# Patient Record
Sex: FEMALE | Race: WHITE | NOT HISPANIC OR LATINO | Employment: UNEMPLOYED | URBAN - NONMETROPOLITAN AREA
[De-identification: names, ages, dates, MRNs, and addresses within clinical notes are randomized per-mention and may not be internally consistent; named-entity substitution may affect disease eponyms.]

---

## 2017-05-15 ENCOUNTER — APPOINTMENT (OUTPATIENT)
Dept: LAB | Facility: HOSPITAL | Age: 22
End: 2017-05-15
Payer: COMMERCIAL

## 2017-05-15 ENCOUNTER — TRANSCRIBE ORDERS (OUTPATIENT)
Dept: ADMINISTRATIVE | Facility: HOSPITAL | Age: 22
End: 2017-05-15

## 2017-05-15 DIAGNOSIS — O20.9 ANTEPARTUM BLEEDING, FIRST TRIMESTER: Primary | ICD-10-CM

## 2017-05-15 DIAGNOSIS — O20.9 ANTEPARTUM BLEEDING, FIRST TRIMESTER: ICD-10-CM

## 2017-05-15 DIAGNOSIS — O26.859 SPOTTING COMPLICATING PREGNANCY, UNSPECIFIED TRIMESTER: ICD-10-CM

## 2017-05-15 LAB
ABO GROUP BLD: NORMAL
B-HCG SERPL-ACNC: 102 MIU/ML
BLD GP AB SCN SERPL QL: NEGATIVE
RH BLD: POSITIVE
SPECIMEN EXPIRATION DATE: NORMAL

## 2017-05-15 PROCEDURE — 36415 COLL VENOUS BLD VENIPUNCTURE: CPT

## 2017-05-15 PROCEDURE — 84702 CHORIONIC GONADOTROPIN TEST: CPT

## 2017-05-15 PROCEDURE — 86900 BLOOD TYPING SEROLOGIC ABO: CPT

## 2017-05-15 PROCEDURE — 86901 BLOOD TYPING SEROLOGIC RH(D): CPT

## 2017-05-15 PROCEDURE — 86850 RBC ANTIBODY SCREEN: CPT

## 2017-05-17 ENCOUNTER — APPOINTMENT (OUTPATIENT)
Dept: LAB | Facility: CLINIC | Age: 22
End: 2017-05-17
Payer: COMMERCIAL

## 2017-05-17 DIAGNOSIS — O20.9 ANTEPARTUM BLEEDING, FIRST TRIMESTER: ICD-10-CM

## 2017-05-17 DIAGNOSIS — O26.859 SPOTTING COMPLICATING PREGNANCY, UNSPECIFIED TRIMESTER: ICD-10-CM

## 2017-05-17 LAB — B-HCG SERPL-ACNC: 41 MIU/ML

## 2017-05-17 PROCEDURE — 84702 CHORIONIC GONADOTROPIN TEST: CPT

## 2017-05-17 PROCEDURE — 36415 COLL VENOUS BLD VENIPUNCTURE: CPT

## 2017-08-11 ENCOUNTER — HOSPITAL ENCOUNTER (EMERGENCY)
Facility: HOSPITAL | Age: 22
End: 2017-08-12
Attending: EMERGENCY MEDICINE | Admitting: EMERGENCY MEDICINE
Payer: COMMERCIAL

## 2017-08-11 DIAGNOSIS — T50.902A SUICIDE ATTEMPT BY DRUG INGESTION (HCC): Primary | ICD-10-CM

## 2017-08-11 LAB
ALBUMIN SERPL BCP-MCNC: 3.5 G/DL (ref 3.5–5)
ALP SERPL-CCNC: 40 U/L (ref 46–116)
ALT SERPL W P-5'-P-CCNC: 20 U/L (ref 12–78)
AMPHETAMINES SERPL QL SCN: NEGATIVE
ANION GAP SERPL CALCULATED.3IONS-SCNC: 10 MMOL/L (ref 4–13)
APAP SERPL-MCNC: <2 UG/ML (ref 10–30)
AST SERPL W P-5'-P-CCNC: 13 U/L (ref 5–45)
BARBITURATES UR QL: NEGATIVE
BASOPHILS # BLD AUTO: 0.03 THOUSANDS/ΜL (ref 0–0.1)
BASOPHILS NFR BLD AUTO: 0 % (ref 0–1)
BENZODIAZ UR QL: NEGATIVE
BILIRUB DIRECT SERPL-MCNC: 0.13 MG/DL (ref 0–0.2)
BILIRUB SERPL-MCNC: 0.3 MG/DL (ref 0.2–1)
BILIRUB UR QL STRIP: NEGATIVE
BUN SERPL-MCNC: 10 MG/DL (ref 5–25)
CALCIUM SERPL-MCNC: 8.6 MG/DL (ref 8.3–10.1)
CHLORIDE SERPL-SCNC: 105 MMOL/L (ref 100–108)
CLARITY UR: CLEAR
CO2 SERPL-SCNC: 24 MMOL/L (ref 21–32)
COCAINE UR QL: NEGATIVE
COLOR UR: YELLOW
CREAT SERPL-MCNC: 0.87 MG/DL (ref 0.6–1.3)
EOSINOPHIL # BLD AUTO: 0.14 THOUSAND/ΜL (ref 0–0.61)
EOSINOPHIL NFR BLD AUTO: 1 % (ref 0–6)
ERYTHROCYTE [DISTWIDTH] IN BLOOD BY AUTOMATED COUNT: 12.4 % (ref 11.6–15.1)
ETHANOL SERPL-MCNC: <3 MG/DL (ref 0–3)
GFR SERPL CREATININE-BSD FRML MDRD: 94 ML/MIN/1.73SQ M
GLUCOSE SERPL-MCNC: 94 MG/DL (ref 65–140)
GLUCOSE UR STRIP-MCNC: NEGATIVE MG/DL
HCG UR QL: NEGATIVE
HCT VFR BLD AUTO: 39 % (ref 34.8–46.1)
HGB BLD-MCNC: 13.6 G/DL (ref 11.5–15.4)
HGB UR QL STRIP.AUTO: NEGATIVE
INR PPP: 0.95 (ref 0.86–1.16)
KETONES UR STRIP-MCNC: NEGATIVE MG/DL
LEUKOCYTE ESTERASE UR QL STRIP: NEGATIVE
LIPASE SERPL-CCNC: 51 U/L (ref 73–393)
LYMPHOCYTES # BLD AUTO: 1.77 THOUSANDS/ΜL (ref 0.6–4.47)
LYMPHOCYTES NFR BLD AUTO: 18 % (ref 14–44)
MCH RBC QN AUTO: 32.3 PG (ref 26.8–34.3)
MCHC RBC AUTO-ENTMCNC: 34.9 G/DL (ref 31.4–37.4)
MCV RBC AUTO: 93 FL (ref 82–98)
METHADONE UR QL: NEGATIVE
MONOCYTES # BLD AUTO: 0.48 THOUSAND/ΜL (ref 0.17–1.22)
MONOCYTES NFR BLD AUTO: 5 % (ref 4–12)
NEUTROPHILS # BLD AUTO: 7.31 THOUSANDS/ΜL (ref 1.85–7.62)
NEUTS SEG NFR BLD AUTO: 76 % (ref 43–75)
NITRITE UR QL STRIP: NEGATIVE
OPIATES UR QL SCN: NEGATIVE
PCP UR QL: NEGATIVE
PH UR STRIP.AUTO: 7.5 [PH] (ref 4.5–8)
PLATELET # BLD AUTO: 254 THOUSANDS/UL (ref 149–390)
PMV BLD AUTO: 10.1 FL (ref 8.9–12.7)
POTASSIUM SERPL-SCNC: 3.9 MMOL/L (ref 3.5–5.3)
PROT SERPL-MCNC: 6.9 G/DL (ref 6.4–8.2)
PROT UR STRIP-MCNC: NEGATIVE MG/DL
PROTHROMBIN TIME: 12.6 SECONDS (ref 12.1–14.4)
RBC # BLD AUTO: 4.21 MILLION/UL (ref 3.81–5.12)
SALICYLATES SERPL-MCNC: <3 MG/DL (ref 3–20)
SODIUM SERPL-SCNC: 139 MMOL/L (ref 136–145)
SP GR UR STRIP.AUTO: 1.01 (ref 1–1.03)
THC UR QL: POSITIVE
UROBILINOGEN UR QL STRIP.AUTO: 0.2 E.U./DL
WBC # BLD AUTO: 9.73 THOUSAND/UL (ref 4.31–10.16)

## 2017-08-11 PROCEDURE — 96360 HYDRATION IV INFUSION INIT: CPT

## 2017-08-11 PROCEDURE — 80076 HEPATIC FUNCTION PANEL: CPT | Performed by: EMERGENCY MEDICINE

## 2017-08-11 PROCEDURE — 83690 ASSAY OF LIPASE: CPT | Performed by: EMERGENCY MEDICINE

## 2017-08-11 PROCEDURE — 93005 ELECTROCARDIOGRAM TRACING: CPT

## 2017-08-11 PROCEDURE — 81025 URINE PREGNANCY TEST: CPT | Performed by: EMERGENCY MEDICINE

## 2017-08-11 PROCEDURE — G0480 DRUG TEST DEF 1-7 CLASSES: HCPCS | Performed by: EMERGENCY MEDICINE

## 2017-08-11 PROCEDURE — 81003 URINALYSIS AUTO W/O SCOPE: CPT | Performed by: EMERGENCY MEDICINE

## 2017-08-11 PROCEDURE — 80320 DRUG SCREEN QUANTALCOHOLS: CPT | Performed by: EMERGENCY MEDICINE

## 2017-08-11 PROCEDURE — 80307 DRUG TEST PRSMV CHEM ANLYZR: CPT | Performed by: EMERGENCY MEDICINE

## 2017-08-11 PROCEDURE — 80048 BASIC METABOLIC PNL TOTAL CA: CPT | Performed by: EMERGENCY MEDICINE

## 2017-08-11 PROCEDURE — 85025 COMPLETE CBC W/AUTO DIFF WBC: CPT | Performed by: EMERGENCY MEDICINE

## 2017-08-11 PROCEDURE — 80329 ANALGESICS NON-OPIOID 1 OR 2: CPT | Performed by: EMERGENCY MEDICINE

## 2017-08-11 PROCEDURE — 85610 PROTHROMBIN TIME: CPT | Performed by: EMERGENCY MEDICINE

## 2017-08-11 PROCEDURE — 36415 COLL VENOUS BLD VENIPUNCTURE: CPT | Performed by: EMERGENCY MEDICINE

## 2017-08-11 RX ORDER — LORAZEPAM 1 MG/1
TABLET ORAL
Status: COMPLETED
Start: 2017-08-11 | End: 2017-08-11

## 2017-08-11 RX ORDER — LORAZEPAM 0.5 MG/1
0.5 TABLET ORAL ONCE
Status: COMPLETED | OUTPATIENT
Start: 2017-08-11 | End: 2017-08-11

## 2017-08-11 RX ORDER — NICOTINE 21 MG/24HR
21 PATCH, TRANSDERMAL 24 HOURS TRANSDERMAL ONCE
Status: DISCONTINUED | OUTPATIENT
Start: 2017-08-11 | End: 2017-08-12 | Stop reason: HOSPADM

## 2017-08-11 RX ORDER — LORAZEPAM 0.5 MG/1
0.5 TABLET ORAL ONCE
Status: DISCONTINUED | OUTPATIENT
Start: 2017-08-11 | End: 2017-08-11

## 2017-08-11 RX ORDER — LORAZEPAM 1 MG/1
2 TABLET ORAL ONCE
Status: COMPLETED | OUTPATIENT
Start: 2017-08-11 | End: 2017-08-11

## 2017-08-11 RX ADMIN — LORAZEPAM 0.5 MG: 1 TABLET ORAL at 20:25

## 2017-08-11 RX ADMIN — SODIUM CHLORIDE 1000 ML: 0.9 INJECTION, SOLUTION INTRAVENOUS at 16:30

## 2017-08-11 RX ADMIN — LORAZEPAM 2 MG: 1 TABLET ORAL at 23:02

## 2017-08-11 RX ADMIN — NICOTINE 21 MG: 21 PATCH, EXTENDED RELEASE TRANSDERMAL at 20:24

## 2017-08-11 RX ADMIN — LORAZEPAM 0.5 MG: 0.5 TABLET ORAL at 20:25

## 2017-08-12 VITALS
OXYGEN SATURATION: 99 % | DIASTOLIC BLOOD PRESSURE: 81 MMHG | HEART RATE: 77 BPM | RESPIRATION RATE: 15 BRPM | TEMPERATURE: 95.8 F | SYSTOLIC BLOOD PRESSURE: 128 MMHG

## 2017-08-12 PROCEDURE — 99285 EMERGENCY DEPT VISIT HI MDM: CPT

## 2017-08-14 LAB
ATRIAL RATE: 89 BPM
P AXIS: 60 DEGREES
PR INTERVAL: 130 MS
QRS AXIS: 61 DEGREES
QRSD INTERVAL: 82 MS
QT INTERVAL: 352 MS
QTC INTERVAL: 428 MS
T WAVE AXIS: 47 DEGREES
VENTRICULAR RATE: 89 BPM

## 2017-08-23 ENCOUNTER — GENERIC CONVERSION - ENCOUNTER (OUTPATIENT)
Dept: OTHER | Facility: OTHER | Age: 22
End: 2017-08-23

## 2017-08-24 ENCOUNTER — ALLSCRIPTS OFFICE VISIT (OUTPATIENT)
Dept: OTHER | Facility: OTHER | Age: 22
End: 2017-08-24

## 2017-09-21 ENCOUNTER — ALLSCRIPTS OFFICE VISIT (OUTPATIENT)
Dept: OTHER | Facility: OTHER | Age: 22
End: 2017-09-21

## 2018-01-11 NOTE — RESULT NOTES
Verified Results  (1) CBC/PLT/DIFF 07Apr2016 12:20PM Gabby Barnett Order Number: [id number]    TW Order Number: [id number]     Test Name Result Flag Reference   WBC COUNT 8 44 Thousand/uL  4 31-10 16   RBC COUNT 4 52 Million/uL  3 81-5 12   HEMOGLOBIN 13 8 g/dL  11 5-15 4   HEMATOCRIT 40 9 %  34 8-46  1   MCV 91 fL  82-98   MCH 30 5 pg  26 8-34 3   MCHC 33 7 g/dL  31 4-37 4   RDW 12 3 %  11 6-15 1   MPV 10 7 fL  8 9-12 7   PLATELET COUNT 199 Thousands/uL  149-390   NEUTROPHILS RELATIVE PERCENT 63 %  43-75   LYMPHOCYTES RELATIVE PERCENT 24 %  14-44   MONOCYTES RELATIVE PERCENT 8 %  4-12   EOSINOPHILS RELATIVE PERCENT 4 %  0-6   BASOPHILS RELATIVE PERCENT 1 %  0-1   NEUTROPHILS ABSOLUTE COUNT 5 40 Thousands/µL  1 85-7 62   LYMPHOCYTES ABSOLUTE COUNT 2 03 Thousands/µL  0 60-4 47   MONOCYTES ABSOLUTE COUNT 0 66 Thousand/µL  0 17-1 22   EOSINOPHILS ABSOLUTE COUNT 0 31 Thousand/µL  0 00-0 61   BASOPHILS ABSOLUTE COUNT 0 04 Thousands/µL  0 00-0 10     (1) COMPREHENSIVE METABOLIC PANEL [id number] 98:06RB Perez Arora    Order Number: [id number]      National Kidney Disease Education Program recommendations are as follows:  GFR calculation is accurate only with a steady state creatinine  Chronic Kidney disease less than 60 ml/min/1 73 sq  meters  Kidney failure less than 15 ml/min/1 73 sq  meters  Test Name Result Flag Reference   GLUCOSE,RANDM 98 mg/dL     If the patient is fasting, the ADA then defines impaired fasting glucose as > 100 mg/dL and diabetes as > or equal to 123 mg/dL     SODIUM 141 mmol/L  136-145   POTASSIUM 4 8 mmol/L  3 5-5 3   CHLORIDE 106 mmol/L  100-108   CARBON DIOXIDE 24 mmol/L  21-32   ANION GAP (CALC) 11 mmol/L  4-13   BLOOD UREA NITROGEN 13 mg/dL  5-25   CREATININE 0 73 mg/dL  0 60-1 30   Standardized to IDMS reference method   CALCIUM 9 0 mg/dL  8 3-10 1   BILI, TOTAL 0 11 mg/dL L 0 20-1 00   ALK PHOSPHATAS 57 U/L     ALT (SGPT) 26 U/L  12-78   AST(SGOT) 12 U/L 5-45   ALBUMIN 3 7 g/dL  3 5-5 0   TOTAL PROTEIN 7 0 g/dL  6 4-8 2   eGFR Non-African American      >60 0 ml/min/1 73sq m     (1) LIPID PANEL, FASTING 07Apr2016 12:20PM [employer] Order Number: [id number]      Triglyceride:         Normal              <150 mg/dl       Borderline High    150-199 mg/dl       High               200-499 mg/dl       Very High          >499 mg/dl  Cholesterol:         Desirable        <200 mg/dl      Borderline High  200-239 mg/dl      High             >239 mg/dl  HDL Cholesterol:        High    >59 mg/dL      Low     <41 mg/dL     Test Name Result Flag Reference   CHOLESTEROL 170 mg/dL     HDL,DIRECT 34 mg/dL L 40-60   Specimen collection should occur prior to Metamizole administration due to the potential for falsely depressed results  LDL CHOLESTEROL CALCULATED 123 mg/dL H 0-100   TRIGLYCERIDES 67 mg/dL  <=150   Specimen collection should occur prior to N-Acetylcysteine or Metamizole administration due to the potential for falsely depressed results  (1) PREGNANCY TEST (HCG QUALITATIVE) 07Apr2016 12:20PM [employer] Order Number: [id number]     Test Name Result Flag Reference   PREGNANCY TEST Negative  Negative     (1) TSH 07Apr2016 12:20PM [employer] Order Number: [id number]    Patients undergoing fluorescein dye angiography may retain small amounts of fluorescein in the body for 48-72 hours post procedure  Samples containing fluorescein can produce falsely depressed TSH values  If the patient had this procedure,a specimen should be resubmitted post fluorescein clearance          The recommended reference ranges for TSH during pregnancy are as follows:  First trimester 0 1 to 2 5 uIU/mL  Second trimester  0 2 to 3 0 uIU/mL  Third trimester 0 3 to 3 0 uIU/m     Test Name Result Flag Reference   TSH 1 301 uIU/mL  0 358-3 740     (1) URINALYSIS w URINE C/S REFLEX (will reflex a microscopy if leukocytes, occult blood, or nitrites are not within normal limits) 07Apr2016 12:20PM Cj Davila Order Number: [id number]     Test Name Result Flag Reference   COLOR Yellow     CLARITY Slightly Cloudy     PH UA 6 0  4 5-8 0   LEUKOCYTE ESTERASE UA Negative  Negative   NITRITE UA Negative  Negative   PROTEIN UA Negative mg/dl  Negative   GLUCOSE UA Negative mg/dl  Negative   KETONES UA Trace mg/dl A Negative   UROBILINOGEN UA 0 2 E U /dl  0 2, 1 0 E U /dl   BILIRUBIN UA Negative  Negative   BLOOD UA Negative  Negative, Trace-Intact   SPECIFIC GRAVITY UA 1 025  1 003-1 030

## 2018-01-13 VITALS
DIASTOLIC BLOOD PRESSURE: 78 MMHG | HEIGHT: 72 IN | BODY MASS INDEX: 35.08 KG/M2 | SYSTOLIC BLOOD PRESSURE: 130 MMHG | WEIGHT: 259 LBS

## 2018-01-22 VITALS
SYSTOLIC BLOOD PRESSURE: 120 MMHG | DIASTOLIC BLOOD PRESSURE: 70 MMHG | BODY MASS INDEX: 34.95 KG/M2 | HEIGHT: 72 IN | WEIGHT: 258 LBS

## 2018-01-23 NOTE — MISCELLANEOUS
Assessment   1  Current smoker (305 1) (F17 200)1   2  Depression (311) (F32 9)1      1 Amended By: Shruthi Murphy; Aug 24 2017 12:22 PM EST    Plan  Depression    · Start: Sertraline HCl - 50 MG Oral Tablet; TAKE 1 TABLET BY MOUTH ONCE DAILY1   Rx By: Shruthi Murphy; Dispense: 30 Days ; #:30 Tablet; Refill: 5;For: Depression; MIKAELA =   N;1 1,2  Verified Transmission to [address] 1,2  Last Updated By: System, [employer]; 8/24/2017 12:24:51 PM2    · Follow-up visit in 3 weeks Evaluation and Treatment  Follow-up  Status: Hold For -  Scheduling  Requested for: [id number]   Ordered; For: Depression;  Ordered By: Shruthi Murphy  Performed:   Due: [id number]      1 Amended By: Shruthi Murphy; Aug 24 2017 12:22 PM EST   2 Amended By: Shruthi Murphy; Aug 24 2017 12:25 PM EST    Discussion/Summary  Discussion Summary: To help her shut her mind down when she wants to rest,we will add sertraline 50 mg at bedtime  Follow-up with therapist as scheduled  Follow-up here in 2-3 weeks or when necessary1    Medication SE Review and Pt Understands Tx: Possible side effects of new medications were reviewed with the patient/guardian today1  The treatment plan was reviewed with the patient/guardian  The patient/guardian understands and agrees with the treatment plan1        1 Amended By: Shruthi Murphy; Aug 24 2017 12:25 PM EST    Chief Complaint  Chief Complaint Free Text Note Form: TOC1    Chief Complaint Chronic Condition [facility]: Patient is here today for follow up of chronic conditions described in HPI 1        1 Amended By: Shruthi Murphy; Aug 24 2017 12:26 PM EST    History of Present Illness  TCM Communication [facility]: The patient is being contacted for follow-up after hospitalization and EMMY  8-24-17 1215 1  PM  PT CALLED TO RESCHEDULE 1  APPT  Hospital  records were reviewed2  2   She was hospitalized at [facility]  The date of admission: 8-12-17, date of discharge: 8-19-17   Diagnosis: DEPRESSION WITH SUICIDAL IDEATION  She was discharged to home  She did not schedule a follow up appointment  Follow-up appointments with other specialists: PSYCHIATRY AND THERAPEUTIC CONSULT APPT 8-30-17 1200 PM AT [address]  The patient is currently asymptomatic  Communication performed and completed by Kiara Sifuentes   HPI: PT WAS ADMITTED TO [facility] WITH A SUICIDE ATTEMPT 2 WEEKS PRIOR TO ADMISSION  PT HAD A POSITIVE SCREEN OF MARIJUANA IN SYSTEM AND HAD CBC AND CMP WHAT WERE NORMAL  PT ALSO HAD AN EKG WHICH WAS NORMAL AT TIME OF ADMISSION  PT WAS COMPLAINING OF INSOMNIA AND WAS PLACED ON TRAZADONE 50 AT BEDTIME  WELLBUTRIN 100  WAS TAKEN ON A REGULAR BASIS ALONG WITH PROPANOLOL 10 FOR ANXIETY REASONS  PT RECEIVED THERAPY WHILE ADMITTED WITH PSYCH AND WAS DISCHARGED WITH AN APPT TO [address] [place] [address] WITH ACCESS SERVICES ON 8-30-17  patient here today for EMMY  Patient was admitted to the psychiatric floor due to depression and suicidal ideation  Patient now on bupropion and propranolol  She feels it is helping with her depression  Patient states trazodone does not help her sleep  Has trouble shutting her mind off  No SI or HI currently  Going to see the therapist on 8/30/20173    Depression (Initial): The patient presents with complaints of moderate depressed mood  Symptoms are improving  3  The patient is being seen for  follow-up of a hospitalization for3  depression3   The patient presents with complaints of moderate depressed mood  Symptoms are improving  3  insomnia3    The patient presents with complaints of suicidal ideation  Symptoms are resolved  3   Associated symptoms: 3  racing thoughts3   She describes this as3  moderate in severity3   Current treatment includes  bupropion, propranolol3  3    By report, there is  good compliance with treatment3  ,  good tolerance of treatment3  and  fair symptom control3  3         1 Amended By: Corine Haddad; Aug 22 2017 11:04 AM EST   2 Amended By: Gwendolyn Mobley; Aug 24 2017 12:22 PM EST   3 Amended By: Gwendolyn Mobley; Aug 24 2017 12:28 PM EST    Review of Systems  Complete-Female:   Constitutional:1  No fever, no chills, feels well, no tiredness, no recent weight gain or weight loss1   Cardiovascular:1  No complaints of slow heart rate, no fast heart rate, no chest pain, no palpitations, no leg claudication, no lower extremity edema1   Respiratory:1  No complaints of shortness of breath, no wheezing, no cough, no SOB on exertion, no orthopnea, no PND1   Gastrointestinal:1  No complaints of abdominal pain, no constipation, no nausea or vomiting, no diarrhea, no bloody stools1   Genitourinary:1  No complaints of dysuria, no incontinence, no pelvic pain, no dysmenorrhea, no vaginal discharge or bleeding1   Psychiatric:1  as noted in Hilda Iniguez   1 Amended By: Gwendolyn Mobley; Aug 24 2017 12:26 PM EST    Active Problems   1  Encounter for screening for cardiovascular disorders (V81 2) (Z13 6)  2  Postural dizziness with presyncope (780 4,780 2) (R42,R55)  3  Redness or discharge of eye (379 93) (H57 8)    Surgical History  Surgical History Reviewed: The surgical history was reviewed and updated today  1        1 Amended By: Gwendolyn Weinberg Aug 24 2017 12:25 PM EST    Family History  Family History Reviewed: The family history was reviewed and updated today  1        1 Amended By: Gwendolyn Weinberg Aug 24 2017 12:25 PM EST    Social History    · Current smoker (305 1) (F17 200)    Social History Reviewed: The social history was reviewed and updated today  1  The social history was reviewed and is unchanged  1        1 Amended By: Gwendolyn Mobley; Aug 24 2017 12:25 PM EST    Current Meds  1  No Reported Medications Recorded  Medication List Reviewed: The medication list was reviewed and updated today  1        1 Amended By: Gwendolyn Weinberg Aug 24 2017 12:25 PM EST    Allergies   1   No Known Drug Allergies    Vitals  Signs   Recorded: 38UMT1094 46:57YX    Systolic: 587 1    Diastolic: 78 1    Height: 6 ft  1    Weight: 259 lb  1    BMI Calculated: 35 13 1    BSA Calculated: 2 38 1      1 Amended By: Josr Gaviria; Aug 24 2017 12:25 PM EST    Physical Exam    Constitutional1    General appearance: No acute distress, well appearing and well nourished  1    Pulmonary1    Respiratory effort: No increased work of breathing or signs of respiratory distress  1    Auscultation of lungs: Clear to auscultation  1    Cardiovascular1    Auscultation of heart: Normal rate and rhythm, normal S1 and S2, without murmurs  1    Examination of extremities for edema and/or varicosities: Normal 1    Psychiatric1    Orientation to person, place, and time: Normal 1    Mood and affect: Normal 1          1 Amended By: Josr Gaviria; Aug 24 2017 12:25 PM EST    Future Appointments    Date/Time Provider Specialty Site   08/22/2017 01:15 PM Josr Gaviria DO Family [facility] [address] [facility]     Signatures   Electronically signed by : Shalini Hlil, ; Aug 21 2017  1:03PM EST                       (Author)    Electronically signed by : Cynthia Roberts DO; Aug 22 2017  6:50AM EST                       (Author)    Electronically signed by : Shalini Hill, ; Aug 24 2017 10:57AM EST                       (Author)    Electronically signed by : Jazmine Dewey DO; Aug 24 2017 12:28PM EST                       (Author)

## 2019-03-15 ENCOUNTER — OFFICE VISIT (OUTPATIENT)
Dept: FAMILY MEDICINE CLINIC | Facility: CLINIC | Age: 24
End: 2019-03-15
Payer: COMMERCIAL

## 2019-03-15 VITALS
BODY MASS INDEX: 38.33 KG/M2 | DIASTOLIC BLOOD PRESSURE: 72 MMHG | WEIGHT: 283 LBS | TEMPERATURE: 97.8 F | HEIGHT: 72 IN | SYSTOLIC BLOOD PRESSURE: 130 MMHG

## 2019-03-15 DIAGNOSIS — K59.1 FUNCTIONAL DIARRHEA: Primary | ICD-10-CM

## 2019-03-15 DIAGNOSIS — R10.84 GENERALIZED ABDOMINAL PAIN: ICD-10-CM

## 2019-03-15 DIAGNOSIS — J06.9 UPPER RESPIRATORY TRACT INFECTION, UNSPECIFIED TYPE: ICD-10-CM

## 2019-03-15 PROCEDURE — 99214 OFFICE O/P EST MOD 30 MIN: CPT | Performed by: FAMILY MEDICINE

## 2019-03-15 PROCEDURE — 3008F BODY MASS INDEX DOCD: CPT | Performed by: FAMILY MEDICINE

## 2019-03-15 RX ORDER — FLUTICASONE PROPIONATE 50 MCG
2 SPRAY, SUSPENSION (ML) NASAL DAILY
Qty: 16 G | Refills: 0 | Status: SHIPPED | OUTPATIENT
Start: 2019-03-15 | End: 2019-06-28 | Stop reason: ALTCHOICE

## 2019-03-15 RX ORDER — HYOSCYAMINE SULFATE 0.125 MG
0.12 TABLET ORAL EVERY 4 HOURS PRN
Qty: 40 TABLET | Refills: 1 | Status: SHIPPED | OUTPATIENT
Start: 2019-03-15 | End: 2019-06-28 | Stop reason: ALTCHOICE

## 2019-03-15 RX ORDER — AZITHROMYCIN 250 MG/1
TABLET, FILM COATED ORAL
Qty: 6 TABLET | Refills: 0 | Status: SHIPPED | OUTPATIENT
Start: 2019-03-15 | End: 2019-03-20

## 2019-03-15 NOTE — LETTER
March 15, 2019     Patient: Maru Ballard   YOB: 1995   Date of Visit: 3/15/2019       To Whom it May Concern:    Maru Ballard is under my professional care  She was seen in my office on 3/15/2019  She may return to work on 3/16/2019  Please excuse from work 3/15/2019 due to URI  If you have any questions or concerns, please don't hesitate to call           Sincerely,          Cathryn Johnson, DO        CC: No Recipients

## 2019-03-15 NOTE — PROGRESS NOTES
Assessment/Plan: For URI, Rx for Flonase and Zithromax  Push fluids  Call symptoms continue or increase  For her abdominal pain and functional diarrhea, Rx for Levsin and referral to GI  She will call us if symptoms continue or increase  No problem-specific Assessment & Plan notes found for this encounter  Diagnoses and all orders for this visit:    Functional diarrhea  -     Ambulatory referral to Gastroenterology; Future  -     hyoscyamine (ANASPAZ,LEVSIN) 0 125 MG tablet; Take 1 tablet (0 125 mg total) by mouth every 4 (four) hours as needed for cramping or diarrhea    Generalized abdominal pain  -     Ambulatory referral to Gastroenterology; Future  -     hyoscyamine (ANASPAZ,LEVSIN) 0 125 MG tablet; Take 1 tablet (0 125 mg total) by mouth every 4 (four) hours as needed for cramping or diarrhea    Upper respiratory tract infection, unspecified type  -     fluticasone (FLONASE) 50 mcg/act nasal spray; 2 sprays into each nostril daily  -     azithromycin (ZITHROMAX) 250 mg tablet; Take 2 tablets today then 1 tablet daily x 4 days          Subjective:      Patient ID: Rosi Guaman is a 20 y o  female  Patient here today with a couple concerns  The 1st is she has had a cold since yesterday  Head congestion postnasal drip coughing up phlegm  Feels hot and cold  No nausea or vomiting  Secondly for the last couple months, patient has had episodes of abdominal pain in the need to run to the bathroom to have a bowel movement  When she does, it is loose  The pain does finally subsided after a while after the bowel movement  Patient has tried to change her diet, increase her fiber  She states this weeks been pretty good  It seems to go every other week  It has he been woken her up out of sleep where she has to go to the bathroom  She has used Imodium, which she states does help  She has not seen any blood the stool  URI    This is a new problem  The current episode started yesterday   The problem has been unchanged  There has been no fever  Associated symptoms include abdominal pain, chest pain, congestion, coughing and diarrhea  Pertinent negatives include no nausea or vomiting  Treatments tried: Over-the-counter cold medicine  The treatment provided mild relief  Diarrhea    This is a new problem  The current episode started more than 1 month ago  The problem has been unchanged  The stool consistency is described as watery  Associated symptoms include abdominal pain, coughing and a URI  Pertinent negatives include no vomiting  Nothing aggravates the symptoms  There are no known risk factors  She has tried anti-motility drug for the symptoms  The treatment provided moderate relief  The following portions of the patient's history were reviewed and updated as appropriate:   She  has a past medical history of Depression  She   Patient Active Problem List    Diagnosis Date Noted    Functional diarrhea 03/15/2019    Generalized abdominal pain 03/15/2019     She  has no past surgical history on file  Her family history includes Thyroid disease in her mother  She  reports that she has been smoking  She uses smokeless tobacco  She reports that she drinks alcohol  She reports that she has current or past drug history  Drug: Marijuana  Current Outpatient Medications   Medication Sig Dispense Refill    azithromycin (ZITHROMAX) 250 mg tablet Take 2 tablets today then 1 tablet daily x 4 days 6 tablet 0    fluticasone (FLONASE) 50 mcg/act nasal spray 2 sprays into each nostril daily 16 g 0    hyoscyamine (ANASPAZ,LEVSIN) 0 125 MG tablet Take 1 tablet (0 125 mg total) by mouth every 4 (four) hours as needed for cramping or diarrhea 40 tablet 1     No current facility-administered medications for this visit  No current outpatient medications on file prior to visit  No current facility-administered medications on file prior to visit  She has No Known Allergies       Review of Systems HENT: Positive for congestion  Respiratory: Positive for cough  Cardiovascular: Positive for chest pain  Gastrointestinal: Positive for abdominal pain and diarrhea  Negative for nausea and vomiting  Objective:      /72   Temp 97 8 °F (36 6 °C)   Ht 6' (1 829 m)   Wt 128 kg (283 lb)   BMI 38 38 kg/m²          Physical Exam   Constitutional: She is oriented to person, place, and time  She appears well-developed and well-nourished  No distress  HENT:   Head: Normocephalic and atraumatic  Right Ear: Tympanic membrane normal    Left Ear: Tympanic membrane normal    Nose: Mucosal edema and rhinorrhea present  Mouth/Throat: Posterior oropharyngeal erythema present  No oropharyngeal exudate  Clear postnasal drip   Eyes: Conjunctivae are normal    Cardiovascular: Normal rate, regular rhythm and normal heart sounds  Exam reveals no gallop and no friction rub  No murmur heard  Pulmonary/Chest: Effort normal and breath sounds normal  No respiratory distress  She has no wheezes  She has no rales  Musculoskeletal: She exhibits no edema  Neurological: She is alert and oriented to person, place, and time  Skin: She is not diaphoretic  Psychiatric: She has a normal mood and affect  Her behavior is normal  Judgment and thought content normal    Vitals reviewed

## 2019-06-28 ENCOUNTER — OFFICE VISIT (OUTPATIENT)
Dept: FAMILY MEDICINE CLINIC | Facility: CLINIC | Age: 24
End: 2019-06-28
Payer: COMMERCIAL

## 2019-06-28 VITALS
DIASTOLIC BLOOD PRESSURE: 72 MMHG | BODY MASS INDEX: 37.84 KG/M2 | WEIGHT: 279.4 LBS | HEIGHT: 72 IN | SYSTOLIC BLOOD PRESSURE: 112 MMHG

## 2019-06-28 DIAGNOSIS — Z12.4 SCREENING FOR CERVICAL CANCER: ICD-10-CM

## 2019-06-28 DIAGNOSIS — N92.1 METRORRHAGIA: ICD-10-CM

## 2019-06-28 DIAGNOSIS — Z00.00 ANNUAL PHYSICAL EXAM: Primary | ICD-10-CM

## 2019-06-28 DIAGNOSIS — N94.10 DYSPAREUNIA IN FEMALE: ICD-10-CM

## 2019-06-28 PROCEDURE — 99395 PREV VISIT EST AGE 18-39: CPT | Performed by: PHYSICIAN ASSISTANT

## 2019-07-05 ENCOUNTER — OFFICE VISIT (OUTPATIENT)
Dept: URGENT CARE | Facility: CLINIC | Age: 24
End: 2019-07-05
Payer: COMMERCIAL

## 2019-07-05 VITALS
RESPIRATION RATE: 20 BRPM | DIASTOLIC BLOOD PRESSURE: 70 MMHG | OXYGEN SATURATION: 99 % | TEMPERATURE: 98.6 F | HEIGHT: 72 IN | HEART RATE: 60 BPM | WEIGHT: 279 LBS | BODY MASS INDEX: 37.79 KG/M2 | SYSTOLIC BLOOD PRESSURE: 140 MMHG

## 2019-07-05 DIAGNOSIS — K08.89 PAIN, DENTAL: Primary | ICD-10-CM

## 2019-07-05 PROCEDURE — 99204 OFFICE O/P NEW MOD 45 MIN: CPT | Performed by: PHYSICIAN ASSISTANT

## 2019-07-05 RX ORDER — AMOXICILLIN 500 MG/1
500 TABLET, FILM COATED ORAL 3 TIMES DAILY
Qty: 21 TABLET | Refills: 0 | Status: SHIPPED | OUTPATIENT
Start: 2019-07-05 | End: 2019-07-12

## 2019-07-05 NOTE — PROGRESS NOTES
[address] [facility]  ([facility]) [phone]  (fax) [phone]        NAME: Tyler Valdez is a 20 y o  female  : 1995    MRN: [id number]  DATE: 2019  TIME: 12:53 PM    Assessment and Plan   Pain, dental [K08 89]  1  Pain, dental  amoxicillin (AMOXIL) 500 MG tablet       Patient Instructions   May alternate Tylenol and Ibuprofen as needed  Encourage fluids and rest    Warm water rinses  Maintain good oral hygiene  Complete course of antibiotics as prescribed  Follow up with Dentist for additional eval as scheduled next week  F/U with PCP if symptoms persist/worsen or go to nearest emergency department if any signs of distress  To present to the ER if symptoms worsen  Chief Complaint     Chief Complaint   Patient presents with    Dental Pain     Has broken tooth R lower with  R sided facial pain    Earache     R ear sounds muffled x 1 day         History of Present Illness   Tyler Valdez presents to the clinic c/o    Has apt with dentist next week  Dental Pain    This is a new problem  The current episode started in the past 7 days  The problem occurs constantly  The problem has been unchanged  The pain is moderate  Associated symptoms include facial pain  Pertinent negatives include no difficulty swallowing, fever, oral bleeding, sinus pressure or thermal sensitivity  She has tried NSAIDs for the symptoms  The treatment provided mild relief  Review of Systems   Review of Systems   Constitutional: Negative for chills, fatigue and fever  HENT: Positive for dental problem  Negative for congestion, ear discharge, ear pain, postnasal drip, rhinorrhea, sinus pressure and sinus pain  Right ear blocked at times   Eyes: Negative for photophobia, pain, discharge, redness and itching  Respiratory: Negative for apnea, cough, choking, chest tightness, shortness of breath and stridor      Cardiovascular: Negative for chest pain, palpitations and leg swelling  Musculoskeletal: Negative for arthralgias  Neurological: Negative for dizziness, weakness and headaches  Hematological: Negative for adenopathy  Current Medications     No long-term medications on file  Current Allergies     Allergies as of 07/05/2019    (No Known Allergies)            The following portions of the patient's history were reviewed and updated as appropriate: allergies, current medications, past family history, past medical history, past social history, past surgical history and problem list   Past Medical History:   Diagnosis Date    Depression      History reviewed  No pertinent surgical history    Social History     Socioeconomic History    Marital status: Single     Spouse name: Not on file    Number of children: Not on file    Years of education: Not on file    Highest education level: Not on file   Occupational History    Not on file   Social Needs    Financial resource strain: Not on file    Food insecurity:     Worry: Not on file     Inability: Not on file    Transportation needs:     Medical: Not on file     Non-medical: Not on file   Tobacco Use    Smoking status: Current Every Day Smoker    Smokeless tobacco: Never Used   Substance and Sexual Activity    Alcohol use: Not Currently     Frequency: Monthly or less     Drinks per session: 1 or 2     Binge frequency: Never     Comment: rare    Drug use: Yes     Types: Marijuana    Sexual activity: Not on file   Lifestyle    Physical activity:     Days per week: Not on file     Minutes per session: Not on file    Stress: Not on file   Relationships    Social connections:     Talks on phone: Not on file     Gets together: Not on file     Attends Latter day service: Not on file     Active member of club or organization: Not on file     Attends meetings of clubs or organizations: Not on file     Relationship status: Not on file    Intimate partner violence:     Fear of current or ex partner: Not on file     Emotionally abused: Not on file     Physically abused: Not on file     Forced sexual activity: Not on file   Other Topics Concern    Not on file   Social History Narrative    Not on file       Objective   /70 (BP Location: Right arm, Patient Position: Sitting, Cuff Size: Large)   Pulse 60   Temp 98 6 °F (37 °C) (Tympanic)   Resp 20   Ht 6' (1 829 m)   Wt 127 kg (279 lb)   LMP 06/12/2019   SpO2 99%   BMI 37 84 kg/m²      Physical Exam     Physical Exam   Constitutional: She appears well-developed and well-nourished  No distress  HENT:   Head: Normocephalic and atraumatic  Right Ear: Tympanic membrane and external ear normal  No drainage, swelling or tenderness  No middle ear effusion  Left Ear: Tympanic membrane and external ear normal  No drainage, swelling or tenderness  No middle ear effusion  Nose: Nose normal    Mouth/Throat: Oropharynx is clear and moist  Dental caries present  No dental abscesses  No oropharyngeal exudate  Eyes: Conjunctivae are normal  Right eye exhibits no discharge  Left eye exhibits no discharge  Cardiovascular: Normal rate and regular rhythm  Exam reveals no gallop and no friction rub  No murmur heard  Pulmonary/Chest: Effort normal and breath sounds normal  No stridor  No respiratory distress  She has no decreased breath sounds  She has no wheezes  She has no rhonchi  She has no rales  She exhibits no tenderness  Lymphadenopathy:     She has no cervical adenopathy  Neurological: She is alert  Skin: She is not diaphoretic  Nursing note and vitals reviewed        Yesika Love PA-C

## 2019-08-08 ENCOUNTER — OFFICE VISIT (OUTPATIENT)
Dept: URGENT CARE | Facility: CLINIC | Age: 24
End: 2019-08-08
Payer: COMMERCIAL

## 2019-08-08 VITALS
TEMPERATURE: 98.9 F | OXYGEN SATURATION: 98 % | HEART RATE: 92 BPM | HEIGHT: 72 IN | DIASTOLIC BLOOD PRESSURE: 73 MMHG | BODY MASS INDEX: 37.25 KG/M2 | SYSTOLIC BLOOD PRESSURE: 123 MMHG | WEIGHT: 275 LBS | RESPIRATION RATE: 18 BRPM

## 2019-08-08 DIAGNOSIS — K04.7 DENTAL INFECTION: Primary | ICD-10-CM

## 2019-08-08 PROCEDURE — 99214 OFFICE O/P EST MOD 30 MIN: CPT | Performed by: PHYSICIAN ASSISTANT

## 2019-08-08 RX ORDER — AMOXICILLIN 500 MG/1
500 TABLET, FILM COATED ORAL 3 TIMES DAILY
Qty: 21 TABLET | Refills: 0 | Status: SHIPPED | COMMUNITY
Start: 2019-08-08 | End: 2019-08-15

## 2019-08-08 NOTE — PROGRESS NOTES
[address] [facility]  ([facility] [phone]  (fax) [phone]        NAME: Payal Muñoz is a 20 y o  female  : 1995    MRN: [id number]  DATE: 2019  TIME: 9:09 AM    Assessment and Plan   Dental infection [K04 7]  1  Dental infection  amoxicillin (AMOXIL) 500 MG tablet       Patient Instructions   May alternate Tylenol and Ibuprofen as needed  Encourage fluids and rest    Warm water rinses  Maintain good oral hygiene  Complete course of antibiotics as prescribed  Follow up with Dentist for additional eval    F/U with PCP if symptoms persist/worsen or go to nearest emergency department if any signs of distress  To present to the ER if symptoms worsen  Chief Complaint     Chief Complaint   Patient presents with    Dental Pain     left lower jaw pain, has appt with dentist next week         History of Present Illness   Payal Muñoz presents to the clinic c/o    Has apt with dentist next week  Dental Pain    This is a new problem  The current episode started in the past 7 days  The problem occurs constantly  The problem has been unchanged  The pain is at a severity of 8/10  The pain is moderate  Associated symptoms include thermal sensitivity  Pertinent negatives include no difficulty swallowing, facial pain, fever, oral bleeding or sinus pressure  She has tried acetaminophen and NSAIDs for the symptoms  The treatment provided mild relief  Review of Systems   Review of Systems   Constitutional: Negative for activity change, appetite change, chills, diaphoresis, fatigue and fever  HENT: Positive for dental problem  Negative for congestion, ear discharge, ear pain, facial swelling, rhinorrhea, sinus pressure, sinus pain, sneezing and sore throat  Eyes: Negative for photophobia, pain, discharge, redness, itching and visual disturbance  Respiratory: Negative for apnea, cough, chest tightness, shortness of breath and wheezing  Cardiovascular: Negative for chest pain  Gastrointestinal: Negative for abdominal distention, abdominal pain, constipation, diarrhea, nausea and vomiting  Genitourinary: Negative for dysuria, flank pain, frequency, hematuria and urgency  Musculoskeletal: Negative for arthralgias, back pain, gait problem, joint swelling, myalgias, neck pain and neck stiffness  Skin: Negative for color change, rash and wound  Allergic/Immunologic: Negative for immunocompromised state  Neurological: Negative for dizziness and headaches  Hematological: Negative for adenopathy  Psychiatric/Behavioral: Negative for confusion  Current Medications     No long-term medications on file  Current Allergies     Allergies as of 08/08/2019    (No Known Allergies)            The following portions of the patient's history were reviewed and updated as appropriate: allergies, current medications, past family history, past medical history, past social history, past surgical history and problem list   Past Medical History:   Diagnosis Date    Depression      History reviewed  No pertinent surgical history    Social History     Socioeconomic History    Marital status: Single     Spouse name: Not on file    Number of children: Not on file    Years of education: Not on file    Highest education level: Not on file   Occupational History    Not on file   Social Needs    Financial resource strain: Not on file    Food insecurity:     Worry: Not on file     Inability: Not on file    Transportation needs:     Medical: Not on file     Non-medical: Not on file   Tobacco Use    Smoking status: Current Every Day Smoker    Smokeless tobacco: Never Used   Substance and Sexual Activity    Alcohol use: Not Currently     Frequency: Monthly or less     Drinks per session: 1 or 2     Binge frequency: Never     Comment: rare    Drug use: Not Currently     Types: Marijuana    Sexual activity: Not on file   Lifestyle    Physical activity:     Days per week: Not on file     Minutes per session: Not on file    Stress: Not on file   Relationships    Social connections:     Talks on phone: Not on file     Gets together: Not on file     Attends Jainism service: Not on file     Active member of club or organization: Not on file     Attends meetings of clubs or organizations: Not on file     Relationship status: Not on file    Intimate partner violence:     Fear of current or ex partner: Not on file     Emotionally abused: Not on file     Physically abused: Not on file     Forced sexual activity: Not on file   Other Topics Concern    Not on file   Social History Narrative    Not on file       Objective   /73   Pulse 92   Temp 98 9 °F (37 2 °C)   Resp 18   Ht 6' (1 829 m)   Wt 125 kg (275 lb)   SpO2 98%   BMI 37 30 kg/m²      Physical Exam     Physical Exam   Constitutional: She is oriented to person, place, and time  She appears well-developed and well-nourished  No distress  HENT:   Head: Normocephalic and atraumatic  Right Ear: Tympanic membrane and external ear normal    Left Ear: Tympanic membrane and external ear normal    Nose: Nose normal    Mouth/Throat: Oropharynx is clear and moist  Abnormal dentition  Dental caries present  No dental abscesses  No oropharyngeal exudate or posterior oropharyngeal erythema  Eyes: Conjunctivae are normal  Right eye exhibits no discharge  Left eye exhibits no discharge  No scleral icterus  Neck: Normal range of motion  Neck supple  No JVD present  No tracheal deviation present  No thyromegaly present  Cardiovascular: Normal rate, regular rhythm and normal heart sounds  Exam reveals no gallop and no friction rub  No murmur heard  Pulmonary/Chest: Effort normal and breath sounds normal  No stridor  No respiratory distress  She has no wheezes  She has no rales  She exhibits no tenderness  Musculoskeletal: Normal range of motion   She exhibits no tenderness or deformity  Lymphadenopathy:     She has no cervical adenopathy  Neurological: She is alert and oriented to person, place, and time  Coordination normal    Skin: Skin is warm and dry  No rash noted  She is not diaphoretic  No erythema  No pallor  Psychiatric: She has a normal mood and affect  Her behavior is normal  Judgment and thought content normal    Nursing note and vitals reviewed        Helena Humphrey PA-C

## 2019-08-28 ENCOUNTER — OFFICE VISIT (OUTPATIENT)
Dept: OBGYN CLINIC | Facility: MEDICAL CENTER | Age: 24
End: 2019-08-28
Payer: COMMERCIAL

## 2019-08-28 VITALS
HEIGHT: 71 IN | WEIGHT: 272 LBS | SYSTOLIC BLOOD PRESSURE: 110 MMHG | DIASTOLIC BLOOD PRESSURE: 78 MMHG | BODY MASS INDEX: 38.08 KG/M2

## 2019-08-28 DIAGNOSIS — N89.8 VAGINAL DISCHARGE: Primary | ICD-10-CM

## 2019-08-28 DIAGNOSIS — R39.9 URINARY TRACT INFECTION SYMPTOMS: ICD-10-CM

## 2019-08-28 DIAGNOSIS — Z11.3 SCREENING EXAMINATION FOR STD (SEXUALLY TRANSMITTED DISEASE): ICD-10-CM

## 2019-08-28 PROCEDURE — 87510 GARDNER VAG DNA DIR PROBE: CPT | Performed by: NURSE PRACTITIONER

## 2019-08-28 PROCEDURE — 87591 N.GONORRHOEAE DNA AMP PROB: CPT | Performed by: NURSE PRACTITIONER

## 2019-08-28 PROCEDURE — 87660 TRICHOMONAS VAGIN DIR PROBE: CPT | Performed by: NURSE PRACTITIONER

## 2019-08-28 PROCEDURE — 87491 CHLMYD TRACH DNA AMP PROBE: CPT | Performed by: NURSE PRACTITIONER

## 2019-08-28 PROCEDURE — 87480 CANDIDA DNA DIR PROBE: CPT | Performed by: NURSE PRACTITIONER

## 2019-08-28 PROCEDURE — 99202 OFFICE O/P NEW SF 15 MIN: CPT | Performed by: NURSE PRACTITIONER

## 2019-08-28 RX ORDER — FLUCONAZOLE 200 MG/1
TABLET ORAL
Qty: 2 TABLET | Refills: 0 | Status: SHIPPED | OUTPATIENT
Start: 2019-08-28 | End: 2019-09-01

## 2019-08-28 NOTE — PROGRESS NOTES
Assessment    Very low risk of STD exposure        Plan;  Labs for [facility] and Chlamydia sent  Affirm culture sent of vaginal discharge             Patient treated with fluconazole for her presumptive yeast infection  Urine cc dip in office was negative  Urine clean catch culture in the office was negative for infection today                25 minutes was spent with pt of which >50% was counseling and coordination of care  Discussed safe sexual practice in detail  Discussed HSV issues in detail  Appropriate educational material was distributed       Subjective    Garrett Roth is a 20 y o  female who presents for sexually transmitted disease check  Sexual history reviewed with the patient  STI Exposure: denies knowledge of risky exposure  Previous history of STI none  Patient recently finished a course of amoxicillin for dental work  She complains of pain with sex which she states is mainly with penetration     She is in a monogamous relationship  With 1 partner and uses condoms all the time  Current symptoms vaginal discharge: white and thick,   Denies odor, bu positive itching and burning  Wants to be checked for UTI also  Because sometimes feels burning with voiding  Contraception;   Condoms all the time  OB History        1    Para        Term                AB   1    Living   0       SAB   1    TAB        Ectopic        Multiple        Live Births   0                Menarche age:  13  Patient's last menstrual period was 2019  Period Duration (Days): 5 days  Period Pattern: Regular  Menstrual Flow: Moderate    The following portions of the patient's history were reviewed and updated as appropriate: allergies, past family history, past social history and past surgical history  Review of Systems  Pertinent items are noted in HPI          Objective      /78   Ht 5' 11" (1 803 m)   Wt 123 kg (272 lb)   LMP 2019   BMI 37 94 kg/m² General:   alert and oriented, in no acute distress           Abdomen: soft, non-tender, without masses or organomegaly   Vulva: normal   Vagina: + discharge thick white   Cervix: no cervical motion tenderness   Uterus: normal size   Adnexa: no mass, fullness, tenderness

## 2019-08-29 LAB
C TRACH DNA SPEC QL NAA+PROBE: NEGATIVE
N GONORRHOEA DNA SPEC QL NAA+PROBE: NEGATIVE

## 2019-08-30 LAB
CANDIDA RRNA VAG QL PROBE: NEGATIVE
G VAGINALIS RRNA GENITAL QL PROBE: NEGATIVE
T VAGINALIS RRNA GENITAL QL PROBE: NEGATIVE

## 2020-07-06 ENCOUNTER — TELEPHONE (OUTPATIENT)
Dept: OBGYN CLINIC | Facility: MEDICAL CENTER | Age: 25
End: 2020-07-06

## 2020-07-07 ENCOUNTER — LAB (OUTPATIENT)
Dept: LAB | Facility: MEDICAL CENTER | Age: 25
End: 2020-07-07
Payer: COMMERCIAL

## 2020-07-07 DIAGNOSIS — N91.2 AMENORRHEA: ICD-10-CM

## 2020-07-07 DIAGNOSIS — N91.2 AMENORRHEA: Primary | ICD-10-CM

## 2020-07-07 LAB
B-HCG SERPL-ACNC: ABNORMAL MIU/ML
PROGEST SERPL-MCNC: 16.6 NG/ML

## 2020-07-07 PROCEDURE — 84702 CHORIONIC GONADOTROPIN TEST: CPT

## 2020-07-07 PROCEDURE — 36415 COLL VENOUS BLD VENIPUNCTURE: CPT

## 2020-07-07 PROCEDURE — 84144 ASSAY OF PROGESTERONE: CPT

## 2020-07-08 DIAGNOSIS — Z34.91 ENCOUNTER FOR PREGNANCY RELATED EXAMINATION IN FIRST TRIMESTER: Primary | ICD-10-CM

## 2020-07-08 NOTE — RESULT ENCOUNTER NOTE
Please inform patient of results , given LMP around 11 weeks    If interested in sequential screen recommend MFM referral asap as this is time sensitive thanks

## 2020-07-13 NOTE — PATIENT INSTRUCTIONS
Pregnancy at 11 to 14 Weeks   AMBULATORY CARE:   What changes are happening to your body: You are now at the end of your first trimester and entering your second trimester  Morning sickness usually goes away by this time  You may have other symptoms such as fatigue, frequent urination, and headaches  You may have gained between 2 to 4 pounds by now  Seek care immediately if:   · You have pain or cramping in your abdomen or low back  · You have heavy vaginal bleeding or clotting  · You pass material that looks like tissue or large clots  Collect the material and bring it with you  Contact your healthcare provider if:   · You cannot keep food or drinks down, and you are losing weight  · You have light bleeding  · You have chills or a fever  · You have vaginal itching, burning, or pain  · You have yellow, green, white, or foul-smelling vaginal discharge  · You have pain or burning when you urinate, less urine than usual, or pink or bloody urine  · You have questions or concerns about your condition or care  How to care for yourself at this stage of your pregnancy:   · Get plenty of rest   You may feel more tired than normal  You may need to take naps or go to bed earlier  · Manage nausea and vomiting  Avoid fatty and spicy foods  Eat small meals throughout the day instead of large meals  Marion may help to decrease nausea  Ask your healthcare provider about other ways of decreasing nausea and vomiting  · Eat a variety of healthy foods  Healthy foods include fruits, vegetables, whole-grain breads, low-fat dairy foods, beans, lean meats, and fish  Drink liquids as directed  Ask how much liquid to drink each day and which liquids are best for you  Limit caffeine to less than 200 milligrams each day  Limit your intake of fish to 2 servings each week  Choose fish low in mercury such as canned light tuna, shrimp, salmon, cod, or tilapia   Do not  eat fish high in mercury such as swordfish, tilefish, basilio mackerel, and shark  · Take prenatal vitamins as directed  Your need for certain vitamins and minerals, such as folic acid, increases during pregnancy  Prenatal vitamins provide some of the extra vitamins and minerals you need  Prenatal vitamins may also help to decrease the risk of certain birth defects  · Do not smoke  If you smoke, it is never too late to quit  Smoking increases your risk of a miscarriage and other health problems during your pregnancy  Smoking can cause your baby to be born too early or weigh less at birth  Ask your healthcare provider for information if you need help quitting  · Do not drink alcohol  Alcohol passes from your body to your baby through the placenta  It can affect your baby's brain development and cause fetal alcohol syndrome (FAS)  FAS is a group of conditions that causes mental, behavior, and growth problems  · Talk to your healthcare provider before you take any medicines  Many medicines may harm your baby if you take them when you are pregnant  Do not take any medicines, vitamins, herbs, or supplements without first talking to your healthcare provider  Never use illegal or street drugs (such as marijuana or cocaine) while you are pregnant  Safety tips during pregnancy:   · Avoid hot tubs and saunas  Do not use a hot tub or sauna while you are pregnant, especially during your first trimester  Hot tubs and saunas may raise your baby's temperature and increase the risk of birth defects  · Avoid toxoplasmosis  This is an infection caused by eating raw meat or being around infected cat feces  It can cause birth defects, miscarriages, and other problems  Wash your hands after you touch raw meat  Make sure any meat is well-cooked before you eat it  Avoid raw eggs and unpasteurized milk  Use gloves or ask someone else to clean your cat's litter box while you are pregnant  Changes that are happening with your baby:   Your baby has fully formed fingernails and toenails  Your baby's heartbeat can now be heard  Ask your healthcare provider if you can listen to your baby's heartbeat  By week 14, your baby is over 4 inches long from the top of the head to the rump (baby's bottom)  Your baby weighs over 3 ounces  What you need to know about prenatal care:  During the first 28 weeks of your pregnancy, you will see your healthcare provider once a month  Prenatal care can help prevent problems during pregnancy and childbirth  Your healthcare provider will check your blood pressure and weight  You may also need any of the following:  · A urine test  may also be done to check for sugar and protein  These can be signs of gestational diabetes or infection  · Genetic disorders screening tests  may be offered to you  This screening test checks your baby's risk of genetic disorders such as Down syndrome  The screening test includes a blood test and ultrasound  · Your baby's heart rate  will be checked  © 2017 [address] [employer] Information is for End User's use only and may not be sold, redistributed or otherwise used for commercial purposes  All illustrations and images included in CareNotes® are the copyrighted property of [employer]  or [employer]  The above information is an  only  It is not intended as medical advice for individual conditions or treatments  Talk to your doctor, nurse or pharmacist before following any medical regimen to see if it is safe and effective for you

## 2020-07-15 ENCOUNTER — TELEPHONE (OUTPATIENT)
Dept: PERINATAL CARE | Facility: OTHER | Age: 25
End: 2020-07-15

## 2020-07-15 ENCOUNTER — INITIAL PRENATAL (OUTPATIENT)
Dept: OBGYN CLINIC | Facility: MEDICAL CENTER | Age: 25
End: 2020-07-15
Payer: COMMERCIAL

## 2020-07-15 DIAGNOSIS — Z34.01 ENCOUNTER FOR PRENATAL CARE IN FIRST TRIMESTER OF FIRST PREGNANCY: Primary | ICD-10-CM

## 2020-07-15 PROCEDURE — T1001 NURSING ASSESSMENT/EVALUATN: HCPCS | Performed by: OBSTETRICS & GYNECOLOGY

## 2020-07-15 NOTE — TELEPHONE ENCOUNTER
Spoke with patient and confirmed appointment with M  1 support person (must be over age of 15) may accompany patient  Will you and your support person be able to wear a mask, without a valve, during the entire appointment? yes  [facility] does not allow cell phone use, recording device or streaming during the ultrasound  Check in and rooming questions will be done via phone, when you arrive in the parking lot please call the following inside line # prior to entering office:    [facility] line:  [phone]    Have you or your support person traveled outside the state in the last 2 weeks? No   If yes, what state did you travel to? No     Do you or your support person have:  Fever or flu-like symptoms? No  Symptoms of upper respiratory infection like runny nose, sore throat or cough? No  Do you have new headache that you have not had in the past? No  Have you experienced any new shortness of breath recently? No  Do you have any new loss of taste or smell? No  Do you have any new diarrhea, nausea or vomiting? No  Have you recently been in contact with anyone who has been sick or diagnosed with COVID-19 infection? No  Have you been recommended to quarantine because of an exposure to a confirmed positive COVID19 person? No    You and your support person will have temperature screening upon arrival     Patient verbalized understanding of all instructions

## 2020-07-15 NOTE — PROGRESS NOTES
OB INTAKE INTERVIEW      Pt presents for OB intake    OB History    Para Term  AB Living   2       1 0   SAB TAB Ectopic Multiple Live Births   1       0      # Outcome Date GA Lbr Satish/2nd Weight Sex Delivery Anes PTL Lv   2 Current            1 SAB 2016 6w0d    SAB            Hx of  delivery prior to 36 weeks 6 days:  NO   Last Menstrual Period:   Patient's last menstrual period was 2020 (exact date)  ? History of Diabetes: NO  History of Hypertension: NO      Infection Screening: Does the pt have a hx of MRSA? NO    H&P visit scheduled  ?  Interview education  Information on [facility] Pregnancy Essentials reviewed  Handouts given: Baby and Me support center  [facility] Vishal Li in Pregnancy information sheet   COVID-19: precautions and travel restrictions reviewed    Interview education    [facility]  Discussed genetic testing-    - pt has dating US with NT scheduled tomorrow  Referral given for genetic counselor as pts  Mother has cerebellar atrophy  Information on CF and SMA carrier screening reviewed-patient will let office know at next appointment if screening is desired  Discussed Tdap and Influenza vaccines         Depression Screening Follow-up Plan: Patient's depression screening was Negative with an [place] score of 4    States she had episode of bleeding approx  6 weeks ago  States she has not had any since and UPT remains positive  Lab slip given for PN panel-advised to complete ASAP                   The patient was oriented to our practice and all questions were answered    Interviewed by: Shayna Slade RN 07/15/20

## 2020-07-16 ENCOUNTER — APPOINTMENT (OUTPATIENT)
Dept: LAB | Facility: MEDICAL CENTER | Age: 25
End: 2020-07-16
Payer: COMMERCIAL

## 2020-07-16 ENCOUNTER — TELEPHONE (OUTPATIENT)
Dept: PERINATAL CARE | Facility: OTHER | Age: 25
End: 2020-07-16

## 2020-07-16 ENCOUNTER — ROUTINE PRENATAL (OUTPATIENT)
Dept: PERINATAL CARE | Facility: OTHER | Age: 25
End: 2020-07-16
Payer: COMMERCIAL

## 2020-07-16 VITALS
WEIGHT: 290 LBS | DIASTOLIC BLOOD PRESSURE: 80 MMHG | SYSTOLIC BLOOD PRESSURE: 132 MMHG | TEMPERATURE: 99.1 F | HEART RATE: 110 BPM | HEIGHT: 72 IN | BODY MASS INDEX: 39.28 KG/M2

## 2020-07-16 DIAGNOSIS — Z34.91 ENCOUNTER FOR PREGNANCY RELATED EXAMINATION IN FIRST TRIMESTER: ICD-10-CM

## 2020-07-16 DIAGNOSIS — Z86.59 H/O MAJOR DEPRESSION: ICD-10-CM

## 2020-07-16 DIAGNOSIS — Z3A.13 13 WEEKS GESTATION OF PREGNANCY: Primary | ICD-10-CM

## 2020-07-16 DIAGNOSIS — Z13.79 GENETIC SCREENING: ICD-10-CM

## 2020-07-16 DIAGNOSIS — Z34.01 ENCOUNTER FOR PRENATAL CARE IN FIRST TRIMESTER OF FIRST PREGNANCY: ICD-10-CM

## 2020-07-16 DIAGNOSIS — Z72.0 TOBACCO USER: ICD-10-CM

## 2020-07-16 DIAGNOSIS — E66.9 OBESITY (BMI 35.0-39.9 WITHOUT COMORBIDITY): ICD-10-CM

## 2020-07-16 DIAGNOSIS — Z36.82 ENCOUNTER FOR (NT) NUCHAL TRANSLUCENCY SCAN: ICD-10-CM

## 2020-07-16 LAB
ABO GROUP BLD: NORMAL
BACTERIA UR QL AUTO: ABNORMAL /HPF
BASOPHILS # BLD AUTO: 0.03 THOUSANDS/ΜL (ref 0–0.1)
BASOPHILS NFR BLD AUTO: 0 % (ref 0–1)
BILIRUB UR QL STRIP: NEGATIVE
BLD GP AB SCN SERPL QL: NEGATIVE
CLARITY UR: ABNORMAL
COLOR UR: ABNORMAL
EOSINOPHIL # BLD AUTO: 0.19 THOUSAND/ΜL (ref 0–0.61)
EOSINOPHIL NFR BLD AUTO: 2 % (ref 0–6)
ERYTHROCYTE [DISTWIDTH] IN BLOOD BY AUTOMATED COUNT: 12.2 % (ref 11.6–15.1)
GLUCOSE UR STRIP-MCNC: NEGATIVE MG/DL
HBV SURFACE AG SER QL: NORMAL
HCT VFR BLD AUTO: 35.6 % (ref 34.8–46.1)
HGB BLD-MCNC: 12.1 G/DL (ref 11.5–15.4)
HGB UR QL STRIP.AUTO: NEGATIVE
IMM GRANULOCYTES # BLD AUTO: 0.02 THOUSAND/UL (ref 0–0.2)
IMM GRANULOCYTES NFR BLD AUTO: 0 % (ref 0–2)
KETONES UR STRIP-MCNC: NEGATIVE MG/DL
LEUKOCYTE ESTERASE UR QL STRIP: NEGATIVE
LYMPHOCYTES # BLD AUTO: 2.89 THOUSANDS/ΜL (ref 0.6–4.47)
LYMPHOCYTES NFR BLD AUTO: 28 % (ref 14–44)
MCH RBC QN AUTO: 31.3 PG (ref 26.8–34.3)
MCHC RBC AUTO-ENTMCNC: 34 G/DL (ref 31.4–37.4)
MCV RBC AUTO: 92 FL (ref 82–98)
MONOCYTES # BLD AUTO: 0.65 THOUSAND/ΜL (ref 0.17–1.22)
MONOCYTES NFR BLD AUTO: 6 % (ref 4–12)
MUCOUS THREADS UR QL AUTO: ABNORMAL
NEUTROPHILS # BLD AUTO: 6.47 THOUSANDS/ΜL (ref 1.85–7.62)
NEUTS SEG NFR BLD AUTO: 64 % (ref 43–75)
NITRITE UR QL STRIP: NEGATIVE
NON-SQ EPI CELLS URNS QL MICRO: ABNORMAL /HPF
NRBC BLD AUTO-RTO: 0 /100 WBCS
PH UR STRIP.AUTO: 6 [PH]
PLATELET # BLD AUTO: 277 THOUSANDS/UL (ref 149–390)
PMV BLD AUTO: 10.4 FL (ref 8.9–12.7)
PROT UR STRIP-MCNC: ABNORMAL MG/DL
RBC # BLD AUTO: 3.87 MILLION/UL (ref 3.81–5.12)
RBC #/AREA URNS AUTO: ABNORMAL /HPF
RH BLD: POSITIVE
RUBV IGG SERPL IA-ACNC: 38 IU/ML
SP GR UR STRIP.AUTO: 1.02 (ref 1–1.03)
SPECIMEN EXPIRATION DATE: NORMAL
UROBILINOGEN UR QL STRIP.AUTO: 0.2 E.U./DL
WBC # BLD AUTO: 10.25 THOUSAND/UL (ref 4.31–10.16)
WBC #/AREA URNS AUTO: ABNORMAL /HPF

## 2020-07-16 PROCEDURE — 4004F PT TOBACCO SCREEN RCVD TLK: CPT | Performed by: OBSTETRICS & GYNECOLOGY

## 2020-07-16 PROCEDURE — 87086 URINE CULTURE/COLONY COUNT: CPT

## 2020-07-16 PROCEDURE — 76813 OB US NUCHAL MEAS 1 GEST: CPT | Performed by: OBSTETRICS & GYNECOLOGY

## 2020-07-16 PROCEDURE — 81001 URINALYSIS AUTO W/SCOPE: CPT

## 2020-07-16 PROCEDURE — 36415 COLL VENOUS BLD VENIPUNCTURE: CPT

## 2020-07-16 PROCEDURE — 99202 OFFICE O/P NEW SF 15 MIN: CPT | Performed by: OBSTETRICS & GYNECOLOGY

## 2020-07-16 PROCEDURE — 80081 OBSTETRIC PANEL INC HIV TSTG: CPT

## 2020-07-16 NOTE — PROGRESS NOTES
Ob ultrasound and brief MFM consultation    Ms Verena Garcia  is a 31 yo   at 15 0/7 weeks gestation patient    An ultrasound for viability, dating and nuchal translucency was completed today  See Ob Procedures in EPIC  1  Live, mayo fetus with size = dates; CALLIE 2021  Normal nuchal translucency  Today's ultrasound findings and suggested follow-up were discussed  with the patient  The Sequential Screen was discussed in detail, including the sensitivity for detection of Down syndrome  Definitive prenatal diagnosis is possible only through genetic amniocentesis or CVS   The patient had a fingerstick blood collection for hCG and GUILLERMO-A to complete the initial component of the Sequential Screen  Results should be available within one week  Ob Hx:     2017: miscarriage at 6 weeks unexplained  No D/C     2020: current pregnancy    Medical hx: Obesity  Three years ago depression; was admitted to [facility]; no suicidal ideation then or now  Surgical hx:  None    Medications: None    Allergies: none    Family Hx: non contributory    Social Hx: Smokes 1/2 ppd of cigarettes from 1 ppd; no alcohol or illicit drug use  I reviewed the results of this ultrasound with Ms Verena Garcia and her partner  Errol Barnhart  and answered their questions  We discussed that tobacco use during pregnancy is associated with an increased risk for adverse pregnancy outcomes, including  cleft lip and palate, abnormal development of the fetal brain,  delivery, fetal growth restriction, abruptio placentae, and stillbirth and in the  period, SIDS, otitis and obesity  Reviewed how use of nicotine replacement therapy has not been shown to be safe in pregnancy and it is not recommended  Encouraged her to continue  her effort to D/C smoking  Counseling to reduce the risk of postpartum depression   There are significant risks associated with untreated and under treated depression and other mental illnesses in pregnancy  The [address] [place] [address] is an additional resource for screening and treatment of depression  Resources for a mental health crisis include the emergency department and the [place] [address] at 0-210-387-RDHF  These interventions can reduce the risk of postpartum depression  It is recommended this be arranged through your office  Obesity: BMI above 30 confers increased pregnancy risks  Maternal risks include preeclampsia, gestational diabetes, cardiac dysfunction, and sleep apnea  Fetal risks include miscarriage, fetal anomalies, and stillbirth as well as macrosomia and impaired growth  Intrapartum risks include  delivery, wound complications, and venous thrombosis  The  detection of congenital anomalies is reduced with increasing maternal BMI  Strategies to limit weight gain to the [place] of Medicine guidelines, which for BMI>30 is 11-20 pounds, include dietary control, exercise, and behavior modification  The [place] [address] and Human Services recommends 150 minutes per week of aerobic exercise in pregnancy, which could be divided into 30 minutes daily, five times per week  Recommendations:    1  Follow-up multiple marker serum screening at 16 to 20 weeks gestation is recommended to complete the Sequential Screen  2  Fetal Level II ultrasound imaging is scheduled at about 20 weeks gestation  3  I recommend a consultation with  nutrition  4   I also advise early glucola screening  In addition to review of the ultrasound results I completed a consultation in 20 minutes with > 50% in direct face to face contact and coordination of a plan of care  Thank you for referring your patient to our offices  The limitations of ultrasound to detect all anomalies was reviewed and how it is not  a test to rule out aneuploidy  If you have any further questions do not hesitate to contact us as [phone]      Walker Cervantes MD

## 2020-07-16 NOTE — TELEPHONE ENCOUNTER
Spoke with patient and confirmed appointment with [facility]  1 support person ( must be over age of 15) may accompany patient  Will you and your support person be able to wear a mask ,without a valve , during entire appointment? ***  [facility] does not allow cell phone use, recording device or streaming during the ultrasound  Check in and rooming questions will be done via phone, when you arrive in the parking lot please call the following inside line # prior to entering office:    [place] [phone]  [place] line: [phone] line:  [address] line: [phone]  Sakina Marcum line:  [phone]  [place] line: [phone]    Have you or your support person traveled outside the state in the last 2 weeks? ***   If yes, what state did you travel to? ***     Do you or your support person have:  Fever or flu- like symptoms? ***  Symptoms of upper respiratory infection like runny nose, sore throat or cough? ***  Do you have new headache that you have not had in the past?***  Have you experienced any new shortness of breath recently? ***  Do you have any new loss of taste or smell? ***  Do you have any new diarrhea, nausea or vomiting?***  Have you recently been in contact with anyone who has been sick or diagnosed with COVID-19 infection? ***  Have you been recommended to quarantine because of an exposure to a confirmed positive COVID19 person? ***  You and your support person will have temperature screening upon arrival  Patient verbalized understanding of all instructions   -------------------------------------------------------------    Attempted to reach patient by phone and left voicemail to confirm appointment for MFM ultrasound  1 support person ( must be over the age of 15) may accompany you for your appointment  If you or your support person have traveled outside the state in the past 2 weeks, please call and notify our office today #[phone]  You and your support person must wear a mask ,covering nose and mouth,during your entire visit  You and your support person will have temperature screened upon arrival     Please call our office prior to entering the building  Check in and rooming questions will be done via phone  We will give you directions when to enter for your appointment  Inside office # provided:  [facility] line: [phone]  [facility] line:  [phone]  [facility] line:  [phone] line:  [phone]  [facility] line:  [phone]  [place] line:  [phone]    [facility] does not allow cell phone use, recording device or streaming during ultrasound  IF you are not feeling well- cough, fever, shortness of breath or any flu like symptoms, contact your primary care physician or [phone]-[facility]    Any questions with these instructions please call Maternal Fetal Medicine nurse line today @ # [phone]

## 2020-07-16 NOTE — LETTER
2020     Joe Suh MD  [address]    Patient: Yuly Sue   YOB: 1995   Date of Visit: 2020       Dear Dr Ashlee Duron: Thank you for referring Yuly Sue to me for evaluation  Below are my notes for this consultation  If you have questions, please do not hesitate to call me  I look forward to following your patient along with you  Sincerely,        Heather Mahajan MD        CC: No Recipients  Heather Mahajan MD  2020 12:26 PM  Sign at close encounter  Ob ultrasound and brief MFM consultation    Ms Arleth Quevedo  is a 29 yo   at 15 0/7 weeks gestation patient    An ultrasound for viability, dating and nuchal translucency was completed today  See Ob Procedures in EPIC  1  Live, mayo fetus with size = dates; CALLIE 2021  Normal nuchal translucency  Today's ultrasound findings and suggested follow-up were discussed  with the patient  The Sequential Screen was discussed in detail, including the sensitivity for detection of Down syndrome  Definitive prenatal diagnosis is possible only through genetic amniocentesis or CVS   The patient had a fingerstick blood collection for hCG and GUILLERMO-A to complete the initial component of the Sequential Screen  Results should be available within one week  Ob Hx:     2017: miscarriage at 6 weeks unexplained  No D/C     2020: current pregnancy    Medical hx: Obesity  Three years ago depression; was admitted to [facility]; no suicidal ideation then or now  Surgical hx:  None    Medications: None    Allergies: none    Family Hx: non contributory    Social Hx: Smokes 1/2 ppd of cigarettes from 1 ppd; no alcohol or illicit drug use  I reviewed the results of this ultrasound with Ms Arleth Quevedo and her partner  Matthew Guerrero  and answered their questions      We discussed that tobacco use during pregnancy is associated with an increased risk for adverse pregnancy outcomes, including  cleft lip and palate, abnormal development of the fetal brain,  delivery, fetal growth restriction, abruptio placentae, and stillbirth and in the  period, SIDS, otitis and obesity  Reviewed how use of nicotine replacement therapy has not been shown to be safe in pregnancy and it is not recommended  Encouraged her to continue  her effort to D/C smoking  Counseling to reduce the risk of postpartum depression  There are significant risks associated with untreated and under treated depression and other mental illnesses in pregnancy  The [address] [place] [address] Nw is an additional resource for screening and treatment of depression  Resources for a mental health crisis include the emergency department and the [place] [address] at 6-233-051-CJNW  These interventions can reduce the risk of postpartum depression  It is recommended this be arranged through your office  Obesity: BMI above 30 confers increased pregnancy risks  Maternal risks include preeclampsia, gestational diabetes, cardiac dysfunction, and sleep apnea  Fetal risks include miscarriage, fetal anomalies, and stillbirth as well as macrosomia and impaired growth  Intrapartum risks include  delivery, wound complications, and venous thrombosis  The  detection of congenital anomalies is reduced with increasing maternal BMI  Strategies to limit weight gain to the [place] [facility] [place] guidelines, which for BMI>30 is 11-20 pounds, include dietary control, exercise, and behavior modification  The [facility] and Human Services recommends 150 minutes per week of aerobic exercise in pregnancy, which could be divided into 30 minutes daily, five times per week  Recommendations:    1  Follow-up multiple marker serum screening at 16 to 20 weeks gestation is recommended to complete the Sequential Screen        2  Fetal Level II ultrasound imaging is scheduled at about 20 weeks gestation  3  I recommend a consultation with  nutrition  4   I also advise early glucola screening  In addition to review of the ultrasound results I completed a consultation in 20 minutes with > 50% in direct face to face contact and coordination of a plan of care  Thank you for referring your patient to our offices  The limitations of ultrasound to detect all anomalies was reviewed and how it is not  a test to rule out aneuploidy  If you have any further questions do not hesitate to contact us as [phone]      Roque Brewer MD

## 2020-07-17 LAB
BACTERIA UR CULT: NORMAL
HIV 1+2 AB+HIV1 P24 AG SERPL QL IA: NORMAL
RPR SER QL: NORMAL

## 2020-07-20 ENCOUNTER — INITIAL PRENATAL (OUTPATIENT)
Dept: OBGYN CLINIC | Facility: MEDICAL CENTER | Age: 25
End: 2020-07-20
Payer: COMMERCIAL

## 2020-07-20 VITALS
TEMPERATURE: 98.4 F | SYSTOLIC BLOOD PRESSURE: 134 MMHG | BODY MASS INDEX: 39.63 KG/M2 | WEIGHT: 292.2 LBS | DIASTOLIC BLOOD PRESSURE: 70 MMHG

## 2020-07-20 DIAGNOSIS — Z3A.13 13 WEEKS GESTATION OF PREGNANCY: Primary | ICD-10-CM

## 2020-07-20 PROCEDURE — 4004F PT TOBACCO SCREEN RCVD TLK: CPT | Performed by: OBSTETRICS & GYNECOLOGY

## 2020-07-20 PROCEDURE — 87591 N.GONORRHOEAE DNA AMP PROB: CPT | Performed by: OBSTETRICS & GYNECOLOGY

## 2020-07-20 PROCEDURE — 87491 CHLMYD TRACH DNA AMP PROBE: CPT | Performed by: OBSTETRICS & GYNECOLOGY

## 2020-07-20 PROCEDURE — 99214 OFFICE O/P EST MOD 30 MIN: CPT | Performed by: OBSTETRICS & GYNECOLOGY

## 2020-07-20 PROCEDURE — G0145 SCR C/V CYTO,THINLAYER,RESCR: HCPCS | Performed by: OBSTETRICS & GYNECOLOGY

## 2020-07-21 NOTE — PROGRESS NOTES
A/P    1   IUP    @ 13w5d    2  Tobacco    Advised to decrease the number of cigarettes   Pt has decreased from 1 pack to about 10 -12 a day     3  Obesity    Current BMI is 39   Advised to watch total weight gain of 12 lbs   Currently lost 3 kg     4  Major Depression    Watch for pp depression and advised to reach out if feels  the need arises     5  Pap and cultures today       31 y/o   IUP no complaints doing great  + fetal heart tones no vaginal bleeding or leakage of fluid    Review of Systems - History obtained from chart review and the patient  General ROS: negative  Psychological ROS: negative  Ophthalmic ROS: negative  ENT ROS: negative  Allergy and Immunology ROS: negative  Hematological and Lymphatic ROS: negative  Endocrine ROS: negative  Breast ROS: negative for breast lumps  Respiratory ROS: no cough, shortness of breath, or wheezing  Cardiovascular ROS: no chest pain or dyspnea on exertion  Gastrointestinal ROS: no abdominal pain, change in bowel habits, or black or bloody stools  Genito-Urinary ROS: no dysuria, trouble voiding, or hematuria  Musculoskeletal ROS: negative  Neurological ROS: no TIA or stroke symptoms  Dermatological ROS: negative    Physical Exam   Constitutional: She is oriented to person, place, and time  She appears well-developed  Eyes: EOM are normal    Neck: Normal range of motion  No thyromegaly present  Cardiovascular: Normal rate and normal heart sounds  Pulmonary/Chest: Effort normal  No accessory muscle usage  No respiratory distress  She exhibits no tenderness  Right breast exhibits no inverted nipple, no mass and no tenderness  Left breast exhibits no inverted nipple, no mass and no tenderness  No breast tenderness or discharge  Breasts are symmetrical    Abdominal: Soft  She exhibits no distension  There is no tenderness  There is no rebound, no guarding and no CVA tenderness     Genitourinary: Vagina normal and uterus normal  Rectal exam shows no external hemorrhoid  No breast tenderness or discharge  Pelvic exam was performed with patient supine  No labial fusion  There is no rash, tenderness, lesion or injury on the right labia  There is no rash, tenderness, lesion or injury on the left labia  Uterus is not enlarged and not fixed  Cervix exhibits no motion tenderness and no discharge  Right adnexum displays no mass, no tenderness and no fullness  Left adnexum displays no mass, no tenderness and no fullness  No bleeding in the vagina  No foreign body in the vagina  No vaginal discharge found  Lymphadenopathy:     She has no cervical adenopathy  Right: No inguinal and no supraclavicular adenopathy present  Left: No inguinal and no supraclavicular adenopathy present  Neurological: She is alert and oriented to person, place, and time  Skin: Skin is intact  Psychiatric: She has a normal mood and affect  Her speech is normal and behavior is normal  Judgment and thought content normal    Vitals reviewed

## 2020-07-24 ENCOUNTER — TELEPHONE (OUTPATIENT)
Dept: PERINATAL CARE | Facility: OTHER | Age: 25
End: 2020-07-24

## 2020-07-24 NOTE — LETTER
07/24/20  Lillihilton Vaughnet  1995    Thank you for completing Part 1 of your Sequential Screen  To obtain a complete test result, please complete blood work for Part 2 Sequential Screen between the weeks of 8/3/20 to 8/17/20  Based on your insurance coverage, please use one of the following locations  Call our office for any questions at [phone]      [address] [place] [address] Avenue  [address] [place] [address], [place], [address]   300 [facility], [facility], [address]  Phone:  [phone]     Phone:  [phone]    [facility]  [facility] 6 [facility], 960 [facility], [address] [place] [address] Road  Phone: [phone]      Phone: [phone] [address] for lab)    53 [facility]  59 [facility], [facility], [facility]   700 [facility], [address]  Phone: [phone]      Phone:  [phone]    [address] [place] [address]  1401 [facility], [facility] 6   [facility], [address] [place] [address]  Phone: [phone]      Phone:  793 [facility]  207 [facility], [facility], 600 [address] [facility]   [facility], [address]  Phone: [phone]      Phone: [phone]    [facility] [place]     1896 [facility]  1430 [facility], [facility], [address], [place], [address]  Phone:  [phone]     Phone: [phone]    [address] [place] [address] - [place], [address]  Phone: [phone]

## 2020-07-24 NOTE — TELEPHONE ENCOUNTER
----- Message from Ilan Oliveros MD sent at 7/20/2020  6:24 PM EDT -----  I have reviewed the patient's lab results which are normal   Please contact the patient to inform her of the normal results        Ilan Oliveros MD

## 2020-07-24 NOTE — TELEPHONE ENCOUNTER
Phone call to patient, reviewed [facility] Part 1 Sequential Screen result  Patient given instructions for Part 2 collection and she verbalized understanding  TRF for Part 2 and collection instruction letter mailed  Patient given phone number to call [facility] nurse line any questions [phone]

## 2020-07-25 LAB
C TRACH DNA SPEC QL NAA+PROBE: NEGATIVE
N GONORRHOEA DNA SPEC QL NAA+PROBE: NEGATIVE

## 2020-07-29 LAB
LAB AP GYN PRIMARY INTERPRETATION: NORMAL
Lab: NORMAL
PATH INTERP SPEC-IMP: NORMAL

## 2020-08-17 ENCOUNTER — TRANSCRIBE ORDERS (OUTPATIENT)
Dept: ADMINISTRATIVE | Facility: HOSPITAL | Age: 25
End: 2020-08-17

## 2020-08-17 ENCOUNTER — APPOINTMENT (OUTPATIENT)
Dept: LAB | Facility: MEDICAL CENTER | Age: 25
End: 2020-08-17
Payer: COMMERCIAL

## 2020-08-17 ENCOUNTER — ROUTINE PRENATAL (OUTPATIENT)
Dept: OBGYN CLINIC | Facility: MEDICAL CENTER | Age: 25
End: 2020-08-17
Payer: COMMERCIAL

## 2020-08-17 VITALS — DIASTOLIC BLOOD PRESSURE: 84 MMHG | BODY MASS INDEX: 40.61 KG/M2 | SYSTOLIC BLOOD PRESSURE: 126 MMHG | WEIGHT: 293 LBS

## 2020-08-17 DIAGNOSIS — Z3A.17 17 WEEKS GESTATION OF PREGNANCY: Primary | ICD-10-CM

## 2020-08-17 DIAGNOSIS — E66.9 OBESITY (BMI 35.0-39.9 WITHOUT COMORBIDITY): ICD-10-CM

## 2020-08-17 DIAGNOSIS — Z72.0 TOBACCO USER: ICD-10-CM

## 2020-08-17 DIAGNOSIS — Z33.1 PREGNANT STATE, INCIDENTAL: ICD-10-CM

## 2020-08-17 DIAGNOSIS — Z33.1 PREGNANT STATE, INCIDENTAL: Primary | ICD-10-CM

## 2020-08-17 PROCEDURE — 99213 OFFICE O/P EST LOW 20 MIN: CPT | Performed by: ADVANCED PRACTICE MIDWIFE

## 2020-08-17 PROCEDURE — 36415 COLL VENOUS BLD VENIPUNCTURE: CPT

## 2020-08-17 PROCEDURE — 4004F PT TOBACCO SCREEN RCVD TLK: CPT | Performed by: ADVANCED PRACTICE MIDWIFE

## 2020-08-17 NOTE — PROGRESS NOTES
Ephraim Chun is a 30y o  year old  at 17w4d for routine prenatal visit    + FM, no vaginal bleeding, contractions, or LOF  Complaints: Yes Nausea and vomiting, occasionally in the a m  Most recent ultrasound and labs reviewed  Doing well  Not feeling movement  For second part of Sequential today

## 2020-08-18 LAB — SCAN RESULT: NORMAL

## 2020-08-27 ENCOUNTER — TELEPHONE (OUTPATIENT)
Dept: PERINATAL CARE | Facility: OTHER | Age: 25
End: 2020-08-27

## 2020-08-27 NOTE — TELEPHONE ENCOUNTER
I called the patient and left a voicemail, per her communication consent, with the results of her part 2 sequential screen   I left the nurse line phone number ([phone]) for her to call with any questions

## 2020-08-27 NOTE — TELEPHONE ENCOUNTER
----- Message from Yuan Campos MD sent at 8/26/2020  5:04 PM EDT -----  I have reviewed the patient's lab results which are normal   Please contact the patient to inform her of the normal results        Yuan Campos MD

## 2020-09-02 ENCOUNTER — TELEPHONE (OUTPATIENT)
Dept: PERINATAL CARE | Facility: CLINIC | Age: 25
End: 2020-09-02

## 2020-09-02 NOTE — TELEPHONE ENCOUNTER
Attempted to reach patient by phone and left voicemail to confirm appointment for MFM ultrasound  1 support person ( must be over the age of 15) may accompany you for your appointment  If you or your support person have traveled outside the state in the past 2 weeks, please call and notify our office today #[phone]  You and your support person must wear a mask ,covering nose and mouth,during your entire visit  You and your support person will have temperature screened upon arrival     To minimize your exposure in our waiting room, please call our office prior to entering the building  Check in and rooming questions will be done via phone  We will give you directions when to enter for your appointment  Inside office # provided:  [facility] line: [phone]  [facility] line:  [phone]  [facility] line:  [address] line:  [phone]  [facility] line:  [phone]  [place] line:  [phone]    IF you are not feeling well- cough, fever, shortness of breath or any flu like symptoms, contact your primary care physician or [facility] [place]    Any questions with these instructions please call Maternal Fetal Medicine nurse line today @ # [phone]

## 2020-09-03 ENCOUNTER — ROUTINE PRENATAL (OUTPATIENT)
Dept: PERINATAL CARE | Facility: OTHER | Age: 25
End: 2020-09-03
Payer: COMMERCIAL

## 2020-09-03 VITALS
HEART RATE: 101 BPM | DIASTOLIC BLOOD PRESSURE: 62 MMHG | SYSTOLIC BLOOD PRESSURE: 136 MMHG | TEMPERATURE: 98.1 F | WEIGHT: 293 LBS | BODY MASS INDEX: 39.68 KG/M2 | HEIGHT: 72 IN

## 2020-09-03 DIAGNOSIS — E66.01 MATERNAL MORBID OBESITY, ANTEPARTUM (HCC): ICD-10-CM

## 2020-09-03 DIAGNOSIS — O99.210 MATERNAL MORBID OBESITY, ANTEPARTUM (HCC): ICD-10-CM

## 2020-09-03 DIAGNOSIS — Z3A.20 20 WEEKS GESTATION OF PREGNANCY: ICD-10-CM

## 2020-09-03 DIAGNOSIS — Z36.89 ENCOUNTER FOR FETAL ANATOMIC SURVEY: Primary | ICD-10-CM

## 2020-09-03 DIAGNOSIS — Z36.86 ENCOUNTER FOR ANTENATAL SCREENING FOR CERVICAL LENGTH: ICD-10-CM

## 2020-09-03 PROBLEM — N83.202 LEFT OVARIAN CYST: Status: ACTIVE | Noted: 2020-09-03

## 2020-09-03 PROCEDURE — 76811 OB US DETAILED SNGL FETUS: CPT | Performed by: OBSTETRICS & GYNECOLOGY

## 2020-09-03 PROCEDURE — 76817 TRANSVAGINAL US OBSTETRIC: CPT | Performed by: OBSTETRICS & GYNECOLOGY

## 2020-09-03 PROCEDURE — 99212 OFFICE O/P EST SF 10 MIN: CPT | Performed by: OBSTETRICS & GYNECOLOGY

## 2020-09-03 NOTE — PROGRESS NOTES
[address]: Ms Yong Lozano was seen today at 20w0d for anatomic survey and cervical length screening ultrasound  See ultrasound report under "OB Procedures" tab    Please don't hesitate to contact our office with any concerns or questions   -Berna Johnson MD

## 2020-09-03 NOTE — PROGRESS NOTES
A transvaginal ultrasound was performed  Sonographer note on use of High Level Disinfection Process (Trophon) for transvaginal probe#1 used, serial [id number]    Kayla Wahl RDMS

## 2020-09-08 ENCOUNTER — OFFICE VISIT (OUTPATIENT)
Dept: FAMILY MEDICINE CLINIC | Facility: CLINIC | Age: 25
End: 2020-09-08
Payer: COMMERCIAL

## 2020-09-08 ENCOUNTER — TELEPHONE (OUTPATIENT)
Dept: FAMILY MEDICINE CLINIC | Facility: CLINIC | Age: 25
End: 2020-09-08

## 2020-09-08 VITALS
WEIGHT: 293 LBS | DIASTOLIC BLOOD PRESSURE: 74 MMHG | TEMPERATURE: 98.2 F | SYSTOLIC BLOOD PRESSURE: 116 MMHG | BODY MASS INDEX: 39.68 KG/M2 | HEIGHT: 72 IN

## 2020-09-08 DIAGNOSIS — L05.91 INFECTED PILONIDAL CYST: Primary | ICD-10-CM

## 2020-09-08 PROCEDURE — 3725F SCREEN DEPRESSION PERFORMED: CPT | Performed by: FAMILY MEDICINE

## 2020-09-08 PROCEDURE — 99213 OFFICE O/P EST LOW 20 MIN: CPT | Performed by: FAMILY MEDICINE

## 2020-09-08 RX ORDER — AMOXICILLIN AND CLAVULANATE POTASSIUM 875; 125 MG/1; MG/1
1 TABLET, FILM COATED ORAL EVERY 12 HOURS SCHEDULED
Qty: 14 TABLET | Refills: 0 | Status: SHIPPED | OUTPATIENT
Start: 2020-09-08 | End: 2020-09-15

## 2020-09-08 NOTE — PROGRESS NOTES
Assessment/Plan:  Rx put in for Augmentin  Patient will use warm compresses to the area  If she should start getting fever chills, she knows she has to go to the ER  She will call us if symptoms continue or increase  Problem List Items Addressed This Visit     None      Visit Diagnoses     Infected pilonidal cyst    -  Primary    Relevant Medications    amoxicillin-clavulanate (AUGMENTIN) 875-125 mg per tablet           Diagnoses and all orders for this visit:    Infected pilonidal cyst  -     amoxicillin-clavulanate (AUGMENTIN) 875-125 mg per tablet; Take 1 tablet by mouth every 12 (twelve) hours for 7 days        No problem-specific Assessment & Plan notes found for this encounter  Subjective:      Patient ID: Natalie Denton is a 30 y o  female  Patient here today stating that over the weekend started with an area at the top of her buttock that seems to have gotten bigger and more painful  Patient has had a cyst in this location at least twice before  1 time and even ruptured on her  She denies any fever chills  No nausea vomiting or diarrhea  Patient is 20 weeks pregnant  The following portions of the patient's history were reviewed and updated as appropriate:   She has a past medical history of Chlamydia, Depression, Urinary tract infection, and Varicella  ,  does not have any pertinent problems on file  ,   has a past surgical history that includes [place] tooth extraction  ,  family history includes Breast cancer in her maternal grandmother; Diabetes in her father and paternal grandmother; Kidney disease in her father; Other in her mother; Thyroid disease in her mother  ,   reports that she has been smoking  She has a 5 00 pack-year smoking history  She has never used smokeless tobacco  She reports previous alcohol use  She reports previous drug use  Drug: Marijuana  ,  has No Known Allergies     Current Outpatient Medications   Medication Sig Dispense Refill    Prenatal MV & Min w/FA-DHA (PRENATAL ADULT GUMMY/DHA/FA) 0 4-25 MG CHEW Chew 2 tablets daily      amoxicillin-clavulanate (AUGMENTIN) 875-125 mg per tablet Take 1 tablet by mouth every 12 (twelve) hours for 7 days 14 tablet 0     No current facility-administered medications for this visit  Review of Systems   Constitutional: Negative  Respiratory: Negative  Cardiovascular: Negative  Gastrointestinal: Negative  Genitourinary: Negative  Skin:        As per HPI         Objective:  Vitals:    09/08/20 1111   BP: 116/74   Temp: 98 2 °F (36 8 °C)   Weight: (!) 140 kg (309 lb 6 4 oz)   Height: 6' (1 829 m)     Body mass index is 41 96 kg/m²  Physical Exam  Vitals signs reviewed  Constitutional:       General: She is not in acute distress  Appearance: She is well-developed  She is not diaphoretic  HENT:      Head: Normocephalic and atraumatic  Eyes:      Conjunctiva/sclera: Conjunctivae normal    Musculoskeletal:         General: Tenderness (At the top of her gluteal cleft, right greater than left  No erythema) present  Neurological:      Mental Status: She is alert and oriented to person, place, and time  Psychiatric:         Behavior: Behavior normal          Thought Content:  Thought content normal          Judgment: Judgment normal

## 2020-09-08 NOTE — TELEPHONE ENCOUNTER
Dr Jovita Van  I am writing you to let you know that I saw this patient today with what looks like a early infected pilonidal cyst   Patient has had infections before, 1 spontaneously drained on its own  On exam, area not erythematous, no real swelling seen  I placed her on 7 days of Augmentin  Patient has no fever chills, or any other problems  Thank you,  Kumar LIND

## 2020-09-12 ENCOUNTER — HOSPITAL ENCOUNTER (EMERGENCY)
Facility: HOSPITAL | Age: 25
Discharge: HOME | End: 2020-09-12
Attending: FAMILY MEDICINE | Admitting: FAMILY MEDICINE
Payer: COMMERCIAL

## 2020-09-12 VITALS
RESPIRATION RATE: 18 BRPM | HEART RATE: 87 BPM | TEMPERATURE: 97.9 F | OXYGEN SATURATION: 98 % | HEIGHT: 72 IN | DIASTOLIC BLOOD PRESSURE: 58 MMHG | WEIGHT: 293 LBS | BODY MASS INDEX: 39.68 KG/M2 | SYSTOLIC BLOOD PRESSURE: 124 MMHG

## 2020-09-12 DIAGNOSIS — L05.01 PILONIDAL ABSCESS: Primary | ICD-10-CM

## 2020-09-12 PROCEDURE — 99284 EMERGENCY DEPT VISIT MOD MDM: CPT | Performed by: PHYSICIAN ASSISTANT

## 2020-09-12 PROCEDURE — 10080 I&D PILONIDAL CYST SIMPLE: CPT | Performed by: PHYSICIAN ASSISTANT

## 2020-09-12 PROCEDURE — 99282 EMERGENCY DEPT VISIT SF MDM: CPT

## 2020-09-12 RX ORDER — LIDOCAINE HYDROCHLORIDE AND EPINEPHRINE 10; 10 MG/ML; UG/ML
20 INJECTION, SOLUTION INFILTRATION; PERINEURAL ONCE
Status: COMPLETED | OUTPATIENT
Start: 2020-09-12 | End: 2020-09-12

## 2020-09-12 RX ADMIN — LIDOCAINE HYDROCHLORIDE,EPINEPHRINE BITARTRATE 20 ML: 10; .01 INJECTION, SOLUTION INFILTRATION; PERINEURAL at 11:07

## 2020-09-12 NOTE — ED PROVIDER NOTES
History  Chief Complaint   Patient presents with    Cyst     small absess between gluteal folds, started few days ago, hx of pilonodal cysts     61-year-old female presents emergency department concern for pilonidal cyst   She reports having a history of these  She states they have drained previously spontaneously on room  She also has got antibiotics from her PCP for them  She reports tenderness when sitting and lying flat on her back  She is also pregnant  She denies having pilonidal abscess drained previously  At rest she is well-appearing in no acute distress  She is lying on her side for comfort  Allergies reviewed          Prior to Admission Medications   Prescriptions Last Dose Informant Patient Reported? Taking? Prenatal MV & Min w/FA-DHA (PRENATAL ADULT GUMMY/DHA/FA) 0 4-25 MG CHEW 9/12/2020 at Unknown time Self Yes Yes   Sig: Chew 2 tablets daily   amoxicillin-clavulanate (AUGMENTIN) 875-125 mg per tablet Not Taking at Unknown time  No No   Sig: Take 1 tablet by mouth every 12 (twelve) hours for 7 days   Patient not taking: Reported on 9/12/2020      Facility-Administered Medications: None       Past Medical History:   Diagnosis Date    Chlamydia     Depression     no medications for 1 year    Urinary tract infection     Varicella        Past Surgical History:   Procedure Laterality Date    WISDOM TOOTH EXTRACTION         Family History   Problem Relation Age of Onset    Thyroid disease Mother     Other Mother         cerebellar atrophy dx  at 22 years    Diabetes Father     Kidney disease Father     Breast cancer Maternal Grandmother     Diabetes Paternal Grandmother     Colon cancer Neg Hx     Ovarian cancer Neg Hx      I have reviewed and agree with the history as documented      E-Cigarette/Vaping    E-Cigarette Use Never User      E-Cigarette/Vaping Substances    Nicotine No     THC No     CBD No     Flavoring No     Other No     Unknown No      Social History Tobacco Use    Smoking status: Current Every Day Smoker     Packs/day: 0 50     Years: 10 00     Pack years: 5 00    Smokeless tobacco: Never Used    Tobacco comment: encouraged to contact 1-800-quit-now   Substance Use Topics    Alcohol use: Not Currently     Frequency: Monthly or less     Drinks per session: 1 or 2     Binge frequency: Never     Comment: rare    Drug use: Not Currently     Types: Marijuana     Comment: quit with knowledge of pregnancy       Review of Systems   Constitutional: Negative for chills, fatigue and fever  HENT: Negative for congestion, ear pain, rhinorrhea, sinus pressure, sneezing, sore throat and trouble swallowing  Eyes: Negative for discharge and itching  Respiratory: Negative for cough, chest tightness, shortness of breath, wheezing and stridor  Cardiovascular: Negative for chest pain and palpitations  Gastrointestinal: Negative for abdominal pain, diarrhea, nausea and vomiting  Neurological: Negative for dizziness, syncope, numbness and headaches  All other systems reviewed and are negative  Physical Exam  Physical Exam  Vitals signs and nursing note reviewed  Constitutional:       General: She is not in acute distress  Appearance: She is well-developed  She is not diaphoretic  HENT:      Head: Normocephalic and atraumatic  Right Ear: External ear normal       Left Ear: External ear normal       Nose: Nose normal    Eyes:      Conjunctiva/sclera: Conjunctivae normal       Pupils: Pupils are equal, round, and reactive to light  Neck:      Musculoskeletal: Normal range of motion  Cardiovascular:      Rate and Rhythm: Normal rate and regular rhythm  Heart sounds: Normal heart sounds  No murmur  No friction rub  No gallop  Pulmonary:      Effort: Pulmonary effort is normal  No respiratory distress  Breath sounds: Normal breath sounds  No stridor  No wheezing or rales     Abdominal:      General: Bowel sounds are normal  There is no distension  Palpations: Abdomen is soft  Tenderness: There is no abdominal tenderness  There is no guarding  Musculoskeletal: Normal range of motion  General: No tenderness  Skin:     General: Skin is warm  Capillary Refill: Capillary refill takes less than 2 seconds  Comments: Abscess at the gluteal fold concerning for pilonidal abscess  Large and fluctuant  Neurological:      Mental Status: She is alert and oriented to person, place, and time  Vital Signs  ED Triage Vitals [09/12/20 1005]   Temperature Pulse Respirations Blood Pressure SpO2   97 9 °F (36 6 °C) (!) 112 18 107/68 100 %      Temp Source Heart Rate Source Patient Position - Orthostatic VS BP Location FiO2 (%)   Temporal Monitor Lying Left arm --      Pain Score       Worst Possible Pain           Vitals:    09/12/20 1005 09/12/20 1133   BP: 107/68 124/58   Pulse: (!) 112 87   Patient Position - Orthostatic VS: Lying Sitting         Visual Acuity      ED Medications  Medications   lidocaine-epinephrine (XYLOCAINE/EPINEPHRINE) 1 %-1:100,000 injection 20 mL (20 mL Infiltration Given 9/12/20 1107)       Diagnostic Studies  Results Reviewed     None                 No orders to display              Procedures  Procedures         ED Course  ED Course as of Sep 12 1955   Sat Sep 12, 2020   1129 I and D pilonidal abscess  Lidocaine with epinephrine used for field block  Approximately 15 cc used  Procedure well tolerated with some discomfort  Large volume of purulent material expressed  Wound packed  Advised f/u with gen surgery                                          MDM  Number of Diagnoses or Management Options  Pilonidal abscess: new and requires workup  Diagnosis management comments: Pilonidal abscess  I and D performed  Lidocaine with epinephrine was used for field block  Procedure was well tolerated  Very large volume of purulent and necrotic appearing material was expressed    Patient reports dramatic relief of discomfort  Patient was educated regarding wound care and packing removal   Advised close follow-up with surgery as she may need surgical intervention to prevent future recurrence  Patient educated regarding their diagnosis and given return and follow-up instructions  Patient was advised to returned to the ED with worsening symptoms or concerns  Patient is understanding and in agreement with the treatment plan  There are no questions at the time of discharge  Amount and/or Complexity of Data Reviewed  Clinical lab tests: ordered and reviewed  Tests in the radiology section of CPT®: ordered and reviewed    Risk of Complications, Morbidity, and/or Mortality  Presenting problems: moderate  Diagnostic procedures: low  Management options: low    Patient Progress  Patient progress: stable      Disposition  Final diagnoses:   Pilonidal abscess     Time reflects when diagnosis was documented in both MDM as applicable and the Disposition within this note     Time User Action Codes Description Comment    9/12/2020 11:28 AM Kapil Valerio [L05 01] Pilonidal abscess       ED Disposition     ED Disposition Condition Date/Time Comment    Discharge Stable Sat Sep 12, 2020 11:28 AM Susy Gaona discharge to home/self care  Follow-up Information     Follow up With Specialties Details Why Contact Info    Luis Fernando Mckeon MD General Surgery   [address] [place] [id number]  [phone]            Discharge Medication List as of 9/12/2020 11:29 AM      CONTINUE these medications which have NOT CHANGED    Details   Prenatal MV & Min w/FA-DHA (PRENATAL ADULT GUMMY/DHA/FA) 0 4-25 MG CHEW Chew 2 tablets daily, Historical Med      amoxicillin-clavulanate (AUGMENTIN) 875-125 mg per tablet Take 1 tablet by mouth every 12 (twelve) hours for 7 days, Starting Tue 9/8/2020, Until Tue 9/15/2020, Normal           No discharge procedures on file      PDMP Review     None          ED Provider  Electronically Signed by           Sophy Villarreal PA-C  09/12/20 1955

## 2020-09-21 ENCOUNTER — ROUTINE PRENATAL (OUTPATIENT)
Dept: OBGYN CLINIC | Facility: CLINIC | Age: 25
End: 2020-09-21
Payer: COMMERCIAL

## 2020-09-21 VITALS — SYSTOLIC BLOOD PRESSURE: 126 MMHG | BODY MASS INDEX: 41.77 KG/M2 | WEIGHT: 293 LBS | DIASTOLIC BLOOD PRESSURE: 72 MMHG

## 2020-09-21 DIAGNOSIS — Z3A.20 20 WEEKS GESTATION OF PREGNANCY: ICD-10-CM

## 2020-09-21 DIAGNOSIS — E66.01 MATERNAL MORBID OBESITY, ANTEPARTUM (HCC): ICD-10-CM

## 2020-09-21 DIAGNOSIS — Z86.59 H/O MAJOR DEPRESSION: ICD-10-CM

## 2020-09-21 DIAGNOSIS — Z72.0 TOBACCO USER: ICD-10-CM

## 2020-09-21 DIAGNOSIS — O99.210 MATERNAL MORBID OBESITY, ANTEPARTUM (HCC): ICD-10-CM

## 2020-09-21 PROCEDURE — 99213 OFFICE O/P EST LOW 20 MIN: CPT | Performed by: ADVANCED PRACTICE MIDWIFE

## 2020-09-21 PROCEDURE — 4004F PT TOBACCO SCREEN RCVD TLK: CPT | Performed by: ADVANCED PRACTICE MIDWIFE

## 2020-09-21 NOTE — PROGRESS NOTES
Saad Astudillo is a 30y o  year old  at 18w2d for routine prenatal visit    + FM, no vaginal bleeding, contractions, or LOF  Complaints: Yes mild nausea, using over the counter preggie pops and noting relief  Most recent ultrasound and labs reviewed  For 1 hr gtt, cbc,TDAP next visit  Pilonidal cyst healed  Seeing dentist for dental work in the next few weeks

## 2020-10-19 ENCOUNTER — ROUTINE PRENATAL (OUTPATIENT)
Dept: OBGYN CLINIC | Facility: MEDICAL CENTER | Age: 25
End: 2020-10-19
Payer: COMMERCIAL

## 2020-10-19 VITALS — BODY MASS INDEX: 42.72 KG/M2 | WEIGHT: 293 LBS | DIASTOLIC BLOOD PRESSURE: 80 MMHG | SYSTOLIC BLOOD PRESSURE: 120 MMHG

## 2020-10-19 DIAGNOSIS — Z3A.26 26 WEEKS GESTATION OF PREGNANCY: Primary | ICD-10-CM

## 2020-10-19 DIAGNOSIS — Z34.03 ENCOUNTER FOR SUPERVISION OF NORMAL FIRST PREGNANCY IN THIRD TRIMESTER: ICD-10-CM

## 2020-10-19 LAB
BASOPHILS # BLD AUTO: 0.03 THOUSANDS/ΜL (ref 0–0.1)
BASOPHILS NFR BLD AUTO: 0 % (ref 0–1)
EOSINOPHIL # BLD AUTO: 0.23 THOUSAND/ΜL (ref 0–0.61)
EOSINOPHIL NFR BLD AUTO: 2 % (ref 0–6)
ERYTHROCYTE [DISTWIDTH] IN BLOOD BY AUTOMATED COUNT: 12.6 % (ref 11.6–15.1)
GLUCOSE 1H P 50 G GLC PO SERPL-MCNC: 190 MG/DL
HCT VFR BLD AUTO: 35.3 % (ref 34.8–46.1)
HGB BLD-MCNC: 11.9 G/DL (ref 11.5–15.4)
IMM GRANULOCYTES # BLD AUTO: 0.08 THOUSAND/UL (ref 0–0.2)
IMM GRANULOCYTES NFR BLD AUTO: 1 % (ref 0–2)
LYMPHOCYTES # BLD AUTO: 2.1 THOUSANDS/ΜL (ref 0.6–4.47)
LYMPHOCYTES NFR BLD AUTO: 16 % (ref 14–44)
MCH RBC QN AUTO: 32.1 PG (ref 26.8–34.3)
MCHC RBC AUTO-ENTMCNC: 33.7 G/DL (ref 31.4–37.4)
MCV RBC AUTO: 95 FL (ref 82–98)
MONOCYTES # BLD AUTO: 0.69 THOUSAND/ΜL (ref 0.17–1.22)
MONOCYTES NFR BLD AUTO: 5 % (ref 4–12)
NEUTROPHILS # BLD AUTO: 10.11 THOUSANDS/ΜL (ref 1.85–7.62)
NEUTS SEG NFR BLD AUTO: 76 % (ref 43–75)
NRBC BLD AUTO-RTO: 0 /100 WBCS
PLATELET # BLD AUTO: 306 THOUSANDS/UL (ref 149–390)
PMV BLD AUTO: 10.6 FL (ref 8.9–12.7)
RBC # BLD AUTO: 3.71 MILLION/UL (ref 3.81–5.12)
WBC # BLD AUTO: 13.24 THOUSAND/UL (ref 4.31–10.16)

## 2020-10-19 PROCEDURE — 85025 COMPLETE CBC W/AUTO DIFF WBC: CPT | Performed by: OBSTETRICS & GYNECOLOGY

## 2020-10-19 PROCEDURE — 99213 OFFICE O/P EST LOW 20 MIN: CPT | Performed by: OBSTETRICS & GYNECOLOGY

## 2020-10-19 PROCEDURE — 36415 COLL VENOUS BLD VENIPUNCTURE: CPT | Performed by: OBSTETRICS & GYNECOLOGY

## 2020-10-19 PROCEDURE — 82950 GLUCOSE TEST: CPT | Performed by: OBSTETRICS & GYNECOLOGY

## 2020-10-22 DIAGNOSIS — O24.410 DIET CONTROLLED GESTATIONAL DIABETES MELLITUS (GDM) IN THIRD TRIMESTER: Primary | ICD-10-CM

## 2020-10-23 DIAGNOSIS — Z3A.27 27 WEEKS GESTATION OF PREGNANCY: ICD-10-CM

## 2020-10-23 DIAGNOSIS — O24.419 GESTATIONAL DIABETES MELLITUS (GDM) IN SECOND TRIMESTER, GESTATIONAL DIABETES METHOD OF CONTROL UNSPECIFIED: ICD-10-CM

## 2020-10-23 DIAGNOSIS — O99.212 OBESITY AFFECTING PREGNANCY IN SECOND TRIMESTER: ICD-10-CM

## 2020-10-23 DIAGNOSIS — O99.810 ABNORMAL GLUCOSE TOLERANCE AFFECTING PREGNANCY, ANTEPARTUM: Primary | ICD-10-CM

## 2020-10-23 RX ORDER — BLOOD-GLUCOSE METER
EACH MISCELLANEOUS
Qty: 1 KIT | Refills: 0 | Status: SHIPPED | OUTPATIENT
Start: 2020-10-23 | End: 2021-04-08 | Stop reason: ALTCHOICE

## 2020-10-23 RX ORDER — BLOOD SUGAR DIAGNOSTIC
STRIP MISCELLANEOUS
Qty: 100 EACH | Refills: 4 | Status: SHIPPED | OUTPATIENT
Start: 2020-10-23 | End: 2021-01-07 | Stop reason: HOSPADM

## 2020-10-23 RX ORDER — LANCETS 33 GAUGE
EACH MISCELLANEOUS
Qty: 100 EACH | Refills: 4 | Status: SHIPPED | OUTPATIENT
Start: 2020-10-23 | End: 2021-04-08 | Stop reason: ALTCHOICE

## 2020-10-26 ENCOUNTER — TELEMEDICINE (OUTPATIENT)
Dept: PERINATAL CARE | Facility: CLINIC | Age: 25
End: 2020-10-26
Payer: COMMERCIAL

## 2020-10-26 ENCOUNTER — TELEPHONE (OUTPATIENT)
Dept: PERINATAL CARE | Facility: CLINIC | Age: 25
End: 2020-10-26

## 2020-10-26 DIAGNOSIS — O24.419 GESTATIONAL DIABETES MELLITUS (GDM) IN THIRD TRIMESTER, GESTATIONAL DIABETES METHOD OF CONTROL UNSPECIFIED: Primary | ICD-10-CM

## 2020-10-26 DIAGNOSIS — Z3A.27 27 WEEKS GESTATION OF PREGNANCY: ICD-10-CM

## 2020-10-26 PROCEDURE — G0109 DIAB MANAGE TRN IND/GROUP: HCPCS | Performed by: DIETITIAN, REGISTERED

## 2020-10-28 ENCOUNTER — TELEPHONE (OUTPATIENT)
Dept: PERINATAL CARE | Facility: CLINIC | Age: 25
End: 2020-10-28

## 2020-10-29 ENCOUNTER — ULTRASOUND (OUTPATIENT)
Dept: PERINATAL CARE | Facility: OTHER | Age: 25
End: 2020-10-29
Payer: COMMERCIAL

## 2020-10-29 VITALS
SYSTOLIC BLOOD PRESSURE: 118 MMHG | HEART RATE: 97 BPM | DIASTOLIC BLOOD PRESSURE: 82 MMHG | BODY MASS INDEX: 39.68 KG/M2 | WEIGHT: 293 LBS | TEMPERATURE: 97.9 F | HEIGHT: 72 IN

## 2020-10-29 DIAGNOSIS — O24.414 INSULIN CONTROLLED GESTATIONAL DIABETES MELLITUS (GDM) IN THIRD TRIMESTER: Primary | ICD-10-CM

## 2020-10-29 DIAGNOSIS — Z3A.28 28 WEEKS GESTATION OF PREGNANCY: ICD-10-CM

## 2020-10-29 DIAGNOSIS — O99.210 OBESITY AFFECTING PREGNANCY, ANTEPARTUM: ICD-10-CM

## 2020-10-29 PROCEDURE — 76816 OB US FOLLOW-UP PER FETUS: CPT | Performed by: OBSTETRICS & GYNECOLOGY

## 2020-10-29 PROCEDURE — 99213 OFFICE O/P EST LOW 20 MIN: CPT | Performed by: OBSTETRICS & GYNECOLOGY

## 2020-10-29 RX ORDER — CLINDAMYCIN HYDROCHLORIDE 300 MG/1
300 CAPSULE ORAL 3 TIMES DAILY
COMMUNITY
Start: 2020-10-29 | End: 2020-11-05

## 2020-10-30 ENCOUNTER — TELEPHONE (OUTPATIENT)
Dept: PERINATAL CARE | Facility: CLINIC | Age: 25
End: 2020-10-30

## 2020-11-02 ENCOUNTER — ROUTINE PRENATAL (OUTPATIENT)
Dept: OBGYN CLINIC | Facility: MEDICAL CENTER | Age: 25
End: 2020-11-02
Payer: COMMERCIAL

## 2020-11-02 ENCOUNTER — TELEMEDICINE (OUTPATIENT)
Dept: PERINATAL CARE | Facility: CLINIC | Age: 25
End: 2020-11-02
Payer: COMMERCIAL

## 2020-11-02 VITALS — SYSTOLIC BLOOD PRESSURE: 112 MMHG | BODY MASS INDEX: 42.45 KG/M2 | WEIGHT: 293 LBS | DIASTOLIC BLOOD PRESSURE: 68 MMHG

## 2020-11-02 VITALS — BODY MASS INDEX: 39.68 KG/M2 | WEIGHT: 293 LBS | HEIGHT: 72 IN

## 2020-11-02 DIAGNOSIS — E66.01 MATERNAL MORBID OBESITY, ANTEPARTUM (HCC): ICD-10-CM

## 2020-11-02 DIAGNOSIS — Z72.0 TOBACCO USER: ICD-10-CM

## 2020-11-02 DIAGNOSIS — O99.213 OBESITY AFFECTING PREGNANCY IN THIRD TRIMESTER: ICD-10-CM

## 2020-11-02 DIAGNOSIS — Z3A.28 28 WEEKS GESTATION OF PREGNANCY: ICD-10-CM

## 2020-11-02 DIAGNOSIS — O24.414 INSULIN CONTROLLED GESTATIONAL DIABETES MELLITUS (GDM) IN THIRD TRIMESTER: ICD-10-CM

## 2020-11-02 DIAGNOSIS — Z3A.28 28 WEEKS GESTATION OF PREGNANCY: Primary | ICD-10-CM

## 2020-11-02 DIAGNOSIS — Z34.93 THIRD TRIMESTER PREGNANCY: ICD-10-CM

## 2020-11-02 DIAGNOSIS — O99.810 HYPERGLYCEMIA IN PREGNANCY: Primary | ICD-10-CM

## 2020-11-02 DIAGNOSIS — O99.210 MATERNAL MORBID OBESITY, ANTEPARTUM (HCC): ICD-10-CM

## 2020-11-02 PROCEDURE — 99214 OFFICE O/P EST MOD 30 MIN: CPT | Performed by: NURSE PRACTITIONER

## 2020-11-02 PROCEDURE — 3008F BODY MASS INDEX DOCD: CPT | Performed by: OBSTETRICS & GYNECOLOGY

## 2020-11-02 PROCEDURE — 4004F PT TOBACCO SCREEN RCVD TLK: CPT | Performed by: NURSE PRACTITIONER

## 2020-11-02 PROCEDURE — 99213 OFFICE O/P EST LOW 20 MIN: CPT | Performed by: OBSTETRICS & GYNECOLOGY

## 2020-11-02 RX ORDER — INSULIN DETEMIR 100 [IU]/ML
34 INJECTION, SOLUTION SUBCUTANEOUS
Qty: 5 PEN | Refills: 0 | Status: SHIPPED | OUTPATIENT
Start: 2020-11-02 | End: 2020-12-02 | Stop reason: SDUPTHER

## 2020-11-03 ENCOUNTER — LAB (OUTPATIENT)
Dept: LAB | Facility: MEDICAL CENTER | Age: 25
End: 2020-11-03
Payer: COMMERCIAL

## 2020-11-03 DIAGNOSIS — O24.414 INSULIN CONTROLLED GESTATIONAL DIABETES MELLITUS (GDM) IN THIRD TRIMESTER: ICD-10-CM

## 2020-11-03 LAB
ALBUMIN SERPL BCP-MCNC: 2.7 G/DL (ref 3.5–5)
ALP SERPL-CCNC: 63 U/L (ref 46–116)
ALT SERPL W P-5'-P-CCNC: 17 U/L (ref 12–78)
ANION GAP SERPL CALCULATED.3IONS-SCNC: 6 MMOL/L (ref 4–13)
AST SERPL W P-5'-P-CCNC: 6 U/L (ref 5–45)
BILIRUB SERPL-MCNC: 0.15 MG/DL (ref 0.2–1)
BUN SERPL-MCNC: 5 MG/DL (ref 5–25)
CALCIUM ALBUM COR SERPL-MCNC: 9.9 MG/DL (ref 8.3–10.1)
CALCIUM SERPL-MCNC: 8.9 MG/DL (ref 8.3–10.1)
CHLORIDE SERPL-SCNC: 110 MMOL/L (ref 100–108)
CO2 SERPL-SCNC: 22 MMOL/L (ref 21–32)
CREAT SERPL-MCNC: 0.62 MG/DL (ref 0.6–1.3)
EST. AVERAGE GLUCOSE BLD GHB EST-MCNC: 120 MG/DL
GFR SERPL CREATININE-BSD FRML MDRD: 124 ML/MIN/1.73SQ M
GLUCOSE P FAST SERPL-MCNC: 121 MG/DL (ref 65–99)
HBA1C MFR BLD: 5.8 %
POTASSIUM SERPL-SCNC: 4 MMOL/L (ref 3.5–5.3)
PROT SERPL-MCNC: 6.8 G/DL (ref 6.4–8.2)
SODIUM SERPL-SCNC: 138 MMOL/L (ref 136–145)

## 2020-11-03 PROCEDURE — 83036 HEMOGLOBIN GLYCOSYLATED A1C: CPT

## 2020-11-03 PROCEDURE — 36415 COLL VENOUS BLD VENIPUNCTURE: CPT | Performed by: OBSTETRICS & GYNECOLOGY

## 2020-11-03 PROCEDURE — 80053 COMPREHEN METABOLIC PANEL: CPT | Performed by: OBSTETRICS & GYNECOLOGY

## 2020-11-05 ENCOUNTER — TELEMEDICINE (OUTPATIENT)
Dept: PERINATAL CARE | Facility: CLINIC | Age: 25
End: 2020-11-05
Payer: COMMERCIAL

## 2020-11-05 ENCOUNTER — DOCUMENTATION (OUTPATIENT)
Dept: PERINATAL CARE | Facility: CLINIC | Age: 25
End: 2020-11-05

## 2020-11-05 DIAGNOSIS — O99.213 OBESITY AFFECTING PREGNANCY IN THIRD TRIMESTER: ICD-10-CM

## 2020-11-05 DIAGNOSIS — O24.414 INSULIN CONTROLLED GESTATIONAL DIABETES MELLITUS (GDM) IN THIRD TRIMESTER: Primary | ICD-10-CM

## 2020-11-05 DIAGNOSIS — Z3A.29 29 WEEKS GESTATION OF PREGNANCY: ICD-10-CM

## 2020-11-05 PROCEDURE — G0108 DIAB MANAGE TRN  PER INDIV: HCPCS | Performed by: DIETITIAN, REGISTERED

## 2020-11-16 ENCOUNTER — DOCUMENTATION (OUTPATIENT)
Dept: PERINATAL CARE | Facility: CLINIC | Age: 25
End: 2020-11-16

## 2020-11-16 ENCOUNTER — ROUTINE PRENATAL (OUTPATIENT)
Dept: OBGYN CLINIC | Facility: MEDICAL CENTER | Age: 25
End: 2020-11-16
Payer: COMMERCIAL

## 2020-11-16 VITALS — SYSTOLIC BLOOD PRESSURE: 118 MMHG | BODY MASS INDEX: 43.1 KG/M2 | WEIGHT: 293 LBS | DIASTOLIC BLOOD PRESSURE: 70 MMHG

## 2020-11-16 DIAGNOSIS — Z3A.30 30 WEEKS GESTATION OF PREGNANCY: Primary | ICD-10-CM

## 2020-11-16 DIAGNOSIS — O24.414 INSULIN CONTROLLED GESTATIONAL DIABETES MELLITUS (GDM) IN THIRD TRIMESTER: ICD-10-CM

## 2020-11-16 PROBLEM — Z3A.28 28 WEEKS GESTATION OF PREGNANCY: Status: RESOLVED | Noted: 2020-07-16 | Resolved: 2020-11-16

## 2020-11-16 PROCEDURE — 90471 IMMUNIZATION ADMIN: CPT | Performed by: STUDENT IN AN ORGANIZED HEALTH CARE EDUCATION/TRAINING PROGRAM

## 2020-11-16 PROCEDURE — 90715 TDAP VACCINE 7 YRS/> IM: CPT | Performed by: STUDENT IN AN ORGANIZED HEALTH CARE EDUCATION/TRAINING PROGRAM

## 2020-11-16 PROCEDURE — 99214 OFFICE O/P EST MOD 30 MIN: CPT | Performed by: STUDENT IN AN ORGANIZED HEALTH CARE EDUCATION/TRAINING PROGRAM

## 2020-11-23 ENCOUNTER — DOCUMENTATION (OUTPATIENT)
Dept: PERINATAL CARE | Facility: CLINIC | Age: 25
End: 2020-11-23

## 2020-11-23 DIAGNOSIS — Z3A.31 31 WEEKS GESTATION OF PREGNANCY: ICD-10-CM

## 2020-11-23 DIAGNOSIS — O99.213 OBESITY AFFECTING PREGNANCY IN THIRD TRIMESTER: ICD-10-CM

## 2020-11-23 DIAGNOSIS — O24.414 INSULIN CONTROLLED GESTATIONAL DIABETES MELLITUS (GDM) IN THIRD TRIMESTER: Primary | ICD-10-CM

## 2020-11-24 PROBLEM — Z3A.31 31 WEEKS GESTATION OF PREGNANCY: Status: ACTIVE | Noted: 2020-11-16

## 2020-11-30 ENCOUNTER — TELEPHONE (OUTPATIENT)
Dept: PERINATAL CARE | Facility: CLINIC | Age: 25
End: 2020-11-30

## 2020-11-30 ENCOUNTER — DOCUMENTATION (OUTPATIENT)
Dept: PERINATAL CARE | Facility: CLINIC | Age: 25
End: 2020-11-30

## 2020-12-01 ENCOUNTER — ULTRASOUND (OUTPATIENT)
Dept: PERINATAL CARE | Facility: OTHER | Age: 25
End: 2020-12-01
Payer: COMMERCIAL

## 2020-12-01 ENCOUNTER — ROUTINE PRENATAL (OUTPATIENT)
Dept: PERINATAL CARE | Facility: OTHER | Age: 25
End: 2020-12-01
Payer: COMMERCIAL

## 2020-12-01 VITALS
WEIGHT: 293 LBS | SYSTOLIC BLOOD PRESSURE: 133 MMHG | HEIGHT: 72 IN | DIASTOLIC BLOOD PRESSURE: 68 MMHG | HEART RATE: 93 BPM | BODY MASS INDEX: 39.68 KG/M2

## 2020-12-01 DIAGNOSIS — Z3A.32 32 WEEKS GESTATION OF PREGNANCY: ICD-10-CM

## 2020-12-01 DIAGNOSIS — O24.414 INSULIN CONTROLLED GESTATIONAL DIABETES MELLITUS (GDM) IN THIRD TRIMESTER: Primary | ICD-10-CM

## 2020-12-01 DIAGNOSIS — O99.210 OBESITY COMPLICATING PREGNANCY, UNSPECIFIED TRIMESTER: Primary | ICD-10-CM

## 2020-12-01 PROCEDURE — NC001 PR NO CHARGE: Performed by: OBSTETRICS & GYNECOLOGY

## 2020-12-01 PROCEDURE — 76816 OB US FOLLOW-UP PER FETUS: CPT | Performed by: OBSTETRICS & GYNECOLOGY

## 2020-12-01 PROCEDURE — 59025 FETAL NON-STRESS TEST: CPT | Performed by: OBSTETRICS & GYNECOLOGY

## 2020-12-01 PROCEDURE — 99212 OFFICE O/P EST SF 10 MIN: CPT | Performed by: OBSTETRICS & GYNECOLOGY

## 2020-12-01 RX ORDER — AMOXICILLIN AND CLAVULANATE POTASSIUM 250; 125 MG/1; MG/1
1 TABLET, FILM COATED ORAL EVERY 8 HOURS SCHEDULED
COMMUNITY
End: 2020-12-18 | Stop reason: ALTCHOICE

## 2020-12-01 NOTE — LETTER
Michael Barber  MRN: [id number] MRN: [id number] MRN: [id number]  : 1995 : 1995 : 1995  CALLIE/GA: CALLIE/GA: CALLIE/GA:  Date:  Date:  Date:     Michael Barber  MRN: [id number] MRN: [id number] MRN: [id number]  : [birth year] : 1995  CALLIE/GA: CALLIE/GA: CALLIE/GA:  Date:  Date:  Date:     Michael Barber  MRN: [id number] MRN: [id number] MRN: [id number]  : [birth year] : 1995  CALLIE/GA: CALLIE/GA: CALLIE/GA:  Date:  Date:  Date:     Michael Barber  MRN: [id number] MRN: [id number] MRN: [id number]  : [birth year] : 1995  CALLIE/GA: CALLIE/GA: CALLIE/GA:  Date:  Date:  Date:     Michael Barber  MRN: [id number] MRN: [id number] MRN: [id number]  : [birth year] : 1995  CALLIE/GA: CALLIE/GA: CALLIE/GA:  Date:  Date:  Date:     Michael Barber  MRN: [id number] MRN: [id number] MRN: [id number]  : [birth year] : 1995  CALLIE/GA: CALLIE/GA: CALLIE/GA:  Date:  Date:  Date:     Michael Barber  MRN: [id number] MRN: [id number] MRN: [id number]  : [birth year] : 1995  CALLIE/GA: CALLIE/GA: CALLIE/GA:  Date:  Date:  Date:

## 2020-12-01 NOTE — LETTER
NST sleeve cover sheet    Patient name: Marin Ca  : 1995  MRN: [id number]    CALLIE: Estimated Date of Delivery: 21    Obstetrician: ______________________[facility]_________    Reason(s) for testing:  _____________________________A2 GDM_____________      Testing frequency:    x___ 2x/wk  ___ 1x/wk  ___ Dopplers  ___ BPP?       Last growth scan: __________________________________________

## 2020-12-02 DIAGNOSIS — O24.414 INSULIN CONTROLLED GESTATIONAL DIABETES MELLITUS (GDM) IN THIRD TRIMESTER: Primary | ICD-10-CM

## 2020-12-02 DIAGNOSIS — O99.810 HYPERGLYCEMIA IN PREGNANCY: ICD-10-CM

## 2020-12-02 DIAGNOSIS — Z3A.28 28 WEEKS GESTATION OF PREGNANCY: ICD-10-CM

## 2020-12-02 DIAGNOSIS — O99.213 OBESITY AFFECTING PREGNANCY IN THIRD TRIMESTER: ICD-10-CM

## 2020-12-02 RX ORDER — INSULIN DETEMIR 100 [IU]/ML
72 INJECTION, SOLUTION SUBCUTANEOUS
Qty: 10 PEN | Refills: 0 | Status: SHIPPED | OUTPATIENT
Start: 2020-12-02 | End: 2021-01-04 | Stop reason: SDUPTHER

## 2020-12-03 ENCOUNTER — ROUTINE PRENATAL (OUTPATIENT)
Dept: OBGYN CLINIC | Facility: MEDICAL CENTER | Age: 25
End: 2020-12-03
Payer: COMMERCIAL

## 2020-12-03 VITALS — DIASTOLIC BLOOD PRESSURE: 70 MMHG | BODY MASS INDEX: 43.32 KG/M2 | SYSTOLIC BLOOD PRESSURE: 118 MMHG | WEIGHT: 293 LBS

## 2020-12-03 DIAGNOSIS — Z3A.33 33 WEEKS GESTATION OF PREGNANCY: Primary | ICD-10-CM

## 2020-12-03 DIAGNOSIS — O24.414 INSULIN CONTROLLED GESTATIONAL DIABETES MELLITUS (GDM) IN THIRD TRIMESTER: ICD-10-CM

## 2020-12-03 DIAGNOSIS — B37.3 VULVOVAGINAL CANDIDIASIS: ICD-10-CM

## 2020-12-03 PROBLEM — B37.31 VULVOVAGINAL CANDIDIASIS: Status: ACTIVE | Noted: 2020-12-03

## 2020-12-03 PROCEDURE — 4004F PT TOBACCO SCREEN RCVD TLK: CPT | Performed by: STUDENT IN AN ORGANIZED HEALTH CARE EDUCATION/TRAINING PROGRAM

## 2020-12-03 PROCEDURE — 59025 FETAL NON-STRESS TEST: CPT | Performed by: STUDENT IN AN ORGANIZED HEALTH CARE EDUCATION/TRAINING PROGRAM

## 2020-12-03 PROCEDURE — 99214 OFFICE O/P EST MOD 30 MIN: CPT | Performed by: STUDENT IN AN ORGANIZED HEALTH CARE EDUCATION/TRAINING PROGRAM

## 2020-12-03 RX ORDER — FLUCONAZOLE 150 MG/1
150 TABLET ORAL ONCE
Qty: 1 TABLET | Refills: 0 | Status: SHIPPED | OUTPATIENT
Start: 2020-12-03 | End: 2020-12-03

## 2020-12-07 ENCOUNTER — TELEPHONE (OUTPATIENT)
Dept: PERINATAL CARE | Facility: CLINIC | Age: 25
End: 2020-12-07

## 2020-12-07 ENCOUNTER — DOCUMENTATION (OUTPATIENT)
Dept: PERINATAL CARE | Facility: CLINIC | Age: 25
End: 2020-12-07

## 2020-12-08 ENCOUNTER — ULTRASOUND (OUTPATIENT)
Dept: PERINATAL CARE | Facility: OTHER | Age: 25
End: 2020-12-08
Payer: COMMERCIAL

## 2020-12-08 VITALS
WEIGHT: 293 LBS | BODY MASS INDEX: 39.68 KG/M2 | HEIGHT: 72 IN | DIASTOLIC BLOOD PRESSURE: 70 MMHG | HEART RATE: 96 BPM | SYSTOLIC BLOOD PRESSURE: 110 MMHG

## 2020-12-08 DIAGNOSIS — O24.414 INSULIN CONTROLLED GESTATIONAL DIABETES MELLITUS (GDM) IN THIRD TRIMESTER: Primary | ICD-10-CM

## 2020-12-08 DIAGNOSIS — O99.213 OBESITY AFFECTING PREGNANCY IN THIRD TRIMESTER: ICD-10-CM

## 2020-12-08 DIAGNOSIS — Z3A.33 33 WEEKS GESTATION OF PREGNANCY: ICD-10-CM

## 2020-12-08 PROCEDURE — 59025 FETAL NON-STRESS TEST: CPT | Performed by: OBSTETRICS & GYNECOLOGY

## 2020-12-08 PROCEDURE — 76815 OB US LIMITED FETUS(S): CPT | Performed by: OBSTETRICS & GYNECOLOGY

## 2020-12-08 RX ORDER — INSULIN LISPRO 100 [IU]/ML
INJECTION, SOLUTION INTRAVENOUS; SUBCUTANEOUS
Qty: 15 ML | Refills: 0 | Status: SHIPPED | OUTPATIENT
Start: 2020-12-08 | End: 2021-01-07 | Stop reason: HOSPADM

## 2020-12-08 RX ORDER — FLUCONAZOLE 150 MG/1
TABLET ORAL
COMMUNITY
Start: 2020-12-03 | End: 2020-12-18 | Stop reason: ALTCHOICE

## 2020-12-11 ENCOUNTER — ROUTINE PRENATAL (OUTPATIENT)
Dept: OBGYN CLINIC | Facility: MEDICAL CENTER | Age: 25
End: 2020-12-11
Payer: COMMERCIAL

## 2020-12-11 ENCOUNTER — TELEPHONE (OUTPATIENT)
Dept: PERINATAL CARE | Facility: CLINIC | Age: 25
End: 2020-12-11

## 2020-12-11 VITALS — SYSTOLIC BLOOD PRESSURE: 125 MMHG | BODY MASS INDEX: 44.24 KG/M2 | DIASTOLIC BLOOD PRESSURE: 75 MMHG | WEIGHT: 293 LBS

## 2020-12-11 DIAGNOSIS — Z3A.34 34 WEEKS GESTATION OF PREGNANCY: Primary | ICD-10-CM

## 2020-12-11 DIAGNOSIS — Z86.59 H/O MAJOR DEPRESSION: ICD-10-CM

## 2020-12-11 DIAGNOSIS — Z72.0 TOBACCO USER: ICD-10-CM

## 2020-12-11 DIAGNOSIS — O24.414 INSULIN CONTROLLED GESTATIONAL DIABETES MELLITUS (GDM) IN THIRD TRIMESTER: ICD-10-CM

## 2020-12-11 PROCEDURE — 99214 OFFICE O/P EST MOD 30 MIN: CPT | Performed by: STUDENT IN AN ORGANIZED HEALTH CARE EDUCATION/TRAINING PROGRAM

## 2020-12-14 ENCOUNTER — DOCUMENTATION (OUTPATIENT)
Dept: PERINATAL CARE | Facility: CLINIC | Age: 25
End: 2020-12-14

## 2020-12-14 ENCOUNTER — ULTRASOUND (OUTPATIENT)
Dept: PERINATAL CARE | Facility: OTHER | Age: 25
End: 2020-12-14
Payer: COMMERCIAL

## 2020-12-14 VITALS
DIASTOLIC BLOOD PRESSURE: 84 MMHG | WEIGHT: 293 LBS | BODY MASS INDEX: 39.68 KG/M2 | SYSTOLIC BLOOD PRESSURE: 128 MMHG | HEART RATE: 100 BPM | HEIGHT: 72 IN

## 2020-12-14 DIAGNOSIS — E66.01 MATERNAL MORBID OBESITY, ANTEPARTUM (HCC): ICD-10-CM

## 2020-12-14 DIAGNOSIS — O24.414 INSULIN CONTROLLED GESTATIONAL DIABETES MELLITUS (GDM) IN THIRD TRIMESTER: Primary | ICD-10-CM

## 2020-12-14 DIAGNOSIS — Z3A.34 34 WEEKS GESTATION OF PREGNANCY: ICD-10-CM

## 2020-12-14 DIAGNOSIS — O99.210 MATERNAL MORBID OBESITY, ANTEPARTUM (HCC): ICD-10-CM

## 2020-12-14 PROCEDURE — 76815 OB US LIMITED FETUS(S): CPT | Performed by: OBSTETRICS & GYNECOLOGY

## 2020-12-14 PROCEDURE — 59025 FETAL NON-STRESS TEST: CPT | Performed by: OBSTETRICS & GYNECOLOGY

## 2020-12-14 PROCEDURE — 3008F BODY MASS INDEX DOCD: CPT | Performed by: STUDENT IN AN ORGANIZED HEALTH CARE EDUCATION/TRAINING PROGRAM

## 2020-12-15 ENCOUNTER — PATIENT MESSAGE (OUTPATIENT)
Dept: PERINATAL CARE | Facility: CLINIC | Age: 25
End: 2020-12-15

## 2020-12-18 ENCOUNTER — ROUTINE PRENATAL (OUTPATIENT)
Dept: OBGYN CLINIC | Facility: MEDICAL CENTER | Age: 25
End: 2020-12-18
Payer: COMMERCIAL

## 2020-12-18 ENCOUNTER — ROUTINE PRENATAL (OUTPATIENT)
Dept: OBGYN CLINIC | Facility: MEDICAL CENTER | Age: 25
End: 2020-12-18

## 2020-12-18 ENCOUNTER — TELEPHONE (OUTPATIENT)
Dept: PERINATAL CARE | Facility: OTHER | Age: 25
End: 2020-12-18

## 2020-12-18 VITALS — BODY MASS INDEX: 43.94 KG/M2 | SYSTOLIC BLOOD PRESSURE: 124 MMHG | WEIGHT: 293 LBS | DIASTOLIC BLOOD PRESSURE: 80 MMHG

## 2020-12-18 DIAGNOSIS — Z3A.35 35 WEEKS GESTATION OF PREGNANCY: Primary | ICD-10-CM

## 2020-12-18 DIAGNOSIS — O24.414 INSULIN CONTROLLED GESTATIONAL DIABETES MELLITUS (GDM) IN THIRD TRIMESTER: ICD-10-CM

## 2020-12-18 DIAGNOSIS — Z3A.36 36 WEEKS GESTATION OF PREGNANCY: Primary | ICD-10-CM

## 2020-12-18 LAB — EXTERNAL GROUP B STREP ANTIGEN: POSITIVE

## 2020-12-18 PROCEDURE — 87081 CULTURE SCREEN ONLY: CPT | Performed by: STUDENT IN AN ORGANIZED HEALTH CARE EDUCATION/TRAINING PROGRAM

## 2020-12-18 PROCEDURE — 99214 OFFICE O/P EST MOD 30 MIN: CPT | Performed by: STUDENT IN AN ORGANIZED HEALTH CARE EDUCATION/TRAINING PROGRAM

## 2020-12-18 PROCEDURE — NC001 PR NO CHARGE: Performed by: STUDENT IN AN ORGANIZED HEALTH CARE EDUCATION/TRAINING PROGRAM

## 2020-12-18 PROCEDURE — 87184 SC STD DISK METHOD PER PLATE: CPT | Performed by: STUDENT IN AN ORGANIZED HEALTH CARE EDUCATION/TRAINING PROGRAM

## 2020-12-18 PROCEDURE — 59025 FETAL NON-STRESS TEST: CPT | Performed by: STUDENT IN AN ORGANIZED HEALTH CARE EDUCATION/TRAINING PROGRAM

## 2020-12-21 ENCOUNTER — ULTRASOUND (OUTPATIENT)
Dept: PERINATAL CARE | Facility: OTHER | Age: 25
End: 2020-12-21
Payer: COMMERCIAL

## 2020-12-21 VITALS
DIASTOLIC BLOOD PRESSURE: 84 MMHG | BODY MASS INDEX: 39.68 KG/M2 | WEIGHT: 293 LBS | SYSTOLIC BLOOD PRESSURE: 138 MMHG | HEART RATE: 93 BPM | HEIGHT: 72 IN

## 2020-12-21 DIAGNOSIS — E66.01 MATERNAL MORBID OBESITY, ANTEPARTUM (HCC): ICD-10-CM

## 2020-12-21 DIAGNOSIS — O24.414 INSULIN CONTROLLED GESTATIONAL DIABETES MELLITUS (GDM) IN THIRD TRIMESTER: Primary | ICD-10-CM

## 2020-12-21 DIAGNOSIS — O99.210 MATERNAL MORBID OBESITY, ANTEPARTUM (HCC): ICD-10-CM

## 2020-12-21 PROCEDURE — 76815 OB US LIMITED FETUS(S): CPT | Performed by: OBSTETRICS & GYNECOLOGY

## 2020-12-21 PROCEDURE — 59025 FETAL NON-STRESS TEST: CPT | Performed by: OBSTETRICS & GYNECOLOGY

## 2020-12-22 LAB
GP B STREP GENITAL QL CULT: ABNORMAL
GP B STREP GENITAL QL CULT: ABNORMAL

## 2020-12-28 ENCOUNTER — TELEPHONE (OUTPATIENT)
Dept: PERINATAL CARE | Facility: OTHER | Age: 25
End: 2020-12-28

## 2020-12-29 ENCOUNTER — ROUTINE PRENATAL (OUTPATIENT)
Dept: PERINATAL CARE | Facility: OTHER | Age: 25
End: 2020-12-29
Payer: COMMERCIAL

## 2020-12-29 ENCOUNTER — ULTRASOUND (OUTPATIENT)
Dept: PERINATAL CARE | Facility: OTHER | Age: 25
End: 2020-12-29
Payer: COMMERCIAL

## 2020-12-29 VITALS
BODY MASS INDEX: 39.68 KG/M2 | HEIGHT: 72 IN | SYSTOLIC BLOOD PRESSURE: 128 MMHG | HEART RATE: 96 BPM | WEIGHT: 293 LBS | DIASTOLIC BLOOD PRESSURE: 85 MMHG

## 2020-12-29 DIAGNOSIS — O99.213 MATERNAL MORBID OBESITY IN THIRD TRIMESTER, ANTEPARTUM (HCC): ICD-10-CM

## 2020-12-29 DIAGNOSIS — O24.414 INSULIN CONTROLLED GESTATIONAL DIABETES MELLITUS (GDM) IN THIRD TRIMESTER: Primary | ICD-10-CM

## 2020-12-29 DIAGNOSIS — E66.01 MATERNAL MORBID OBESITY IN THIRD TRIMESTER, ANTEPARTUM (HCC): ICD-10-CM

## 2020-12-29 DIAGNOSIS — Z3A.36 36 WEEKS GESTATION OF PREGNANCY: ICD-10-CM

## 2020-12-29 PROCEDURE — 76816 OB US FOLLOW-UP PER FETUS: CPT | Performed by: OBSTETRICS & GYNECOLOGY

## 2020-12-29 PROCEDURE — 99212 OFFICE O/P EST SF 10 MIN: CPT | Performed by: OBSTETRICS & GYNECOLOGY

## 2020-12-29 PROCEDURE — 59025 FETAL NON-STRESS TEST: CPT | Performed by: OBSTETRICS & GYNECOLOGY

## 2020-12-29 PROCEDURE — NC001 PR NO CHARGE

## 2020-12-30 ENCOUNTER — ROUTINE PRENATAL (OUTPATIENT)
Dept: OBGYN CLINIC | Facility: MEDICAL CENTER | Age: 25
End: 2020-12-30
Payer: COMMERCIAL

## 2020-12-30 VITALS
WEIGHT: 293 LBS | HEIGHT: 72 IN | SYSTOLIC BLOOD PRESSURE: 132 MMHG | HEART RATE: 107 BPM | DIASTOLIC BLOOD PRESSURE: 90 MMHG | BODY MASS INDEX: 39.68 KG/M2

## 2020-12-30 DIAGNOSIS — Z72.0 TOBACCO USER: ICD-10-CM

## 2020-12-30 DIAGNOSIS — Z3A.36 36 WEEKS GESTATION OF PREGNANCY: Primary | ICD-10-CM

## 2020-12-30 DIAGNOSIS — O24.414 INSULIN CONTROLLED GESTATIONAL DIABETES MELLITUS (GDM) IN THIRD TRIMESTER: ICD-10-CM

## 2020-12-30 PROCEDURE — 99214 OFFICE O/P EST MOD 30 MIN: CPT | Performed by: STUDENT IN AN ORGANIZED HEALTH CARE EDUCATION/TRAINING PROGRAM

## 2020-12-30 PROCEDURE — 3008F BODY MASS INDEX DOCD: CPT | Performed by: STUDENT IN AN ORGANIZED HEALTH CARE EDUCATION/TRAINING PROGRAM

## 2020-12-31 ENCOUNTER — DOCUMENTATION (OUTPATIENT)
Dept: PERINATAL CARE | Facility: CLINIC | Age: 25
End: 2020-12-31

## 2020-12-31 ENCOUNTER — TELEPHONE (OUTPATIENT)
Dept: PERINATAL CARE | Facility: CLINIC | Age: 25
End: 2020-12-31

## 2020-12-31 DIAGNOSIS — O24.414 INSULIN CONTROLLED GESTATIONAL DIABETES MELLITUS (GDM) IN THIRD TRIMESTER: Primary | ICD-10-CM

## 2021-01-04 ENCOUNTER — TELEPHONE (OUTPATIENT)
Dept: PERINATAL CARE | Facility: OTHER | Age: 26
End: 2021-01-04

## 2021-01-04 ENCOUNTER — HOSPITAL ENCOUNTER (INPATIENT)
Facility: HOSPITAL | Age: 26
LOS: 3 days | Discharge: HOME | DRG: 560 | End: 2021-01-07
Attending: OBSTETRICS & GYNECOLOGY | Admitting: OBSTETRICS & GYNECOLOGY
Payer: COMMERCIAL

## 2021-01-04 ENCOUNTER — ROUTINE PRENATAL (OUTPATIENT)
Dept: OBGYN CLINIC | Facility: MEDICAL CENTER | Age: 26
End: 2021-01-04
Payer: COMMERCIAL

## 2021-01-04 VITALS — DIASTOLIC BLOOD PRESSURE: 90 MMHG | SYSTOLIC BLOOD PRESSURE: 136 MMHG | BODY MASS INDEX: 44.89 KG/M2 | WEIGHT: 293 LBS

## 2021-01-04 DIAGNOSIS — O24.414 INSULIN CONTROLLED GESTATIONAL DIABETES MELLITUS (GDM) IN THIRD TRIMESTER: ICD-10-CM

## 2021-01-04 DIAGNOSIS — O99.213 OBESITY AFFECTING PREGNANCY IN THIRD TRIMESTER: ICD-10-CM

## 2021-01-04 DIAGNOSIS — Z34.93 THIRD TRIMESTER PREGNANCY: ICD-10-CM

## 2021-01-04 DIAGNOSIS — Z3A.37 37 WEEKS GESTATION OF PREGNANCY: ICD-10-CM

## 2021-01-04 DIAGNOSIS — O99.810 HYPERGLYCEMIA IN PREGNANCY: ICD-10-CM

## 2021-01-04 DIAGNOSIS — Z3A.37 37 WEEKS GESTATION OF PREGNANCY: Primary | ICD-10-CM

## 2021-01-04 DIAGNOSIS — O13.3 GESTATIONAL HYPERTENSION, THIRD TRIMESTER: ICD-10-CM

## 2021-01-04 DIAGNOSIS — O99.210 MATERNAL MORBID OBESITY, ANTEPARTUM (HCC): ICD-10-CM

## 2021-01-04 DIAGNOSIS — Z3A.28 28 WEEKS GESTATION OF PREGNANCY: ICD-10-CM

## 2021-01-04 DIAGNOSIS — E66.01 MATERNAL MORBID OBESITY, ANTEPARTUM (HCC): ICD-10-CM

## 2021-01-04 PROCEDURE — NC001 PR NO CHARGE: Performed by: OBSTETRICS & GYNECOLOGY

## 2021-01-04 PROCEDURE — 99213 OFFICE O/P EST LOW 20 MIN: CPT | Performed by: OBSTETRICS & GYNECOLOGY

## 2021-01-04 PROCEDURE — 82948 REAGENT STRIP/BLOOD GLUCOSE: CPT

## 2021-01-04 PROCEDURE — 4A1HXCZ MONITORING OF PRODUCTS OF CONCEPTION, CARDIAC RATE, EXTERNAL APPROACH: ICD-10-PCS | Performed by: OBSTETRICS & GYNECOLOGY

## 2021-01-04 RX ORDER — SODIUM CHLORIDE 9 MG/ML
125 INJECTION, SOLUTION INTRAVENOUS CONTINUOUS
Status: DISCONTINUED | OUTPATIENT
Start: 2021-01-04 | End: 2021-01-06

## 2021-01-04 RX ORDER — ONDANSETRON 2 MG/ML
4 INJECTION INTRAMUSCULAR; INTRAVENOUS EVERY 6 HOURS PRN
Status: DISCONTINUED | OUTPATIENT
Start: 2021-01-04 | End: 2021-01-06

## 2021-01-04 RX ORDER — INSULIN DETEMIR 100 [IU]/ML
78 INJECTION, SOLUTION SUBCUTANEOUS
Qty: 15 ML | Refills: 0 | Status: SHIPPED | OUTPATIENT
Start: 2021-01-04 | End: 2021-01-07 | Stop reason: HOSPADM

## 2021-01-04 RX ADMIN — SODIUM CHLORIDE 125 ML/HR: 0.9 INJECTION, SOLUTION INTRAVENOUS at 23:39

## 2021-01-04 RX ADMIN — SODIUM CHLORIDE 5 MILLION UNITS: 0.9 INJECTION, SOLUTION INTRAVENOUS at 23:56

## 2021-01-04 RX ADMIN — INSULIN DETEMIR 39 UNITS: 100 INJECTION, SOLUTION SUBCUTANEOUS at 23:54

## 2021-01-04 NOTE — PROGRESS NOTES
Assessment  30 y o  Bashir Leaks at 37w4d presenting for routine prenatal visit  2nd elevated BP noted  Discussed GHTN and need to screen for preeclampsia  Plan  Diagnoses and all orders for this visit:    37 weeks gestation of pregnancy  Third trimester pregnancy  Insulin controlled gestational diabetes mellitus (GDM) in third trimester  Gestational hypertension, third trimester  - Advise to go to L&D for further evaluation  - Delivery indicated in 37th week for GHTN      ____________________________________________________________        Subjective    [place] Paulette is a 30 y o  Bashir Leaks at 37w4d who presents for routine prenatal visit  She is without complaint  She reports occasional manoj maravilla ctxns, nothing regular  Denies loss of fluid, or vaginal bleeding  She feels regular fetal movements  Reports a mild headache off and on, but this has been her normal for the majority of pregnancy  She has not noticed a change in the headache  Denies visual changes or RUQ pain  Pregnancy Problems:  Patient Active Problem List   Diagnosis    Functional diarrhea    Generalized abdominal pain    Maternal morbid obesity, antepartum ([facility] [address] )    Tobacco user    H/O major depression    Left ovarian cyst    Pilonidal cyst    Insulin controlled gestational diabetes mellitus (GDM) in third trimester    Obesity affecting pregnancy in third trimester    BMI 40 0-44 9, adult ([address] )    Hyperglycemia in pregnancy    Vulvovaginal candidiasis    37 weeks gestation of pregnancy         Objective  /90   Wt (!) 150 kg (331 lb)   LMP 04/16/2020 (Exact Date)   BMI 44 89 kg/m²     FHT: 156 BPM   Uterine Size: size equals dates     Physical Exam:  Physical Exam  Constitutional:       General: She is not in acute distress  Appearance: Normal appearance  She is well-developed  She is not ill-appearing, toxic-appearing or diaphoretic  HENT:      Head: Normocephalic and atraumatic  Eyes:      General: No scleral icterus  Right eye: No discharge  Left eye: No discharge  Conjunctiva/sclera: Conjunctivae normal    Pulmonary:      Effort: Pulmonary effort is normal  No accessory muscle usage or respiratory distress  Abdominal:      General: There is distension (gravid)  Tenderness: There is no abdominal tenderness  There is no guarding or rebound  Skin:     General: Skin is warm and dry  Coloration: Skin is not jaundiced  Findings: No bruising, erythema or rash  Neurological:      Mental Status: She is alert  Psychiatric:         Mood and Affect: Mood normal          Behavior: Behavior normal          Thought Content:  Thought content normal          Judgment: Judgment normal

## 2021-01-04 NOTE — TELEPHONE ENCOUNTER
-------------------------------------------------------------    Attempted to reach patient by phone and left voicemail to confirm appointment for MFM ultrasound  1 support person ( must be over the age of 15) may accompany you for your appointment  If you or your support person have traveled outside the state in the past 2 weeks, please call and notify our office today #[phone]  You and your support person must wear a mask ,covering nose and mouth,during your entire visit  To minimize your exposure in our waiting room, please call our office prior to entering the building  Check in and rooming questions will be done via phone  We will give you directions when to enter for your appointment  [place]: [phone]    IF you are not feeling well- cough, fever, shortness of breath or any flu like symptoms, contact your primary care physician or [phone] [place] [address]  If you are awaiting COVID 19 test results please call and reschedule your appointment    Any questions with these instructions please call Maternal Fetal Medicine nurse line today @ # [phone]

## 2021-01-05 ENCOUNTER — ANESTHESIA EVENT (INPATIENT)
Dept: ANESTHESIOLOGY | Facility: HOSPITAL | Age: 26
DRG: 560 | End: 2021-01-05
Payer: COMMERCIAL

## 2021-01-05 ENCOUNTER — ANESTHESIA (INPATIENT)
Dept: ANESTHESIOLOGY | Facility: HOSPITAL | Age: 26
DRG: 560 | End: 2021-01-05
Payer: COMMERCIAL

## 2021-01-05 PROBLEM — O14.93 PRE-ECLAMPSIA IN THIRD TRIMESTER: Status: ACTIVE | Noted: 2021-01-04

## 2021-01-05 LAB
ABO GROUP BLD: NORMAL
ALBUMIN SERPL BCP-MCNC: 2.2 G/DL (ref 3.5–5)
ALP SERPL-CCNC: 102 U/L (ref 46–116)
ALT SERPL W P-5'-P-CCNC: 22 U/L (ref 12–78)
ANION GAP SERPL CALCULATED.3IONS-SCNC: 12 MMOL/L (ref 4–13)
AST SERPL W P-5'-P-CCNC: 13 U/L (ref 5–45)
BACTERIA UR QL AUTO: ABNORMAL /HPF
BASOPHILS # BLD AUTO: 0.05 THOUSANDS/ΜL (ref 0–0.1)
BASOPHILS NFR BLD AUTO: 0 % (ref 0–1)
BILIRUB SERPL-MCNC: 0.21 MG/DL (ref 0.2–1)
BILIRUB UR QL STRIP: NEGATIVE
BLD GP AB SCN SERPL QL: NEGATIVE
BUN SERPL-MCNC: 6 MG/DL (ref 5–25)
CALCIUM ALBUM COR SERPL-MCNC: 10 MG/DL (ref 8.3–10.1)
CALCIUM SERPL-MCNC: 8.6 MG/DL (ref 8.3–10.1)
CHLORIDE SERPL-SCNC: 104 MMOL/L (ref 100–108)
CLARITY UR: CLEAR
CO2 SERPL-SCNC: 21 MMOL/L (ref 21–32)
COLOR UR: YELLOW
CREAT SERPL-MCNC: 0.65 MG/DL (ref 0.6–1.3)
CREAT UR-MCNC: 145 MG/DL
EOSINOPHIL # BLD AUTO: 0.14 THOUSAND/ΜL (ref 0–0.61)
EOSINOPHIL NFR BLD AUTO: 1 % (ref 0–6)
ERYTHROCYTE [DISTWIDTH] IN BLOOD BY AUTOMATED COUNT: 13.7 % (ref 11.6–15.1)
GFR SERPL CREATININE-BSD FRML MDRD: 122 ML/MIN/1.73SQ M
GLUCOSE SERPL-MCNC: 100 MG/DL (ref 65–140)
GLUCOSE SERPL-MCNC: 104 MG/DL (ref 65–140)
GLUCOSE SERPL-MCNC: 111 MG/DL (ref 65–140)
GLUCOSE SERPL-MCNC: 73 MG/DL (ref 65–140)
GLUCOSE SERPL-MCNC: 79 MG/DL (ref 65–140)
GLUCOSE SERPL-MCNC: 86 MG/DL (ref 65–140)
GLUCOSE SERPL-MCNC: 86 MG/DL (ref 65–140)
GLUCOSE SERPL-MCNC: 89 MG/DL (ref 65–140)
GLUCOSE SERPL-MCNC: 90 MG/DL (ref 65–140)
GLUCOSE SERPL-MCNC: 91 MG/DL (ref 65–140)
GLUCOSE SERPL-MCNC: 95 MG/DL (ref 65–140)
GLUCOSE SERPL-MCNC: 98 MG/DL (ref 65–140)
GLUCOSE UR STRIP-MCNC: NEGATIVE MG/DL
HCT VFR BLD AUTO: 35.2 % (ref 34.8–46.1)
HGB BLD-MCNC: 11.6 G/DL (ref 11.5–15.4)
HGB UR QL STRIP.AUTO: ABNORMAL
IMM GRANULOCYTES # BLD AUTO: 0.04 THOUSAND/UL (ref 0–0.2)
IMM GRANULOCYTES NFR BLD AUTO: 0 % (ref 0–2)
KETONES UR STRIP-MCNC: NEGATIVE MG/DL
LEUKOCYTE ESTERASE UR QL STRIP: ABNORMAL
LYMPHOCYTES # BLD AUTO: 2.36 THOUSANDS/ΜL (ref 0.6–4.47)
LYMPHOCYTES NFR BLD AUTO: 17 % (ref 14–44)
MCH RBC QN AUTO: 30.2 PG (ref 26.8–34.3)
MCHC RBC AUTO-ENTMCNC: 33 G/DL (ref 31.4–37.4)
MCV RBC AUTO: 92 FL (ref 82–98)
MONOCYTES # BLD AUTO: 0.91 THOUSAND/ΜL (ref 0.17–1.22)
MONOCYTES NFR BLD AUTO: 7 % (ref 4–12)
MUCOUS THREADS UR QL AUTO: ABNORMAL
NEUTROPHILS # BLD AUTO: 10.11 THOUSANDS/ΜL (ref 1.85–7.62)
NEUTS SEG NFR BLD AUTO: 75 % (ref 43–75)
NITRITE UR QL STRIP: NEGATIVE
NON-SQ EPI CELLS URNS QL MICRO: ABNORMAL /HPF
NRBC BLD AUTO-RTO: 0 /100 WBCS
PH UR STRIP.AUTO: 6 [PH]
PLATELET # BLD AUTO: 278 THOUSANDS/UL (ref 149–390)
PMV BLD AUTO: 10.2 FL (ref 8.9–12.7)
POTASSIUM SERPL-SCNC: 3.7 MMOL/L (ref 3.5–5.3)
PROT SERPL-MCNC: 6.7 G/DL (ref 6.4–8.2)
PROT UR STRIP-MCNC: ABNORMAL MG/DL
PROT UR-MCNC: 87 MG/DL
PROT/CREAT UR: 0.6 MG/G{CREAT} (ref 0–0.1)
RBC # BLD AUTO: 3.84 MILLION/UL (ref 3.81–5.12)
RBC #/AREA URNS AUTO: ABNORMAL /HPF
RH BLD: POSITIVE
RPR SER QL: NORMAL
SODIUM SERPL-SCNC: 137 MMOL/L (ref 136–145)
SP GR UR STRIP.AUTO: 1.02 (ref 1–1.03)
SPECIMEN EXPIRATION DATE: NORMAL
UROBILINOGEN UR QL STRIP.AUTO: 0.2 E.U./DL
WBC # BLD AUTO: 13.61 THOUSAND/UL (ref 4.31–10.16)
WBC #/AREA URNS AUTO: ABNORMAL /HPF

## 2021-01-05 PROCEDURE — 10H073Z INSERTION OF MONITORING ELECTRODE INTO PRODUCTS OF CONCEPTION, VIA NATURAL OR ARTIFICIAL OPENING: ICD-10-PCS | Performed by: OBSTETRICS & GYNECOLOGY

## 2021-01-05 PROCEDURE — 86592 SYPHILIS TEST NON-TREP QUAL: CPT | Performed by: STUDENT IN AN ORGANIZED HEALTH CARE EDUCATION/TRAINING PROGRAM

## 2021-01-05 PROCEDURE — 80053 COMPREHEN METABOLIC PANEL: CPT | Performed by: STUDENT IN AN ORGANIZED HEALTH CARE EDUCATION/TRAINING PROGRAM

## 2021-01-05 PROCEDURE — 82948 REAGENT STRIP/BLOOD GLUCOSE: CPT

## 2021-01-05 PROCEDURE — 10907ZC DRAINAGE OF AMNIOTIC FLUID, THERAPEUTIC FROM PRODUCTS OF CONCEPTION, VIA NATURAL OR ARTIFICIAL OPENING: ICD-10-PCS | Performed by: OBSTETRICS & GYNECOLOGY

## 2021-01-05 PROCEDURE — 86900 BLOOD TYPING SEROLOGIC ABO: CPT | Performed by: STUDENT IN AN ORGANIZED HEALTH CARE EDUCATION/TRAINING PROGRAM

## 2021-01-05 PROCEDURE — 84156 ASSAY OF PROTEIN URINE: CPT | Performed by: STUDENT IN AN ORGANIZED HEALTH CARE EDUCATION/TRAINING PROGRAM

## 2021-01-05 PROCEDURE — 10H07YZ INSERTION OF OTHER DEVICE INTO PRODUCTS OF CONCEPTION, VIA NATURAL OR ARTIFICIAL OPENING: ICD-10-PCS | Performed by: OBSTETRICS & GYNECOLOGY

## 2021-01-05 PROCEDURE — 85025 COMPLETE CBC W/AUTO DIFF WBC: CPT | Performed by: STUDENT IN AN ORGANIZED HEALTH CARE EDUCATION/TRAINING PROGRAM

## 2021-01-05 PROCEDURE — 82570 ASSAY OF URINE CREATININE: CPT | Performed by: STUDENT IN AN ORGANIZED HEALTH CARE EDUCATION/TRAINING PROGRAM

## 2021-01-05 PROCEDURE — 3E033VJ INTRODUCTION OF OTHER HORMONE INTO PERIPHERAL VEIN, PERCUTANEOUS APPROACH: ICD-10-PCS | Performed by: OBSTETRICS & GYNECOLOGY

## 2021-01-05 PROCEDURE — 86850 RBC ANTIBODY SCREEN: CPT | Performed by: STUDENT IN AN ORGANIZED HEALTH CARE EDUCATION/TRAINING PROGRAM

## 2021-01-05 PROCEDURE — 81001 URINALYSIS AUTO W/SCOPE: CPT | Performed by: STUDENT IN AN ORGANIZED HEALTH CARE EDUCATION/TRAINING PROGRAM

## 2021-01-05 PROCEDURE — 3E0P7VZ INTRODUCTION OF HORMONE INTO FEMALE REPRODUCTIVE, VIA NATURAL OR ARTIFICIAL OPENING: ICD-10-PCS | Performed by: OBSTETRICS & GYNECOLOGY

## 2021-01-05 PROCEDURE — 86901 BLOOD TYPING SEROLOGIC RH(D): CPT | Performed by: STUDENT IN AN ORGANIZED HEALTH CARE EDUCATION/TRAINING PROGRAM

## 2021-01-05 PROCEDURE — 4A1H7CZ MONITORING OF PRODUCTS OF CONCEPTION, CARDIAC RATE, VIA NATURAL OR ARTIFICIAL OPENING: ICD-10-PCS | Performed by: OBSTETRICS & GYNECOLOGY

## 2021-01-05 RX ORDER — DIPHENHYDRAMINE HYDROCHLORIDE 50 MG/ML
25 INJECTION INTRAMUSCULAR; INTRAVENOUS EVERY 6 HOURS PRN
Status: DISCONTINUED | OUTPATIENT
Start: 2021-01-05 | End: 2021-01-06

## 2021-01-05 RX ORDER — OXYTOCIN/RINGER'S LACTATE 30/500 ML
1-30 PLASTIC BAG, INJECTION (ML) INTRAVENOUS
Status: DISCONTINUED | OUTPATIENT
Start: 2021-01-05 | End: 2021-01-06

## 2021-01-05 RX ORDER — BUTORPHANOL TARTRATE 1 MG/ML
1 INJECTION, SOLUTION INTRAMUSCULAR; INTRAVENOUS ONCE
Status: COMPLETED | OUTPATIENT
Start: 2021-01-05 | End: 2021-01-05

## 2021-01-05 RX ORDER — ROPIVACAINE HYDROCHLORIDE 2 MG/ML
INJECTION, SOLUTION EPIDURAL; INFILTRATION; PERINEURAL
Status: DISPENSED
Start: 2021-01-05 | End: 2021-01-06

## 2021-01-05 RX ORDER — ROPIVACAINE HYDROCHLORIDE 2 MG/ML
INJECTION, SOLUTION EPIDURAL; INFILTRATION; PERINEURAL CONTINUOUS PRN
Status: DISCONTINUED | OUTPATIENT
Start: 2021-01-05 | End: 2021-01-06 | Stop reason: HOSPADM

## 2021-01-05 RX ORDER — ROPIVACAINE HYDROCHLORIDE 2 MG/ML
INJECTION, SOLUTION EPIDURAL; INFILTRATION; PERINEURAL
Status: COMPLETED
Start: 2021-01-05 | End: 2021-01-05

## 2021-01-05 RX ORDER — CHLOROPROCAINE HYDROCHLORIDE 30 MG/ML
INJECTION, SOLUTION EPIDURAL; INFILTRATION; INTRACAUDAL; PERINEURAL AS NEEDED
Status: DISCONTINUED | OUTPATIENT
Start: 2021-01-05 | End: 2021-01-06 | Stop reason: HOSPADM

## 2021-01-05 RX ORDER — LABETALOL 20 MG/4 ML (5 MG/ML) INTRAVENOUS SYRINGE
20 ONCE
Status: DISCONTINUED | OUTPATIENT
Start: 2021-01-05 | End: 2021-01-05

## 2021-01-05 RX ORDER — LIDOCAINE HYDROCHLORIDE AND EPINEPHRINE 15; 5 MG/ML; UG/ML
INJECTION, SOLUTION EPIDURAL AS NEEDED
Status: DISCONTINUED | OUTPATIENT
Start: 2021-01-05 | End: 2021-01-06 | Stop reason: HOSPADM

## 2021-01-05 RX ORDER — BUPIVACAINE HYDROCHLORIDE 2.5 MG/ML
INJECTION, SOLUTION EPIDURAL; INFILTRATION; INTRACAUDAL
Status: COMPLETED
Start: 2021-01-05 | End: 2021-01-06

## 2021-01-05 RX ORDER — ROPIVACAINE HYDROCHLORIDE 5 MG/ML
INJECTION, SOLUTION EPIDURAL; INFILTRATION; PERINEURAL AS NEEDED
Status: DISCONTINUED | OUTPATIENT
Start: 2021-01-05 | End: 2021-01-06 | Stop reason: HOSPADM

## 2021-01-05 RX ORDER — CARBOPROST TROMETHAMINE 250 UG/ML
INJECTION, SOLUTION INTRAMUSCULAR
Status: DISCONTINUED
Start: 2021-01-05 | End: 2021-01-06 | Stop reason: WASHOUT

## 2021-01-05 RX ORDER — METHYLERGONOVINE MALEATE 0.2 MG/ML
INJECTION INTRAVENOUS
Status: DISCONTINUED
Start: 2021-01-05 | End: 2021-01-06 | Stop reason: WASHOUT

## 2021-01-05 RX ADMIN — BUTORPHANOL TARTRATE 1 MG: 1 INJECTION, SOLUTION INTRAMUSCULAR; INTRAVENOUS at 09:48

## 2021-01-05 RX ADMIN — SODIUM CHLORIDE 2.5 MILLION UNITS: 9 INJECTION, SOLUTION INTRAVENOUS at 17:19

## 2021-01-05 RX ADMIN — SODIUM CHLORIDE 2.5 MILLION UNITS: 9 INJECTION, SOLUTION INTRAVENOUS at 21:03

## 2021-01-05 RX ADMIN — SODIUM CHLORIDE 999 ML/HR: 0.9 INJECTION, SOLUTION INTRAVENOUS at 12:29

## 2021-01-05 RX ADMIN — SODIUM CHLORIDE 2.5 MILLION UNITS: 9 INJECTION, SOLUTION INTRAVENOUS at 08:59

## 2021-01-05 RX ADMIN — ROPIVACAINE HYDROCHLORIDE 10 ML/HR: 2 INJECTION, SOLUTION EPIDURAL; INFILTRATION at 13:13

## 2021-01-05 RX ADMIN — SODIUM CHLORIDE 125 ML/HR: 0.9 INJECTION, SOLUTION INTRAVENOUS at 16:31

## 2021-01-05 RX ADMIN — SODIUM CHLORIDE 2.5 MILLION UNITS: 9 INJECTION, SOLUTION INTRAVENOUS at 13:18

## 2021-01-05 RX ADMIN — CHLOROPROCAINE HYDROCHLORIDE 5 ML: 30 INJECTION, SOLUTION EPIDURAL; INFILTRATION; INTRACAUDAL; PERINEURAL at 21:28

## 2021-01-05 RX ADMIN — MISOPROSTOL 25 MCG: 100 TABLET ORAL at 00:21

## 2021-01-05 RX ADMIN — Medication 2 MILLI-UNITS/MIN: at 09:43

## 2021-01-05 RX ADMIN — SODIUM CHLORIDE 999 ML/HR: 0.9 INJECTION, SOLUTION INTRAVENOUS at 08:15

## 2021-01-05 RX ADMIN — ROPIVACAINE HYDROCHLORIDE 5 ML: 5 INJECTION, SOLUTION EPIDURAL; INFILTRATION; PERINEURAL at 20:43

## 2021-01-05 RX ADMIN — SODIUM CHLORIDE 125 ML/HR: 0.9 INJECTION, SOLUTION INTRAVENOUS at 21:32

## 2021-01-05 RX ADMIN — LIDOCAINE HYDROCHLORIDE AND EPINEPHRINE 5 ML: 15; 5 INJECTION, SOLUTION EPIDURAL at 13:05

## 2021-01-05 RX ADMIN — CHLOROPROCAINE HYDROCHLORIDE 5 ML: 30 INJECTION, SOLUTION EPIDURAL; INFILTRATION; INTRACAUDAL; PERINEURAL at 20:43

## 2021-01-05 RX ADMIN — ROPIVACAINE HYDROCHLORIDE 6 ML: 5 INJECTION, SOLUTION EPIDURAL; INFILTRATION; PERINEURAL at 13:06

## 2021-01-05 RX ADMIN — ROPIVACAINE HYDROCHLORIDE: 2 INJECTION, SOLUTION EPIDURAL; INFILTRATION at 13:21

## 2021-01-05 RX ADMIN — MISOPROSTOL 25 MCG: 100 TABLET ORAL at 04:25

## 2021-01-05 RX ADMIN — SODIUM CHLORIDE 2.5 MILLION UNITS: 9 INJECTION, SOLUTION INTRAVENOUS at 04:33

## 2021-01-05 RX ADMIN — ROPIVACAINE HYDROCHLORIDE 3 ML: 5 INJECTION, SOLUTION EPIDURAL; INFILTRATION; PERINEURAL at 13:13

## 2021-01-05 NOTE — PLAN OF CARE
Problem: PAIN - ADULT  Goal: Verbalizes/displays adequate comfort level or baseline comfort level  Description: Interventions:  - Encourage patient to monitor pain and request assistance  - Assess pain using appropriate pain scale  - Administer analgesics based on type and severity of pain and evaluate response  - Implement non-pharmacological measures as appropriate and evaluate response  - Consider cultural and social influences on pain and pain management  - Notify physician/advanced practitioner if interventions unsuccessful or patient reports new pain  Outcome: Progressing     Problem: INFECTION - ADULT  Goal: Absence or prevention of progression during hospitalization  Description: INTERVENTIONS:  - Assess and monitor for signs and symptoms of infection  - Monitor lab/diagnostic results  - Monitor all insertion sites, i e  indwelling lines, tubes, and drains  - Monitor endotracheal if appropriate and nasal secretions for changes in amount and color  - [place] appropriate cooling/warming therapies per order  - Administer medications as ordered  - Instruct and encourage patient and family to use good hand hygiene technique  - Identify and instruct in appropriate isolation precautions for identified infection/condition  Outcome: Progressing  Goal: Absence of fever/infection during neutropenic period  Description: INTERVENTIONS:  - Monitor WBC    Outcome: Progressing     Problem: SAFETY ADULT  Goal: Patient will remain free of falls  Description: INTERVENTIONS:  - Assess patient frequently for physical needs  -  Identify cognitive and physical deficits and behaviors that affect risk of falls    -  [place] fall precautions as indicated by assessment   - Educate patient/family on patient safety including physical limitations  - Instruct patient to call for assistance with activity based on assessment  - Modify environment to reduce risk of injury  - Consider OT/PT consult to assist with strengthening/mobility  Outcome: Progressing  Goal: Maintain or return to baseline ADL function  Description: INTERVENTIONS:  -  Assess patient's ability to carry out ADLs; assess patient's baseline for ADL function and identify physical deficits which impact ability to perform ADLs (bathing, care of mouth/teeth, toileting, grooming, dressing, etc )  - Assess/evaluate cause of self-care deficits   - Assess range of motion  - Assess patient's mobility; develop plan if impaired  - Assess patient's need for assistive devices and provide as appropriate  - Encourage maximum independence but intervene and supervise when necessary  - Involve family in performance of ADLs  - Assess for home care needs following discharge   - Consider OT consult to assist with ADL evaluation and planning for discharge  - Provide patient education as appropriate  Outcome: Progressing  Goal: Maintain or return mobility status to optimal level  Description: INTERVENTIONS:  - Assess patient's baseline mobility status (ambulation, transfers, stairs, etc )    - Identify cognitive and physical deficits and behaviors that affect mobility  - Identify mobility aids required to assist with transfers and/or ambulation (gait belt, sit-to-stand, lift, walker, cane, etc )  - [place] fall precautions as indicated by assessment  - Record patient progress and toleration of activity level on Mobility SBAR; progress patient to next Phase/Stage  - Instruct patient to call for assistance with activity based on assessment  - Consider rehabilitation consult to assist with strengthening/weightbearing, etc   Outcome: Progressing     Problem: Knowledge Deficit  Goal: Patient/family/caregiver demonstrates understanding of disease process, treatment plan, medications, and discharge instructions  Description: Complete learning assessment and assess knowledge base    Interventions:  - Provide teaching at level of understanding  - Provide teaching via preferred learning methods  Outcome: Progressing  Goal: Verbalizes understanding of labor plan  Description: Assess patient/family/caregiver's baseline knowledge level and ability to understand information  Provide education via patient/family/caregiver's preferred learning method at appropriate level of understanding  1  Provide teaching at level of understanding  2  Provide teaching via preferred learning method(s)  Outcome: Progressing     Problem: DISCHARGE PLANNING  Goal: Discharge to home or other facility with appropriate resources  Description: INTERVENTIONS:  - Identify barriers to discharge w/patient and caregiver  - Arrange for needed discharge resources and transportation as appropriate  - Identify discharge learning needs (meds, wound care, etc )  - Arrange for interpretive services to assist at discharge as needed  - Refer to Case Management Department for coordinating discharge planning if the patient needs post-hospital services based on physician/advanced practitioner order or complex needs related to functional status, cognitive ability, or social support system  Outcome: Progressing     Problem: Labor & Delivery  Goal: Manages discomfort  Description: Assess and monitor for signs and symptoms of discomfort  Assess patient's pain level regularly and per hospital policy  Administer medications as ordered  Support use of nonpharmacological methods to help control pain such as distraction, imagery, relaxation, and application of heat and cold  Collaborate with interdisciplinary team and patient to determine appropriate pain management plan  1  Include patient in decisions related to comfort  2  Offer non-pharmacological pain management interventions  3  Report ineffective pain management to physician  Outcome: Progressing  Goal: Patient vital signs are stable  Description: 1  Assess vital signs - vaginal delivery    Outcome: Progressing

## 2021-01-05 NOTE — OB LABOR/OXYTOCIN SAFETY PROGRESS
Labor Progress Note - Ariel August 30 y o  female MRN: [id number]    Unit/Bed#: L&D 323-01 Encounter: [id number]       Contraction Frequency (minutes): na  Contraction Quality: Not applicable  Tachysystole: No   Cervical Dilation: 1-2        Cervical Effacement: 70  Fetal Station: -3  Baseline Rate: 150 bpm     FHR Category: Category I               Vital Signs:   Vitals:    01/05/21 0153   BP: 155/78   Pulse: 93   Resp:    Temp:    SpO2:            Notes/comments:    Evaluated patient  Comfortable, had been sleeping soundly  Found to be slightly changed to 1 5cm dilated  Patient tolerates exams poorly, unable to place barrera balloon at this time  Second dose cytotec placed  Will continue to monitor      Discussed with Dr Oliver Smith MD 1/5/2021 4:28 AM

## 2021-01-05 NOTE — PLAN OF CARE
Problem: PAIN - ADULT  Goal: Verbalizes/displays adequate comfort level or baseline comfort level  Description: Interventions:  - Encourage patient to monitor pain and request assistance  - Assess pain using appropriate pain scale  - Administer analgesics based on type and severity of pain and evaluate response  - Implement non-pharmacological measures as appropriate and evaluate response  - Consider cultural and social influences on pain and pain management  - Notify physician/advanced practitioner if interventions unsuccessful or patient reports new pain  Outcome: Progressing     Problem: INFECTION - ADULT  Goal: Absence or prevention of progression during hospitalization  Description: INTERVENTIONS:  - Assess and monitor for signs and symptoms of infection  - Monitor lab/diagnostic results  - Monitor all insertion sites, i e  indwelling lines, tubes, and drains  - Monitor endotracheal if appropriate and nasal secretions for changes in amount and color  - [place] appropriate cooling/warming therapies per order  - Administer medications as ordered  - Instruct and encourage patient and family to use good hand hygiene technique  - Identify and instruct in appropriate isolation precautions for identified infection/condition  Outcome: Progressing  Goal: Absence of fever/infection during neutropenic period  Description: INTERVENTIONS:  - Monitor WBC    Outcome: Progressing     Problem: SAFETY ADULT  Goal: Patient will remain free of falls  Description: INTERVENTIONS:  - Assess patient frequently for physical needs  -  Identify cognitive and physical deficits and behaviors that affect risk of falls    -  [place] fall precautions as indicated by assessment   - Educate patient/family on patient safety including physical limitations  - Instruct patient to call for assistance with activity based on assessment  - Modify environment to reduce risk of injury  - Consider OT/PT consult to assist with strengthening/mobility  Outcome: Progressing  Goal: Maintain or return to baseline ADL function  Description: INTERVENTIONS:  -  Assess patient's ability to carry out ADLs; assess patient's baseline for ADL function and identify physical deficits which impact ability to perform ADLs (bathing, care of mouth/teeth, toileting, grooming, dressing, etc )  - Assess/evaluate cause of self-care deficits   - Assess range of motion  - Assess patient's mobility; develop plan if impaired  - Assess patient's need for assistive devices and provide as appropriate  - Encourage maximum independence but intervene and supervise when necessary  - Involve family in performance of ADLs  - Assess for home care needs following discharge   - Consider OT consult to assist with ADL evaluation and planning for discharge  - Provide patient education as appropriate  Outcome: Progressing  Goal: Maintain or return mobility status to optimal level  Description: INTERVENTIONS:  - Assess patient's baseline mobility status (ambulation, transfers, stairs, etc )    - Identify cognitive and physical deficits and behaviors that affect mobility  - Identify mobility aids required to assist with transfers and/or ambulation (gait belt, sit-to-stand, lift, walker, cane, etc )  - [place] fall precautions as indicated by assessment  - Record patient progress and toleration of activity level on Mobility SBAR; progress patient to next Phase/Stage  - Instruct patient to call for assistance with activity based on assessment  - Consider rehabilitation consult to assist with strengthening/weightbearing, etc   Outcome: Progressing     Problem: Knowledge Deficit  Goal: Patient/family/caregiver demonstrates understanding of disease process, treatment plan, medications, and discharge instructions  Description: Complete learning assessment and assess knowledge base    Interventions:  - Provide teaching at level of understanding  - Provide teaching via preferred learning methods  Outcome: Progressing  Goal: Verbalizes understanding of labor plan  Description: Assess patient/family/caregiver's baseline knowledge level and ability to understand information  Provide education via patient/family/caregiver's preferred learning method at appropriate level of understanding  1  Provide teaching at level of understanding  2  Provide teaching via preferred learning method(s)  Outcome: Progressing     Problem: Labor & Delivery  Goal: Manages discomfort  Description: Assess and monitor for signs and symptoms of discomfort  Assess patient's pain level regularly and per hospital policy  Administer medications as ordered  Support use of nonpharmacological methods to help control pain such as distraction, imagery, relaxation, and application of heat and cold  Collaborate with interdisciplinary team and patient to determine appropriate pain management plan  1  Include patient in decisions related to comfort  2  Offer non-pharmacological pain management interventions  3  Report ineffective pain management to physician  Outcome: Progressing  Goal: Patient vital signs are stable  Description: 1  Assess vital signs - vaginal delivery    Outcome: Progressing

## 2021-01-05 NOTE — ANESTHESIA PREPROCEDURE EVALUATION
Procedure:  LABOR ANALGESIA    Relevant Problems   CARDIO   (+) Pre-eclampsia in third trimester      GYN   (+) 37 weeks gestation of pregnancy      NEURO/PSYCH   (+) H/O major depression      Other   (+) BMI 40 0-44 9, adult (HCC)   (+) Insulin controlled gestational diabetes mellitus (GDM) in third trimester   (+) Maternal morbid obesity, antepartum (HCC)   (+) Tobacco user        Physical Exam    Airway      TM Distance: >3 FB  Neck ROM: full     Dental       Cardiovascular  Cardiovascular exam normal    Pulmonary  Pulmonary exam normal     Other Findings        Anesthesia Plan  ASA Score- 3     Anesthesia Type- epidural with ASA Monitors  Additional Monitors:   Airway Plan:           Plan Factors-Exercise tolerance (METS): <4 METS  Chart reviewed  Existing labs reviewed  Patient summary reviewed  Patient is a current smoker  Patient instructed to abstain from smoking on day of procedure  Patient did not smoke on day of surgery  Induction-     Postoperative Plan-     Informed Consent- Anesthetic plan and risks discussed with patient

## 2021-01-05 NOTE — OB LABOR/OXYTOCIN SAFETY PROGRESS
Oxytocin Safety Progress Check Note - Luisa Rear 30 y o  female MRN: [id number]    Unit/Bed#: L&D 323-01 Encounter: [id number]    Dose (dia-units/min) Oxytocin: 6 dia-units/min  Contraction Frequency (minutes): 2-5  Contraction Quality: Mild  Tachysystole: No   Cervical Dilation: 3        Cervical Effacement: 70  Fetal Station: -3  Baseline Rate: 145 bpm  Fetal Heart Rate: 165 BPM  FHR Category: Category II               Vital Signs:   Vitals:    01/05/21 0735   BP:    Pulse:    Resp:    Temp: 98 2 °F (36 8 °C)   SpO2:            Notes/comments:    Patient is slightly uncomfortable and requesting epidural  Cervical exam is as above  FHT is difficult to monitor but overall reassuring, will internalize as soon as feasible  Attending aware      Manjula Edwards MD 1/5/2021 12:28 PM

## 2021-01-05 NOTE — OB LABOR/OXYTOCIN SAFETY PROGRESS
Labor Progress Note - Cathy Gutierrez 30 y o  female MRN: [id number]    Unit/Bed#: L&D 323-01 Encounter: [id number]       Contraction Frequency (minutes): irritability  Contraction Quality: Not applicable  Tachysystole: No   Cervical Dilation: 2-3        Cervical Effacement: 70  Fetal Station: -3  Baseline Rate: 155 bpm     FHR Category: Category I               Vital Signs:   Vitals:    01/05/21 0532   BP: 132/74   Pulse: 87   Resp:    Temp:    SpO2:            Notes/comments:    Patient is fairly comfortable  Fetal heart tracing is hard to assess due to inability to keep baby on monitor  Will plan to switch to know the monitor to assess fetal heart rate  Cervical exam is as above  Will plan to the start Pitocin once baby is monitored      Jeremy Vela MD 1/5/2021 8:51 AM

## 2021-01-05 NOTE — ANESTHESIA PROCEDURE NOTES
Epidural Block    Patient location during procedure: OB  Start time: 1/5/2021 1:05 PM  Reason for block: primary anesthetic  Staffing  Anesthesiologist: Jason Gomez DO  Performed: anesthesiologist   Preanesthetic Checklist  Completed: patient identified, site marked, surgical consent, pre-op evaluation, timeout performed, IV checked, risks and benefits discussed and monitors and equipment checked  Epidural  Patient position: sitting  Prep: Betadine  Patient monitoring: frequent blood pressure checks  Approach: midline  Location: lumbar (1-5)  Injection technique: RIA saline  Needle  Needle type: Tuohy   Needle gauge: 18 G  Catheter type: side hole  Catheter size: 20 G  Catheter at skin depth: 18 cm  Test dose: negativeno paresthesia on injection, negative aspiration for heme and negative aspiration for CSF  patient tolerated the procedure well with no immediate complications

## 2021-01-05 NOTE — OB LABOR/OXYTOCIN SAFETY PROGRESS
Oxytocin Safety Progress Check Note - Maurice Laughter 30 y o  female MRN: [id number]    Unit/Bed#: L&D 323-01 Encounter: [id number]    Dose (dia-units/min) Oxytocin: 8 dia-units/min  Contraction Frequency (minutes): 2-5  Contraction Quality: Mild  Tachysystole: No   Cervical Dilation: 3-4        Cervical Effacement: 80  Fetal Station: -2  Baseline Rate: 145 bpm  Fetal Heart Rate: 165 BPM  FHR Category: Category II               Vital Signs:   Vitals:    01/05/21 0735   BP:    Pulse:    Resp:    Temp: 98 2 °F (36 8 °C)   SpO2:            Notes/comments:    Patient comfortable after epidural  Cervical exam is as above  She was artificially ruptured for clear fluid and internalized w/ an IUPC and FSE  Will continue to monitor and titrate Pitocin      Meena Delgadillo MD 1/5/2021 2:14 PM

## 2021-01-05 NOTE — H&P
History & Physical - OB/GYN   Marcus Gonzalez 30 y o  female MRN: [id number]  Unit/Bed#: L&D 323-01 Encounter: [id number]    Marcus Gonzalez is a patient of [facility]  Chief complaint:  "I have elevated BP"    HPI:  Marcus Gonzalez is a 30 y o   female with an CALLIE of 2021, by Last Menstrual Period at 37w4d weeks gestation who is being admitted for gestational HTN with induction of labor  Patient was evaluated at outpatient prenatal visit today noted to have second elevated BP with diastolic of 90  Patient endorses intermittent headache but for past couple days she has been having right temporal headache relieved with tylenol with hydration  Denied any visual disturbance, RUQ abdominal pain  Endorses bilateral lower extremities swelling  Vitals:    21 2300   BP: (!) 147/102   Pulse: 95   Resp: 16   Temp: 98 4 °F (36 9 °C)   SpO2: 100%       Contractions:  none  Fetal movement:  present  Vaginal bleeding:   none  Leaking of fluid:  None    This pregnancy is complicated by:  1  Insulin controlled gestational diabetes in third trimester   - Currently on Levemir 78 U qHS, and Lispro 5 U premeals (lunch and dinner)  2  Tobacco user: 7-8 cigs/day, patient declined Nicoderm patch  3  Obesity during pregnancy  4  GBS positive status  5   Gestational HTN diagnosed during third trimester    Postpartum Contraception plan: partner vasectomy    Pregnancy Complications:  Patient Active Problem List   Diagnosis    Functional diarrhea    Generalized abdominal pain    Maternal morbid obesity, antepartum ([address] )    Tobacco user    H/O major depression    Left ovarian cyst    Pilonidal cyst    Insulin controlled gestational diabetes mellitus (GDM) in third trimester    Obesity affecting pregnancy in third trimester    BMI 40 0-44 9, adult ([address] )    Hyperglycemia in pregnancy    Vulvovaginal candidiasis    37 weeks gestation of pregnancy    Gestational hypertension, third trimester         PMH:  Past Medical History: Diagnosis Date    Chlamydia     Depression     no medications for 1 year    Gestational hypertension, third trimester 2021    Insulin controlled gestational diabetes mellitus (GDM) in third trimester 10/29/2020    Urinary tract infection     Varicella        PSH:  Past Surgical History:   Procedure Laterality Date    WISDOM TOOTH EXTRACTION      WISDOM TOOTH EXTRACTION Bilateral        Social Hx:  Denies EtOH, and recreational drug use in pregnancy  Patient smokes 7-8 cigs/day  OB Hx:  OB History    Para Term  AB Living   2 0 0 0 1 0   SAB TAB Ectopic Multiple Live Births   1 0 0 0 0      # Outcome Date GA Lbr Satish/2nd Weight Sex Delivery Anes PTL Lv   2 Current            1 SAB 2016 6w0d    SAB          Meds:  No current facility-administered medications on file prior to encounter  Current Outpatient Medications on File Prior to Encounter   Medication Sig Dispense Refill    Blood Glucose Monitoring Suppl (OneTouch Verio Flex System) w/Device KIT Dispense 1 kit per insurance formulary  1 kit 0    insulin detemir (Levemir FlexTouch) 100 Units/mL injection pen Inject 78 Units under the skin daily at bedtime At 10 PM daily  To be titrated  15 mL 0    insulin lispro (HumaLOG KwikPen) 100 units/mL injection pen Inject 4 units before lunch and 4 units before dinner  15 mL 0    OneTouch Delica Lancets 93K MISC Use 4 a day or as directed  100 each 4    OneTouch Verio test strip Test 4 times a day and as instructed  Gestational diabetes  100 each 4    Prenatal MV & Min w/FA-DHA (PRENATAL ADULT GUMMY/DHA/FA) 0 4-25 MG CHEW Chew 2 tablets daily      Insulin Pen Needle 31G X 5 MM MISC Inject under the skin daily at bedtime Use one a day or as directed  100 each 1    [DISCONTINUED] insulin detemir (Levemir FlexTouch) 100 Units/mL injection pen Inject 72 Units under the skin daily at bedtime At 10 PM daily  To be titrated   (Patient taking differently: Inject 78 Units under the skin daily at bedtime At 10 PM daily  To be titrated ) 10 pen 0       Allergies:  No Known Allergies    Review of Systems   Constitutional: Negative for chills and fever  HENT: Negative for congestion, rhinorrhea and sore throat  Eyes: Negative for visual disturbance  Respiratory: Negative for cough and shortness of breath  Cardiovascular: Negative for chest pain and palpitations  Gastrointestinal: Negative for abdominal pain, nausea and vomiting  Genitourinary: Negative for dysuria  Musculoskeletal: Negative for back pain  Skin: Negative for color change  Neurological: Positive for headaches  Negative for dizziness  Hematological: Does not bruise/bleed easily  Psychiatric/Behavioral: Negative for confusion  Physical Exam  Vitals signs reviewed  Constitutional:       General: She is not in acute distress  Appearance: She is not ill-appearing  Comments: Obese   HENT:      Head: Normocephalic  Nose: No congestion or rhinorrhea  Eyes:      General: No scleral icterus  Right eye: No discharge  Left eye: No discharge  Conjunctiva/sclera: Conjunctivae normal    Cardiovascular:      Rate and Rhythm: Normal rate  Heart sounds: No murmur  Pulmonary:      Effort: Pulmonary effort is normal  No respiratory distress  Breath sounds: Normal breath sounds  No wheezing or rales  Abdominal:      Comments: Gravid abdomen   Genitourinary:     Comments: No vulvar lesion or erythema  Musculoskeletal:      Comments: 1+ bilateral nonpitting edema   Skin:     Coloration: Skin is not jaundiced  Neurological:      General: No focal deficit present  Mental Status: She is alert and oriented to person, place, and time  Comments: CN II to XII grossly intact without focal neurological deficits  Strength 2+ and equal in bilateral UE and LE      Psychiatric:         Mood and Affect: Mood normal          Behavior: Behavior normal          SVE: 1/50/-4 (performed by Dr Ingrid Rodriguez)       Fetal heart rate: 130-140s/ moderate variability/ acceleration, no deceleration       [place]: irregular    Vertex presentation confirmed by transabdominal ultrasound       EFW: 6 lb 15 oz     Membranes: intact    Labs:  Hemoglobin: 11 9 on 10/19/20  Blood type: A+  Antibody: negative  Group B strep: positive  HIV: Non-reactive on 7/16/20  Hepatitis B: Non-reactive HBsAg on 7/16/20  RPR: non-reactive   Rubella: Immune  Varicella Unknown  1 hour Glucose: abnormal 190 mg/dL on 10/19/20    Assessment:   30 y o  Henry Napoleon at 37w4d with A2GDM, gestational hypertension r/o pre-eclampsia admitted for induction of labor  Plan:   1  IUP at 37w4d   - FHR and tocometry monitoring  2  Gestational HTN   - continue BP monitoring, currently BP range of 147/102 mmHg   - Pre-eclampsia work up: CMP, CBC, Urine Protein/Cr ratio, UA  3  Induction of labor   - Cytotec if cervix requires ripening   4  Routine antepartum labs   - CBC, RPR, T&S stat  5  Analgesia and/or Epidural upon request  6  GBS positive status: initiate PCN prophylaxis  7  A2GDM   - initiate half dosage of home regimen: 39 U qHS    - Accucheck  8  FEN   - Clear liquid diet, IV NS for hydration    D/w Dr Liliam Rahman and OB resident Dr Ingrid Rodriguez  Patient will be admitted for inpatient with anticipated length of stay > 2 midnights      Yuly Lara MD  FM, PGY-3  1/4/2021 11:37 PM

## 2021-01-05 NOTE — OB LABOR/OXYTOCIN SAFETY PROGRESS
Oxytocin Safety Progress Check Note - Darlen Face 30 y o  female MRN: [id number]    Unit/Bed#: L&D 323-01 Encounter: [id number]    Dose (dia-units/min) Oxytocin: 14 dia-units/min  Contraction Frequency (minutes): 1 5-2  Contraction Quality: Moderate  Tachysystole: No   Cervical Dilation: 4        Cervical Effacement: 80  Fetal Station: -2  Baseline Rate: 150 bpm  Fetal Heart Rate: 165 BPM  FHR Category: Category II               Vital Signs:   Vitals:    01/05/21 1544   BP: 149/82   Pulse: 89   Resp:    Temp:    SpO2:            Notes/comments: The patient has made some cervical change  The plan is to continue up titrating Pitocin  Will recheck in 2 hours earlier if needed  Discussed with Dr Toño Martin MD 1/5/2021 4:49 PM

## 2021-01-06 LAB
BASE EXCESS BLDCOA CALC-SCNC: -13.6 MMOL/L (ref 3–11)
BASE EXCESS BLDCOV CALC-SCNC: -11.9 MMOL/L (ref 1–9)
GLUCOSE SERPL-MCNC: 153 MG/DL (ref 65–140)
HCO3 BLDCOA-SCNC: 17.5 MMOL/L (ref 17.3–27.3)
HCO3 BLDCOV-SCNC: 15.6 MMOL/L (ref 12.2–28.6)
O2 CT VFR BLDCOA CALC: 10.1 ML/DL
OXYHGB MFR BLDCOA: 37.8 %
OXYHGB MFR BLDCOV: 44.9 %
PCO2 BLDCOA: 60.1 MM[HG] (ref 30–60)
PCO2 BLDCOV: 40.7 MM HG (ref 27–43)
PH BLDCOA: 7.08 [PH] (ref 7.23–7.43)
PH BLDCOV: 7.2 [PH] (ref 7.19–7.49)
PO2 BLDCOA: 22.1 MM HG (ref 5–25)
PO2 BLDCOV: 21.2 MM HG (ref 15–45)
SAO2 % BLDCOV: 12.2 ML/DL

## 2021-01-06 PROCEDURE — 0KQM0ZZ REPAIR PERINEUM MUSCLE, OPEN APPROACH: ICD-10-PCS | Performed by: OBSTETRICS & GYNECOLOGY

## 2021-01-06 PROCEDURE — 82805 BLOOD GASES W/O2 SATURATION: CPT | Performed by: OBSTETRICS & GYNECOLOGY

## 2021-01-06 PROCEDURE — 0UQMXZZ REPAIR VULVA, EXTERNAL APPROACH: ICD-10-PCS | Performed by: OBSTETRICS & GYNECOLOGY

## 2021-01-06 PROCEDURE — 99024 POSTOP FOLLOW-UP VISIT: CPT | Performed by: OBSTETRICS & GYNECOLOGY

## 2021-01-06 PROCEDURE — 59410 OBSTETRICAL CARE: CPT | Performed by: OBSTETRICS & GYNECOLOGY

## 2021-01-06 PROCEDURE — 82948 REAGENT STRIP/BLOOD GLUCOSE: CPT

## 2021-01-06 RX ORDER — DOCUSATE SODIUM 100 MG/1
100 CAPSULE, LIQUID FILLED ORAL 2 TIMES DAILY
Status: DISCONTINUED | OUTPATIENT
Start: 2021-01-06 | End: 2021-01-07 | Stop reason: HOSPADM

## 2021-01-06 RX ORDER — CALCIUM CARBONATE 200(500)MG
1000 TABLET,CHEWABLE ORAL 3 TIMES DAILY PRN
Status: DISCONTINUED | OUTPATIENT
Start: 2021-01-06 | End: 2021-01-07 | Stop reason: HOSPADM

## 2021-01-06 RX ORDER — KETOROLAC TROMETHAMINE 30 MG/ML
30 INJECTION, SOLUTION INTRAMUSCULAR; INTRAVENOUS ONCE
Status: COMPLETED | OUTPATIENT
Start: 2021-01-06 | End: 2021-01-06

## 2021-01-06 RX ORDER — DIPHENHYDRAMINE HCL 25 MG
25 TABLET ORAL EVERY 6 HOURS PRN
Status: DISCONTINUED | OUTPATIENT
Start: 2021-01-06 | End: 2021-01-07 | Stop reason: HOSPADM

## 2021-01-06 RX ORDER — IBUPROFEN 600 MG/1
600 TABLET ORAL EVERY 6 HOURS PRN
Status: DISCONTINUED | OUTPATIENT
Start: 2021-01-06 | End: 2021-01-07 | Stop reason: HOSPADM

## 2021-01-06 RX ORDER — BISACODYL 10 MG
10 SUPPOSITORY, RECTAL RECTAL DAILY PRN
Status: DISCONTINUED | OUTPATIENT
Start: 2021-01-06 | End: 2021-01-07 | Stop reason: HOSPADM

## 2021-01-06 RX ORDER — ONDANSETRON 2 MG/ML
4 INJECTION INTRAMUSCULAR; INTRAVENOUS EVERY 6 HOURS PRN
Status: DISCONTINUED | OUTPATIENT
Start: 2021-01-06 | End: 2021-01-07 | Stop reason: HOSPADM

## 2021-01-06 RX ORDER — ACETAMINOPHEN 325 MG/1
650 TABLET ORAL EVERY 6 HOURS PRN
Status: DISCONTINUED | OUTPATIENT
Start: 2021-01-06 | End: 2021-01-07 | Stop reason: HOSPADM

## 2021-01-06 RX ORDER — SIMETHICONE 80 MG
80 TABLET,CHEWABLE ORAL EVERY 6 HOURS PRN
Status: DISCONTINUED | OUTPATIENT
Start: 2021-01-06 | End: 2021-01-07 | Stop reason: HOSPADM

## 2021-01-06 RX ORDER — DIAPER,BRIEF,INFANT-TODD,DISP
1 EACH MISCELLANEOUS 4 TIMES DAILY PRN
Status: DISCONTINUED | OUTPATIENT
Start: 2021-01-06 | End: 2021-01-07 | Stop reason: HOSPADM

## 2021-01-06 RX ADMIN — HYDROCORTISONE 1 APPLICATION: 1 CREAM TOPICAL at 03:20

## 2021-01-06 RX ADMIN — WITCH HAZEL 1 PAD: 500 SOLUTION RECTAL; TOPICAL at 03:20

## 2021-01-06 RX ADMIN — BENZOCAINE AND LEVOMENTHOL: 200; 5 SPRAY TOPICAL at 03:20

## 2021-01-06 RX ADMIN — DOCUSATE SODIUM 100 MG: 100 CAPSULE, LIQUID FILLED ORAL at 17:33

## 2021-01-06 RX ADMIN — KETOROLAC TROMETHAMINE 30 MG: 30 INJECTION, SOLUTION INTRAMUSCULAR at 02:18

## 2021-01-06 RX ADMIN — ENOXAPARIN SODIUM 40 MG: 40 INJECTION SUBCUTANEOUS at 21:57

## 2021-01-06 RX ADMIN — ENOXAPARIN SODIUM 40 MG: 40 INJECTION SUBCUTANEOUS at 09:37

## 2021-01-06 RX ADMIN — IBUPROFEN 600 MG: 600 TABLET, FILM COATED ORAL at 11:23

## 2021-01-06 RX ADMIN — DOCUSATE SODIUM 100 MG: 100 CAPSULE, LIQUID FILLED ORAL at 09:37

## 2021-01-06 RX ADMIN — BUPIVACAINE HYDROCHLORIDE 30 ML: 2.5 INJECTION, SOLUTION EPIDURAL; INFILTRATION; INTRACAUDAL; PERINEURAL at 02:14

## 2021-01-06 NOTE — OB LABOR/OXYTOCIN SAFETY PROGRESS
Oxytocin Safety Progress Check Note - Samaria Old 30 y o  female MRN: [id number]    Unit/Bed#: L&D 323-01 Encounter: [id number]    Dose (dia-units/min) Oxytocin: 20 dia-units/min  Contraction Frequency (minutes): 2-3  Contraction Quality: Moderate  Tachysystole: No   Cervical Dilation: 9        Cervical Effacement: 100  Fetal Station: 0  Baseline Rate: 155 bpm  Fetal Heart Rate: 165 BPM  FHR Category: Category I               Vital Signs:   Vitals:    01/05/21 2135   BP: 145/85   Pulse: 80   Resp:    Temp:    SpO2:            Notes/comments:    Evaluated patient after reported increase in pressure  Found to be changed to 9cm dilated  Category I FHT  Will continue to monitor      Discussed with Dr Lashaun Contreras MD 1/5/2021 10:03 PM

## 2021-01-06 NOTE — OB LABOR/OXYTOCIN SAFETY PROGRESS
Oxytocin Safety Progress Check Note - Parrish Roberson 30 y o  female MRN: [id number]    Unit/Bed#: L&D 323-01 Encounter: [id number]    Dose (dia-units/min) Oxytocin: 20 dia-units/min  Contraction Frequency (minutes): 2-3  Contraction Quality: Moderate  Tachysystole: No   Cervical Dilation: 8        Cervical Effacement: 90  Fetal Station: 0  Baseline Rate: 155 bpm  Fetal Heart Rate: 165 BPM  FHR Category: Category II               Vital Signs:   Vitals:    01/05/21 1845   BP: 157/86   Pulse: 96   Resp:    Temp:    SpO2:            Notes/comments:    Evaluated patient after reported increasing pressure  Found to be changed to 8cm dilated  Will continue to monitor       Discussed with Dr Deborah Rees MD 1/5/2021 8:33 PM

## 2021-01-06 NOTE — OB LABOR/OXYTOCIN SAFETY PROGRESS
Oxytocin Safety Progress Check Note - Nathaniel Nicolas 30 y o  female MRN: [id number]    Unit/Bed#: L&D 323-01 Encounter: [id number]    Dose (dia-units/min) Oxytocin: 20 dia-units/min  Contraction Frequency (minutes): 2-3  Contraction Quality: Moderate  Tachysystole: No   Cervical Dilation: Lip/rim (Comment)        Cervical Effacement: 100  Fetal Station: 1  Baseline Rate: 155 bpm  Fetal Heart Rate: 165 BPM  FHR Category: Category II               Vital Signs:   Vitals:    01/05/21 2207   BP: (!) 147/104   Pulse: 101   Resp:    Temp:    SpO2:            Notes/comments:    Evaluated patient after reported urge to push  Found to be changed to 9 5cm dilated  Uncomfortable  Will continue to monitor      Discussed with Dr Yeni Murray MD 1/5/2021 10:37 PM

## 2021-01-06 NOTE — L&D DELIVERY NOTE
Vaginal Delivery Summary - OB/GYN   Emigdio Harmon 30 y o  female MRN: [id number]  Unit/Bed#: L&D 323-01 Encounter: [id number]          Predelivery Diagnosis:  1  Pregnancy at 37w6d  2  Pre-eclampsia without severe features  3  A2GDM  4  Obesity, with BMI 45  5  Tobacco use in pregnancy  6  History of depression     Postdelivery Diagnosis:  1  Same as above  2  Delivery of term     Procedure: Spontaneous Vaginal Delivery, repair of second degree and right labial lacerations    Attending: Stacey    Assistant: Gasper    Anesthesia: Epidural, local    Quantified blood loss (mL): 191  Admission Hg:   Lab Results   Component Value Date    HGB 11 6 2021      Admission platelets:   Lab Results   Component Value Date             Complications: none apparent    Specimens: cord blood, arterial and venous cord blood gasses, placenta to storage    Findings:   1  Viable male at [facility] 6 on 21, with APGARS of 8 and 9 at 1 and 5 minutes respectively,  2  Spontaneous delivery of intact placenta at 0208  3  2 degree laceration and right labial laceration repaired with 3-0 Vicryl  4  Blood gases:    Umbilical Cord Venous Blood Gas:  Results from last 7 days   Lab Units 21  0207   PH COV  7 200   PCO2 COV mm HG 40 7   HCO3 COV mmol/L 15 6   BASE EXC COV mmol/L -11 9*   O2 CT CD VB mL/dL 12 2   O2 HGB, VENOUS CORD % 07 2     Umbilical Cord Arterial Blood Gas:  Results from last 7 days   Lab Units 21  0207   PH COA  7 081*   PCO2 COA  60 1*   PO2 COA mm HG 22 1   HCO3 COA mmol/L 17 5   BASE EXC COA mmol/L -13 6*   O2 CONTENT CORD ART ml/dl 10 1   O2 HGB, ARTERIAL CORD % 37 8       Disposition:  Patient tolerated the procedure well and was recovering in labor and delivery room     Brief history and labor course:  Ms Emigdio Harmon is a 30 y o  G2 now  at 37wk6d   She presented to labor and delivery with new diagnosis of gestational hypertension and met criteria for pre-eclampsia, so was admitted for induction of labor  She received penicillin for GBS carrier status, cytotec for cervical ripening, and pitocin for induction  She received an epidural for analgesia and amniotomy for augmentation  She progressed well, though with some difficulty with pain control as she became complete, and was complete at 0004 on 21 and started pushing  Description of procedure    Warm compresses were applied during pushing and perineal massage was performed  After pushing for 120 minutes, at [facility] 6 patient delivered a viable male , wt pending, apgars of 8 (1 min) and 9 (5 min)  The fetal vertex delivered spontaneously  Baby was checked for nuchal  One loose loop present, reduced  The anterior shoulder delivered atraumatically with maternal expulsive forces and the assistance of gentle downward traction  The posterior shoulder delivered with maternal expulsive forces and the assistance of gentle upward traction  The remainder of the fetus delivered spontaneously  Upon delivery, the infant was placed on the mothers abdomen and the cord was clamped and cut  Delayed cord clamping was performed  The infant was noted to cry spontaneously and had all signs of life  There was no evidence for injury  Awaiting nurse resuscitators evaluated the   Arterial and venous cord blood gases and cord blood was collected for analysis  These were promptly sent to the lab  In the immediate post-partum, 30 units of IV pitocin was administered, and the uterus was noted to contract down well with massage and pitocin  The placenta delivered spontaneously at 0208 and was noted to have a centrally inserted 3 vessel cord  The vagina, cervix, perineum, and rectum were inspected and there was noted to be a second degree laceration and a right labial laceration requiring repair  15cc of 0 25% bupivacaine infused prior to repair for patient comfort   The apex of the vaginal laceration was identified and a suture of 3-0 Vicryl was placed 1cm above the apex  The vaginal mucosa and underlying rectovaginal fascia were closed using a running locked suture to the hymenal ring  The suture was then brought underneath the hymenal ring  A stitch was then placed through the bulbocavernosus muscle  Continuing with the same suture, the transverse perineal muscles were re-approximated  The suture was brought to the posterior apex of the skin laceration and then the skin was re-approximated in a subcuticular fashion to the hymenal ring  Hemostasis was achieved  The right labial laceration was repaired with 3-0 Vicryl in an interrupted fashion  At the conclusion of the procedure, all needle, sponge, and instrument counts were noted to be correct  Jj Fly showed no retained sponges  Patient tolerated the procedure well and was allowed to recover in labor and delivery room with family and  before being transferred to the post-partum floor  Dr Massiel Godoy was present and participated in all key portions of the case          Antonio Han MD  2021  2:35 AM

## 2021-01-06 NOTE — ANESTHESIA POSTPROCEDURE EVALUATION
Post-Op Assessment Note    CV Status:  Stable    Pain management: adequate     Mental Status:  Alert   PONV Controlled:  None   Airway Patency:  Patent      Post Op Vitals Reviewed: Yes      Staff: Anesthesiologist     Post-op block assessment: catheter intact and no complications      No complications documented      BP      Temp      Pulse     Resp      SpO2

## 2021-01-06 NOTE — OB LABOR/OXYTOCIN SAFETY PROGRESS
Oxytocin Safety Progress Check Note - Sophie Flood 30 y o  female MRN: [id number]    Unit/Bed#: L&D 323-01 Encounter: [id number]    Dose (dia-units/min) Oxytocin: 20 dia-units/min  Contraction Frequency (minutes): 2-3  Contraction Quality: Moderate  Tachysystole: No   Cervical Dilation: 8-9        Cervical Effacement: 90  Fetal Station: 0  Baseline Rate: 150 bpm  Fetal Heart Rate: 165 BPM  FHR Category: Category II               Vital Signs:   Vitals:    01/05/21 2104   BP: (!) 152/106   Pulse: 93   Resp:    Temp:    SpO2:            Notes/comments:    Evaluated patient prior to evaluation by anesthesia  Continuing to report significant pressure and pain  Found to be slightly changed  Cervix on left side  Category II FHT with occasional variable decelerations  Intermittent severe range blood pressures  No new symptoms   Will continue to monitor    Discussed with Dr Alem Ortiz MD 1/5/2021 9:20 PM

## 2021-01-06 NOTE — PLAN OF CARE
Problem: PAIN - ADULT  Goal: Verbalizes/displays adequate comfort level or baseline comfort level  Description: Interventions:  - Encourage patient to monitor pain and request assistance  - Assess pain using appropriate pain scale  - Administer analgesics based on type and severity of pain and evaluate response  - Implement non-pharmacological measures as appropriate and evaluate response  - Consider cultural and social influences on pain and pain management  - Notify physician/advanced practitioner if interventions unsuccessful or patient reports new pain  Outcome: Progressing     Problem: INFECTION - ADULT  Goal: Absence or prevention of progression during hospitalization  Description: INTERVENTIONS:  - Assess and monitor for signs and symptoms of infection  - Monitor lab/diagnostic results  - Monitor all insertion sites, i e  indwelling lines, tubes, and drains  - Monitor endotracheal if appropriate and nasal secretions for changes in amount and color  - [place] appropriate cooling/warming therapies per order  - Administer medications as ordered  - Instruct and encourage patient and family to use good hand hygiene technique  - Identify and instruct in appropriate isolation precautions for identified infection/condition  Outcome: Progressing  Goal: Absence of fever/infection during neutropenic period  Description: INTERVENTIONS:  - Monitor WBC    Outcome: Progressing     Problem: SAFETY ADULT  Goal: Patient will remain free of falls  Description: INTERVENTIONS:  - Assess patient frequently for physical needs  -  Identify cognitive and physical deficits and behaviors that affect risk of falls    -  [place] fall precautions as indicated by assessment   - Educate patient/family on patient safety including physical limitations  - Instruct patient to call for assistance with activity based on assessment  - Modify environment to reduce risk of injury  - Consider OT/PT consult to assist with strengthening/mobility  Outcome: Progressing  Goal: Maintain or return to baseline ADL function  Description: INTERVENTIONS:  -  Assess patient's ability to carry out ADLs; assess patient's baseline for ADL function and identify physical deficits which impact ability to perform ADLs (bathing, care of mouth/teeth, toileting, grooming, dressing, etc )  - Assess/evaluate cause of self-care deficits   - Assess range of motion  - Assess patient's mobility; develop plan if impaired  - Assess patient's need for assistive devices and provide as appropriate  - Encourage maximum independence but intervene and supervise when necessary  - Involve family in performance of ADLs  - Assess for home care needs following discharge   - Consider OT consult to assist with ADL evaluation and planning for discharge  - Provide patient education as appropriate  Outcome: Progressing  Goal: Maintain or return mobility status to optimal level  Description: INTERVENTIONS:  - Assess patient's baseline mobility status (ambulation, transfers, stairs, etc )    - Identify cognitive and physical deficits and behaviors that affect mobility  - Identify mobility aids required to assist with transfers and/or ambulation (gait belt, sit-to-stand, lift, walker, cane, etc )  - [place] fall precautions as indicated by assessment  - Record patient progress and toleration of activity level on Mobility SBAR; progress patient to next Phase/Stage  - Instruct patient to call for assistance with activity based on assessment  - Consider rehabilitation consult to assist with strengthening/weightbearing, etc   Outcome: Progressing     Problem: Knowledge Deficit  Goal: Patient/family/caregiver demonstrates understanding of disease process, treatment plan, medications, and discharge instructions  Description: Complete learning assessment and assess knowledge base    Interventions:  - Provide teaching at level of understanding  - Provide teaching via preferred learning methods  Outcome: Progressing  Goal: Verbalizes understanding of labor plan  Description: Assess patient/family/caregiver's baseline knowledge level and ability to understand information  Provide education via patient/family/caregiver's preferred learning method at appropriate level of understanding  1  Provide teaching at level of understanding  2  Provide teaching via preferred learning method(s)  Outcome: Progressing     Problem: DISCHARGE PLANNING  Goal: Discharge to home or other facility with appropriate resources  Description: INTERVENTIONS:  - Identify barriers to discharge w/patient and caregiver  - Arrange for needed discharge resources and transportation as appropriate  - Identify discharge learning needs (meds, wound care, etc )  - Arrange for interpretive services to assist at discharge as needed  - Refer to Case Management Department for coordinating discharge planning if the patient needs post-hospital services based on physician/advanced practitioner order or complex needs related to functional status, cognitive ability, or social support system  Outcome: Progressing     Problem: Labor & Delivery  Goal: Manages discomfort  Description: Assess and monitor for signs and symptoms of discomfort  Assess patient's pain level regularly and per hospital policy  Administer medications as ordered  Support use of nonpharmacological methods to help control pain such as distraction, imagery, relaxation, and application of heat and cold  Collaborate with interdisciplinary team and patient to determine appropriate pain management plan  1  Include patient in decisions related to comfort  2  Offer non-pharmacological pain management interventions  3  Report ineffective pain management to physician  Outcome: Progressing  Goal: Patient vital signs are stable  Description: 1  Assess vital signs - vaginal delivery    Outcome: Progressing

## 2021-01-06 NOTE — UTILIZATION REVIEW
Initial Clinical Review    Admission: Date/Time/Statement:   Admission Orders (From admission, onward)     Ordered        21 2245  Inpatient Admission  Once                   Orders Placed This Encounter   Procedures    Inpatient Admission     Standing Status:   Standing     Number of Occurrences:   1     Order Specific Question:   Admitting Physician     Answer:   Monica Keenan     Order Specific Question:   Level of Care     Answer:   Med Surg [16]     Order Specific Question:   Estimated length of stay     Answer:   More than 2 Midnights     Order Specific Question:   Certification     Answer:   I certify that inpatient services are medically necessary for this patient for a duration of greater than two midnights  See H&P and MD Progress Notes for additional information about the patient's course of treatment  ED Arrival Information     Patient not seen in ED                       Assessment/Plan:  ON 2021  30 y o  Rosi Singh at 37w4d weeks gestation who is being admitted for gestational HTN with induction of labor  Patient  evaluated at outpatient prenatal visit today noted to have second elevated BP with diastolic of 90  Patient endorses intermittent headache but for past couple days she has been having right temporal headache relieved with tylenol with hydration  SVE: /4  Pre Eclampsia workup with labs; urine protein/cr ratio/UA  Cont BP monitoring  AROM, received Epidural with  2021 @ 0205   2021  4:28 am Labor Progress Note, Evaluated patient  Comfortable, had been sleeping soundly  Found to be slightly changed to 1 5cm dilated  Patient tolerates exams poorly, unable to place barrera balloon at this time  Second dose cytotec placed  2021 8:51 am cervical dilation 2-3, Patient is fairly comfortable  Fetal heart tracing is hard to assess due to inability to keep baby on monitor  Will plan to switch to know the monitor to assess fetal heart rate    Cervical exam is as above   Will plan to the start Pitocin once baby is monitored  1/5/2021 12:28 pm Dose (dia-units/min) Oxytocin: 6 dia-units/min; cervix 3 cm;   1/5/2021 1:05 PM Epidural Block  1/5/2021 2:14PM AROM , cervix 3-4; Dose (dia-units/min) Oxytocin: 8 dia-units/min  1/5/2021 8:33PM  Cervical Dilation: 8, Dose (dia-units/min) Oxytocin: 20 dia-units/min  1/5/2021 9:20 PM Cervical Dilation: 8-9, Dose (dia-units/min) Oxytocin: 20 dia-units/min  1/5/2021 10:37 PM cervical dilation lip/rim  Dose (dia-units/min) Oxytocin: 20 dia-units/min  1/6/2021 0205  Vaginal Delivery Summary -    Procedure: Spontaneous Vaginal Delivery, repair of second degree and right labial lacerations   Anesthesia: Epidural, local   Findings:   1   Viable male at [place] [address] on 1/6/21, with APGARS of 8 and 9 at 1 and 5      Triage Vitals [01/04/21 2300]   Temperature Pulse Respirations Blood Pressure SpO2   98 4 °F (36 9 °C) 95 16 (!) 147/102 100 %      Temp Source Heart Rate Source Patient Position - Orthostatic VS BP Location FiO2 (%)   Oral Monitor Lying Left arm --      Pain Score       No Pain          Wt Readings from Last 1 Encounters:   01/04/21 (!) 150 kg (331 lb)     Additional Vital Signs:   Date/Time  Temp  Pulse  Resp  BP  SpO2  Patient Position - Orthostatic VS   01/06/21 1047  98 8 °F (37 1 °C)  100  18  146/89  --  --   01/06/21 0700  98 8 °F (37 1 °C)  96  20  150/83  --  --   01/06/21 0410  --  94  --  159/107Abnormal   --  --   01/06/21 0330  --  106Abnormal   --  142/91   --  --   BP: MD aware at 01/06/21 0330   01/06/21 0322  --  93  --  144/83  --  --   01/06/21 0306  --  92  --  153/80  --  --   01/06/21 0251  --  89  --  149/76  --  --   01/06/21 0241  --  100  --  153/79  --  --   01/06/21 0240  --  96  --  152/107Abnormal    --  --   BP: Patient arm bent at 01/06/21 0240 01/06/21 0221  --  95  --  141/75  --  --   01/06/21 0215  --  103  --  134/86  --  --   01/06/21 0200  --  --  --  139/97  --  --   01/06/21 0130 --  --  --  168/89  --  --   01/06/21 0115  --  --  --  164/70  --  --   01/06/21 0100  --  --  --  146/85  --  --   01/06/21 0045  --  104  --  146/90  98 %  --   01/06/21 0015  --  --  --  140/91  --  --   01/06/21 0000  --  --  --  179/91Abnormal   --  --   01/05/21 2308  --  97  --  165/111Abnormal    --  --   BP: MD aware at 01/05/21 2308 01/05/21 2252  --  100  --  167/119Abnormal    --  --   BP: MD aware at 01/05/21 2252 01/05/21 2239  --  92  --  144/94  --  --   01/05/21 2215  98 6 °F (37 °C)  --  --  --  --  --   01/05/21 2207  --  101  --  147/104Abnormal   --  --   01/05/21 2151  --  87  --  138/81  --  --   01/05/21 2135  --  80  --  145/85  --  --   01/05/21 2132  --  86  --  147/86  --  --   01/05/21 2129  --  92  --  138/87  --  --   01/05/21 2122  --  86  --  142/88  --  --   01/05/21 2104  --  93  --  152/106Abnormal    --  --   BP: MD aware at 01/05/21 2104 01/05/21 2056  --  91  --  190/75Abnormal    --  --   BP: MD aware at 01/05/21 2056 01/05/21 2053  --  96  --  199/131Abnormal    --  --   BP: Cuff not on right   Readjusted at 01/05/21 2053 01/05/21 2047  --  104  --  155/97  --  --   01/05/21 2043  --  107Abnormal   --  139/81  --  --   01/05/21 2029  --  88  --  163/83  --  --   01/05/21 2014  --  89  --  159/89  --  --   01/05/21 2000  98 9 °F (37 2 °C)  93  --  146/90  --  --   01/05/21 1945  --  101  --  143/81  --  --   01/05/21 1929  --  98  --  133/69  --  --   01/05/21 1915  98 9 °F (37 2 °C)  --  --  --  --  --   01/05/21 1914  --  97  --  131/79  --  --   01/05/21 1900  --  93  --  147/84  --  --   01/05/21 1845  --  96  --  157/86  --  --   01/05/21 1829  --  129Abnormal   --  156/70  --  --   01/05/21 1814  --  93  --  143/76  --  --   01/05/21 1810  97 6 °F (36 4 °C)  --  --  --  --  --   01/05/21 1615  --  85  --  134/83  --  --   01/05/21 1600  --  86  --  160/91  --  --   01/05/21 1544  --  89  --  149/82  --  --   01/05/21 1530  98 2 °F (36 8 °C)  --  --  --  --  -- 01/05/21 1529  --  88  --  149/85  --  --   01/05/21 1515  --  90  --  148/90  --  --   01/05/21 1500  --  90  --  142/83  --  --   01/05/21 1444  --  100  --  140/87  --  --   01/05/21 1429  --  97  --  162/86  --  --   01/05/21 1400  --  96  --  145/81  --  --   01/05/21 1344  --  100  --  146/88  --  --   01/05/21 1328  --  96  --  149/88  --  --   01/05/21 1325  --  92  --  151/85  --  --   01/05/21 1322  --  90  --  145/80  --  --   01/05/21 1319  --  76  --  143/71  --  --   01/05/21 1316  --  88  --  155/74  --  --   01/05/21 1311  --  83  --  158/72  --  --   01/05/21 1302  98 6 °F (37 °C)  96  16  187/89Abnormal   --  --   01/05/21 1259  --  82  --  160/86  --  --   01/05/21 0735  98 2 °F (36 8 °C)  --  --  --  --  --   01/05/21 0238  --  96  --  159/83  --  --   01/05/21 0223  --  100  --  155/79  --  --   01/05/21 0208  --  98  --  152/79  --  --   01/05/21 0153  --  93  --  155/78  --  --   01/05/21 0138  --  95  --  145/71  --  --   01/05/21 0123  --  97  --  152/71  --  --   01/05/21 0108  --  100  --  148/95  --  --   01/05/21 0100  --  --  --  134/87  --  --   01/04/21 2300  98 4 °F (36 9 °C)  95  16  147/102Abnormal   100 %  Lying      Weights (last 14 days)    Date/Time  Weight  Height   01/04/21 2300  150 kg (331 lb)Abnormal   6' (1 829 m)       Pertinent Labs/Diagnostic Test Results:       Results from last 7 days   Lab Units 01/05/21  0009   WBC Thousand/uL 13 61*   HEMOGLOBIN g/dL 11 6   HEMATOCRIT % 35 2   PLATELETS Thousands/uL 278   NEUTROS ABS Thousands/µL 10 11*         Results from last 7 days   Lab Units 01/05/21  0009   SODIUM mmol/L 137   POTASSIUM mmol/L 3 7   CHLORIDE mmol/L 104   CO2 mmol/L 21   ANION GAP mmol/L 12   BUN mg/dL 6   CREATININE mg/dL 0 65   EGFR ml/min/1 73sq m 122   CALCIUM mg/dL 8 6     Results from last 7 days   Lab Units 01/05/21  0009   AST U/L 13   ALT U/L 22   ALK PHOS U/L 102   TOTAL PROTEIN g/dL 6 7   ALBUMIN g/dL 2 2*   TOTAL BILIRUBIN mg/dL 0 21 Results from last 7 days   Lab Units 01/06/21  0041 01/05/21  2214 01/05/21  1956 01/05/21  1809 01/05/21  1617 01/05/21  1359 01/05/21  1236 01/05/21  1136 01/05/21  1031 01/05/21  0825 01/05/21  0128 01/04/21  2312   POC GLUCOSE mg/dl 153* 86 111 73 89 79 91 95 100 86 104 90     Results from last 7 days   Lab Units 01/05/21  0009   GLUCOSE RANDOM mg/dL 98       Results from last 7 days   Lab Units 01/05/21  0050 01/05/21  0009   CLARITY UA  Clear  --    COLOR UA  Yellow  --    SPEC GRAV UA  1 025  --    PH UA  6 0  --    GLUCOSE UA mg/dl Negative  --    KETONES UA mg/dl Negative  --    BLOOD UA  Trace-Intact*  --    PROTEIN UA mg/dl 100 (2+)*  --    NITRITE UA  Negative  --    BILIRUBIN UA  Negative  --    UROBILINOGEN UA E U /dl 0 2  --    LEUKOCYTES UA  Trace*  --    WBC UA /hpf 1-2*  --    RBC UA /hpf 1-2*  --    BACTERIA UA /hpf Moderate*  --    EPITHELIAL CELLS WET PREP /hpf Occasional  --    MUCUS THREADS  Occasional*  --    CREATININE UR mg/dL  --  145 0   PROTEIN UR mg/dL  --  87   PROT/CREAT RATIO UR   --  0 60*         Past Medical History:   Diagnosis Date    Chlamydia     Depression     no medications for 1 year    Gestational hypertension, third trimester 1/4/2021    Insulin controlled gestational diabetes mellitus (GDM) in third trimester 10/29/2020    Urinary tract infection     Varicella      Present on Admission:   Insulin controlled gestational diabetes mellitus (GDM) in third trimester   Tobacco user    Admitting Diagnosis: High blood pressure affecting pregnancy in third trimester, antepartum [O16 3]  Age/Sex: 30 y o  female  Admission Orders:  External uterine contraction monitoring  external fetal heart rate monitoring    Scheduled Medications:  docusate sodium, 100 mg, Oral, BID  enoxaparin, 40 mg, Subcutaneous, Q12H [address]    Continuous IV Infusions:   IV  oxytocin (PITOCIN) 30 Units in lactated ringers 500 mL infusion titration   IV  sodium chloride 0 9 % infusion @125 ml/hr    PRN Meds:  acetaminophen, 650 mg, Oral, Q6H PRN  benzocaine-menthol-lanolin-aloe, , Topical, 4x Daily PRN  bisacodyl, 10 mg, Rectal, Daily PRN  calcium carbonate, 1,000 mg, Oral, TID PRN  diphenhydrAMINE, 25 mg, Oral, Q6H PRN  hydrocortisone, 1 application, Topical, 4x Daily PRN  ibuprofen, 600 mg, Oral, Q6H PRN  ondansetron, 4 mg, Intravenous, Q6H PRN  simethicone, 80 mg, Oral, Q6H PRN  witch hazel-glycerin, 1 pad, Topical, Q2H PRN  Network Utilization Review Department  ATTENTION: Please call with any questions or concerns to [phone] and carefully listen to the prompts so that you are directed to the right person  All voicemails are confidential   Georgina Jones all requests for admission clinical reviews, approved or denied determinations and any other requests to dedicated fax number below belonging to the campus where the patient is receiving treatment   List of dedicated fax numbers for the Facilities:  [address] DENIALS (Administrative/Medical Necessity) [phone]   [address] (Maternity/NICU/Pediatrics) [phone]   [address] Roxanne Mahajan 7713 (  Aniya Olea "Flaca" 103) [address] [place] [address] Viky Goodman 1481 [address] [place] [address] [phone]

## 2021-01-06 NOTE — DISCHARGE SUMMARY
Discharge Summary - Daina Arnold 30 y o  female MRN: [id number]    Unit/Bed#: L&D 323-01 Encounter: [id number]    Admission Date: 2021     Discharge Date: 21    Admitting Diagnosis:   Patient Active Problem List   Diagnosis    Functional diarrhea    Generalized abdominal pain    Maternal morbid obesity, antepartum ([facility] )    Tobacco user    H/O major depression    Left ovarian cyst    Pilonidal cyst    Insulin controlled gestational diabetes mellitus (GDM) in third trimester    Obesity affecting pregnancy in third trimester    BMI 40 0-44 9, adult ([facility] )    Hyperglycemia in pregnancy    Vulvovaginal candidiasis    37 weeks gestation of pregnancy    Pre-eclampsia in third trimester     Discharge Diagnosis:   Same, delivered    Procedures:   spontaneous vaginal delivery    Admitting Attending: Dr Bhanu Bustamante MD  Delivery Attending: Dr Bhanu Bustamante MD  Discharge Attending: Dr Charles Cardozo Course:     Daina Arnold is a 30 y o  Brandon Canard who was admitted at 37w4d for gestational HTN with induction of labor  She made continuous cervical change and received an epidural for pain control  She was AROM'd for clear moderate fluid at 1411 on 21  She proceeded to make cervical change and became complete at 0004 on 21  She started pushing at [facility] on 21  She then underwent an uncomplicated spontaneous vaginal delivery and delivered a viable male  at 0 on 21  APGARS were 8, 9 at 1 and 5 minutes, respectively   weighed 8lb 9oz  Placenta was delivered at    was then transferred to  nursery; baby also was monitored in the NICU for glycemic issues  Patient tolerated the procedure well and was transferred to recovery in stable condition  The patient's post partum course was unremarkable  On day of discharge, she was ambulating and able to reasonably perform all ADLs  She was voiding and had appropriate bowel function   Pain was well controlled  She was discharged home on postpartum day #1 without complications  Patient was instructed to follow up with her OB as an outpatient and was given appropriate warnings to call provider if she develops signs of infection or uncontrolled pain  On day of discharge she was ambulating, voiding spontaneously, tolerating oral intake and hemodynamically stable  She is breast feeding   Mom's blood type is A positive; RhoGAM is not indicated  Condition at discharge:   good     Disposition:   See After Visit Summary for discharge disposition information  Planned Readmission:   No    Discharge Medications:   Prenatal vitamin daily for 6 months or the duration of nursing whichever is longer  Motrin 600 mg orally every 6 hours as needed for pain  Tylenol (over the counter) per bottle directions as needed for pain  Hydrocortisone cream 1% (over the counter) applied 1-2x daily to hemorrhoids as needed  Witch hazel pads for hemorrhoidal discomfort as needed    Discharge instructions :   -Do not place anything (no partner, tampons or douche) in your vagina for 6 weeks  -You may walk for exercise for the first 6 weeks then gradually return to your usual activities    -Please do not drive for 1 week if you have no stitches and for 2 weeks if you have stitches or underwent a  delivery     -You may take baths or shower per your preference    -Please look at your bust (breasts) in the mirror daily and call provider for redness or tenderness or increased warmth  - If you have had a  please look at your incision daily as well and call provider for increasing redness or steady drainage from the incision    -Please call your provider if temperature > 100 4*F or 38* C, worsening pain or a foul discharge

## 2021-01-06 NOTE — PLAN OF CARE
Problem: Knowledge Deficit  Goal: Patient/family/caregiver demonstrates understanding of disease process, treatment plan, medications, and discharge instructions  Description: Complete learning assessment and assess knowledge base  Interventions:  - Provide teaching at level of understanding  - Provide teaching via preferred learning methods  1/6/2021 0222 by Adeola Mendez RN  Outcome: Completed  1/5/2021 2112 by Adeola Mendez RN  Outcome: Progressing  Goal: Verbalizes understanding of labor plan  Description: Assess patient/family/caregiver's baseline knowledge level and ability to understand information  Provide education via patient/family/caregiver's preferred learning method at appropriate level of understanding  1  Provide teaching at level of understanding  2  Provide teaching via preferred learning method(s)  1/6/2021 0222 by Adeola Mendez RN  Outcome: Completed  1/5/2021 2112 by Adeola Mendez RN  Outcome: Progressing     Problem: Labor & Delivery  Goal: Manages discomfort  Description: Assess and monitor for signs and symptoms of discomfort  Assess patient's pain level regularly and per hospital policy  Administer medications as ordered  Support use of nonpharmacological methods to help control pain such as distraction, imagery, relaxation, and application of heat and cold  Collaborate with interdisciplinary team and patient to determine appropriate pain management plan  1  Include patient in decisions related to comfort  2  Offer non-pharmacological pain management interventions  3  Report ineffective pain management to physician  1/6/2021 0222 by Adeola Mendez RN  Outcome: Completed  1/5/2021 2112 by Adeola Mendez RN  Outcome: Progressing  Goal: Patient vital signs are stable  Description: 1  Assess vital signs - vaginal delivery    1/6/2021 0222 by Adeola Mendez RN  Outcome: Completed  1/5/2021 2112 by Adeola Mendez RN  Outcome: Progressing

## 2021-01-06 NOTE — LACTATION NOTE
CONSULT - LACTATION  Lavonne Christianson 30 y o  female MRN: [id number]    Arcadio Mendez [place] L&D Room / Bed: L&D 304/L&D 304-01 Encounter: [id number]    Maternal Information     MOTHER:  N/A  Maternal Age: This patient's mother is not on file  OB History: This patient's mother is not on file  Previouse breast reduction surgery? No    Lactation history:   Has patient previously breast fed: No   How long had patient previously breast fed:     Previous breast feeding complications: This patient's mother is not on file  Birth information:  YOB: 1995   Time of birth:     Sex: female   Delivery type:     Birth Weight: No birth weight on file  Percent of Weight Change: Birth weight not on file     Gestational Age: <None>   [unfilled]    Assessment     Breast and nipple assessment: normal assessment     Assessment: baby in NICU    Feeding assessment: BABY IN NICU  LATCH:  Latch: Audible Swallowing:     Type of Nipple:     Comfort (Breast/Nipple):     Hold (Positioning):     LATCH Score:            Feeding recommendations:  pump every 2-3 hours     Met with mother AND FATHER  Provided mother with Ready, Set, Baby booklet  Discussed Skin to Skin contact an benefits to mom and baby  Talked about the delay of the first bath until baby has adjusted  Spoke about the benefits of rooming in  Feeding on cue and what that means for recognizing infant's hunger  Avoidance of pacifiers for the first month discussed  Talked about exclusive breastfeeding for the first 6 months  Positioning and latch reviewed as well as showing images of other feeding positions  Discussed the properties of a good latch in any position  Reviewed hand/manual expression  Discussed s/s that baby is getting enough milk and some s/s that breastfeeding dyad may need further help  Instructions given for pumping for baby in NICU    Discussed when to start, frequency, different pumps available versus manual expression  Discussed hygiene of hands and supplies as well as assembly, placement of flanges, size of flanged, preparing the breast and cycles and suction settings on pump  Demonstrated use of hand pump  Discussed labeling of milk, storage, and preparation of stored milk  Gave information on common concerns, what to expect the first few weeks after delivery, preparing for other caregivers, and how partners can help  Resources for support also provided  Dad at bedside  [employer] [facility]  to call for assistance, questions and concerns  Extension number for inpatient lactation support provided          Shira Carrasco RN 1/6/2021 2:44 PM

## 2021-01-07 VITALS
SYSTOLIC BLOOD PRESSURE: 142 MMHG | BODY MASS INDEX: 39.68 KG/M2 | DIASTOLIC BLOOD PRESSURE: 98 MMHG | WEIGHT: 293 LBS | HEART RATE: 69 BPM | TEMPERATURE: 98.1 F | RESPIRATION RATE: 16 BRPM | OXYGEN SATURATION: 98 % | HEIGHT: 72 IN

## 2021-01-07 PROCEDURE — 99024 POSTOP FOLLOW-UP VISIT: CPT | Performed by: OBSTETRICS & GYNECOLOGY

## 2021-01-07 RX ORDER — DOCUSATE SODIUM 100 MG/1
100 CAPSULE, LIQUID FILLED ORAL 2 TIMES DAILY
Qty: 10 CAPSULE | Refills: 0
Start: 2021-01-07 | End: 2021-04-08 | Stop reason: ALTCHOICE

## 2021-01-07 RX ORDER — IBUPROFEN 200 MG
600 TABLET ORAL EVERY 6 HOURS PRN
Start: 2021-01-07 | End: 2021-04-08 | Stop reason: ALTCHOICE

## 2021-01-07 RX ORDER — ACETAMINOPHEN 325 MG/1
650 TABLET ORAL EVERY 6 HOURS PRN
Refills: 0
Start: 2021-01-07 | End: 2021-04-08 | Stop reason: ALTCHOICE

## 2021-01-07 RX ADMIN — DOCUSATE SODIUM 100 MG: 100 CAPSULE, LIQUID FILLED ORAL at 09:56

## 2021-01-07 RX ADMIN — IBUPROFEN 600 MG: 600 TABLET, FILM COATED ORAL at 05:53

## 2021-01-07 RX ADMIN — ENOXAPARIN SODIUM 40 MG: 40 INJECTION SUBCUTANEOUS at 09:56

## 2021-01-07 NOTE — PLAN OF CARE
Problem: PAIN - ADULT  Goal: Verbalizes/displays adequate comfort level or baseline comfort level  Description: Interventions:  - Encourage patient to monitor pain and request assistance  - Assess pain using appropriate pain scale  - Administer analgesics based on type and severity of pain and evaluate response  - Implement non-pharmacological measures as appropriate and evaluate response  - Consider cultural and social influences on pain and pain management  - Notify physician/advanced practitioner if interventions unsuccessful or patient reports new pain  Outcome: Not Progressing     Problem: INFECTION - ADULT  Goal: Absence or prevention of progression during hospitalization  Description: INTERVENTIONS:  - Assess and monitor for signs and symptoms of infection  - Monitor lab/diagnostic results  - Monitor all insertion sites, i e  indwelling lines, tubes, and drains  - Monitor endotracheal if appropriate and nasal secretions for changes in amount and color  - [place] appropriate cooling/warming therapies per order  - Administer medications as ordered  - Instruct and encourage patient and family to use good hand hygiene technique  - Identify and instruct in appropriate isolation precautions for identified infection/condition  Outcome: Not Progressing  Goal: Absence of fever/infection during neutropenic period  Description: INTERVENTIONS:  - Monitor WBC    Outcome: Not Progressing     Problem: SAFETY ADULT  Goal: Patient will remain free of falls  Description: INTERVENTIONS:  - Assess patient frequently for physical needs  -  Identify cognitive and physical deficits and behaviors that affect risk of falls    -  [place] fall precautions as indicated by assessment   - Educate patient/family on patient safety including physical limitations  - Instruct patient to call for assistance with activity based on assessment  - Modify environment to reduce risk of injury  - Consider OT/PT consult to assist with strengthening/mobility  Outcome: Not Progressing  Goal: Maintain or return to baseline ADL function  Description: INTERVENTIONS:  -  Assess patient's ability to carry out ADLs; assess patient's baseline for ADL function and identify physical deficits which impact ability to perform ADLs (bathing, care of mouth/teeth, toileting, grooming, dressing, etc )  - Assess/evaluate cause of self-care deficits   - Assess range of motion  - Assess patient's mobility; develop plan if impaired  - Assess patient's need for assistive devices and provide as appropriate  - Encourage maximum independence but intervene and supervise when necessary  - Involve family in performance of ADLs  - Assess for home care needs following discharge   - Consider OT consult to assist with ADL evaluation and planning for discharge  - Provide patient education as appropriate  Outcome: Not Progressing  Goal: Maintain or return mobility status to optimal level  Description: INTERVENTIONS:  - Assess patient's baseline mobility status (ambulation, transfers, stairs, etc )    - Identify cognitive and physical deficits and behaviors that affect mobility  - Identify mobility aids required to assist with transfers and/or ambulation (gait belt, sit-to-stand, lift, walker, cane, etc )  - [place] fall precautions as indicated by assessment  - Record patient progress and toleration of activity level on Mobility SBAR; progress patient to next Phase/Stage  - Instruct patient to call for assistance with activity based on assessment  - Consider rehabilitation consult to assist with strengthening/weightbearing, etc   Outcome: Not Progressing     Problem: DISCHARGE PLANNING  Goal: Discharge to home or other facility with appropriate resources  Description: INTERVENTIONS:  - Identify barriers to discharge w/patient and caregiver  - Arrange for needed discharge resources and transportation as appropriate  - Identify discharge learning needs (meds, wound care, etc )  - Arrange for interpretive services to assist at discharge as needed  - Refer to Case Management Department for coordinating discharge planning if the patient needs post-hospital services based on physician/advanced practitioner order or complex needs related to functional status, cognitive ability, or social support system  Outcome: Not Progressing

## 2021-01-07 NOTE — DISCHARGE INSTRUCTIONS
Left message see below     make appointment for complete in June.     Derek Kimball MD 9:29 AM 5/28/2019   Self Care After Delivery   AMBULATORY CARE:   The postpartum period  is the period of time from delivery to about 6 weeks  During this time you may experience many physical and emotional changes  It is important to understand what is normal and when you need to call your healthcare provider  It is also important to know how to care for yourself during this time  Call your local emergency number (911 in the [address]) for any of the following:   · You see or hear things that are not there, or have thoughts of harming yourself or your baby  · You soak through 1 pad in 15 minutes, have blurry vision, clammy or pale skin, and feel faint  · You faint or lose consciousness  · You have trouble breathing  · You cough up blood  · Your  incision comes apart  Seek care immediately if:   · Your heart is beating faster than usual     · You have a bad headache or changes in your vision  · Your episiotomy or  incision is red, swollen, bleeding, or draining pus  · You have severe abdominal pain  Call your doctor or obstetrician if:   · Your leg is painful, red, and larger than usual     · You soak through 1 or more pads in an hour, or pass blood clots larger than a quarter from your vagina  · You have a fever  · You have new or worsening pain in your abdomen or vagina  · You continue to have depression 1 to 2 weeks after you deliver  · You have trouble sleeping  · You have foul-smelling discharge from your vagina  · You have pain or burning when you urinate  · You do not have a bowel movement for 3 days or more  · You have nausea or are vomiting  · You have hard lumps or red streaks over your breasts  · You have cracked nipples or bleed from your nipples  · You have questions or concerns about your condition or care  Physical changes:   The following are normal changes after you give birth:  · Pain in the area between your anus and vagina    · Breast pain    · Constipation or hemorrhoids    · Hot or cold flashes    · Vaginal bleeding or discharge    · Mild to moderate abdominal cramping    · Difficulty controlling bowel movements or urine    Emotional changes:  A drop in hormone levels after you deliver may cause changes in your emotions  You may feel irritable, sad, or anxious  You may cry easily or for no reason  You may also feel depressed  Depression that continues can be a sign of postpartum depression, a condition that can be treated  Treatment may include talk therapy, medicines, or both  Healthcare providers will ask how you are feeling and if you have any depression  These talks can happen during appointments for your medical care and for your baby's care, such as well child visits  Providers can help you find ways to care for yourself and your baby  Talk to your providers about the following:  · When emotional changes or depression started, and if it is getting worse over time    · Problems you are having with daily activities, sleep, or caring for your baby    · If anything makes you feel worse, or makes you feel better    · Feeling that you are not bonding with your baby the way you want    · Any problems your baby has with sleeping or feeding    · Your baby is fussy or cries a lot    · Support you have from friends, family, or others    Breast care for breastfeeding mothers: You may have sore breasts for 3 to 6 days after you give birth  This happens as your milk begins to fill your breasts  You may also have sore breasts if you do not breastfeed frequently  Do the following to care for your breasts:  · Apply a moist, warm, compress to your breast as directed  This may help soothe your breasts  Make sure the washcloth is not too hot before you apply it to your breast     · Nurse your baby or pump your milk frequently  This may prevent clogged milk ducts  Ask your healthcare provider how often to nurse or pump      · Massage your breasts as directed  This may help increase your milk flow  Gently rub your breasts in a circular motion before you breastfeed  You may need to gently squeeze your breast or nipple to help release milk  You can also use a breast pump to help release milk from your breast     · Wash your breasts with warm water only  Do not put soap on your nipples  Soap may cause your nipples to become dry  · Apply lanolin cream to your nipples as directed  Lanolin cream may add moisture to your skin and prevent nipple dryness  Always  wash off lanolin cream with warm water before you breastfeed  · Place pads in your bra  Your nipples may leak milk when you are not breastfeeding  You can place pads inside of your bra to help prevent leaking onto your clothing  Ask your healthcare provider where to purchase bra pads  · Get breastfeeding support if needed  Healthcare providers can answer questions about breastfeeding and provide you with support  Ask your healthcare provider who you can contact if you need breastfeeding support  Breast care for non-breastfeeding mothers:  Milk will fill your breasts even if you bottle feed your baby  Do the following to help stop your milk from filling your breasts and causing pain:  · Wear a bra with support at all times  A sports bra or a tight-fitting bra will help stop your milk from coming in  · Apply ice on each breast for 15 to 20 minutes every hour or as directed  Use an ice pack, or put crushed ice in a plastic bag  Cover it with a towel before you apply it to your breast  Ice helps your milk ducts shrink  · Keep your breasts away from warm water  Warm water will make it easier for milk to fill your breasts  Stand with your breasts away from warm water in the shower  · Limit how much you touch your breasts  This will prevent them from filling with milk  Perineum care: Your perineum is the area between your rectum and vagina   It is normal to have swelling and pain in this area after you give birth  If you had an episiotomy, your healthcare provider may give you special instructions  · Clean your perineum after you use the bathroom  This may prevent infection and help with healing  Use a spray bottle with warm water to clean your perineum  You may also gently spray warm water against your perineum when you urinate  Always wipe front to back  · Take a sitz bath as directed  A sitz bath may help relieve swelling and pain  Fill your bath tub or bucket with water up to your hips and sit in the water  Use cold water for 2 days after you deliver  Then use warm water  Ask your healthcare provider for more information about a sitz bath  · Apply ice packs for the first 24 hours or as directed  Use a plastic glove filled with ice or buy an ice pack  Wrap the ice pack or plastic glove in a small towel or wash cloth  Place the ice pack on your perineum for 20 minutes at a time  · Sit on a donut-shaped pillow  This may relieve pressure on your perineum when you sit  · Use wipes that contain medicine or take pills as directed  Your healthcare provider may tell you to use witch hazel pads  You can place witch hazel pads in the refrigerator before you apply them to your perineum  Your provider may also tell you to take NSAIDs  Ask him or her how often to take pills or use the wipes  · Do not go swimming or take tub baths for 4 to 6 weeks or as directed  This will help prevent an infection in your vagina or uterus  Bowel and bladder care: It may take 3 to 5 days to have a bowel movement after you deliver your baby  You can do the following to prevent or manage constipation, and get control of your bowel or bladder:  · Take stool softeners as directed  A stool softener is medicine that will make your bowel movements softer  This may prevent or relieve constipation  A stool softener may also make bowel movements less painful  · Drink plenty of liquids    Ask how much liquid to drink each day and which liquids are best for you  Liquids may help prevent constipation  · Eat foods high in fiber  Examples include fruits, vegetables, grains, beans, and lentils  Ask your healthcare provider how much fiber you need each day  Fiber may prevent constipation  · Do Kegel exercises as directed  Kegel exercises will help strengthen the muscles that control bowel movements and urination  Ask your healthcare provider for more information on Kegel exercises  · Apply cold compresses or medicine to hemorrhoids as directed  This may relieve swelling and pain  Your healthcare provider may tell you to apply ice or wipes that contain medicine to your hemorrhoids  He or she may also tell you to use a sitz bath  Ask your provider for more information on how to manage hemorrhoids  Nutrition:  Good nutrition is important in the postpartum period  It will help you return to a healthy weight, increase your energy levels, and prevent constipation  It will also help you get enough nutrients and calories if you are going to breastfeed your baby  · Eat a variety of healthy foods  Healthy foods include fruits, vegetables, whole-grain breads, low-fat dairy products, beans, lean meats, and fish  You may need 500 to 700 extra calories each day if you breastfeed your baby  You may also need extra protein  · Limit foods with added sugar and high amounts of fat  These foods are high in calories and low in healthy nutrients  Read food labels so you know how much sugar and fat is in the food you want to eat  · Drink 8 to 10 glasses of water per day  Water will help you make plenty of milk for your baby  It will also help prevent constipation  Drink a glass of water every time you breastfeed your baby  · Take vitamins as directed  Ask your healthcare provider what vitamins you need  · Limit caffeine and alcohol if you are breastfeeding    Caffeine and alcohol can get into your breast milk  Caffeine and alcohol can make your baby fussy  They can also interfere with your baby's sleep  Ask your healthcare provider if you can drink alcohol or caffeine  Rest and sleep: You may feel very tired in the postpartum period  Enough sleep will help you heal and give you energy to care for your baby  The following may help you get sleep and rest:  · Nap when your baby naps  Your baby may nap several times during the day  Get rest during this time  · Limit visitors  Many people may want to see you and your baby  Ask friends or family to visit on different days  This will give you time to rest     · Do not plan too much for one day  Put off household chores so that you have time to rest  Gradually do more each day  · Ask for help from family, friends, or neighbors  Ask them to help you with laundry, cleaning, or errands  Also ask someone to watch the baby while you take a nap or relax  Ask your partner to help with the care of your baby  Pump some of your breast milk so your partner can feed your baby during the night  Exercise after delivery:  Wait until your healthcare provider says it is okay to exercise  Exercise can help you lose weight, increase your energy levels, and manage your mood  It can also prevent constipation and blood clots  Start with gentle exercises such as walking  Do more as you have more energy  You may need to avoid abdominal exercises for 1 to 2 weeks after you deliver  Talk to your healthcare provider about an exercise plan that is right for you  Sexual activity after delivery:   · Do not have sex until your healthcare provider says it is okay  You may need to wait 4 to 6 weeks before you have sex  This may prevent infection and allow time to heal     · Your menstrual cycle may begin as soon as 3 weeks after you deliver  Your period may be delayed if you breastfeed your baby  You can become pregnant before you get your first postpartum period   Talk to your healthcare provider about birth control that is right for you  Some types of birth control are not safe during breastfeeding  For support and more information:  Join a support group for new mothers  Ask for help from family and friends with chores, errands, and care of your baby  · Office of Women's Health,  Department of Health and Human Services  [address], [address] [facility] [address]  [address], [address] [facility] [address]  Phone: 9- 507 - [phone]  Web Address: www womenshealth gov  · March of [facility] Postpartum [address] [facility] [place] , [address]  [address] [facility] , [address] [facility]  Web Address: [email or web address]  Follow up with your doctor or obstetrician as directed: You will need to follow up within 2 to 6 weeks of delivery  Write down your questions so you remember to ask them at your visits  © Copyright [facility] [address] Information is for End User's use only and may not be sold, redistributed or otherwise used for commercial purposes  All illustrations and images included in CareNotes® are the copyrighted property of [employer] D A M , Inc  or [address] [place] [address]  The above information is an  only  It is not intended as medical advice for individual conditions or treatments  Talk to your doctor, nurse or pharmacist before following any medical regimen to see if it is safe and effective for you  How to Stop Smoking, Ambulatory Care   GENERAL INFORMATION:   Why you should stop smoking: You will improve your health and the health of others around you if you stop smoking  Your risk of heart and lung disease, cancer, stroke, heart attack, and vision problems will also decrease  You can benefit from quitting no matter how long you have smoked  Quitting may even prolong your life  Prepare to stop smoking:  Nicotine is a highly addictive drug found in cigarettes   Withdrawal symptoms can happen when you stop smoking and make it hard to quit  These include anxiety, depression, irritability, trouble sleeping, and increased appetite  You increase your chances of success if you prepare to quit  · Set a quit date  This will help confirm your decision to stop smoking  · Tell friends and family that you plan to quit  Explain that you may have withdrawal symptoms when you try to quit  Ask them to support you  They may be able to encourage you and help reduce your stress to make it easier for you to quit  · Expect it to be hard to quit, but know you can do it  Smoking is a daily habit that becomes part of your life  Know the triggers that tempt you to smoke, so you can break this habit  Write down a list of these challenges and have a plan to avoid them  · Remove all tobacco and nicotine products from your home, car, and workplace  Also, remove anything else that will tempt you to smoke, such as lighters, matches, or bethany trays  Tools to help you stop smoking: You may be able to quit on your own, or you may need to try one or more of the following:  · Counseling  from trained caregivers will help teach you skills to quit smoking  They will also teach you to manage your withdrawal symptoms and cravings  You may receive counseling from one counselor, in group therapy, or through phone therapy called a quit line  · Nicotine replacement therapy (NRT)  such as nicotine patches, gum, or lozenges may help reduce your nicotine cravings and other withdrawal symptoms  You may get these without a doctor's order  · Prescription medicines  such as nasal sprays or nicotine inhalers may help reduce your withdrawal symptoms  Other medicines may also be used to reduce your urge to smoke  Ask your primary healthcare provider about these medicines  You may need to start certain medicines 2 weeks before your quit date for them to work well    Manage your cravings:   · Avoid situations, people, and places that tempt you to smoke  Go to nonsmoking places, such as libraries or restaurants  Understand what tempts you and try to avoid these things  · Keep your hands busy  Hold things such as a stress ball or pen  Keep lollipops, gum, or toothpicks in your mouth to distract you from your cravings  · Avoid alcohol and caffeine  These drinks may tempt you to smoke  Drink healthy liquids such as water or juice instead  · Reward yourself when you resist your cravings  Rewards will motivate you and help you stay positive  For more support and information:   · Smokefree  gov  Phone: 7- 871 - 653-4261  Web Address: www smokefree  [employer]  CARE AGREEMENT:   You have the right to help plan your care  Learn about your health condition and how it may be treated  Discuss treatment options with your caregivers to decide what care you want to receive  You always have the right to refuse treatment  The above information is an  only  It is not intended as medical advice for individual conditions or treatments  Talk to your doctor, nurse or pharmacist before following any medical regimen to see if it is safe and effective for you  © 2014 [address] is for End User's use only and may not be sold, redistributed or otherwise used for commercial purposes  All illustrations and images included in CareNotes® are the copyrighted property of [employer]  or Cezar Enamorado  COVID-19 (Coronavirus Disease 2019)   WHAT YOU NEED TO KNOW:   What do I need to know about coronavirus disease 2019 (COVID-19)? COVID-19 is the disease caused by the novel (new) coronavirus first discovered in December 2019  Coronaviruses generally cause upper respiratory (nose, throat, and lung) infections, such as a cold  The new virus can also cause serious lower respiratory conditions, such as pneumonia or acute respiratory distress syndrome (ARDS)   Anyone can develop serious problems from the new virus, but your risk is higher if you are 65 or older  A weak immune system, diabetes, or a heart or lung condition can also increase your risk  What are the signs and symptoms of COVID-19? You may not develop any signs or symptoms  Signs and symptoms that do develop usually start about 5 days after infection but can take 2 to 14 days  Signs and symptoms range from mild to severe  You may feel like you have the flu or a bad cold  Information on COVID-19 is still being learned  Tell your healthcare provider if you think you were infected but develop signs or symptoms not listed below:  · A cough    · Shortness of breath or trouble breathing that may become severe    · A fever of at least 100 4°F, or 38°C (may be lower in adults 65 or older)    · Chills that might include shaking    · Muscle pain, body aches, or a headache    · A sore throat    · Suddenly not being able to taste or smell anything    · Feeling mentally and physically tired (fatigue)    · Congestion (stuffy head and nose), or a runny nose    · Diarrhea, nausea, or vomiting    How is COVID-19 diagnosed? If you think you have COVID-19, call your healthcare provider  In some areas, testing is only done if a person has severe symptoms or is hospitalized  Testing is done more widely in other places  Your provider will tell you what to do based on your symptoms and the rules in your area  In general, the following may be used:  · A viral test  shows if you have a current infection  Samples are taken from your nose and throat, usually with swabs  You may need to wait several days to get the test results  Your healthcare provider will tell you how to get your results  You will need to quarantine (stay physically away from others) until you get your results  If results show you have COVID-19, you will need to quarantine until you are well  Your provider or other health official may give you more directions   You will also need to prevent another infection until it is known if you can get COVID-19 again  · An antibody test  shows if you had a past infection  Blood samples are used for this test  Antibodies are made by your immune system to attack the virus that causes COVID-19  Antibodies will form 1 to 3 weeks after you are infected  It is not known if antibodies prevent a second infection, or for how long a person might be protected  If you have antibodies, you will still need to be careful around others until more is known  · CT scans or x-rays  may be used to check for signs of pneumonia  The 2019 coronavirus causes a specific kind of pneumonia, usually in both lungs  How is COVID-19 treated? No medicine or specific treatment is currently approved for COVID-19  The following may be used to manage your symptoms or treat the effects of COVID-19:  · Mild symptoms  may get better on their own  If you do not need to be treated in a hospital, you will be given instructions to use at home  Your condition will be closely monitored  You will need to watch for worsening symptoms and seek immediate care if needed  Talk to your healthcare provider about the following:    ? Relieve your symptoms  To soothe a sore throat, gargle with warm salt water, or use throat lozenges or a throat spray  Your healthcare provider may recommend a cough medicine  Drink more liquids to thin and loosen mucus and to prevent dehydration  Use decongestants or saline drops as directed for nasal congestion  ? NSAIDs or acetaminophen  can help lower a fever and relieve body aches or a headache  Follow directions  If not taken correctly, NSAIDs can cause kidney damage and acetaminophen can cause liver damage  · Severe or life-threatening symptoms  are treated in the hospital  You may need a combination of the following:    ? Medicines  may be given to reduce inflammation or to fight the virus  You may also need blood thinners to prevent or treat blood clots   If you have a deep vein thrombosis (DVT) or pulmonary embolism (PE), you may need to keep using blood thinners for 3 months  ? Extra oxygen  may be given if you have respiratory failure  This means your lungs cannot get enough oxygen into your blood and out to your organs  Extra oxygen can help prevent organ failure  ? A ventilator  may be used to help you breathe  ? Convalescent plasma (part of blood)  from a patient who has recovered from COVID-19 may be used  The plasma contains antibodies that can help your body fight the infection  Convalescent plasma is only given to patients who have severe signs and symptoms  How does the 2019 coronavirus spread? The virus spreads quickly and easily  You can become infected if you are in contact with a large amount of the virus, even for a short time  You can also become infected by being around a small amount of virus for a long time  The following are ways the virus is thought to spread, but more information may be coming:  · Droplets are the most common way all coronaviruses spread  The virus can travel in droplets that form when a person talks, coughs, or sneezes  Anyone who breathes in the droplets or gets them in his or her eyes can become infected with the virus  Close personal contact with an infected person is thought to be the main way the virus spreads  Close personal contact means you are within 6 feet (2 meters) of the person  · Person-to-person contact can spread the virus  For example, a person with the virus on his or her hands can spread it by shaking hands with someone  At this time, it does not appear that the virus can be passed to a baby during pregnancy or delivery  The baby can be infected after he or she is born through person-to-person contact  The virus also does not appear to spread in breast milk  If you are pregnant or breastfeeding, talk to your healthcare provider or obstetrician about any concerns you have      · The virus can stay on objects and surfaces  A person can get the virus on his or her hands by touching the object or surface  Infection happens if the person then touches his or her eyes or mouth with unwashed hands  It is not yet known how long the virus can stay on an object or surface  That is why it is important to clean all surfaces that are used regularly  · An infected animal may be able to infect a person who touches it  This may happen at live markets or on a farm  How can everyone lower the risk for COVID-19? The best way to prevent infection is to avoid anyone who is infected, but this can be hard to do  An infected person can spread the virus before signs or symptoms begin, or even if signs or symptoms never develop  The following can help lower the risk for infection:      · Wash your hands often throughout the day  Use soap and water  Rub your soapy hands together, lacing your fingers  Wash the front and back of each hand, and in between your fingers  Use the fingers of one hand to scrub under the fingernails of the other hand  Wash for at least 20 seconds  Rinse with warm, running water for several seconds  Then dry your hands with a clean towel or paper towel  Use hand  that contains alcohol if soap and water are not available  Do not touch your eyes, nose, or mouth without washing your hands first  Teach children how to wash their hands and use hand   · Cover a sneeze or cough  This prevents droplets from traveling from you to others  Turn your face away and cover your mouth and nose with a tissue  Throw the tissue away  Use the bend of your arm if a tissue is not available  Then wash your hands well with soap and water or use hand   Turn and cover your face if you are around someone who is sneezing or coughing  Teach children how to cover a cough or sneeze  · Follow worldwide, national, and local social distancing guidelines    Social distancing means people avoid close physical contact so the virus cannot spread from one person to another  Keep at least 6 feet (2 meters) between you and others  Also keep this distance from anyone who comes to your home, such as someone making a delivery  · Make a habit of not touching your face  It is not known how long the virus can stay on objects and surfaces  If you get the virus on your hands, you can transfer it to your eyes, nose, or mouth and become infected  You can also transfer it to objects, surfaces, or people  Be aware of what you touch when you go out  Examples include handrails and elevator buttons  Try not to touch anything with bare hands unless it is necessary  Wash your hands before you leave your home and when you return  · Clean and disinfect high-touch surfaces and objects often  Use a disinfecting solution or wipes  You can make a solution by diluting 4 teaspoons of bleach in 1 quart (4 cups) of water  Clean and disinfect even if you think no one living in or coming to your home is infected with the virus  You can wipe items with a disinfecting cloth before you bring them into your home  Wash your hands after you handle anything you bring into your home  · Make your immune system as healthy as possible  A weakened immune system makes you more vulnerable to the new coronavirus  No COVID-19 vaccine is available yet  Vaccines such as the flu and pneumonia vaccines can help your immune system  Your healthcare provider can tell you which vaccines to get, and when to get them  Keep your immune system as strong as possible  Do not smoke  Eat healthy foods, exercise regularly, and try to manage stress  Go to bed and wake up at the same times each day  How do I follow social distancing guidelines to help lower the risk for COVID-19? National and local social distancing rules vary  Rules may change over time as restrictions are lifted  Restrictions may return if an outbreak happens where you live   It is important to know and follow all current social distancing rules in your area  The following are general guidelines:  · Limit trips out of your home  You may be able to have food, medicines, and other supplies delivered  If possible, have delivered items left at your door or other area  Try not to have someone hand you an item  You will be so close to the person that the virus can spread between you  · Do not have close physical contact with anyone who does not live in your home  Do not shake hands with, hug, or kiss a person as a greeting  Stand or walk as far from others as possible  If you must use public transportation (such as a bus or subway), try to sit or stand away from others  You can stay safely connected with others through phone calls, e-mail messages, social media websites, and video chats  Check in on anyone who may be having a hard time socially distancing, or who lives alone  Ask administrators at nursing homes or long-term care facilities how you can safely communicate with someone living there  · Wear a cloth face covering around others who do not live in your home  Face coverings help prevent the virus from spreading to others in droplets  You can use a clear face covering if someone needs to read your lips  This is a cloth covering that has plastic over the mouth area so your lips can be seen  Do not use coverings that have breathing valves or vents  The virus can travel out of the valve or vent and be spread to others  Do not take your covering off to talk, cough, or sneeze  Do not use coverings on children younger than 2 years or on anyone who has breathing problems or cannot remove it  · Only allow medical or other necessary professionals into your home  Wear your face covering, and remind professionals to wear a face covering  Remind them to wash their hands when they arrive and before they leave  Do not  let anyone who does not live in your home in, even if the person is not sick   A person can pass the virus to others before symptoms of COVID-19 begin  Some people never even develop symptoms  Children commonly have mild symptoms or no symptoms  It may be hard to tell a child not to hug or kiss you  Explain that this is how he or she can help you stay healthy  · Do not go to someone else's home unless it is necessary  Do not go over to visit, even if the person is lonely  Only go if you need to help him or her  Make sure you both wear face coverings while you are there  · Avoid large gatherings and crowds  Gatherings or crowds of 10 or more individuals can cause the virus to spread  Examples of gatherings include parties, sporting events, [employer] services, and conferences  Crowds may form at beaches, [place], and tourist attractions  Protect yourself by staying away from large gatherings and crowds  · Ask your healthcare provider for other ways to have appointments  You may be able to have appointments without having to go into the provider's office  Some providers offer phone, video, or other types of appointments  You may also be able to get prescriptions for a few months of your medicines at a time  · Stay safe if you must go out to work  You may have a job that can only be done outside your home  Keep physical distance between you and other workers as much as possible  Follow your employer's rules so everyone stays safe  What should I do if I have COVID-19 and am recovering at home? Healthcare providers will give you specific instructions to follow  The following are general guidelines to remind you how to keep others safe until you are well:  · Wash your hands often  Use soap and water as much as possible  You can use hand  that contains alcohol if soap and water are not available  Do not share towels with anyone  If you use paper towels, throw them away in a lined trash can kept in your room or area  Use a covered trash can, if possible      · Do not go out of your home unless it is necessary  You may have to go to your healthcare provider's office for check-ups or to get prescription refills  Do not arrive at the provider's office without an appointment  Providers have to make their offices safe for staff and other patients  · Do not have close physical contact with anyone unless it is necessary  Only have close physical contact with a person giving direct care, or a baby or child you must care for  Family members and friends should not visit you  If possible, stay in a separate area or room of your home if you live with others  No one should go into the area or room except to give you care  You can visit with others by phone, video chat, e-mail, or similar systems  It is important to stay connected with others in your life while you recover  · Wear a face covering while others are near you  This can help prevent droplets from spreading the virus when you talk, sneeze, or cough  Put the covering on before anyone comes into your room or area  Remind the person to cover his or her nose and mouth before going in to provide care for you  · Do not share items  Do not share dishes, towels, or other items with anyone  Items need to be washed after you use them  · Protect your baby  Wash your hands with soap and water often throughout the day  Wear a clean face covering while you breastfeed, or while you express or pump breast milk  If possible, ask someone who is well to care for your baby  You can put breast milk in bottles for the person to use, if needed  Talk to your healthcare provider if you have any questions or concerns about caring for or bonding with your baby  He or she will tell you when to bring your baby in for check-ups and vaccines  He or she will also tell you what to do if you think your baby was infected with the new virus  · Do not handle live animals  Until more is known, it is best not to touch, play with, or handle live animals   Some animals, including pets, have been infected with the new coronavirus  Do not handle or care for animals until you are well  Care includes feeding, petting, and cuddling your pet  Do not let your pet lick you or share your food  Ask someone who is not infected to take care of your pet, if possible  If you must care for a pet, wear a face covering  Wash your hands before and after you give care  · Follow directions from your healthcare provider for being around others after you recover  You will need to wait at least 10 days after symptoms first appeared  Then you will need to have no fever for 24 hours without fever medicine, and no other symptoms  A loss of taste or smell may continue for several months  It is considered okay to be around others if this is your only symptom  It is not known for sure if or for how long a recovered person can pass the virus to others  Your provider may give you instructions, such as continuing social distancing or wearing a face covering around others  How should I take care of someone who has COVID-19? If the person lives in another home, arrange for a time to give care  Remember to bring a few pairs of disposable gloves and a cloth face covering  The following are general guidelines to help you safely care for anyone who has COVID-19:  · Wash your hands often  Wash before and after you go into the person's home, area, or room  Throw paper towels away in a lined trash can that has a lid, if possible  · Do not allow others to go near the person  No one should come into the person's home unless it is necessary  If possible, the person should be in a separate area or room if he or she lives with others  Keep the room's door shut unless you need to go in or out  Have others call, video chat, or e-mail the person if he or she is feeling well enough  The person may feel lonely if he or she is kept separate for a long period of time   Safe communication can help him or her stay connected to family and friends  · Make sure the person's room has good air flow  You may be able to open the window if the weather allows  An air conditioner can also be turned on to help air move  · Contact the person before you go in to give care  Make sure the person is wearing a face covering  Remind him or her to wash his or her hands with soap and water  He or she can use hand  that contains alcohol if soap and water are not available  Put on a face covering before you go in to give care  · Wear gloves while you give care and clean  Clean items the person uses often  Clean countertops, cooking surfaces, and the fronts and insides of the microwave and refrigerator  Clean the shower, toilet, the area around the toilet, the sink, the area around the sink, and faucets  Gather used laundry or bedding  Wash and dry items on the warmest settings the fabric allows  Wash dishes and silverware in hot, soapy water or in a   · Anything you throw away needs to go into a lined trash can  When you need to empty the trash, close the open end of the lining and tie it closed  This helps prevent items the virus is on from spilling out of the trash  Remove your gloves and throw them away  Wash your hands  Where can I find more information? · Centers for Disease Control and Prevention  [address] , [address]  Phone: 7- 073 - [phone]  Web Address: [employer] com     What should I do if I think I or someone I know may be infected? Do the following to protect others:  · If emergency care is needed,  tell the  about the possible infection, or call ahead and tell the emergency department  · Call a healthcare provider  for instructions if symptoms are mild  Anyone who may be infected should not  arrive without calling first  The provider will need to protect staff members and other patients      · The person who may be infected needs to wear a face covering  while getting medical care  This will help lower the risk of infecting others  Coverings are not used for anyone who is younger than 2 years, has breathing problems, or cannot remove it  The provider can give you instructions for anyone who cannot wear a covering  Call your local emergency number (911 in the [address] [place] [address] [address]) or an emergency department if:   · You have trouble breathing or shortness of breath at rest     · You have chest pain or pressure that lasts longer than 5 minutes  · You become confused or hard to wake  · Your lips or face are blue  · You have a fever of 104°F (40°C) or higher  When should I call my doctor? · You do not  have symptoms of COVID-19 but had close physical contact within 14 days with someone who tested positive  · You have questions or concerns about your condition or care  CARE AGREEMENT:   You have the right to help plan your care  Learn about your health condition and how it may be treated  Discuss treatment options with your healthcare providers to decide what care you want to receive  You always have the right to refuse treatment  The above information is an  only  It is not intended as medical advice for individual conditions or treatments  Talk to your doctor, nurse or pharmacist before following any medical regimen to see if it is safe and effective for you  © Copyright [address] Information is for End User's use only and may not be sold, redistributed or otherwise used for commercial purposes   All illustrations and images included in CareNotes® are the copyrighted property of [employer]  or [address] [place] [address]

## 2021-01-07 NOTE — PROGRESS NOTES
Progress Note - OB/GYN   Esteban Rivera 30 y o  female MRN: [id number]  Unit/Bed#: L&D 304-01 Encounter: [id number]    Assessment:  30 y o   s/p Spontaneous Vaginal Delivery @0831, Postpartum day  1  Pregnancy complicated by A2 GDM, preeclampsia without severe features, history of depression, and tobacco use  Patient recovering well, Stable    Plan:  1  Postpartum  Continue routine post partum care  Pain management PRN  Encourage ambulation  Encourage breastfeeding    2  Preeclampsia without severe features  BP in last 24 hours 138-150/83-99    3   Discharge  Anticipate d/c today      Subjective/Objective   Chief Complaint:    Postpartum state    Subjective:   Pain: yes, cramping, improved with meds  Tolerating PO: yes  Voiding: yes  Flatus: yes  BM: yes  Ambulating: yes  Breastfeeding:  yes  Chest pain: no  Shortness of breath: no  Leg pain: no  Lochia: minimal    Objective:     Vitals: Temp:  [97 7 °F (36 5 °C)-98 8 °F (37 1 °C)] 98 4 °F (36 9 °C)  HR:  [] 98  Resp:  [18-20] 18  BP: (138-150)/(83-99) 138/99       Intake/Output Summary (Last 24 hours) at 2021 0650  Last data filed at 2021 1001  Gross per 24 hour   Intake 930 ml   Output 250 ml   Net 680 ml         Physical Exam:   General: NAD, alert, oriented  Cardio: Regular rate and rhythm, no murmur  Resp: nonlabored breathing, clear to auscultation bilaterally  Abdomen: Soft, no distension/rebound/guarding/tenderness   Fundus: Firm, non-tender, fundus: at umbilicus  G/U: Minimal lochia noted on pad  Lower Extremities: Non-tender, no palpable cords    Medications:  Current Facility-Administered Medications   Medication Dose Route Frequency    acetaminophen (TYLENOL) tablet 650 mg  650 mg Oral Q6H PRN    benzocaine-menthol-lanolin-aloe (DERMOPLAST) 20-0 5 % topical spray   Topical 4x Daily PRN    bisacodyl (DULCOLAX) rectal suppository 10 mg  10 mg Rectal Daily PRN    calcium carbonate (TUMS) chewable tablet 1,000 mg  1,000 mg Oral TID PRN  diphenhydrAMINE (BENADRYL) tablet 25 mg  25 mg Oral Q6H PRN    docusate sodium (COLACE) capsule 100 mg  100 mg Oral BID    enoxaparin (LOVENOX) subcutaneous injection 40 mg  40 mg Subcutaneous Q12H [facility]    hydrocortisone 1 % cream 1 application  1 application Topical 4x Daily PRN    ibuprofen (MOTRIN) tablet 600 mg  600 mg Oral Q6H PRN    ondansetron (ZOFRAN) injection 4 mg  4 mg Intravenous Q6H PRN    simethicone (MYLICON) chewable tablet 80 mg  80 mg Oral Q6H PRN    witch hazel-glycerin (TUCKS) topical pad 1 pad  1 pad Topical Q2H PRN       Labs:   No results found for this or any previous visit (from the past 24 hour(s))        Marsha Chance  Ob/Gyn PGY-1  1/7/2021  6:50 AM

## 2021-01-08 NOTE — UTILIZATION REVIEW
Notification of Maternity/Delivery & Ayr Birth Information for Admission   Notification of Maternity/Delivery for Admission to our facility [facility]  Be advised that this patient was admitted to our facility under Inpatient Status  Contact Isma Cortes at [phone] for additional admission information  [facility] PARENT/CHILD HEALTH UR DEPT  DEDICATED -[phone]  Mother & Ayr Information   Patient Name: Marilin Cruz YOB: 1995   Delivering clinician: Aleyda Elena   OB History        2    Para   1    Term   1            AB   1    Living   1       SAB   1    TAB        Ectopic        Multiple   0    Live Births   1                Name & MRN:   Information for the patient's :  concepcion Chavez) [[id number]]      Delivery Information:  Sex: male  Delivered 2021 2:05 AM by Vaginal, Spontaneous; Gestational Age: 41w10d    Ayr Measurements:  Weight: 7 lb 12 7 oz (3535 g); Height: 20"    APGAR 1 minute 5 minutes 10 minutes   Totals: 8 9      Ayr Birth Information: 30 y o  female MRN: [id number] Unit/Bed#: L&D 304-01 Estimated Date of Delivery: 21  Birthweight: No birth weight on file   Gestational Age: <None> Delivery Type: Vaginal, Spontaneous          APGARS  One minute Five minutes Ten minutes   Totals:                 [address]   [address]:   Willard Linares  Tax ID: [id number]  NPI: [id number] Attending Provider/NPI:  Phone:  Address: Aleyda Kassy, Jeanie Duron [[id number]]  [phone]  Same as Artemio [address]gal[address] of Service Code: 24 Place of Service Name: [address] [place] [address]   Start Date: 21   Discharge Date & Time: 2021 12:55 PM    Type of Admission: Inpatient Status Discharge Disposition (if discharged): Home/Self Care   Patient Diagnoses:   High blood pressure affecting pregnancy in third trimester, antepartum [O16 3]  The primary encounter diagnosis was  (spontaneous vaginal delivery)  Diagnoses of 37 weeks gestation of pregnancy and Postpartum care and examination of lactating mother were also pertinent to this visit  1   (spontaneous vaginal delivery)    2  37 weeks gestation of pregnancy    3  Postpartum care and examination of lactating mother       Orders: Admission Orders (From admission, onward)     Ordered        215  Inpatient Admission  Once                    Assigned Utilization Review Contact: Mario Da Silva Utilization Review Department  Phone: [phone]; Fax [phone]  Email: [email or web address]     Initial Clinical Review    Admission: Date/Time/Statement:   Admission Orders (From admission, onward)     Ordered        21  Inpatient Admission  Once                   Orders Placed This Encounter   Procedures    Inpatient Admission     Standing Status:   Standing     Number of Occurrences:   1     Order Specific Question:   Admitting Physician     Answer:   Glendy Anguiano     Order Specific Question:   Level of Care     Answer:   Med Surg [16]     Order Specific Question:   Estimated length of stay     Answer:   More than 2 Midnights     Order Specific Question:   Certification     Answer:   I certify that inpatient services are medically necessary for this patient for a duration of greater than two midnights  See H&P and MD Progress Notes for additional information about the patient's course of treatment  ED Arrival Information     Patient not seen in ED                       Assessment/Plan:  ON 2021  30 y o   at 37w4d weeks gestation who is being admitted for gestational HTN with induction of labor  Patient  evaluated at outpatient prenatal visit today noted to have second elevated BP with diastolic of 90   Patient endorses intermittent headache but for past couple days she has been having right temporal headache relieved with tylenol with hydration  SVE: /-4  Pre Eclampsia workup with labs; urine protein/cr ratio/UA  Cont BP monitoring  AROM, received Epidural with  2021 @ 0205   2021  4:28 am Labor Progress Note, Evaluated patient  Comfortable, had been sleeping soundly  Found to be slightly changed to 1 5cm dilated  Patient tolerates exams poorly, unable to place barrera balloon at this time  Second dose cytotec placed  2021 8:51 am cervical dilation 2-3, Patient is fairly comfortable  Fetal heart tracing is hard to assess due to inability to keep baby on monitor  Will plan to switch to know the monitor to assess fetal heart rate  Cervical exam is as above  Will plan to the start Pitocin once baby is monitored  2021 12:28 pm Dose (dia-units/min) Oxytocin: 6 dia-units/min; cervix 3 cm;   2021 1:05 PM Epidural Block  2021 2:14PM AROM , cervix 3-4; Dose (dia-units/min) Oxytocin: 8 dia-units/min  2021 8:33PM  Cervical Dilation: 8, Dose (dia-units/min) Oxytocin: 20 dia-units/min  2021 9:20 PM Cervical Dilation: 8-9, Dose (dia-units/min) Oxytocin: 20 dia-units/min  2021 10:37 PM cervical dilation lip/rim  Dose (dia-units/min) Oxytocin: 20 dia-units/min  2021 0205  Vaginal Delivery Summary -    Procedure: Spontaneous Vaginal Delivery, repair of second degree and right labial lacerations   Anesthesia: Epidural, local   Findings:   1   Viable male at [facility] 6 on 21, with APGARS of 8 and 9 at 1 and 5      Triage Vitals [21 2300]   Temperature Pulse Respirations Blood Pressure SpO2   98 4 °F (36 9 °C) 95 16 (!) 147/102 100 %      Temp Source Heart Rate Source Patient Position - Orthostatic VS BP Location FiO2 (%)   Oral Monitor Lying Left arm --      Pain Score       No Pain          Wt Readings from Last 1 Encounters:   21 (!) 150 kg (331 lb)     Additional Vital Signs:   Date/Time  Temp  Pulse  Resp  BP  SpO2  Patient Position - Orthostatic VS 01/06/21 1047  98 8 °F (37 1 °C)  100  18  146/89  --  --   01/06/21 0700  98 8 °F (37 1 °C)  96  20  150/83  --  --   01/06/21 0410  --  94  --  159/107Abnormal   --  --   01/06/21 0330  --  106Abnormal   --  142/91   --  --   BP: MD aware at 01/06/21 0330 01/06/21 0322  --  93  --  144/83  --  --   01/06/21 0306  --  92  --  153/80  --  --   01/06/21 0251  --  89  --  149/76  --  --   01/06/21 0241  --  100  --  153/79  --  --   01/06/21 0240  --  96  --  152/107Abnormal    --  --   BP: Patient arm bent at 01/06/21 0240   01/06/21 0221  --  95  --  141/75  --  --   01/06/21 0215  --  103  --  134/86  --  --   01/06/21 0200  --  --  --  139/97  --  --   01/06/21 0130  --  --  --  168/89  --  --   01/06/21 0115  --  --  --  164/70  --  --   01/06/21 0100  --  --  --  146/85  --  --   01/06/21 0045  --  104  --  146/90  98 %  --   01/06/21 0015  --  --  --  140/91  --  --   01/06/21 0000  --  --  --  179/91Abnormal   --  --   01/05/21 2308  --  97  --  165/111Abnormal    --  --   BP: MD aware at 01/05/21 2308 01/05/21 2252  --  100  --  167/119Abnormal    --  --   BP: MD aware at 01/05/21 2252 01/05/21 2239  --  92  --  144/94  --  --   01/05/21 2215  98 6 °F (37 °C)  --  --  --  --  --   01/05/21 2207  --  101  --  147/104Abnormal   --  --   01/05/21 2151  --  87  --  138/81  --  --   01/05/21 2135  --  80  --  145/85  --  --   01/05/21 2132  --  86  --  147/86  --  --   01/05/21 2129  --  92  --  138/87  --  --   01/05/21 2122  --  86  --  142/88  --  --   01/05/21 2104  --  93  --  152/106Abnormal    --  --   BP: MD aware at 01/05/21 2104 01/05/21 2056  --  91  --  190/75Abnormal    --  --   BP: MD aware at 01/05/21 2056 01/05/21 2053  --  96  --  199/131Abnormal    --  --   BP: Cuff not on right   Readjusted at 01/05/21 2053 01/05/21 2047  --  104  --  155/97  --  --   01/05/21 2043  --  107Abnormal   --  139/81  --  --   01/05/21 2029  --  88  --  163/83  --  --   01/05/21 2014  --  89  --  159/89 --  --   01/05/21 2000  98 9 °F (37 2 °C)  93  --  146/90  --  --   01/05/21 1945  --  101  --  143/81  --  --   01/05/21 1929  --  98  --  133/69  --  --   01/05/21 1915  98 9 °F (37 2 °C)  --  --  --  --  --   01/05/21 1914  --  97  --  131/79  --  --   01/05/21 1900  --  93  --  147/84  --  --   01/05/21 1845  --  96  --  157/86  --  --   01/05/21 1829  --  129Abnormal   --  156/70  --  --   01/05/21 1814  --  93  --  143/76  --  --   01/05/21 1810  97 6 °F (36 4 °C)  --  --  --  --  --   01/05/21 1615  --  85  --  134/83  --  --   01/05/21 1600  --  86  --  160/91  --  --   01/05/21 1544  --  89  --  149/82  --  --   01/05/21 1530  98 2 °F (36 8 °C)  --  --  --  --  --   01/05/21 1529  --  88  --  149/85  --  --   01/05/21 1515  --  90  --  148/90  --  --   01/05/21 1500  --  90  --  142/83  --  --   01/05/21 1444  --  100  --  140/87  --  --   01/05/21 1429  --  97  --  162/86  --  --   01/05/21 1400  --  96  --  145/81  --  --   01/05/21 1344  --  100  --  146/88  --  --   01/05/21 1328  --  96  --  149/88  --  --   01/05/21 1325  --  92  --  151/85  --  --   01/05/21 1322  --  90  --  145/80  --  --   01/05/21 1319  --  76  --  143/71  --  --   01/05/21 1316  --  88  --  155/74  --  --   01/05/21 1311  --  83  --  158/72  --  --   01/05/21 1302  98 6 °F (37 °C)  96  16  187/89Abnormal   --  --   01/05/21 1259  --  82  --  160/86  --  --   01/05/21 0735  98 2 °F (36 8 °C)  --  --  --  --  --   01/05/21 0238  --  96  --  159/83  --  --   01/05/21 0223  --  100  --  155/79  --  --   01/05/21 0208  --  98  --  152/79  --  --   01/05/21 0153  --  93  --  155/78  --  --   01/05/21 0138  --  95  --  145/71  --  --   01/05/21 0123  --  97  --  152/71  --  --   01/05/21 0108  --  100  --  148/95  --  --   01/05/21 0100  --  --  --  134/87  --  --   01/04/21 2300  98 4 °F (36 9 °C)  95  16  147/102Abnormal   100 %  Lying      Weights (last 14 days)    Date/Time  Weight  Height   01/04/21 2300  150 kg (331 lb)Abnormal 6' (1 829 m)       Pertinent Labs/Diagnostic Test Results:       Results from last 7 days   Lab Units 01/05/21  0009   WBC Thousand/uL 13 61*   HEMOGLOBIN g/dL 11 6   HEMATOCRIT % 35 2   PLATELETS Thousands/uL 278   NEUTROS ABS Thousands/µL 10 11*         Results from last 7 days   Lab Units 01/05/21  0009   SODIUM mmol/L 137   POTASSIUM mmol/L 3 7   CHLORIDE mmol/L 104   CO2 mmol/L 21   ANION GAP mmol/L 12   BUN mg/dL 6   CREATININE mg/dL 0 65   EGFR ml/min/1 73sq m 122   CALCIUM mg/dL 8 6     Results from last 7 days   Lab Units 01/05/21  0009   AST U/L 13   ALT U/L 22   ALK PHOS U/L 102   TOTAL PROTEIN g/dL 6 7   ALBUMIN g/dL 2 2*   TOTAL BILIRUBIN mg/dL 0 21     Results from last 7 days   Lab Units 01/06/21  0041 01/05/21  2214 01/05/21  1956 01/05/21  1809 01/05/21  1617 01/05/21  1359 01/05/21  1236 01/05/21  1136 01/05/21  1031 01/05/21  0825 01/05/21  0128 01/04/21  2312   POC GLUCOSE mg/dl 153* 86 111 73 89 79 91 95 100 86 104 90     Results from last 7 days   Lab Units 01/05/21  0009   GLUCOSE RANDOM mg/dL 98       Results from last 7 days   Lab Units 01/05/21  0050 01/05/21  0009   CLARITY UA  Clear  --    COLOR UA  Yellow  --    SPEC GRAV UA  1 025  --    PH UA  6 0  --    GLUCOSE UA mg/dl Negative  --    KETONES UA mg/dl Negative  --    BLOOD UA  Trace-Intact*  --    PROTEIN UA mg/dl 100 (2+)*  --    NITRITE UA  Negative  --    BILIRUBIN UA  Negative  --    UROBILINOGEN UA E U /dl 0 2  --    LEUKOCYTES UA  Trace*  --    WBC UA /hpf 1-2*  --    RBC UA /hpf 1-2*  --    BACTERIA UA /hpf Moderate*  --    EPITHELIAL CELLS WET PREP /hpf Occasional  --    MUCUS THREADS  Occasional*  --    CREATININE UR mg/dL  --  145 0   PROTEIN UR mg/dL  --  87   PROT/CREAT RATIO UR   --  0 60*         Past Medical History:   Diagnosis Date    Chlamydia     Depression     no medications for 1 year    Gestational hypertension, third trimester 1/4/2021    Insulin controlled gestational diabetes mellitus (GDM) in third trimester 10/29/2020    Urinary tract infection     Varicella      Present on Admission:   Insulin controlled gestational diabetes mellitus (GDM) in third trimester   Tobacco user   Pre-eclampsia in third trimester    Admitting Diagnosis: High blood pressure affecting pregnancy in third trimester, antepartum [O16 3]  Age/Sex: 30 y o  female  Admission Orders:  External uterine contraction monitoring  external fetal heart rate monitoring    Scheduled Medications:  docusate sodium, 100 mg, Oral, BID  enoxaparin, 40 mg, Subcutaneous, Q12H [facility]    Continuous IV Infusions:  No current facility-administered medications for this encounter  IV  oxytocin (PITOCIN) 30 Units in lactated ringers 500 mL infusion titration   IV  sodium chloride 0 9 % infusion @125 ml/hr    PRN Meds:  acetaminophen, 650 mg, Oral, Q6H PRN  benzocaine-menthol-lanolin-aloe, , Topical, 4x Daily PRN  bisacodyl, 10 mg, Rectal, Daily PRN  calcium carbonate, 1,000 mg, Oral, TID PRN  diphenhydrAMINE, 25 mg, Oral, Q6H PRN  hydrocortisone, 1 application, Topical, 4x Daily PRN  ibuprofen, 600 mg, Oral, Q6H PRN  ondansetron, 4 mg, Intravenous, Q6H PRN  simethicone, 80 mg, Oral, Q6H PRN  witch hazel-glycerin, 1 pad, Topical, Q2H PRN  Network Utilization Review Department  ATTENTION: Please call with any questions or concerns to [phone] and carefully listen to the prompts so that you are directed to the right person  All voicemails are confidential   Arnel Hobbs all requests for admission clinical reviews, approved or denied determinations and any other requests to dedicated fax number below belonging to the campus where the patient is receiving treatment   List of dedicated fax numbers for the Facilities:  [address] [place] [address] DENIALS (Administrative/Medical Necessity) [phone]   [address] [place] [address] (Maternity/NICU/Pediatrics) [phone]   [address] [address] [facility] [address] [address] [place] [address] Viky Goodman 1481 [phone]   [address] [phone]

## 2021-01-14 LAB — PLACENTA IN STORAGE: NORMAL

## 2021-01-15 ENCOUNTER — POSTPARTUM VISIT (OUTPATIENT)
Dept: OBGYN CLINIC | Facility: MEDICAL CENTER | Age: 26
End: 2021-01-15
Payer: COMMERCIAL

## 2021-01-15 VITALS
SYSTOLIC BLOOD PRESSURE: 140 MMHG | HEIGHT: 72 IN | WEIGHT: 293 LBS | BODY MASS INDEX: 39.68 KG/M2 | DIASTOLIC BLOOD PRESSURE: 98 MMHG

## 2021-01-15 DIAGNOSIS — R10.13 EPIGASTRIC PAIN: ICD-10-CM

## 2021-01-15 DIAGNOSIS — O14.93 PRE-ECLAMPSIA IN THIRD TRIMESTER: ICD-10-CM

## 2021-01-15 LAB
ALBUMIN SERPL BCP-MCNC: 3.1 G/DL (ref 3.5–5)
ALP SERPL-CCNC: 251 U/L (ref 46–116)
ALT SERPL W P-5'-P-CCNC: 576 U/L (ref 12–78)
AMYLASE SERPL-CCNC: 25 IU/L (ref 25–115)
ANION GAP SERPL CALCULATED.3IONS-SCNC: 5 MMOL/L (ref 4–13)
AST SERPL W P-5'-P-CCNC: 626 U/L (ref 5–45)
BASOPHILS # BLD AUTO: 0.03 THOUSANDS/ΜL (ref 0–0.1)
BASOPHILS NFR BLD AUTO: 0 % (ref 0–1)
BILIRUB SERPL-MCNC: 1.75 MG/DL (ref 0.2–1)
BUN SERPL-MCNC: 9 MG/DL (ref 5–25)
CALCIUM ALBUM COR SERPL-MCNC: 10 MG/DL (ref 8.3–10.1)
CALCIUM SERPL-MCNC: 9.3 MG/DL (ref 8.3–10.1)
CHLORIDE SERPL-SCNC: 109 MMOL/L (ref 100–108)
CO2 SERPL-SCNC: 26 MMOL/L (ref 21–32)
CREAT SERPL-MCNC: 0.75 MG/DL (ref 0.6–1.3)
EOSINOPHIL # BLD AUTO: 0.12 THOUSAND/ΜL (ref 0–0.61)
EOSINOPHIL NFR BLD AUTO: 2 % (ref 0–6)
ERYTHROCYTE [DISTWIDTH] IN BLOOD BY AUTOMATED COUNT: 13.7 % (ref 11.6–15.1)
GFR SERPL CREATININE-BSD FRML MDRD: 110 ML/MIN/1.73SQ M
GLUCOSE SERPL-MCNC: 96 MG/DL (ref 65–140)
HCT VFR BLD AUTO: 38.7 % (ref 34.8–46.1)
HGB BLD-MCNC: 12.7 G/DL (ref 11.5–15.4)
IMM GRANULOCYTES # BLD AUTO: 0.03 THOUSAND/UL (ref 0–0.2)
IMM GRANULOCYTES NFR BLD AUTO: 0 % (ref 0–2)
LIPASE SERPL-CCNC: 112 U/L (ref 73–393)
LYMPHOCYTES # BLD AUTO: 1.73 THOUSANDS/ΜL (ref 0.6–4.47)
LYMPHOCYTES NFR BLD AUTO: 23 % (ref 14–44)
MCH RBC QN AUTO: 30.4 PG (ref 26.8–34.3)
MCHC RBC AUTO-ENTMCNC: 32.8 G/DL (ref 31.4–37.4)
MCV RBC AUTO: 93 FL (ref 82–98)
MONOCYTES # BLD AUTO: 0.55 THOUSAND/ΜL (ref 0.17–1.22)
MONOCYTES NFR BLD AUTO: 7 % (ref 4–12)
NEUTROPHILS # BLD AUTO: 5.07 THOUSANDS/ΜL (ref 1.85–7.62)
NEUTS SEG NFR BLD AUTO: 68 % (ref 43–75)
NRBC BLD AUTO-RTO: 0 /100 WBCS
PLATELET # BLD AUTO: 421 THOUSANDS/UL (ref 149–390)
PMV BLD AUTO: 9.9 FL (ref 8.9–12.7)
POTASSIUM SERPL-SCNC: 4.3 MMOL/L (ref 3.5–5.3)
PROT SERPL-MCNC: 7 G/DL (ref 6.4–8.2)
RBC # BLD AUTO: 4.18 MILLION/UL (ref 3.81–5.12)
SODIUM SERPL-SCNC: 140 MMOL/L (ref 136–145)
WBC # BLD AUTO: 7.53 THOUSAND/UL (ref 4.31–10.16)

## 2021-01-15 PROCEDURE — 99024 POSTOP FOLLOW-UP VISIT: CPT | Performed by: OBSTETRICS & GYNECOLOGY

## 2021-01-15 PROCEDURE — 85025 COMPLETE CBC W/AUTO DIFF WBC: CPT | Performed by: OBSTETRICS & GYNECOLOGY

## 2021-01-15 PROCEDURE — 82150 ASSAY OF AMYLASE: CPT | Performed by: OBSTETRICS & GYNECOLOGY

## 2021-01-15 PROCEDURE — 36415 COLL VENOUS BLD VENIPUNCTURE: CPT | Performed by: OBSTETRICS & GYNECOLOGY

## 2021-01-15 PROCEDURE — 80053 COMPREHEN METABOLIC PANEL: CPT | Performed by: OBSTETRICS & GYNECOLOGY

## 2021-01-15 PROCEDURE — 83690 ASSAY OF LIPASE: CPT | Performed by: OBSTETRICS & GYNECOLOGY

## 2021-01-16 NOTE — PROGRESS NOTES
Assessment:  30 y o   who presents postpartum 1wk from SVE of a 8lb 9oz baby boy  Plan:  Diagnoses and all orders for this visit:    Postpartum exam   (spontaneous vaginal delivery)  - Routine care apart from below  - Return at 6wks for postpartum exam    Pre-eclampsia in third trimester  Epigastric pain  -     CBC and differential  -     Comprehensive metabolic panel  -     Lipase  -     Amylase  - Discussed symptom in relationship to preeclampsia, as well as DDx  Patient has baby with her on exam today  Discussed will follow up this evening if labs abnormal  Precautions reviewed and to go to hospital with new or worsening symptoms  - If labs wnl, return within 1wk for BP check       __________________________________________________________________    Subjective   Elpidio Doll is a 30 y o  Kaylan Borden who presents postpartum 1wk from SVE of a 8lb 9oz baby boy  Pregnancy complicated by preeclampsia without severe features  Patient reports shes developed new epigastric pain over last few days  Midline; aches a little across bilaterally but most severe in midline  Constant and mildly worsened by movement  Associated with some reflux, not too severe per pt  Denies nausea/vomiting  Denies headaches or visual changes  Pelvic cramping and lochia are minimal        Objective  /98   Ht 6' (1 829 m)   Wt (!) 140 kg (308 lb)   LMP 2020 (Exact Date)   Breastfeeding Yes   BMI 41 77 kg/m²      Physical Exam:  Physical Exam  Constitutional:       General: She is not in acute distress  Appearance: Normal appearance  She is not ill-appearing, toxic-appearing or diaphoretic  Eyes:      General: No scleral icterus  Right eye: No discharge  Left eye: No discharge  Conjunctiva/sclera: Conjunctivae normal    Cardiovascular:      Rate and Rhythm: Normal rate  Pulmonary:      Effort: Pulmonary effort is normal  No respiratory distress     Abdominal:      General: There is no distension  Palpations: There is no mass  Tenderness: There is abdominal tenderness (midline upper abdomen)  There is no guarding or rebound  Hernia: No hernia is present  Musculoskeletal:         General: No swelling  Skin:     General: Skin is warm and dry  Coloration: Skin is not jaundiced or pale  Findings: No bruising or erythema  Neurological:      Mental Status: She is alert  Psychiatric:         Mood and Affect: Mood normal          Behavior: Behavior normal          Thought Content:  Thought content normal          Judgment: Judgment normal

## 2021-01-16 NOTE — PROGRESS NOTES
Attempted to reach out to patient regarding abnormal LFTs  Listed number attempted x2  Voicemail left with call back instructions

## 2021-01-18 ENCOUNTER — TRANSITIONAL CARE MANAGEMENT (OUTPATIENT)
Dept: FAMILY MEDICINE CLINIC | Facility: CLINIC | Age: 26
End: 2021-01-18

## 2021-01-18 NOTE — RESULT ENCOUNTER NOTE
Phone call returned by patient Friday evening; admitted to [facility] for pancreatitis  Pain continued to worsen so she went in  Plan for cholecystectomy per pt

## 2021-01-21 ENCOUNTER — OFFICE VISIT (OUTPATIENT)
Dept: FAMILY MEDICINE CLINIC | Facility: CLINIC | Age: 26
End: 2021-01-21
Payer: COMMERCIAL

## 2021-01-21 VITALS
DIASTOLIC BLOOD PRESSURE: 90 MMHG | WEIGHT: 293 LBS | HEIGHT: 72 IN | TEMPERATURE: 98 F | SYSTOLIC BLOOD PRESSURE: 122 MMHG | BODY MASS INDEX: 39.68 KG/M2

## 2021-01-21 DIAGNOSIS — Z90.49 S/P LAPAROSCOPIC CHOLECYSTECTOMY: ICD-10-CM

## 2021-01-21 DIAGNOSIS — K85.10 GALLSTONE PANCREATITIS: Primary | ICD-10-CM

## 2021-01-21 PROCEDURE — 99215 OFFICE O/P EST HI 40 MIN: CPT | Performed by: FAMILY MEDICINE

## 2021-01-21 NOTE — PROGRESS NOTES
A/p    1   S/p lap chol - 1/17/21   LFTs were elevated - they are trending down has another set ordered and will do today    Reports today feeling much better    Mild incisional pain      2  Elevated Bp    Had 140/98- 1/15/21   Today reports 126/80   No headache no blurry vision    3    Contraception    Wants to have nexplanon will get auth and place at 6 week visit

## 2021-01-21 NOTE — PROGRESS NOTES
Transition of Care  Follow-up After Hospitalization    Renu Leehilton Mendez 30 y o  female   Date:  1/21/2021    TCM Call (since 12/21/2020)     Date and time call was made  1/18/2021 11:25 AM    Patient was hospitialized at  Other (comment)        [facility]    Date of Admission  01/15/21    Date of discharge  01/18/21    Diagnosis  REMOVAL OF GALL BLADDER    Disposition  Home    Were the patients medications reviewed and updated  No    Current Symptoms  None      TCM Call (since 12/21/2020)     Post hospital issues  None    Should patient be enrolled in anticoag monitoring? No    Scheduled for follow up? Yes (Comment)  TCM APPT 1/20/21    Patients specialists  Other (comment)    Other specialists names  F/U WITH SURGEON 2/3/21    Did you obtain your prescribed medications  No    Why were you unable to obtain your medications  NO NEW MEDS    Do you need help managing your prescriptions or medications  No    Is transportation to your appointment needed  No    I have advised the patient to call PCP with any new or worsening symptoms  ESPERANZA COPE, [employer] records were reviewed  Medications upon discharge reviewed/updated  Discharge Disposition:    Follow up visits with other specialists:       Assessment and Plan: We will get blood work on her today  Follow up with specialists as scheduled  Follow-up here in 4-6 months or p r n  Sven Morrow She will call us if she has any problems before that visit  Chica Lehman was seen today for transition of care management  Diagnoses and all orders for this visit:    Gallstone pancreatitis  -     CBC and differential  -     Basic metabolic panel  -     Hepatic function panel  -     Amylase  -     Lipase    S/P laparoscopic cholecystectomy  -     CBC and differential  -     Basic metabolic panel  -     Hepatic function panel  -     Amylase  -     Lipase            HPI:  EMMY  Patient was admitted for gallstone pancreatitis    Patient status post lap choly and doing well  No nausea vomiting or diarrhea  Eating well  No fever chills  No chest pain or shortness of breath  Patient had just given birth to her baby on 01/06/2021  Patient went home, but started getting abdominal pain  Went back to the hospital and found to have gallstones  ROS: Review of Systems   Constitutional: Negative  Respiratory: Negative  Cardiovascular: Negative  Gastrointestinal: Negative  Genitourinary: Negative          Past Medical History:   Diagnosis Date    Chlamydia     Depression     no medications for 1 year    Gestational hypertension, third trimester 1/4/2021    Insulin controlled gestational diabetes mellitus (GDM) in third trimester 10/29/2020    Urinary tract infection     Varicella        Past Surgical History:   Procedure Laterality Date    WISDOM TOOTH EXTRACTION      WISDOM TOOTH EXTRACTION Bilateral 2010       Social History     Socioeconomic History    Marital status: /Civil Union     Spouse name: None    Number of children: None    Years of education: None    Highest education level: None   Occupational History    None   Social Needs    Financial resource strain: None    Food insecurity     Worry: None     Inability: None    Transportation needs     Medical: None     Non-medical: None   Tobacco Use    Smoking status: Current Every Day Smoker     Packs/day: 0 50     Years: 10 00     Pack years: 5 00    Smokeless tobacco: Never Used   Substance and Sexual Activity    Alcohol use: Not Currently     Frequency: Monthly or less     Drinks per session: 1 or 2     Binge frequency: Never     Comment: rare    Drug use: Not Currently     Types: Marijuana     Comment: quit with knowledge of pregnancy    Sexual activity: Not Currently     Partners: Male   Lifestyle    Physical activity     Days per week: None     Minutes per session: None    Stress: None   Relationships    Social connections     Talks on phone: None Gets together: None     Attends Anabaptist service: None     Active member of club or organization: None     Attends meetings of clubs or organizations: None     Relationship status: None    Intimate partner violence     Fear of current or ex partner: None     Emotionally abused: None     Physically abused: None     Forced sexual activity: None   Other Topics Concern    None   Social History Narrative    None       Family History   Problem Relation Age of Onset    Thyroid disease Mother     Other Mother         cerebellar atrophy dx  at 22 years    Diabetes Father     Kidney disease Father     Breast cancer Maternal Grandmother     Diabetes Paternal Grandmother     Colon cancer Neg Hx     Ovarian cancer Neg Hx        No Known Allergies      Current Outpatient Medications:     acetaminophen (TYLENOL) 325 mg tablet, Take 2 tablets (650 mg total) by mouth every 6 (six) hours as needed for mild pain or headaches, Disp: , Rfl: 0    Prenatal MV & Min w/FA-DHA (PRENATAL ADULT GUMMY/DHA/FA) 0 4-25 MG CHEW, Chew 2 tablets daily, Disp: , Rfl:     benzocaine-menthol-lanolin-aloe (DERMOPLAST) 20-0 5 % topical spray, Apply 1 application topically 4 (four) times a day as needed for irritation (Patient not taking: Reported on 1/15/2021), Disp: , Rfl: 0    Blood Glucose Monitoring Suppl (OneTouch Verio Flex System) w/Device KIT, Dispense 1 kit per insurance formulary   (Patient not taking: Reported on 1/15/2021), Disp: 1 kit, Rfl: 0    docusate sodium (COLACE) 100 mg capsule, Take 1 capsule (100 mg total) by mouth 2 (two) times a day (Patient not taking: Reported on 1/15/2021), Disp: 10 capsule, Rfl: 0    ibuprofen (MOTRIN) 200 mg tablet, Take 3 tablets (600 mg total) by mouth every 6 (six) hours as needed for moderate pain (Patient not taking: Reported on 1/15/2021), Disp: , Rfl:     Insulin Pen Needle 31G X 5 MM MISC, Inject under the skin daily at bedtime Use one a day or as directed , Disp: 100 each, Rfl: 1    OneTouch Delica Lancets 72U MISC, Use 4 a day or as directed  (Patient not taking: Reported on 1/15/2021), Disp: 100 each, Rfl: 4    witch hazel-glycerin (TUCKS) topical pad, Apply 1 pad topically every 2 (two) hours as needed for hemorrhoids (Patient not taking: Reported on 1/15/2021), Disp: , Rfl: 0      Physical Exam:  /90   Temp 98 °F (36 7 °C)   Ht 6' (1 829 m)   Wt (!) 138 kg (304 lb 6 4 oz)   LMP 04/16/2020 (Exact Date)   BMI 41 28 kg/m²     Physical Exam  Vitals signs reviewed  Constitutional:       General: She is not in acute distress  Appearance: Normal appearance  She is well-developed  She is not diaphoretic  HENT:      Head: Normocephalic and atraumatic  Eyes:      Conjunctiva/sclera: Conjunctivae normal    Cardiovascular:      Rate and Rhythm: Normal rate and regular rhythm  Heart sounds: Normal heart sounds  No murmur  No friction rub  No gallop  Pulmonary:      Effort: Pulmonary effort is normal  No respiratory distress  Breath sounds: Normal breath sounds  No wheezing, rhonchi or rales  Abdominal:      Comments: Laparoscopic incision sites healing well   Musculoskeletal:      Right lower leg: No edema  Left lower leg: No edema  Neurological:      General: No focal deficit present  Mental Status: She is alert and oriented to person, place, and time  Psychiatric:         Behavior: Behavior normal          Thought Content:  Thought content normal          Judgment: Judgment normal              Labs:  Lab Results   Component Value Date    WBC 7 53 01/15/2021    HGB 12 7 01/15/2021    HCT 38 7 01/15/2021    MCV 93 01/15/2021     (H) 01/15/2021     Lab Results   Component Value Date    K 4 3 01/15/2021     (H) 01/15/2021    CO2 26 01/15/2021    BUN 9 01/15/2021    CREATININE 0 75 01/15/2021    GLUF 121 (H) 11/03/2020    CALCIUM 9 3 01/15/2021    CORRECTEDCA 10 0 01/15/2021     (H) 01/15/2021     (H) 01/15/2021    ALKPHOS 251 (H) 01/15/2021    EGFR 110 01/15/2021

## 2021-01-22 ENCOUNTER — OFFICE VISIT (OUTPATIENT)
Dept: OBGYN CLINIC | Facility: MEDICAL CENTER | Age: 26
End: 2021-01-22
Payer: COMMERCIAL

## 2021-01-22 VITALS
BODY MASS INDEX: 39.68 KG/M2 | HEIGHT: 72 IN | SYSTOLIC BLOOD PRESSURE: 126 MMHG | WEIGHT: 293 LBS | DIASTOLIC BLOOD PRESSURE: 80 MMHG

## 2021-01-22 DIAGNOSIS — R03.0 ELEVATED BP WITHOUT DIAGNOSIS OF HYPERTENSION: Primary | ICD-10-CM

## 2021-01-22 PROCEDURE — 99213 OFFICE O/P EST LOW 20 MIN: CPT | Performed by: OBSTETRICS & GYNECOLOGY

## 2021-01-22 PROCEDURE — 3008F BODY MASS INDEX DOCD: CPT | Performed by: OBSTETRICS & GYNECOLOGY

## 2021-01-22 PROCEDURE — 4004F PT TOBACCO SCREEN RCVD TLK: CPT | Performed by: OBSTETRICS & GYNECOLOGY

## 2021-01-23 ENCOUNTER — TRANSCRIBE ORDERS (OUTPATIENT)
Dept: LAB | Facility: MEDICAL CENTER | Age: 26
End: 2021-01-23

## 2021-01-23 ENCOUNTER — LAB (OUTPATIENT)
Dept: LAB | Facility: MEDICAL CENTER | Age: 26
End: 2021-01-23
Payer: COMMERCIAL

## 2021-01-23 DIAGNOSIS — Z90.49 POSTCHOLECYSTECTOMY DIARRHEA: ICD-10-CM

## 2021-01-23 DIAGNOSIS — K85.10 GALLSTONE PANCREATITIS: Primary | ICD-10-CM

## 2021-01-23 DIAGNOSIS — K85.10 GALLSTONE PANCREATITIS: ICD-10-CM

## 2021-01-23 DIAGNOSIS — R19.7 POSTCHOLECYSTECTOMY DIARRHEA: ICD-10-CM

## 2021-01-23 LAB
ALBUMIN SERPL BCP-MCNC: 3.2 G/DL (ref 3.5–5)
ALP SERPL-CCNC: 276 U/L (ref 46–116)
ALT SERPL W P-5'-P-CCNC: 304 U/L (ref 12–78)
AMYLASE SERPL-CCNC: 33 IU/L (ref 25–115)
ANION GAP SERPL CALCULATED.3IONS-SCNC: 7 MMOL/L (ref 4–13)
AST SERPL W P-5'-P-CCNC: 100 U/L (ref 5–45)
BASOPHILS # BLD AUTO: 0.04 THOUSANDS/ΜL (ref 0–0.1)
BASOPHILS NFR BLD AUTO: 0 % (ref 0–1)
BILIRUB DIRECT SERPL-MCNC: 0.26 MG/DL (ref 0–0.2)
BILIRUB SERPL-MCNC: 0.61 MG/DL (ref 0.2–1)
BUN SERPL-MCNC: 12 MG/DL (ref 5–25)
CALCIUM SERPL-MCNC: 9.6 MG/DL (ref 8.3–10.1)
CHLORIDE SERPL-SCNC: 110 MMOL/L (ref 100–108)
CO2 SERPL-SCNC: 25 MMOL/L (ref 21–32)
CREAT SERPL-MCNC: 0.87 MG/DL (ref 0.6–1.3)
EOSINOPHIL # BLD AUTO: 0.27 THOUSAND/ΜL (ref 0–0.61)
EOSINOPHIL NFR BLD AUTO: 3 % (ref 0–6)
ERYTHROCYTE [DISTWIDTH] IN BLOOD BY AUTOMATED COUNT: 14 % (ref 11.6–15.1)
GFR SERPL CREATININE-BSD FRML MDRD: 92 ML/MIN/1.73SQ M
GLUCOSE SERPL-MCNC: 92 MG/DL (ref 65–140)
HCT VFR BLD AUTO: 40.3 % (ref 34.8–46.1)
HGB BLD-MCNC: 12.6 G/DL (ref 11.5–15.4)
IMM GRANULOCYTES # BLD AUTO: 0.02 THOUSAND/UL (ref 0–0.2)
IMM GRANULOCYTES NFR BLD AUTO: 0 % (ref 0–2)
LIPASE SERPL-CCNC: 81 U/L (ref 73–393)
LYMPHOCYTES # BLD AUTO: 2.27 THOUSANDS/ΜL (ref 0.6–4.47)
LYMPHOCYTES NFR BLD AUTO: 23 % (ref 14–44)
MCH RBC QN AUTO: 29.4 PG (ref 26.8–34.3)
MCHC RBC AUTO-ENTMCNC: 31.3 G/DL (ref 31.4–37.4)
MCV RBC AUTO: 94 FL (ref 82–98)
MONOCYTES # BLD AUTO: 0.67 THOUSAND/ΜL (ref 0.17–1.22)
MONOCYTES NFR BLD AUTO: 7 % (ref 4–12)
NEUTROPHILS # BLD AUTO: 6.45 THOUSANDS/ΜL (ref 1.85–7.62)
NEUTS SEG NFR BLD AUTO: 67 % (ref 43–75)
NRBC BLD AUTO-RTO: 0 /100 WBCS
PLATELET # BLD AUTO: 392 THOUSANDS/UL (ref 149–390)
PMV BLD AUTO: 10.6 FL (ref 8.9–12.7)
POTASSIUM SERPL-SCNC: 4.3 MMOL/L (ref 3.5–5.3)
PROT SERPL-MCNC: 7.2 G/DL (ref 6.4–8.2)
RBC # BLD AUTO: 4.29 MILLION/UL (ref 3.81–5.12)
SODIUM SERPL-SCNC: 142 MMOL/L (ref 136–145)
WBC # BLD AUTO: 9.72 THOUSAND/UL (ref 4.31–10.16)

## 2021-01-23 PROCEDURE — 83690 ASSAY OF LIPASE: CPT | Performed by: FAMILY MEDICINE

## 2021-01-23 PROCEDURE — 36415 COLL VENOUS BLD VENIPUNCTURE: CPT | Performed by: FAMILY MEDICINE

## 2021-01-23 PROCEDURE — 80076 HEPATIC FUNCTION PANEL: CPT | Performed by: FAMILY MEDICINE

## 2021-01-23 PROCEDURE — 82150 ASSAY OF AMYLASE: CPT | Performed by: FAMILY MEDICINE

## 2021-01-23 PROCEDURE — 80048 BASIC METABOLIC PNL TOTAL CA: CPT | Performed by: FAMILY MEDICINE

## 2021-01-23 PROCEDURE — 85025 COMPLETE CBC W/AUTO DIFF WBC: CPT | Performed by: FAMILY MEDICINE

## 2021-01-25 DIAGNOSIS — K85.10 GALLSTONE PANCREATITIS: Primary | ICD-10-CM

## 2021-01-26 ENCOUNTER — TELEPHONE (OUTPATIENT)
Dept: OBGYN CLINIC | Facility: MEDICAL CENTER | Age: 26
End: 2021-01-26

## 2021-01-26 NOTE — TELEPHONE ENCOUNTER
----- Message from Jerman Munoz sent at 1/26/2021  9:24 AM EST -----  I contacted pt's insurance  Effective 8/1/20 and active  Pt is covered for insertion, removal and device at 100%  No PA needed  Tanna Ref# [id number]      Pt is aware    ----- Message -----  From: Yuri Carlos MD  Sent: 1/22/2021   3:03 PM EST  To: Pauline Becker will do at 6 weeks followup   Thanks

## 2021-02-21 ENCOUNTER — TELEPHONE (OUTPATIENT)
Dept: OTHER | Facility: OTHER | Age: 26
End: 2021-02-21

## 2021-03-08 ENCOUNTER — POSTPARTUM VISIT (OUTPATIENT)
Dept: OBGYN CLINIC | Facility: MEDICAL CENTER | Age: 26
End: 2021-03-08
Payer: COMMERCIAL

## 2021-03-08 VITALS — BODY MASS INDEX: 42.94 KG/M2 | WEIGHT: 293 LBS

## 2021-03-08 DIAGNOSIS — Z30.017 ENCOUNTER FOR INITIAL PRESCRIPTION OF NEXPLANON: Primary | ICD-10-CM

## 2021-03-08 DIAGNOSIS — O24.419 GESTATIONAL DIABETES MELLITUS (GDM) IN THIRD TRIMESTER, GESTATIONAL DIABETES METHOD OF CONTROL UNSPECIFIED: ICD-10-CM

## 2021-03-08 PROCEDURE — 11981 INSERTION DRUG DLVR IMPLANT: CPT | Performed by: OBSTETRICS & GYNECOLOGY

## 2021-03-08 PROCEDURE — 99024 POSTOP FOLLOW-UP VISIT: CPT | Performed by: OBSTETRICS & GYNECOLOGY

## 2021-03-08 NOTE — PROGRESS NOTES
Postpartum Visit   [facility] [address] [place] [address]    Assessment/Plan:  Avram Crigler is a 30y o  year old  who presents for postpartum visit  Routine Postpartum Care  1  Normal postpartum exam  2  Contraception: Nexplanon  3  Depression Screen: EPDS 17  4  Feeding: formula  5  Psychosocial support: adequate  6  Cervical cancer screening Up to Date, next due   7  Follow up in: 1 month or as needed  Additional Problems:  1  Encounter for initial prescription of Nexplanon  -     etonogestrel (NEXPLANON) subdermal implant 68 mg    2  Gestational diabetes mellitus (GDM) in third trimester, gestational diabetes method of control unspecified  -     Glucose NAVNEET 2HR 75GM Nonpreg; Future    3  Postpartum depression  -     sertraline (ZOLOFT) 50 mg tablet; Take 1 tablet (50 mg total) by mouth daily Take half pill daily for 1 week, then take 1 pill daily  Subjective:     CC: Postpartum visit    Lei Hannah is a 30 y o  y o  female  who presents for a postpartum visit  She is 8 weeks postpartum following a spontaneous vaginal delivery on 2021 at 37 weeks  Outcome: spontaneous vaginal delivery  Anesthesia: local and epidural  Postpartum course has been complicated by acute pancreatitis secondary to cholecystitis  S/p lap mariel 1/15/2021 Baby's course has been uncomplicated  Baby is feeding by formula  Bleeding no bleeding  Bowel function is normal  Bladder function is normal  Patient is sexually active  Contraception method is Nexplanon placed today  Postpartum depression screening: positive  Patient has a history of depression  Agrees to restart medications  Script and instructions for Zoloft reviewed and sent  Follow up in 1 month  Patient denies HI/SI  Declines referral to counseling, as she used in the past with minimal benefit       The following portions of the patient's history were reviewed and updated as appropriate: allergies, current medications, past family history, past medical history, obstetric history, gynecologic history, past social history, past surgical history and problem list       Objective:  Wt (!) 144 kg (316 lb 9 6 oz)   LMP 2021   BMI 42 94 kg/m²   Pregravid Weight/BMI: No episode found (BMI Could not be calculated)  Current Weight: (!) 144 kg (316 lb 9 6 oz)   Total Weight Gain: Not found  Pre- Vitals      Most Recent Value   Prenatal Assessment   Prenatal Vitals   Weight - Scale  (!) 144 kg (316 lb 9 6 oz)   Urine Albumin/Glucose   Dilation/Effacement/Station   Vaginal Drainage   Edema           General: Well appearing, no distress  Mood and affect: Appropriate  Respiratory: Unlabored breathing  Clear to auscultate  Cardiovascular: Regular rate and rhythm  Abdomen: Soft, nontender    Vulva: intact, no lesion  Vagina: no discharge  Urethra: normal meatus  Cervix: normal  Uterus: mobile, nontender  Adnexa: nonpalpable    Extremities: Warm and well perfused  Non tender

## 2021-03-08 NOTE — PROGRESS NOTES
Universal Protocol:  Consent: Verbal consent obtained  Written consent obtained  Risks and benefits: risks, benefits and alternatives were discussed  Consent given by: patient  Patient understanding: patient states understanding of the procedure being performed  Patient consent: the patient's understanding of the procedure matches consent given  Procedure consent: procedure consent matches procedure scheduled  Patient identity confirmed: verbally with patient    Remove and insert drug implant    Date/Time: 3/8/2021 5:00 PM  Performed by: El Noland MD  Authorized by: El Noland MD     Indication:     Indication: Insertion of non-biodegradable drug delivery implant    Pre-procedure:     Prepped with: povidone-iodine      Local anesthetic:  Lidocaine without epinephrine    The site was cleaned and prepped in a sterile fashion: yes    Procedure:     Procedure:   Insertion    Small stab incision was made in arm: no      Left/right:  Left    Preloaded contraceptive capsule trocar was placed subdermally: yes      Visualization of implant was obtained: yes      Contraceptive capsule was inserted and trocar removed: yes      Visualization of notch in stylet and palpation of device: yes      Palpation confirms placement by provider and patient: yes      Site was closed with steri-strips and pressure bandage applied: yes

## 2021-04-08 ENCOUNTER — TELEMEDICINE (OUTPATIENT)
Dept: OBGYN CLINIC | Facility: CLINIC | Age: 26
End: 2021-04-08
Payer: COMMERCIAL

## 2021-04-08 DIAGNOSIS — Z97.5 NEXPLANON IN PLACE: Primary | ICD-10-CM

## 2021-04-08 PROBLEM — Z3A.37 37 WEEKS GESTATION OF PREGNANCY: Status: RESOLVED | Noted: 2020-12-30 | Resolved: 2021-04-08

## 2021-04-08 PROCEDURE — 99213 OFFICE O/P EST LOW 20 MIN: CPT | Performed by: OBSTETRICS & GYNECOLOGY

## 2021-04-08 PROCEDURE — 4004F PT TOBACCO SCREEN RCVD TLK: CPT | Performed by: OBSTETRICS & GYNECOLOGY

## 2021-04-08 RX ORDER — SERTRALINE HYDROCHLORIDE 100 MG/1
100 TABLET, FILM COATED ORAL DAILY
Qty: 30 TABLET | Refills: 3 | Status: SHIPPED | OUTPATIENT
Start: 2021-04-08 | End: 2021-08-12 | Stop reason: SDUPTHER

## 2021-04-08 NOTE — PROGRESS NOTES
Virtual Regular Visit      Assessment/Plan:    Problem List Items Addressed This Visit     None      Visit Diagnoses     Postpartum depression        Relevant Medications    sertraline (ZOLOFT) 100 mg tablet               Reason for visit is   Chief Complaint   Patient presents with    Virtual Regular Visit        Encounter provider Darryn Kurtz MD    Provider located at [place] [address] [facility] [address] [id number] [phone]      Recent Visits  No visits were found meeting these conditions  Showing recent visits within past 7 days and meeting all other requirements     Future Appointments  No visits were found meeting these conditions  Showing future appointments within next 150 days and meeting all other requirements        The patient was identified by name and date of birth  Jaydeellen Severs was informed that this is a telemedicine visit and that the visit is being conducted through [employer] and patient was informed that this is a secure, HIPAA-compliant platform  She agrees to proceed     My office door was closed  No one else was in the room  She acknowledged consent and understanding of privacy and security of the video platform  The patient has agreed to participate and understands they can discontinue the visit at any time  Patient is aware this is a billable service  Subjective  Renu Turner is a 30 y o  female for follow up of PPD and Nexplanon  Has been doing well, but feels that she can increase her Zoloft  Will increase to 100 mg po daily  Doing well with Nexplanon  States her cycles have lasted longer than usual  Not bothered by the bleeding  Reviewed these are common side effects of Nexplanon        Past Medical History:   Diagnosis Date    Chlamydia     Depression     no medications for 1 year    Gestational hypertension, third trimester 1/4/2021    Insulin controlled gestational diabetes mellitus (GDM) in third trimester 10/29/2020    Urinary tract infection     Varicella        Past Surgical History:   Procedure Laterality Date    WISDOM TOOTH EXTRACTION      WISDOM TOOTH EXTRACTION Bilateral 2010       Current Outpatient Medications   Medication Sig Dispense Refill    sertraline (ZOLOFT) 100 mg tablet Take 1 tablet (100 mg total) by mouth daily 30 tablet 3     No current facility-administered medications for this visit  No Known Allergies    Review of Systems   Constitutional: Negative  HENT: Negative  Eyes: Negative  Respiratory: Negative  Cardiovascular: Negative  Gastrointestinal: Negative  Genitourinary: Negative  Musculoskeletal: Negative  All other systems reviewed and are negative  Video Exam    There were no vitals filed for this visit  Physical Exam   General: Patient appears well-developed  Patient is adequately nourished  Patient is not diaphoretic  Patient is not in distress  Neck: Visualization of the neck demonstrates no grossly visible masses  Neck mobility is not compromised, neck appears supple  HEENT: Oral mucosa appears moist  Patient does not identify palpable neck masses  Patient reports no oral tenderness or readily identifiable masses  Eyes: Conjunctivae appear normal bilaterally  Right eye with no discharge  Left eye with no discharge  No evidence of scleral icterus  No evidence of strabismus  Respiratory: Respiratory effort appears normal  There is no respiratory distress  Patient able to speak in full sentences  There was no audible stridor or cough  Abdomen: Patient states her abdomen is soft  States abdomen is non-tender  States abdomen is non-distended  Patient denies visible or palpable bulges to suggest hernias  Musculoskeletal: Patient reports and I can confirm no visible deformities in 4 extremities  Patient reports and I can confirm full mobility in 4 extremities   There is no grossly visible limb edema  There is no evidence of clubbing or peripheral cyanosis  Neurologic: Patient is fully alert and responsive  Patient is oriented to time, place and person  Gross evaluation of CNs III-IV-VI-VII-VIII and XI demonstrates no deficits  Patient reports normal gait and balance  Skin: My evaluation of exposed skin areas reveals no evidence of pallor  My evaluation of exposed skin areas reveals no obvious rashes  My evaluation of exposed skin areas reveals no grossly visible lesions  My evaluation of exposed skin areas reveals no evidence of erythema  Psychiatric / Behavioral: Patient's mood and affect appears normal  Patient's judgement is preserved  Patient is coherent and thought content appears directionally and contextually appropriate for age and health status  I spent 10 minutes directly with the patient during this visit      [address]  Kamila Hubbard acknowledges that she has consented to an online visit or consultation  She understands that the online visit is based solely on information provided by her, and that, in the absence of a face-to-face physical evaluation by the physician, the diagnosis she receives is both limited and provisional in terms of accuracy and completeness  This is not intended to replace a full medical face-to-face evaluation by the physician  Jayleen Galarza understands and accepts these terms

## 2021-04-19 DIAGNOSIS — Z23 ENCOUNTER FOR IMMUNIZATION: ICD-10-CM

## 2021-05-20 ENCOUNTER — IMMUNIZATIONS (OUTPATIENT)
Dept: FAMILY MEDICINE CLINIC | Facility: HOSPITAL | Age: 26
End: 2021-05-20

## 2021-05-20 DIAGNOSIS — Z23 ENCOUNTER FOR IMMUNIZATION: Primary | ICD-10-CM

## 2021-05-20 PROCEDURE — 0001A SARS-COV-2 / COVID-19 MRNA VACCINE (PFIZER-BIONTECH) 30 MCG: CPT

## 2021-05-20 PROCEDURE — 91300 SARS-COV-2 / COVID-19 MRNA VACCINE (PFIZER-BIONTECH) 30 MCG: CPT

## 2021-05-28 ENCOUNTER — OFFICE VISIT (OUTPATIENT)
Dept: OBGYN CLINIC | Facility: MEDICAL CENTER | Age: 26
End: 2021-05-28
Payer: COMMERCIAL

## 2021-05-28 VITALS — BODY MASS INDEX: 43.81 KG/M2 | WEIGHT: 293 LBS

## 2021-05-28 DIAGNOSIS — Z97.5 BREAKTHROUGH BLEEDING ON NEXPLANON: Primary | ICD-10-CM

## 2021-05-28 DIAGNOSIS — Z97.5 NEXPLANON IN PLACE: ICD-10-CM

## 2021-05-28 DIAGNOSIS — N92.1 BREAKTHROUGH BLEEDING ON NEXPLANON: Primary | ICD-10-CM

## 2021-05-28 PROBLEM — O99.210 MATERNAL MORBID OBESITY, ANTEPARTUM (HCC): Status: RESOLVED | Noted: 2020-07-16 | Resolved: 2021-05-28

## 2021-05-28 PROBLEM — O14.93 PRE-ECLAMPSIA IN THIRD TRIMESTER: Status: RESOLVED | Noted: 2021-01-04 | Resolved: 2021-05-28

## 2021-05-28 PROBLEM — O99.213 OBESITY AFFECTING PREGNANCY IN THIRD TRIMESTER: Status: RESOLVED | Noted: 2020-11-02 | Resolved: 2021-05-28

## 2021-05-28 PROBLEM — E66.01 MATERNAL MORBID OBESITY, ANTEPARTUM (HCC): Status: RESOLVED | Noted: 2020-07-16 | Resolved: 2021-05-28

## 2021-05-28 PROCEDURE — 11982 REMOVE DRUG IMPLANT DEVICE: CPT | Performed by: OBSTETRICS & GYNECOLOGY

## 2021-05-28 PROCEDURE — 99213 OFFICE O/P EST LOW 20 MIN: CPT | Performed by: OBSTETRICS & GYNECOLOGY

## 2021-05-28 NOTE — PROGRESS NOTES
Universal Protocol:  Consent: Verbal consent obtained  Risks and benefits: risks, benefits and alternatives were discussed  Consent given by: patient  Patient understanding: patient states understanding of the procedure being performed  Patient consent: the patient's understanding of the procedure matches consent given  Procedure consent: procedure consent matches procedure scheduled  Patient identity confirmed: verbally with patient    Remove and insert drug implant    Date/Time: 5/28/2021 2:00 PM  Performed by: Jaswant Blanc MD  Authorized by: Jaswant Blanc MD     Indication:     Indication: Presence of non-biodegradable drug delivery implant    Pre-procedure:     Prepped with: alcohol 70% and povidone-iodine      Local anesthetic:  Lidocaine without epinephrine    The site was cleaned and prepped in a sterile fashion: yes    Procedure:     Left/right:  Left    Nexplanon removed without difficulty

## 2021-05-28 NOTE — PROGRESS NOTES
[facility] [address] [place] [address]    Assessment/Plan:  No problem-specific Assessment & Plan notes found for this encounter  Diagnoses and all orders for this visit:    Breakthrough bleeding on Nexplanon    Nexplanon in place          Subjective:   Cheo Marie is a 30 y o   female  CC: bleeding with Nexplanon in place    HPI: HPI  Patient presents with complaints of continued bleeding on Nexplanon  Patient had implant placed in March and has been bleeding since insertion  Would like removed  Patient does not want to start birth control at this time  ROS: Review of Systems   Constitutional: Negative  Respiratory: Negative  Cardiovascular: Negative  Gastrointestinal: Negative  Genitourinary: Positive for menstrual problem  Musculoskeletal: Negative  All other systems reviewed and are negative  PFSH: The following portions of the patient's history were reviewed and updated as appropriate: allergies, current medications, past family history, past medical history, obstetric history, gynecologic history, past social history, past surgical history and problem list        Objective:  Wt (!) 147 kg (323 lb)   LMP 2021   BMI 43 81 kg/m²    Physical Exam  Vitals signs reviewed  Constitutional:       Appearance: Normal appearance  Cardiovascular:      Rate and Rhythm: Normal rate  Pulmonary:      Effort: Pulmonary effort is normal  No respiratory distress  Skin:     Comments: nexplanon in left arm  Neurological:      Mental Status: She is alert     Psychiatric:         Mood and Affect: Mood normal          Behavior: Behavior normal

## 2021-06-03 ENCOUNTER — OFFICE VISIT (OUTPATIENT)
Dept: FAMILY MEDICINE CLINIC | Facility: CLINIC | Age: 26
End: 2021-06-03
Payer: COMMERCIAL

## 2021-06-03 VITALS
HEIGHT: 72 IN | TEMPERATURE: 98.1 F | SYSTOLIC BLOOD PRESSURE: 122 MMHG | HEART RATE: 100 BPM | BODY MASS INDEX: 39.68 KG/M2 | DIASTOLIC BLOOD PRESSURE: 76 MMHG | WEIGHT: 293 LBS | OXYGEN SATURATION: 97 %

## 2021-06-03 DIAGNOSIS — L05.91 CHRONIC RECURRENT PILONIDAL CYST: ICD-10-CM

## 2021-06-03 DIAGNOSIS — E66.01 MORBID OBESITY WITH BMI OF 40.0-44.9, ADULT (HCC): ICD-10-CM

## 2021-06-03 DIAGNOSIS — L02.419 AXILLARY ABSCESS: Primary | ICD-10-CM

## 2021-06-03 DIAGNOSIS — L73.2 HIDRADENITIS SUPPURATIVA: ICD-10-CM

## 2021-06-03 PROCEDURE — 4004F PT TOBACCO SCREEN RCVD TLK: CPT | Performed by: PHYSICIAN ASSISTANT

## 2021-06-03 PROCEDURE — 99214 OFFICE O/P EST MOD 30 MIN: CPT | Performed by: PHYSICIAN ASSISTANT

## 2021-06-03 RX ORDER — SULFAMETHOXAZOLE AND TRIMETHOPRIM 800; 160 MG/1; MG/1
TABLET ORAL 2 TIMES DAILY
COMMUNITY
Start: 2021-06-02 | End: 2021-06-09

## 2021-06-03 NOTE — PROGRESS NOTES
Assessment/Plan:    Problem List Items Addressed This Visit     None      Visit Diagnoses     Axillary abscess    -  Primary    Relevant Orders    Ambulatory referral to General Surgery    Hidradenitis suppurativa        Relevant Orders    Ambulatory referral to Dermatology    Chronic recurrent pilonidal cyst        Relevant Orders    Ambulatory referral to General Surgery    Morbid obesity with BMI of 40 0-44 9, adult ([facility] )             Diagnoses and all orders for this visit:    Axillary abscess  -     Ambulatory referral to General Surgery; Future    Hidradenitis suppurativa  -     Ambulatory referral to Dermatology; Future    Chronic recurrent pilonidal cyst  -     Ambulatory referral to General Surgery; Future    Morbid obesity with BMI of 40 0-44 9, adult ([facility] )    Other orders  -     sulfamethoxazole-trimethoprim (BACTRIM DS) 800-160 mg per tablet; Take by mouth 2 (two) times a day        Recommended that she start the Bactrim today that was prescribed in the ER  She should continue warm compresses  Recommended that she not use deodorant on her right underarm  Suggested switching to spray on deodorant in the future  Referral placed to general surgery for abscess as well as recurrent pilonidal cysts  Referral placed to dermatology for chronic management of hidradenitis suppurativa  Subjective:      Patient ID: Joellen Severs is a 30 y o  female  Harper Parmar is a pleasant 30year old female who is here today for a follow-up from the ER for an abscess under her right axilla  She was prescribed Bactrim, but has not started it yet  She has been applying warm compresses  She denies any fevers, chills, or body aches  It was recommended that she follow-up with a surgeon  She gets abscesses recurrently  They occur in her axilla bilaterally and under other skin folds in her body  They do appear to tunnel at times  She has had to be on antibiotics multiple times for similar abscesses   She also has issues with pilonidal cysts that keep reoccurring  Her most recent one has resolved  She has never seen a dermatologist for this issue  The following portions of the patient's history were reviewed and updated as appropriate:   She has a past medical history of Chlamydia, Depression, Gestational hypertension, third trimester (1/4/2021), Insulin controlled gestational diabetes mellitus (GDM) in third trimester (10/29/2020), Urinary tract infection, and Varicella  ,  does not have any pertinent problems on file  ,   has a past surgical history that includes Keystone tooth extraction and Keystone tooth extraction (Bilateral, 2010)  ,  family history includes Breast cancer in her maternal grandmother; Diabetes in her father and paternal grandmother; Kidney disease in her father; Other in her mother; Thyroid disease in her mother  ,   reports that she has been smoking  She has a 5 00 pack-year smoking history  She has never used smokeless tobacco  She reports previous alcohol use  She reports previous drug use  Drug: Marijuana  ,  has No Known Allergies     Current Outpatient Medications   Medication Sig Dispense Refill    sertraline (ZOLOFT) 100 mg tablet Take 1 tablet (100 mg total) by mouth daily 30 tablet 3    sulfamethoxazole-trimethoprim (BACTRIM DS) 800-160 mg per tablet Take by mouth 2 (two) times a day       No current facility-administered medications for this visit  Review of Systems   Constitutional: Negative for chills, diaphoresis, fatigue and fever  HENT: Negative for congestion, ear pain, postnasal drip, rhinorrhea, sneezing, sore throat and trouble swallowing  Eyes: Negative for pain and visual disturbance  Respiratory: Negative for apnea, cough, shortness of breath and wheezing  Cardiovascular: Negative for chest pain and palpitations  Gastrointestinal: Negative for abdominal pain, constipation, diarrhea, nausea and vomiting  Genitourinary: Negative for dysuria     Musculoskeletal: Negative for arthralgias, gait problem and myalgias  Skin: Positive for color change and wound  Neurological: Negative for dizziness, syncope, weakness, light-headedness, numbness and headaches  Psychiatric/Behavioral: Negative for suicidal ideas  The patient is not nervous/anxious  Objective:  Vitals:    06/03/21 1111   BP: 122/76   Pulse: 100   Temp: 98 1 °F (36 7 °C)   SpO2: 97%   Weight: (!) 149 kg (328 lb)   Height: 6' (1 829 m)     Body mass index is 44 48 kg/m²  Physical Exam  Vitals signs and nursing note reviewed  Constitutional:       Appearance: She is well-developed  HENT:      Head: Normocephalic and atraumatic  Right Ear: External ear normal       Left Ear: External ear normal       Nose: Nose normal       Mouth/Throat:      Pharynx: No oropharyngeal exudate or posterior oropharyngeal erythema  Eyes:      Extraocular Movements: Extraocular movements intact  Neck:      Musculoskeletal: Normal range of motion and neck supple  Cardiovascular:      Rate and Rhythm: Normal rate and regular rhythm  Heart sounds: Normal heart sounds  No murmur  No friction rub  No gallop  Pulmonary:      Effort: Pulmonary effort is normal  No respiratory distress  Breath sounds: Normal breath sounds  No wheezing or rales  Musculoskeletal: Normal range of motion  Lymphadenopathy:      Cervical: No cervical adenopathy  Skin:     General: Skin is warm and dry  Findings: Abscess present  Comments: Palpable abscess under right axilla measuring approximately 3 cm x 1 cm  Multiple scars under bilateral axilla  Healed scar at base of tailbone  Neurological:      Mental Status: She is alert and oriented to person, place, and time  Psychiatric:         Behavior: Behavior normal          Thought Content: Thought content normal          Judgment: Judgment normal          BMI Counseling: Body mass index is 44 48 kg/m²   The BMI is above normal  Nutrition recommendations include 3-5 servings of fruits/vegetables daily and consuming healthier snacks  Exercise recommendations include exercising 3-5 times per week

## 2021-06-10 ENCOUNTER — IMMUNIZATIONS (OUTPATIENT)
Dept: FAMILY MEDICINE CLINIC | Facility: HOSPITAL | Age: 26
End: 2021-06-10

## 2021-06-10 DIAGNOSIS — Z23 ENCOUNTER FOR IMMUNIZATION: Primary | ICD-10-CM

## 2021-06-10 PROCEDURE — 91300 SARS-COV-2 / COVID-19 MRNA VACCINE (PFIZER-BIONTECH) 30 MCG: CPT

## 2021-06-10 PROCEDURE — 0002A SARS-COV-2 / COVID-19 MRNA VACCINE (PFIZER-BIONTECH) 30 MCG: CPT

## 2021-06-15 ENCOUNTER — CONSULT (OUTPATIENT)
Dept: SURGERY | Facility: CLINIC | Age: 26
End: 2021-06-15
Payer: COMMERCIAL

## 2021-06-15 VITALS
HEIGHT: 72 IN | HEART RATE: 92 BPM | WEIGHT: 293 LBS | TEMPERATURE: 98.5 F | DIASTOLIC BLOOD PRESSURE: 94 MMHG | BODY MASS INDEX: 39.68 KG/M2 | SYSTOLIC BLOOD PRESSURE: 152 MMHG

## 2021-06-15 DIAGNOSIS — L05.91 CHRONIC RECURRENT PILONIDAL CYST: ICD-10-CM

## 2021-06-15 DIAGNOSIS — L02.419 AXILLARY ABSCESS: ICD-10-CM

## 2021-06-15 DIAGNOSIS — Z72.51 UNPROTECTED SEXUAL INTERCOURSE: Primary | ICD-10-CM

## 2021-06-15 PROCEDURE — 3008F BODY MASS INDEX DOCD: CPT | Performed by: PHYSICIAN ASSISTANT

## 2021-06-15 PROCEDURE — 99214 OFFICE O/P EST MOD 30 MIN: CPT | Performed by: SURGERY

## 2021-06-15 RX ORDER — CHLORHEXIDINE GLUCONATE 4 G/100ML
SOLUTION TOPICAL DAILY PRN
Status: CANCELLED | OUTPATIENT
Start: 2021-06-15

## 2021-06-15 RX ORDER — SODIUM CHLORIDE, SODIUM LACTATE, POTASSIUM CHLORIDE, CALCIUM CHLORIDE 600; 310; 30; 20 MG/100ML; MG/100ML; MG/100ML; MG/100ML
125 INJECTION, SOLUTION INTRAVENOUS CONTINUOUS
Status: CANCELLED | OUTPATIENT
Start: 2021-07-13

## 2021-06-15 RX ORDER — HEPARIN SODIUM 5000 [USP'U]/ML
7500 INJECTION, SOLUTION INTRAVENOUS; SUBCUTANEOUS ONCE
Status: CANCELLED | OUTPATIENT
Start: 2021-07-13 | End: 2021-06-15

## 2021-06-15 RX ORDER — LEVONORGESTREL 1.5 MG/1
1.5 TABLET ORAL ONCE
Qty: 1 TABLET | Refills: 0 | Status: SHIPPED | OUTPATIENT
Start: 2021-06-15 | End: 2021-06-15

## 2021-06-15 NOTE — H&P
Office Visit - General Surgery  Jennifer Payne MRN: [id number]  Encounter: [id number]    Assessment and Plan  30 F p/w pilonidal cyst with multiple flare -ups requiring incision and drianage over the past two years ( 8 encounters), last incised 4 weeks ago  No active signs or symptoms of infection, but she desires to explore options for surgical excision  VSS, afebrile    Midline piloidal cyst with overlying punctum, small area of induration to the right of glteal cleft form prior I&D    PLAN  -patient consented for pilonidal cyst excision with possible closure versus wound vac application  -outpatient surgical scheduling  -no additional pre-op testing at this juncture  -seek medical attention if recurrent abscess occurs in interim      Problem List Items Addressed This Visit     None      Visit Diagnoses     Axillary abscess        Chronic recurrent pilonidal cyst              Chief Complaint:  Jennifer Payne is a 30 y o  female who presents for No chief complaint on file  Subjective  The patinet reports eight episodes of abscess formation requiring incision and drainage  This last occurred 4 weeks ago and her site was just to the right and lateral of he usual midline occurrence site  Presently she denies pain, drainage, fever, chills or swelling in the area  She notes no other constitutional symptoms at present      Past Medical History  Past Medical History:   Diagnosis Date    Chlamydia     Depression     no medications for 1 year    Gestational hypertension, third trimester 1/4/2021    Insulin controlled gestational diabetes mellitus (GDM) in third trimester 10/29/2020    Urinary tract infection     Varicella        Past Surgical History  Past Surgical History:   Procedure Laterality Date    WISDOM TOOTH EXTRACTION      WISDOM TOOTH EXTRACTION Bilateral 2010       Family History  Family History   Problem Relation Age of Onset    Thyroid disease Mother    [facility] Other Mother cerebellar atrophy dx  at 22 years    Diabetes Father     Kidney disease Father     Breast cancer Maternal Grandmother     Diabetes Paternal Grandmother     Colon cancer Neg Hx     Ovarian cancer Neg Hx        Social History  Social History     Socioeconomic History    Marital status: /Civil Union     Spouse name: Not on file    Number of children: Not on file    Years of education: Not on file    Highest education level: Not on file   Occupational History    Not on file   Tobacco Use    Smoking status: Current Every Day Smoker     Packs/day: 0 50     Years: 10 00     Pack years: 5 00    Smokeless tobacco: Never Used   Vaping Use    Vaping Use: Former   Substance and Sexual Activity    Alcohol use: Not Currently     Comment: rare    Drug use: Not Currently     Types: Marijuana     Comment: quit with knowledge of pregnancy    Sexual activity: Not Currently     Partners: Male   Other Topics Concern    Not on file   Social History Narrative    Not on file     Social Determinants of Health     Financial Resource Strain:     Difficulty of Paying Living Expenses:    Food Insecurity:     Worried About Running Out of Food in the Last Year:     [address] in the Last Year:    Transportation Needs:     Lack of Transportation (Medical):      Lack of Transportation (Non-Medical):    Physical Activity:     Days of Exercise per Week:     Minutes of Exercise per Session:    Stress:     Feeling of Stress :    Social Connections:     Frequency of Communication with Friends and Family:     Frequency of Social Gatherings with Friends and Family:     Attends Baptism Services:     Active Member of Clubs or Organizations:     Attends Club or Organization Meetings:     Marital Status:    Intimate Partner Violence:     Fear of Current or Ex-Partner:     Emotionally Abused:     Physically Abused:     Sexually Abused:         Medications  Current Outpatient Medications on File Prior to Visit Medication Sig Dispense Refill    sertraline (ZOLOFT) 100 mg tablet Take 1 tablet (100 mg total) by mouth daily 30 tablet 3     No current facility-administered medications on file prior to visit  Allergies  No Known Allergies    Review of Systems   Constitutional: Negative for activity change, appetite change, chills, fatigue and fever  Musculoskeletal: Positive for back pain  Skin: Negative for wound  Hematological: Does not bruise/bleed easily  All other systems reviewed and are negative  Objective  Vitals:    06/15/21 1102   BP: 152/94   Pulse: 92   Temp: 98 5 °F (36 9 °C)       Physical Exam  Constitutional:       General: She is not in acute distress  Appearance: She is obese  She is not ill-appearing  HENT:      Head: Normocephalic and atraumatic  Mouth/Throat:      Mouth: Mucous membranes are moist    Eyes:      General: No scleral icterus  Pupils: Pupils are equal, round, and reactive to light  Cardiovascular:      Rate and Rhythm: Normal rate and regular rhythm  Pulmonary:      Effort: Pulmonary effort is normal  No respiratory distress  Breath sounds: Normal breath sounds  Abdominal:      General: There is no distension  Palpations: Abdomen is soft  Tenderness: There is no abdominal tenderness  Skin:     General: Skin is warm and dry  Capillary Refill: Capillary refill takes less than 2 seconds  Neurological:      General: No focal deficit present  Mental Status: She is alert and oriented to person, place, and time  Mental status is at baseline     Psychiatric:         Mood and Affect: Mood normal

## 2021-06-15 NOTE — PROGRESS NOTES
Office Visit - General Surgery  Tim Murillo MRN: [id number]  Encounter: [id number]    Assessment and Plan  30 F p/w pilonidal cyst with multiple flare -ups requiring incision and drianage over the past two years ( 8 encounters), last incised 4 weeks ago  No active signs or symptoms of infection, but she desires to explore options for surgical excision  VSS, afebrile    Midline piloidal cyst with overlying punctum, small area of induration to the right of glteal cleft form prior I&D    PLAN  -patient consented for pilonidal cyst excision with possible closure versus wound vac application  -outpatient surgical scheduling  -no additional pre-op testing at this juncture  -seek medical attention if recurrent abscess occurs in interim      Problem List Items Addressed This Visit     None      Visit Diagnoses     Axillary abscess        Chronic recurrent pilonidal cyst              Chief Complaint:  Tim Murillo is a 30 y o  female who presents for No chief complaint on file  Subjective  The patinet reports eight episodes of abscess formation requiring incision and drainage  This last occurred 4 weeks ago and her site was just to the right and lateral of he usual midline occurrence site  Presently she denies pain, drainage, fever, chills or swelling in the area  She notes no other constitutional symptoms at present      Past Medical History  Past Medical History:   Diagnosis Date    Chlamydia     Depression     no medications for 1 year    Gestational hypertension, third trimester 1/4/2021    Insulin controlled gestational diabetes mellitus (GDM) in third trimester 10/29/2020    Urinary tract infection     Varicella        Past Surgical History  Past Surgical History:   Procedure Laterality Date    WISDOM TOOTH EXTRACTION      WISDOM TOOTH EXTRACTION Bilateral 2010       Family History  Family History   Problem Relation Age of Onset    Thyroid disease Mother    [facility] Other Mother cerebellar atrophy dx  at 22 years    Diabetes Father     Kidney disease Father     Breast cancer Maternal Grandmother     Diabetes Paternal Grandmother     Colon cancer Neg Hx     Ovarian cancer Neg Hx        Social History  Social History     Socioeconomic History    Marital status: /Civil Union     Spouse name: Not on file    Number of children: Not on file    Years of education: Not on file    Highest education level: Not on file   Occupational History    Not on file   Tobacco Use    Smoking status: Current Every Day Smoker     Packs/day: 0 50     Years: 10 00     Pack years: 5 00    Smokeless tobacco: Never Used   Vaping Use    Vaping Use: Former   Substance and Sexual Activity    Alcohol use: Not Currently     Comment: rare    Drug use: Not Currently     Types: Marijuana     Comment: quit with knowledge of pregnancy    Sexual activity: Not Currently     Partners: Male   Other Topics Concern    Not on file   Social History Narrative    Not on file     Social Determinants of Health     Financial Resource Strain:     Difficulty of Paying Living Expenses:    Food Insecurity:     Worried About Running Out of Food in the Last Year:     [address] in the Last Year:    Transportation Needs:     Lack of Transportation (Medical):      Lack of Transportation (Non-Medical):    Physical Activity:     Days of Exercise per Week:     Minutes of Exercise per Session:    Stress:     Feeling of Stress :    Social Connections:     Frequency of Communication with Friends and Family:     Frequency of Social Gatherings with Friends and Family:     Attends Congregation Services:     Active Member of Clubs or Organizations:     Attends Club or Organization Meetings:     Marital Status:    Intimate Partner Violence:     Fear of Current or Ex-Partner:     Emotionally Abused:     Physically Abused:     Sexually Abused:         Medications  Current Outpatient Medications on File Prior to Visit Medication Sig Dispense Refill    sertraline (ZOLOFT) 100 mg tablet Take 1 tablet (100 mg total) by mouth daily 30 tablet 3     No current facility-administered medications on file prior to visit  Allergies  No Known Allergies    Review of Systems   Constitutional: Negative for activity change, appetite change, chills, fatigue and fever  Musculoskeletal: Positive for back pain  Skin: Negative for wound  Hematological: Does not bruise/bleed easily  All other systems reviewed and are negative  Objective  Vitals:    06/15/21 1102   BP: 152/94   Pulse: 92   Temp: 98 5 °F (36 9 °C)       Physical Exam  Constitutional:       General: She is not in acute distress  Appearance: She is obese  She is not ill-appearing  HENT:      Head: Normocephalic and atraumatic  Mouth/Throat:      Mouth: Mucous membranes are moist    Eyes:      General: No scleral icterus  Pupils: Pupils are equal, round, and reactive to light  Cardiovascular:      Rate and Rhythm: Normal rate and regular rhythm  Pulmonary:      Effort: Pulmonary effort is normal  No respiratory distress  Breath sounds: Normal breath sounds  Abdominal:      General: There is no distension  Palpations: Abdomen is soft  Tenderness: There is no abdominal tenderness  Skin:     General: Skin is warm and dry  Capillary Refill: Capillary refill takes less than 2 seconds  Neurological:      General: No focal deficit present  Mental Status: She is alert and oriented to person, place, and time  Mental status is at baseline     Psychiatric:         Mood and Affect: Mood normal

## 2021-07-09 ENCOUNTER — ANESTHESIA EVENT (OUTPATIENT)
Dept: PERIOP | Facility: HOSPITAL | Age: 26
End: 2021-07-09
Payer: COMMERCIAL

## 2021-07-13 ENCOUNTER — ANESTHESIA (OUTPATIENT)
Dept: PERIOP | Facility: HOSPITAL | Age: 26
End: 2021-07-13
Payer: COMMERCIAL

## 2021-07-28 NOTE — PRE-PROCEDURE INSTRUCTIONS
Pre-Surgery Instructions:   Medication Instructions    sertraline (ZOLOFT) 100 mg tablet pt instructed to take on day of surgery with 1-2 sips of water    Pt denies fever, sob, sore throat and cough  Pt verbalized understanding of shower and med instructions   Pt instructed to stop nsaids and supplements prior to surgery

## 2021-08-03 ENCOUNTER — HOSPITAL ENCOUNTER (OUTPATIENT)
Facility: HOSPITAL | Age: 26
Setting detail: OUTPATIENT SURGERY
Discharge: HOME | End: 2021-08-03
Attending: SURGERY | Admitting: SURGERY
Payer: COMMERCIAL

## 2021-08-03 VITALS
RESPIRATION RATE: 21 BRPM | TEMPERATURE: 97.8 F | DIASTOLIC BLOOD PRESSURE: 81 MMHG | HEIGHT: 72 IN | SYSTOLIC BLOOD PRESSURE: 145 MMHG | WEIGHT: 293 LBS | OXYGEN SATURATION: 94 % | BODY MASS INDEX: 39.68 KG/M2 | HEART RATE: 84 BPM

## 2021-08-03 DIAGNOSIS — L05.91 CHRONIC RECURRENT PILONIDAL CYST: Primary | ICD-10-CM

## 2021-08-03 LAB
EXT PREGNANCY TEST URINE: NEGATIVE
EXT. CONTROL: NORMAL

## 2021-08-03 PROCEDURE — 11771 EXC PILONIDAL CYST XTNSV: CPT | Performed by: PHYSICIAN ASSISTANT

## 2021-08-03 PROCEDURE — 88304 TISSUE EXAM BY PATHOLOGIST: CPT | Performed by: PATHOLOGY

## 2021-08-03 PROCEDURE — 81025 URINE PREGNANCY TEST: CPT | Performed by: SURGERY

## 2021-08-03 PROCEDURE — NC001 PR NO CHARGE: Performed by: SURGERY

## 2021-08-03 PROCEDURE — 11771 EXC PILONIDAL CYST XTNSV: CPT | Performed by: SURGERY

## 2021-08-03 RX ORDER — MORPHINE SULFATE 4 MG/ML
2 INJECTION, SOLUTION INTRAMUSCULAR; INTRAVENOUS EVERY 4 HOURS PRN
Status: DISCONTINUED | OUTPATIENT
Start: 2021-08-03 | End: 2021-08-03 | Stop reason: HOSPADM

## 2021-08-03 RX ORDER — DEXAMETHASONE SODIUM PHOSPHATE 10 MG/ML
INJECTION, SOLUTION INTRAMUSCULAR; INTRAVENOUS AS NEEDED
Status: DISCONTINUED | OUTPATIENT
Start: 2021-08-03 | End: 2021-08-03

## 2021-08-03 RX ORDER — GLYCOPYRROLATE 0.2 MG/ML
INJECTION INTRAMUSCULAR; INTRAVENOUS AS NEEDED
Status: DISCONTINUED | OUTPATIENT
Start: 2021-08-03 | End: 2021-08-03

## 2021-08-03 RX ORDER — FENTANYL CITRATE 50 UG/ML
INJECTION, SOLUTION INTRAMUSCULAR; INTRAVENOUS AS NEEDED
Status: DISCONTINUED | OUTPATIENT
Start: 2021-08-03 | End: 2021-08-03

## 2021-08-03 RX ORDER — HYDROMORPHONE HCL/PF 1 MG/ML
0.5 SYRINGE (ML) INJECTION
Status: DISCONTINUED | OUTPATIENT
Start: 2021-08-03 | End: 2021-08-03 | Stop reason: HOSPADM

## 2021-08-03 RX ORDER — OXYCODONE HYDROCHLORIDE AND ACETAMINOPHEN 5; 325 MG/1; MG/1
2 TABLET ORAL EVERY 4 HOURS PRN
Status: DISCONTINUED | OUTPATIENT
Start: 2021-08-03 | End: 2021-08-03 | Stop reason: HOSPADM

## 2021-08-03 RX ORDER — FENTANYL CITRATE/PF 50 MCG/ML
25 SYRINGE (ML) INJECTION
Status: DISCONTINUED | OUTPATIENT
Start: 2021-08-03 | End: 2021-08-03 | Stop reason: HOSPADM

## 2021-08-03 RX ORDER — OXYCODONE HYDROCHLORIDE AND ACETAMINOPHEN 5; 325 MG/1; MG/1
1 TABLET ORAL EVERY 4 HOURS PRN
Qty: 20 TABLET | Refills: 0 | Status: SHIPPED | OUTPATIENT
Start: 2021-08-03 | End: 2021-08-13

## 2021-08-03 RX ORDER — SODIUM CHLORIDE, SODIUM LACTATE, POTASSIUM CHLORIDE, CALCIUM CHLORIDE 600; 310; 30; 20 MG/100ML; MG/100ML; MG/100ML; MG/100ML
125 INJECTION, SOLUTION INTRAVENOUS CONTINUOUS
Status: DISCONTINUED | OUTPATIENT
Start: 2021-08-03 | End: 2021-08-03 | Stop reason: HOSPADM

## 2021-08-03 RX ORDER — CHLORHEXIDINE GLUCONATE 4 G/100ML
SOLUTION TOPICAL DAILY PRN
Status: DISCONTINUED | OUTPATIENT
Start: 2021-08-03 | End: 2021-08-03 | Stop reason: HOSPADM

## 2021-08-03 RX ORDER — HYDROMORPHONE HCL/PF 1 MG/ML
SYRINGE (ML) INJECTION AS NEEDED
Status: DISCONTINUED | OUTPATIENT
Start: 2021-08-03 | End: 2021-08-03

## 2021-08-03 RX ORDER — HEPARIN SODIUM 5000 [USP'U]/ML
7500 INJECTION, SOLUTION INTRAVENOUS; SUBCUTANEOUS ONCE
Status: COMPLETED | OUTPATIENT
Start: 2021-08-03 | End: 2021-08-03

## 2021-08-03 RX ORDER — KETOROLAC TROMETHAMINE 30 MG/ML
INJECTION, SOLUTION INTRAMUSCULAR; INTRAVENOUS AS NEEDED
Status: DISCONTINUED | OUTPATIENT
Start: 2021-08-03 | End: 2021-08-03

## 2021-08-03 RX ORDER — ROCURONIUM BROMIDE 10 MG/ML
INJECTION, SOLUTION INTRAVENOUS AS NEEDED
Status: DISCONTINUED | OUTPATIENT
Start: 2021-08-03 | End: 2021-08-03

## 2021-08-03 RX ORDER — BUPIVACAINE HYDROCHLORIDE 5 MG/ML
INJECTION, SOLUTION PERINEURAL AS NEEDED
Status: DISCONTINUED | OUTPATIENT
Start: 2021-08-03 | End: 2021-08-03 | Stop reason: HOSPADM

## 2021-08-03 RX ORDER — ONDANSETRON 2 MG/ML
INJECTION INTRAMUSCULAR; INTRAVENOUS AS NEEDED
Status: DISCONTINUED | OUTPATIENT
Start: 2021-08-03 | End: 2021-08-03

## 2021-08-03 RX ORDER — PROPOFOL 10 MG/ML
INJECTION, EMULSION INTRAVENOUS AS NEEDED
Status: DISCONTINUED | OUTPATIENT
Start: 2021-08-03 | End: 2021-08-03

## 2021-08-03 RX ORDER — LIDOCAINE HYDROCHLORIDE 10 MG/ML
INJECTION, SOLUTION EPIDURAL; INFILTRATION; INTRACAUDAL; PERINEURAL AS NEEDED
Status: DISCONTINUED | OUTPATIENT
Start: 2021-08-03 | End: 2021-08-03

## 2021-08-03 RX ORDER — EPHEDRINE SULFATE 50 MG/ML
INJECTION INTRAVENOUS AS NEEDED
Status: DISCONTINUED | OUTPATIENT
Start: 2021-08-03 | End: 2021-08-03

## 2021-08-03 RX ORDER — NEOSTIGMINE METHYLSULFATE 1 MG/ML
INJECTION INTRAVENOUS AS NEEDED
Status: DISCONTINUED | OUTPATIENT
Start: 2021-08-03 | End: 2021-08-03

## 2021-08-03 RX ORDER — ONDANSETRON 2 MG/ML
4 INJECTION INTRAMUSCULAR; INTRAVENOUS EVERY 8 HOURS PRN
Status: DISCONTINUED | OUTPATIENT
Start: 2021-08-03 | End: 2021-08-03 | Stop reason: HOSPADM

## 2021-08-03 RX ORDER — MAGNESIUM HYDROXIDE 1200 MG/15ML
LIQUID ORAL AS NEEDED
Status: DISCONTINUED | OUTPATIENT
Start: 2021-08-03 | End: 2021-08-03 | Stop reason: HOSPADM

## 2021-08-03 RX ORDER — MIDAZOLAM HYDROCHLORIDE 2 MG/2ML
INJECTION, SOLUTION INTRAMUSCULAR; INTRAVENOUS AS NEEDED
Status: DISCONTINUED | OUTPATIENT
Start: 2021-08-03 | End: 2021-08-03

## 2021-08-03 RX ADMIN — PROPOFOL 200 MG: 10 INJECTION, EMULSION INTRAVENOUS at 14:27

## 2021-08-03 RX ADMIN — CEFAZOLIN 3000 MG: 1 INJECTION, POWDER, FOR SOLUTION INTRAMUSCULAR; INTRAVENOUS at 14:10

## 2021-08-03 RX ADMIN — DEXAMETHASONE SODIUM PHOSPHATE 4 MG: 10 INJECTION, SOLUTION INTRAMUSCULAR; INTRAVENOUS at 14:42

## 2021-08-03 RX ADMIN — HEPARIN SODIUM 7500 UNITS: 5000 INJECTION INTRAVENOUS; SUBCUTANEOUS at 13:23

## 2021-08-03 RX ADMIN — ONDANSETRON HYDROCHLORIDE 4 MG: 2 INJECTION, SOLUTION INTRAVENOUS at 14:17

## 2021-08-03 RX ADMIN — LIDOCAINE HYDROCHLORIDE 100 MG: 10 INJECTION, SOLUTION EPIDURAL; INFILTRATION; INTRACAUDAL; PERINEURAL at 14:27

## 2021-08-03 RX ADMIN — EPHEDRINE SULFATE 5 MG: 50 INJECTION, SOLUTION INTRAVENOUS at 15:06

## 2021-08-03 RX ADMIN — FENTANYL CITRATE 50 MCG: 50 INJECTION, SOLUTION INTRAMUSCULAR; INTRAVENOUS at 14:26

## 2021-08-03 RX ADMIN — MIDAZOLAM HYDROCHLORIDE 2 MG: 1 INJECTION, SOLUTION INTRAMUSCULAR; INTRAVENOUS at 14:17

## 2021-08-03 RX ADMIN — HYDROMORPHONE HYDROCHLORIDE 0.5 MG: 1 INJECTION, SOLUTION INTRAMUSCULAR; INTRAVENOUS; SUBCUTANEOUS at 14:52

## 2021-08-03 RX ADMIN — KETOROLAC TROMETHAMINE 30 MG: 30 INJECTION, SOLUTION INTRAMUSCULAR at 14:53

## 2021-08-03 RX ADMIN — GLYCOPYRROLATE 0.4 MG: 0.2 INJECTION, SOLUTION INTRAMUSCULAR; INTRAVENOUS at 15:18

## 2021-08-03 RX ADMIN — FENTANYL CITRATE 50 MCG: 50 INJECTION, SOLUTION INTRAMUSCULAR; INTRAVENOUS at 14:35

## 2021-08-03 RX ADMIN — ROCURONIUM BROMIDE 30 MG: 50 INJECTION, SOLUTION INTRAVENOUS at 14:28

## 2021-08-03 RX ADMIN — SODIUM CHLORIDE, SODIUM LACTATE, POTASSIUM CHLORIDE, AND CALCIUM CHLORIDE 125 ML/HR: .6; .31; .03; .02 INJECTION, SOLUTION INTRAVENOUS at 13:26

## 2021-08-03 RX ADMIN — NEOSTIGMINE METHYLSULFATE 3 MG: 1 INJECTION INTRAMUSCULAR; INTRAVENOUS; SUBCUTANEOUS at 15:18

## 2021-08-03 RX ADMIN — OXYCODONE HYDROCHLORIDE AND ACETAMINOPHEN 1 TABLET: 5; 325 TABLET ORAL at 16:08

## 2021-08-03 RX ADMIN — GLYCOPYRROLATE 0.2 MG: 0.2 INJECTION, SOLUTION INTRAMUSCULAR; INTRAVENOUS at 14:17

## 2021-08-03 NOTE — ANESTHESIA POSTPROCEDURE EVALUATION
Post-Op Assessment Note    CV Status:  Stable  Pain Score: 0    Pain management: adequate  Multimodal analgesia used between 6 hours prior to anesthesia start to PACU discharge    Mental Status:  Sleepy and arousable   Hydration Status:  Euvolemic   PONV Controlled:  Controlled   Airway Patency:  Patent      Post Op Vitals Reviewed: Yes      Staff: Anesthesiologist, CRNA         No complications documented      BP (!) 175/82 (08/03/21 1532)    Temp 98 8 °F (37 1 °C) (08/03/21 1532)    Pulse 90 (08/03/21 1532)   Resp 20 (08/03/21 1532)    SpO2 98 % (08/03/21 1532)

## 2021-08-03 NOTE — H&P
Patient seen and examined  Prior history reviewed  Patient examined, no change from prior  Will proceed to OR as planned  Blood pressure 132/82, pulse 92, temperature 98 5 °F (36 9 °C), temperature source Tympanic, resp  rate 16, height 6' (1 829 m), weight (!) 151 kg (333 lb), last menstrual period 06/25/2021, SpO2 99 %, currently breastfeeding  Office Visit - General Surgery  Haydee Armstrong MRN: [id number]  Encounter: [id number]     Assessment and Plan  30 F p/w pilonidal cyst with multiple flare -ups requiring incision and drianage over the past two years ( 8 encounters), last incised 4 weeks ago  No active signs or symptoms of infection, but she desires to explore options for surgical excision      VSS, afebrile     Midline piloidal cyst with overlying punctum, small area of induration to the right of glteal cleft form prior I&D     PLAN  -patient consented for pilonidal cyst excision with possible closure versus wound vac application  -outpatient surgical scheduling  -no additional pre-op testing at this juncture  -seek medical attention if recurrent abscess occurs in interim            Problem List Items Addressed This Visit      None           Visit Diagnoses      Axillary abscess         Chronic recurrent pilonidal cyst                 Chief Complaint:  Haydee Armstrong is a 30 y o  female who presents for No chief complaint on file      Subjective  The patinet reports eight episodes of abscess formation requiring incision and drainage  This last occurred 4 weeks ago and her site was just to the right and lateral of he usual midline occurrence site  Presently she denies pain, drainage, fever, chills or swelling in the area   She notes no other constitutional symptoms at present      Past Medical History       Past Medical History:   Diagnosis Date    Chlamydia      Depression       no medications for 1 year    Gestational hypertension, third trimester 1/4/2021    Insulin controlled gestational diabetes mellitus (GDM) in third trimester 10/29/2020    Urinary tract infection      Varicella           Past Surgical History        Past Surgical History:   Procedure Laterality Date    WISDOM TOOTH EXTRACTION        WISDOM TOOTH EXTRACTION Bilateral 2010         Family History        Family History   Problem Relation Age of Onset    Thyroid disease Mother      Other Mother           cerebellar atrophy dx  at 22 years    Diabetes Father      Kidney disease Father      Breast cancer Maternal Grandmother      Diabetes Paternal Grandmother      Colon cancer Neg Hx      Ovarian cancer Neg Hx           Social History  Social History            Socioeconomic History    Marital status: /Civil Union       Spouse name: Not on file    Number of children: Not on file    Years of education: Not on file    Highest education level: Not on file   Occupational History    Not on file   Tobacco Use    Smoking status: Current Every Day Smoker       Packs/day: 0 50       Years: 10 00       Pack years: 5 00    Smokeless tobacco: Never Used   Vaping Use    Vaping Use: Former   Substance and Sexual Activity    Alcohol use: Not Currently       Comment: rare    Drug use: Not Currently       Types: Marijuana       Comment: quit with knowledge of pregnancy    Sexual activity: Not Currently       Partners: Male   Other Topics Concern    Not on file   Social History Narrative    Not on file      Social Determinants of Health      Financial Resource Strain:     Difficulty of Paying Living Expenses:    Food Insecurity:     Worried About Running Out of Food in the Last Year:     Ran Out of Food in the Last Year:    Transportation Needs:     Lack of Transportation (Medical):      Lack of Transportation (Non-Medical):    Physical Activity:     Days of Exercise per Week:     Minutes of Exercise per Session:    Stress:     Feeling of Stress :    Social Connections:     Frequency of Communication with Friends and Family:     Frequency of Social Gatherings with Friends and Family:     Attends [employer] Services:     Active Member of Clubs or Organizations:     Attends Club or Organization Meetings:     Marital Status:    Intimate Partner Violence:     Fear of Current or Ex-Partner:     Emotionally Abused:     Physically Abused:     Sexually Abused:          Medications         Current Outpatient Medications on File Prior to Visit   Medication Sig Dispense Refill    sertraline (ZOLOFT) 100 mg tablet Take 1 tablet (100 mg total) by mouth daily 30 tablet 3      No current facility-administered medications on file prior to visit          Allergies  No Known Allergies     Review of Systems   Constitutional: Negative for activity change, appetite change, chills, fatigue and fever  Musculoskeletal: Positive for back pain  Skin: Negative for wound  Hematological: Does not bruise/bleed easily  All other systems reviewed and are negative         Objective      Vitals:     06/15/21 1102   BP: 152/94   Pulse: 92   Temp: 98 5 °F (36 9 °C)         Physical Exam  Constitutional:       General: She is not in acute distress  Appearance: She is obese  She is not ill-appearing  HENT:      Head: Normocephalic and atraumatic  Mouth/Throat:      Mouth: Mucous membranes are moist    Eyes:      General: No scleral icterus  Pupils: Pupils are equal, round, and reactive to light  Cardiovascular:      Rate and Rhythm: Normal rate and regular rhythm  Pulmonary:      Effort: Pulmonary effort is normal  No respiratory distress  Breath sounds: Normal breath sounds  Abdominal:      General: There is no distension  Palpations: Abdomen is soft  Tenderness: There is no abdominal tenderness  Skin:     General: Skin is warm and dry  Capillary Refill: Capillary refill takes less than 2 seconds  Neurological:      General: No focal deficit present        Mental Status: She is alert and oriented to person, place, and time  Mental status is at baseline     Psychiatric:         Mood and Affect: Mood normal

## 2021-08-03 NOTE — ANESTHESIA PREPROCEDURE EVALUATION
Procedure:  EXCISION PILONIDAL CYST (N/A Buttocks)    Relevant Problems   NEURO/PSYCH   (+) H/O major depression   (+) Postpartum depression      Smoker  BMI 45    Physical Exam    Airway    Mallampati score: II  TM Distance: >3 FB  Neck ROM: full     Dental   No notable dental hx     Cardiovascular      Pulmonary      Other Findings        Anesthesia Plan  ASA Score- 3     Anesthesia Type- general with ASA Monitors  Additional Monitors:   Airway Plan: ETT  Comment: GA with ETT, IV, Antiemetics  Plan Factors-    Chart reviewed  EKG reviewed  Existing labs reviewed  Patient summary reviewed  Patient is not a current smoker  Patient did not smoke on day of surgery  Induction- intravenous  Postoperative Plan-   Planned trial extubation    Informed Consent- Anesthetic plan and risks discussed with patient  I personally reviewed this patient with the CRNA  Discussed and agreed on the Anesthesia Plan with the CRNA  Walter Lynne

## 2021-08-03 NOTE — OP NOTE
OPERATIVE REPORT  PATIENT NAME: Heather Santiago    :  1995  MRN: [id number]  Pt Location: [facility] OR ROOM 02    SURGERY DATE: 8/3/2021    Surgeon(s) and Role: * Manju Marroquin DO - Primary     * Enzo Olmstead PA-C - Assisting    Preop Diagnosis:  Chronic recurrent pilonidal cyst [L05 91]    Post-Op Diagnosis Codes:     * Chronic recurrent pilonidal cyst [L05 91]    Procedure(s) (LRB):  EXCISION PILONIDAL CYST (N/A)    Specimen(s):  ID Type Source Tests Collected by Time Destination   1 :  Tissue Pilonidal Cyst/Sinus TISSUE EXAM Manju Marroquin DO 8/3/2021 1453        Estimated Blood Loss:   50 mL    Drains:  Open Drain Midline Buttock (Active)   Number of days: 0       Anesthesia Type:   Choice    Operative Indications:  Chronic recurrent pilonidal cyst [L05 91]      Operative Findings: At the jalil cleft there was signs of a few very small nondraining sinus tracts with evidence of a chronic abscess of the right gluteus region  An approximately 6 cm ellipse was excised to include the chronic draining abscess in addition to the underlying pilonidal cyst and tract  Excision was taken down to the presacral fascia  Complications:   None    Procedure and Technique:  Patient was brought to operating arena and remained in the operating stretcher  All the regular monitor devices were then connected  The patient underwent general seizure with endotracheal intubation without complication  The patient was subsequently repositioned to the prone on the operating table  Care was taken make sure that all pressure points were padded and the ET tube remained in proper position  The patient received perioperative antibiotics  The patient received subcutaneous heparin addition to bilateral lower sequential compressive devices for DVT prophylaxis  Patient then prepped draped usual sterile fashion  On inspection along the  cleft there is a chronic draining abscess cavity to the right    Mild induration noted  Just medial to that along the midline was a few very tiny sinus tracts or pets  Inspection more inferior near the [place] CIS was unremarkable  It was decided to ellipse out this area that included the sinus tracts in addition to the chronic draining tract/abscess  This ellipse with measured approximately 6 cm in length  Using 15 blade scalpel the skin was incised circumferentially  The dissection continued downward using Bovie electrocautery through the dermis and subcutaneous fat  Circumferential dissection continued so until the prepectoral fascia was in plain view  The pilonidal cyst and tract was then transected and removed from the field  The cavity is inspected and any bleeding points were cauterized  The cavity was then irrigated normal saline  Hemostasis was achieved  A 12 cm piece of Penrose drain was placed at the base of the wound  It was then sewn into the skin with 2-0 nylon  The cavity was then closed in layers  The deeper tissue was then approximated using 3-0 Vicryl to obliterate the dead space  The skin and subcutaneous tissue were then approximated using 2-0 nylon suture placed in interrupted fashion using a Smead-Guerra technique  The incision was dressed with 4 x 4 ABD and mesh underwear  The patient tolerated procedure well was taken to the postanesthesia care unit stable condition  All lap, needle, instrument counts were correct       I was present for the entire procedure, A qualified resident physician was not available and A physician assistant was required during the procedure for retraction tissue handling,dissection and suturing    Patient Disposition:  PACU     SIGNATURE: Ankur Salcido DO  DATE: August 3, 2021  TIME: 3:29 PM

## 2021-08-03 NOTE — DISCHARGE INSTRUCTIONS
Follow-up with Dr Josue Singletary in 2 weeks per point  Regular diet as tolerated  Low intensity activity as tolerated  You have a drain to there will be some significant drainage  Keep the wound covered as much as possible  Try to keep the initial dressing on until Thursday  You may shower on Thursday  No baths or swimming  If need be you may change dressing prior Thursday if there is significant soilage  If developed fever, worsening pain, redness or drainage the incision then call the office or go to the ER

## 2021-08-12 RX ORDER — SERTRALINE HYDROCHLORIDE 100 MG/1
100 TABLET, FILM COATED ORAL DAILY
Qty: 30 TABLET | Refills: 3 | Status: SHIPPED | OUTPATIENT
Start: 2021-08-12 | End: 2021-09-08 | Stop reason: ALTCHOICE

## 2021-08-17 ENCOUNTER — OFFICE VISIT (OUTPATIENT)
Dept: SURGERY | Facility: CLINIC | Age: 26
End: 2021-08-17

## 2021-08-17 VITALS
WEIGHT: 293 LBS | BODY MASS INDEX: 39.68 KG/M2 | TEMPERATURE: 98.7 F | SYSTOLIC BLOOD PRESSURE: 130 MMHG | HEIGHT: 72 IN | DIASTOLIC BLOOD PRESSURE: 71 MMHG | HEART RATE: 66 BPM

## 2021-08-17 DIAGNOSIS — L05.91 PILONIDAL CYST: Primary | ICD-10-CM

## 2021-08-17 PROCEDURE — 99024 POSTOP FOLLOW-UP VISIT: CPT | Performed by: SURGERY

## 2021-08-19 NOTE — ASSESSMENT & PLAN NOTE
Status post excision of pilonidal cyst   Still some moderate drainage as per patient  On exam there is some sero/tan drainage noted  No evidence of active infection  Penrose drain along with sutures removed without complication  Patient states that her pain improved after stitches were removed  There is approximately 1 5 cm opening the superior portion of the wound which is been left open to drain follow-up 2 weeks for wound check  For now keep covered  Will hold off on packing to allow for collapse of cavity

## 2021-08-19 NOTE — PROGRESS NOTES
Assessment/Plan:    Pilonidal cyst  Status post excision of pilonidal cyst   Still some moderate drainage as per patient  On exam there is some sero/tan drainage noted  No evidence of active infection  Penrose drain along with sutures removed without complication  Patient states that her pain improved after stitches were removed  There is approximately 1 5 cm opening the superior portion of the wound which is been left open to drain follow-up 2 weeks for wound check  For now keep covered  Will hold off on packing to allow for collapse of cavity  Diagnoses and all orders for this visit:    Pilonidal cyst          Subjective:      Patient ID: Heather Santiago is a 30 y o  female  78-year-old female status post excision of pilonidal cyst presents today for postop check  Overall doing well  No nausea vomiting no fevers or chills  States she does have some pain at the site of surgery  She says drainage was significant 1st but has slowed down  The following portions of the patient's history were reviewed and updated as appropriate:   She  has a past medical history of Chlamydia, Depression, Gestational hypertension, third trimester (2021), Insulin controlled gestational diabetes mellitus (GDM) in third trimester (10/29/2020), Urinary tract infection, and Varicella    She   Patient Active Problem List    Diagnosis Date Noted    Postpartum depression 2021    Nexplanon in place 2021     (spontaneous vaginal delivery) 2021    Vulvovaginal candidiasis 2020    BMI 40 0-44 9, adult ([facility] [address] ) 2020    Hyperglycemia in pregnancy 2020    Insulin controlled gestational diabetes mellitus (GDM) in third trimester 10/29/2020    Pilonidal cyst 2020    Left ovarian cyst 2020    Tobacco user 2020    H/O major depression 2020    Functional diarrhea 03/15/2019    Generalized abdominal pain 03/15/2019     She  has a past surgical history that includes [place] tooth extraction; [place] tooth extraction (Bilateral, ); Cholecystectomy; and pr remv pilonidal lesion extens (N/A, 8/3/2021)  Her family history includes Breast cancer in her maternal grandmother; Diabetes in her father and paternal grandmother; Kidney disease in her father; Other in her mother; Thyroid disease in her mother  She  reports that she has been smoking  She has a 5 00 pack-year smoking history  She has never used smokeless tobacco  She reports previous alcohol use  She reports previous drug use  Drug: Marijuana  Current Outpatient Medications   Medication Sig Dispense Refill    sertraline (ZOLOFT) 100 mg tablet Take 1 tablet (100 mg total) by mouth daily 30 tablet 3     No current facility-administered medications for this visit  She has No Known Allergies       Review of Systems   Constitutional: Negative  Skin: Positive for wound (  At the jalil cleft)  Negative for color change and pallor  Objective:      /71   Pulse 66   Temp 98 7 °F (37 1 °C)   Ht 6' (1 829 m)   Wt (!) 151 kg (332 lb)   BMI 45 03 kg/m²          Physical Exam  Vitals reviewed  Constitutional:       General: She is not in acute distress  Appearance: Normal appearance  She is not ill-appearing, toxic-appearing or diaphoretic  Skin:     General: Skin is warm  Coloration: Skin is not jaundiced or pale  Findings: No bruising or erythema  Comments: Incision  cleft intact with the exception of the drain site  Sutures were removed without complication  Drain was also removed  There is approximately 1 5 cm opening to allow for drainage  No surrounding skin changes  Neurological:      Mental Status: She is alert

## 2021-08-27 ENCOUNTER — VBI (OUTPATIENT)
Dept: ADMINISTRATIVE | Facility: OTHER | Age: 26
End: 2021-08-27

## 2021-08-27 NOTE — TELEPHONE ENCOUNTER
08/27/21 11:17 AM     See documentation in the [employer] SmartForm       Ana Maria Crawford MA done

## 2021-09-08 ENCOUNTER — OFFICE VISIT (OUTPATIENT)
Dept: FAMILY MEDICINE CLINIC | Facility: CLINIC | Age: 26
End: 2021-09-08
Payer: COMMERCIAL

## 2021-09-08 VITALS
OXYGEN SATURATION: 98 % | HEIGHT: 72 IN | BODY MASS INDEX: 39.68 KG/M2 | SYSTOLIC BLOOD PRESSURE: 138 MMHG | DIASTOLIC BLOOD PRESSURE: 80 MMHG | HEART RATE: 66 BPM | WEIGHT: 293 LBS | TEMPERATURE: 98.8 F

## 2021-09-08 DIAGNOSIS — F33.1 MODERATE EPISODE OF RECURRENT MAJOR DEPRESSIVE DISORDER (HCC): Primary | ICD-10-CM

## 2021-09-08 PROBLEM — L73.2 HIDRADENITIS: Status: ACTIVE | Noted: 2021-08-19

## 2021-09-08 PROCEDURE — 99214 OFFICE O/P EST MOD 30 MIN: CPT | Performed by: PHYSICIAN ASSISTANT

## 2021-09-08 RX ORDER — BUPROPION HYDROCHLORIDE 100 MG/1
100 TABLET, EXTENDED RELEASE ORAL 2 TIMES DAILY
Qty: 60 TABLET | Refills: 1 | Status: SHIPPED | OUTPATIENT
Start: 2021-09-08 | End: 2021-10-08 | Stop reason: SDUPTHER

## 2021-09-08 RX ORDER — DOXYCYCLINE HYCLATE 100 MG
100 TABLET ORAL 2 TIMES DAILY WITH MEALS
COMMUNITY
Start: 2021-08-19 | End: 2021-12-20

## 2021-09-08 NOTE — PROGRESS NOTES
Assessment/Plan:    Problem List Items Addressed This Visit     None      Visit Diagnoses     Moderate episode of recurrent major depressive disorder ([facility] [address] )    -  Primary    Relevant Medications    buPROPion (WELLBUTRIN SR) 100 mg 12 hr tablet           Diagnoses and all orders for this visit:    Moderate episode of recurrent major depressive disorder (HCC)  -     buPROPion (WELLBUTRIN SR) 100 mg 12 hr tablet; Take 1 tablet (100 mg total) by mouth 2 (two) times a day    Other orders  -     doxycycline hyclate (VIBRA-TABS) 100 mg tablet; Take 100 mg by mouth 2 (two) times a day with meals        Will taper off of zoloft and restart Wellbutrin  Taper schedule given  She will return in one month for a follow-up or sooner if needed  Subjective:      Patient ID: Jaren Goldman is a 30 y o  female  Dianacharissa Callaway is a very pleasant 30year old female who is here today to discuss her anti-depressant  She was placed on zoloft for postpartum depression  Prior to being pregnant she was on Wellbutrin for her depression  She felt like this worked better for her than the zoloft that she is currently taking  She is asking if she can switch back  She denies any suicidal or homicidal thoughts  She denies any other concerns at this time  The following portions of the patient's history were reviewed and updated as appropriate:   She has a past medical history of Chlamydia, Depression, Gestational hypertension, third trimester (1/4/2021), Insulin controlled gestational diabetes mellitus (GDM) in third trimester (10/29/2020), Urinary tract infection, and Varicella  ,  does not have any pertinent problems on file  ,   has a past surgical history that includes [place] tooth extraction; [place] tooth extraction (Bilateral, 2010); Cholecystectomy; and pr remv pilonidal lesion extens (N/A, 8/3/2021)  ,  family history includes Breast cancer in her maternal grandmother; Diabetes in her father and paternal grandmother; Kidney disease in her father; Other in her mother; Thyroid disease in her mother  ,   reports that she has been smoking  She has a 5 00 pack-year smoking history  She has never used smokeless tobacco  She reports previous alcohol use  She reports previous drug use  Drug: Marijuana  ,  has No Known Allergies     Current Outpatient Medications   Medication Sig Dispense Refill    doxycycline hyclate (VIBRA-TABS) 100 mg tablet Take 100 mg by mouth 2 (two) times a day with meals      buPROPion (WELLBUTRIN SR) 100 mg 12 hr tablet Take 1 tablet (100 mg total) by mouth 2 (two) times a day 60 tablet 1     No current facility-administered medications for this visit  Review of Systems   Constitutional: Negative for chills, diaphoresis, fatigue and fever  HENT: Negative for congestion, ear pain, postnasal drip, rhinorrhea, sneezing, sore throat and trouble swallowing  Eyes: Negative for pain and visual disturbance  Respiratory: Negative for apnea, cough, shortness of breath and wheezing  Cardiovascular: Negative for chest pain and palpitations  Gastrointestinal: Negative for abdominal pain, constipation, diarrhea, nausea and vomiting  Genitourinary: Negative for dysuria  Musculoskeletal: Negative for arthralgias, gait problem and myalgias  Neurological: Negative for dizziness, syncope, weakness, light-headedness, numbness and headaches  Psychiatric/Behavioral: Positive for dysphoric mood  Negative for suicidal ideas  The patient is not nervous/anxious  Objective:  Vitals:    09/08/21 1052   BP: 138/80   Pulse: 66   Temp: 98 8 °F (37 1 °C)   SpO2: 98%   Weight: (!) 153 kg (338 lb 6 4 oz)   Height: 6' (1 829 m)     Body mass index is 45 9 kg/m²  Physical Exam  Vitals and nursing note reviewed  Constitutional:       Appearance: She is well-developed  She is obese  HENT:      Head: Normocephalic and atraumatic        Right Ear: External ear normal       Left Ear: External ear normal       Nose: Nose normal  Mouth/Throat:      Pharynx: No oropharyngeal exudate or posterior oropharyngeal erythema  Cardiovascular:      Rate and Rhythm: Normal rate and regular rhythm  Heart sounds: Normal heart sounds  No murmur heard  No friction rub  No gallop  Pulmonary:      Effort: Pulmonary effort is normal  No respiratory distress  Breath sounds: Normal breath sounds  No wheezing or rales  Musculoskeletal:         General: Normal range of motion  Cervical back: Normal range of motion and neck supple  Lymphadenopathy:      Cervical: No cervical adenopathy  Skin:     General: Skin is warm and dry  Neurological:      Mental Status: She is alert and oriented to person, place, and time  Psychiatric:         Mood and Affect: Mood is depressed (very mild)  Mood is not anxious  Behavior: Behavior normal          Thought Content: Thought content normal  Thought content does not include homicidal or suicidal ideation  Thought content does not include homicidal or suicidal plan           Judgment: Judgment normal

## 2021-09-28 ENCOUNTER — OFFICE VISIT (OUTPATIENT)
Dept: FAMILY MEDICINE CLINIC | Facility: CLINIC | Age: 26
End: 2021-09-28
Payer: COMMERCIAL

## 2021-09-28 ENCOUNTER — APPOINTMENT (OUTPATIENT)
Dept: LAB | Facility: MEDICAL CENTER | Age: 26
End: 2021-09-28
Payer: COMMERCIAL

## 2021-09-28 VITALS
BODY MASS INDEX: 39.68 KG/M2 | HEART RATE: 80 BPM | WEIGHT: 293 LBS | DIASTOLIC BLOOD PRESSURE: 84 MMHG | TEMPERATURE: 97.5 F | SYSTOLIC BLOOD PRESSURE: 126 MMHG | HEIGHT: 72 IN

## 2021-09-28 DIAGNOSIS — K85.10 GALLSTONE PANCREATITIS: ICD-10-CM

## 2021-09-28 DIAGNOSIS — Z72.0 TOBACCO USER: ICD-10-CM

## 2021-09-28 DIAGNOSIS — Z13.6 SCREENING FOR CARDIOVASCULAR CONDITION: ICD-10-CM

## 2021-09-28 DIAGNOSIS — R00.2 HEART PALPITATIONS: Primary | ICD-10-CM

## 2021-09-28 DIAGNOSIS — O99.810 HYPERGLYCEMIA IN PREGNANCY: ICD-10-CM

## 2021-09-28 LAB
ALBUMIN SERPL BCP-MCNC: 3.4 G/DL (ref 3.5–5)
ALP SERPL-CCNC: 67 U/L (ref 46–116)
ALT SERPL W P-5'-P-CCNC: 26 U/L (ref 12–78)
ANION GAP SERPL CALCULATED.3IONS-SCNC: 5 MMOL/L (ref 4–13)
AST SERPL W P-5'-P-CCNC: 12 U/L (ref 5–45)
BASOPHILS # BLD AUTO: 0.06 THOUSANDS/ΜL (ref 0–0.1)
BASOPHILS NFR BLD AUTO: 1 % (ref 0–1)
BILIRUB DIRECT SERPL-MCNC: 0.08 MG/DL (ref 0–0.2)
BILIRUB SERPL-MCNC: 0.36 MG/DL (ref 0.2–1)
BUN SERPL-MCNC: 8 MG/DL (ref 5–25)
CALCIUM ALBUM COR SERPL-MCNC: 9.5 MG/DL (ref 8.3–10.1)
CALCIUM SERPL-MCNC: 9 MG/DL (ref 8.3–10.1)
CHLORIDE SERPL-SCNC: 110 MMOL/L (ref 100–108)
CHOLEST SERPL-MCNC: 171 MG/DL (ref 50–200)
CO2 SERPL-SCNC: 22 MMOL/L (ref 21–32)
CREAT SERPL-MCNC: 0.84 MG/DL (ref 0.6–1.3)
EOSINOPHIL # BLD AUTO: 0.28 THOUSAND/ΜL (ref 0–0.61)
EOSINOPHIL NFR BLD AUTO: 3 % (ref 0–6)
ERYTHROCYTE [DISTWIDTH] IN BLOOD BY AUTOMATED COUNT: 14.1 % (ref 11.6–15.1)
EST. AVERAGE GLUCOSE BLD GHB EST-MCNC: 114 MG/DL
GFR SERPL CREATININE-BSD FRML MDRD: 96 ML/MIN/1.73SQ M
GLUCOSE P FAST SERPL-MCNC: 100 MG/DL (ref 65–99)
HBA1C MFR BLD: 5.6 %
HCT VFR BLD AUTO: 41.6 % (ref 34.8–46.1)
HDLC SERPL-MCNC: 29 MG/DL
HGB BLD-MCNC: 13.7 G/DL (ref 11.5–15.4)
IMM GRANULOCYTES # BLD AUTO: 0.03 THOUSAND/UL (ref 0–0.2)
IMM GRANULOCYTES NFR BLD AUTO: 0 % (ref 0–2)
LDLC SERPL CALC-MCNC: 114 MG/DL (ref 0–100)
LYMPHOCYTES # BLD AUTO: 2.89 THOUSANDS/ΜL (ref 0.6–4.47)
LYMPHOCYTES NFR BLD AUTO: 31 % (ref 14–44)
MCH RBC QN AUTO: 30.2 PG (ref 26.8–34.3)
MCHC RBC AUTO-ENTMCNC: 32.9 G/DL (ref 31.4–37.4)
MCV RBC AUTO: 92 FL (ref 82–98)
MONOCYTES # BLD AUTO: 0.62 THOUSAND/ΜL (ref 0.17–1.22)
MONOCYTES NFR BLD AUTO: 7 % (ref 4–12)
NEUTROPHILS # BLD AUTO: 5.33 THOUSANDS/ΜL (ref 1.85–7.62)
NEUTS SEG NFR BLD AUTO: 58 % (ref 43–75)
NRBC BLD AUTO-RTO: 0 /100 WBCS
PLATELET # BLD AUTO: 318 THOUSANDS/UL (ref 149–390)
PMV BLD AUTO: 10.6 FL (ref 8.9–12.7)
POTASSIUM SERPL-SCNC: 3.9 MMOL/L (ref 3.5–5.3)
PROT SERPL-MCNC: 7.5 G/DL (ref 6.4–8.2)
RBC # BLD AUTO: 4.54 MILLION/UL (ref 3.81–5.12)
SODIUM SERPL-SCNC: 137 MMOL/L (ref 136–145)
TRIGL SERPL-MCNC: 142 MG/DL
TSH SERPL DL<=0.05 MIU/L-ACNC: 3.09 UIU/ML (ref 0.36–3.74)
WBC # BLD AUTO: 9.21 THOUSAND/UL (ref 4.31–10.16)

## 2021-09-28 PROCEDURE — 85025 COMPLETE CBC W/AUTO DIFF WBC: CPT | Performed by: FAMILY MEDICINE

## 2021-09-28 PROCEDURE — 99213 OFFICE O/P EST LOW 20 MIN: CPT | Performed by: FAMILY MEDICINE

## 2021-09-28 PROCEDURE — 83036 HEMOGLOBIN GLYCOSYLATED A1C: CPT | Performed by: DIETITIAN, REGISTERED

## 2021-09-28 PROCEDURE — 80053 COMPREHEN METABOLIC PANEL: CPT | Performed by: FAMILY MEDICINE

## 2021-09-28 PROCEDURE — 36415 COLL VENOUS BLD VENIPUNCTURE: CPT | Performed by: DIETITIAN, REGISTERED

## 2021-09-28 PROCEDURE — 82248 BILIRUBIN DIRECT: CPT

## 2021-09-28 PROCEDURE — 84443 ASSAY THYROID STIM HORMONE: CPT | Performed by: FAMILY MEDICINE

## 2021-09-28 PROCEDURE — 4004F PT TOBACCO SCREEN RCVD TLK: CPT | Performed by: FAMILY MEDICINE

## 2021-09-28 PROCEDURE — 80061 LIPID PANEL: CPT | Performed by: FAMILY MEDICINE

## 2021-09-28 PROCEDURE — 3008F BODY MASS INDEX DOCD: CPT | Performed by: FAMILY MEDICINE

## 2021-09-28 RX ORDER — CLINDAMYCIN PHOSPHATE 10 UG/ML
LOTION TOPICAL
COMMUNITY
Start: 2021-09-20 | End: 2022-01-26

## 2021-09-28 NOTE — PROGRESS NOTES
Assessment/Plan: We will get a Holter monitor on her  Blood work ordered  She will call us if symptoms continue or increase  She will follow-up next week or p r n  Walter Lynne She does not wish to have a flu shot  Problem List Items Addressed This Visit        Endocrine    Hyperglycemia in pregnancy    Relevant Orders    CBC and differential    Hemoglobin A1C       Other    Tobacco user      Other Visit Diagnoses     Heart palpitations    -  Primary    Relevant Orders    CBC and differential    TSH, 3rd generation with Free T4 reflex    Holter monitor - 48 hour    Screening for cardiovascular condition        Relevant Orders    CBC and differential    Comprehensive metabolic panel    Lipid Panel with Direct LDL reflex           Diagnoses and all orders for this visit:    Heart palpitations  -     CBC and differential  -     TSH, 3rd generation with Free T4 reflex  -     Holter monitor - 48 hour; Future    Tobacco user    Hyperglycemia in pregnancy  -     CBC and differential  -     Hemoglobin A1C    Screening for cardiovascular condition  -     CBC and differential  -     Comprehensive metabolic panel  -     Lipid Panel with Direct LDL reflex    Other orders  -     clindamycin (CLEOCIN T) 1 % lotion; APPLY DAILY TO FACE        No problem-specific Assessment & Plan notes found for this encounter  Subjective:      Patient ID: Sadia Martell is a 30 y o  female  Patient here today stating for the last couple days has had episodes of fast heart rate that lasts briefly, then she has a strong heartbeat and then he goes back to regular rhythm  This seems to happen more towards the end of the day  No chest pain or shortness of breath with it  Patient started Wellbutrin about a month ago, is tolerating well  Patient had been on Wellbutrin before  Palpitations  This is a new problem  The current episode started in the past 7 days  The problem occurs daily  The problem has been unchanged   Pertinent negatives include no chest pain, chills, fatigue or fever  Nothing aggravates the symptoms  She has tried nothing for the symptoms  The following portions of the patient's history were reviewed and updated as appropriate:   She has a past medical history of Chlamydia, Depression, Gestational hypertension, third trimester (1/4/2021), Insulin controlled gestational diabetes mellitus (GDM) in third trimester (10/29/2020), Urinary tract infection, and Varicella  ,  does not have any pertinent problems on file  ,   has a past surgical history that includes [place] tooth extraction; [place] tooth extraction (Bilateral, 2010); Cholecystectomy; and pr remv pilonidal lesion extens (N/A, 8/3/2021)  ,  family history includes Breast cancer in her maternal grandmother; Diabetes in her father and paternal grandmother; Kidney disease in her father; Other in her mother; Thyroid disease in her mother  ,   reports that she has been smoking  She has a 5 00 pack-year smoking history  She has never used smokeless tobacco  She reports previous alcohol use  She reports previous drug use  Drug: Marijuana  ,  has No Known Allergies     Current Outpatient Medications   Medication Sig Dispense Refill    buPROPion (WELLBUTRIN SR) 100 mg 12 hr tablet Take 1 tablet (100 mg total) by mouth 2 (two) times a day 60 tablet 1    clindamycin (CLEOCIN T) 1 % lotion APPLY DAILY TO FACE      doxycycline hyclate (VIBRA-TABS) 100 mg tablet Take 100 mg by mouth 2 (two) times a day with meals       No current facility-administered medications for this visit  Review of Systems   Constitutional: Negative for chills, fatigue and fever  Respiratory: Negative  Cardiovascular: Positive for palpitations  Negative for chest pain and leg swelling  Gastrointestinal: Negative  Genitourinary: Negative            Objective:  Vitals:    09/28/21 0727 09/28/21 0747   BP: 126/84    Pulse: 104 80   Temp: 97 5 °F (36 4 °C)    Weight: (!) 150 kg (330 lb 6 4 oz)    Height: 6' (1 829 m)      Body mass index is 44 81 kg/m²  Physical Exam  Vitals reviewed  Constitutional:       General: She is not in acute distress  Appearance: Normal appearance  She is well-developed  She is not ill-appearing, toxic-appearing or diaphoretic  HENT:      Head: Normocephalic and atraumatic  Eyes:      Conjunctiva/sclera: Conjunctivae normal    Cardiovascular:      Rate and Rhythm: Normal rate and regular rhythm  Heart sounds: Normal heart sounds  No murmur heard  No friction rub  No gallop  Pulmonary:      Effort: Pulmonary effort is normal  No respiratory distress  Breath sounds: Normal breath sounds  No wheezing, rhonchi or rales  Musculoskeletal:      Right lower leg: No edema  Left lower leg: No edema  Neurological:      General: No focal deficit present  Mental Status: She is alert and oriented to person, place, and time  Psychiatric:         Mood and Affect: Mood normal          Behavior: Behavior normal          Thought Content:  Thought content normal          Judgment: Judgment normal

## 2021-10-04 ENCOUNTER — HOSPITAL ENCOUNTER (OUTPATIENT)
Dept: NON INVASIVE DIAGNOSTICS | Facility: HOSPITAL | Age: 26
Discharge: HOME | End: 2021-10-04
Payer: COMMERCIAL

## 2021-10-04 DIAGNOSIS — R00.2 HEART PALPITATIONS: ICD-10-CM

## 2021-10-04 PROCEDURE — 93226 XTRNL ECG REC<48 HR SCAN A/R: CPT

## 2021-10-04 PROCEDURE — 93225 XTRNL ECG REC<48 HRS REC: CPT

## 2021-10-06 PROCEDURE — 93227 XTRNL ECG REC<48 HR R&I: CPT | Performed by: INTERNAL MEDICINE

## 2021-10-08 ENCOUNTER — OFFICE VISIT (OUTPATIENT)
Dept: FAMILY MEDICINE CLINIC | Facility: CLINIC | Age: 26
End: 2021-10-08
Payer: COMMERCIAL

## 2021-10-08 VITALS
HEART RATE: 85 BPM | BODY MASS INDEX: 39.68 KG/M2 | OXYGEN SATURATION: 96 % | TEMPERATURE: 97.6 F | HEIGHT: 72 IN | SYSTOLIC BLOOD PRESSURE: 136 MMHG | WEIGHT: 293 LBS | DIASTOLIC BLOOD PRESSURE: 84 MMHG

## 2021-10-08 DIAGNOSIS — F33.1 MODERATE EPISODE OF RECURRENT MAJOR DEPRESSIVE DISORDER (HCC): Primary | ICD-10-CM

## 2021-10-08 DIAGNOSIS — F41.1 GENERALIZED ANXIETY DISORDER: ICD-10-CM

## 2021-10-08 PROCEDURE — 4004F PT TOBACCO SCREEN RCVD TLK: CPT | Performed by: PHYSICIAN ASSISTANT

## 2021-10-08 PROCEDURE — 99214 OFFICE O/P EST MOD 30 MIN: CPT | Performed by: PHYSICIAN ASSISTANT

## 2021-10-08 PROCEDURE — 3008F BODY MASS INDEX DOCD: CPT | Performed by: PHYSICIAN ASSISTANT

## 2021-10-08 RX ORDER — BUPROPION HYDROCHLORIDE 150 MG/1
150 TABLET, EXTENDED RELEASE ORAL 2 TIMES DAILY
Qty: 60 TABLET | Refills: 0 | Status: SHIPPED | OUTPATIENT
Start: 2021-10-08 | End: 2021-11-09 | Stop reason: SDUPTHER

## 2021-10-08 RX ORDER — BUSPIRONE HYDROCHLORIDE 5 MG/1
5 TABLET ORAL 3 TIMES DAILY PRN
Qty: 90 TABLET | Refills: 0 | Status: SHIPPED | OUTPATIENT
Start: 2021-10-08 | End: 2021-11-09 | Stop reason: SDUPTHER

## 2021-11-09 ENCOUNTER — OFFICE VISIT (OUTPATIENT)
Dept: FAMILY MEDICINE CLINIC | Facility: CLINIC | Age: 26
End: 2021-11-09
Payer: COMMERCIAL

## 2021-11-09 VITALS
HEIGHT: 72 IN | OXYGEN SATURATION: 97 % | WEIGHT: 293 LBS | BODY MASS INDEX: 39.68 KG/M2 | SYSTOLIC BLOOD PRESSURE: 108 MMHG | TEMPERATURE: 97.9 F | DIASTOLIC BLOOD PRESSURE: 64 MMHG | HEART RATE: 97 BPM

## 2021-11-09 DIAGNOSIS — F33.1 MODERATE EPISODE OF RECURRENT MAJOR DEPRESSIVE DISORDER (HCC): ICD-10-CM

## 2021-11-09 DIAGNOSIS — F41.1 GENERALIZED ANXIETY DISORDER: ICD-10-CM

## 2021-11-09 PROCEDURE — 99214 OFFICE O/P EST MOD 30 MIN: CPT | Performed by: PHYSICIAN ASSISTANT

## 2021-11-09 PROCEDURE — 4004F PT TOBACCO SCREEN RCVD TLK: CPT | Performed by: PHYSICIAN ASSISTANT

## 2021-11-09 RX ORDER — BUPROPION HYDROCHLORIDE 150 MG/1
150 TABLET, EXTENDED RELEASE ORAL 2 TIMES DAILY
Qty: 180 TABLET | Refills: 0 | Status: SHIPPED | OUTPATIENT
Start: 2021-11-09 | End: 2021-12-20 | Stop reason: SDUPTHER

## 2021-11-09 RX ORDER — BUSPIRONE HYDROCHLORIDE 10 MG/1
10 TABLET ORAL 3 TIMES DAILY PRN
Qty: 90 TABLET | Refills: 1 | Status: SHIPPED | OUTPATIENT
Start: 2021-11-09 | End: 2022-01-26

## 2021-11-12 ENCOUNTER — TELEPHONE (OUTPATIENT)
Dept: FAMILY MEDICINE CLINIC | Facility: CLINIC | Age: 26
End: 2021-11-12

## 2021-11-29 ENCOUNTER — VBI (OUTPATIENT)
Dept: ADMINISTRATIVE | Facility: OTHER | Age: 26
End: 2021-11-29

## 2021-12-03 ENCOUNTER — OFFICE VISIT (OUTPATIENT)
Dept: OBGYN CLINIC | Facility: CLINIC | Age: 26
End: 2021-12-03
Payer: COMMERCIAL

## 2021-12-03 VITALS
WEIGHT: 293 LBS | BODY MASS INDEX: 39.68 KG/M2 | SYSTOLIC BLOOD PRESSURE: 122 MMHG | DIASTOLIC BLOOD PRESSURE: 74 MMHG | HEIGHT: 72 IN

## 2021-12-03 DIAGNOSIS — B37.3 VULVOVAGINAL CANDIDIASIS: Primary | ICD-10-CM

## 2021-12-03 LAB
BV WHIFF TEST VAG QL: NEGATIVE
CLUE CELLS SPEC QL WET PREP: NEGATIVE
PH SMN: ABNORMAL [PH]
SL AMB POCT WET MOUNT: ABNORMAL
T VAGINALIS VAG QL WET PREP: NEGATIVE
YEAST VAG QL WET PREP: POSITIVE

## 2021-12-03 PROCEDURE — 87210 SMEAR WET MOUNT SALINE/INK: CPT | Performed by: ADVANCED PRACTICE MIDWIFE

## 2021-12-03 PROCEDURE — 99213 OFFICE O/P EST LOW 20 MIN: CPT | Performed by: ADVANCED PRACTICE MIDWIFE

## 2021-12-03 RX ORDER — NYSTATIN AND TRIAMCINOLONE ACETONIDE 100000; 1 [USP'U]/G; MG/G
OINTMENT TOPICAL 2 TIMES DAILY
Qty: 30 G | Refills: 0 | Status: SHIPPED | OUTPATIENT
Start: 2021-12-03 | End: 2022-01-26

## 2021-12-03 RX ORDER — FLUCONAZOLE 150 MG/1
TABLET ORAL
Qty: 2 TABLET | Refills: 0 | Status: SHIPPED | OUTPATIENT
Start: 2021-12-03 | End: 2021-12-10

## 2021-12-20 ENCOUNTER — OFFICE VISIT (OUTPATIENT)
Dept: FAMILY MEDICINE CLINIC | Facility: CLINIC | Age: 26
End: 2021-12-20
Payer: COMMERCIAL

## 2021-12-20 VITALS
WEIGHT: 293 LBS | SYSTOLIC BLOOD PRESSURE: 118 MMHG | BODY MASS INDEX: 39.68 KG/M2 | TEMPERATURE: 97.3 F | HEIGHT: 72 IN | DIASTOLIC BLOOD PRESSURE: 70 MMHG

## 2021-12-20 DIAGNOSIS — F41.1 GENERALIZED ANXIETY DISORDER: Primary | ICD-10-CM

## 2021-12-20 DIAGNOSIS — F33.1 MODERATE EPISODE OF RECURRENT MAJOR DEPRESSIVE DISORDER (HCC): ICD-10-CM

## 2021-12-20 PROCEDURE — 99214 OFFICE O/P EST MOD 30 MIN: CPT | Performed by: PHYSICIAN ASSISTANT

## 2021-12-20 PROCEDURE — 3725F SCREEN DEPRESSION PERFORMED: CPT | Performed by: PHYSICIAN ASSISTANT

## 2021-12-20 PROCEDURE — 3008F BODY MASS INDEX DOCD: CPT | Performed by: PHYSICIAN ASSISTANT

## 2021-12-20 PROCEDURE — 4004F PT TOBACCO SCREEN RCVD TLK: CPT | Performed by: PHYSICIAN ASSISTANT

## 2021-12-20 RX ORDER — BUPROPION HYDROCHLORIDE 200 MG/1
200 TABLET, EXTENDED RELEASE ORAL 2 TIMES DAILY
Qty: 60 TABLET | Refills: 1 | Status: SHIPPED | OUTPATIENT
Start: 2021-12-20 | End: 2022-03-21 | Stop reason: ALTCHOICE

## 2022-01-23 ENCOUNTER — TELEPHONE (OUTPATIENT)
Dept: OTHER | Facility: OTHER | Age: 27
End: 2022-01-23

## 2022-01-24 ENCOUNTER — TELEPHONE (OUTPATIENT)
Dept: FAMILY MEDICINE CLINIC | Facility: CLINIC | Age: 27
End: 2022-01-24

## 2022-01-26 ENCOUNTER — OFFICE VISIT (OUTPATIENT)
Dept: FAMILY MEDICINE CLINIC | Facility: CLINIC | Age: 27
End: 2022-01-26
Payer: COMMERCIAL

## 2022-01-26 VITALS
HEART RATE: 97 BPM | SYSTOLIC BLOOD PRESSURE: 140 MMHG | TEMPERATURE: 97 F | HEIGHT: 72 IN | OXYGEN SATURATION: 98 % | DIASTOLIC BLOOD PRESSURE: 96 MMHG | WEIGHT: 293 LBS | BODY MASS INDEX: 39.68 KG/M2

## 2022-01-26 DIAGNOSIS — F41.1 GENERALIZED ANXIETY DISORDER: Primary | ICD-10-CM

## 2022-01-26 DIAGNOSIS — F33.1 MODERATE EPISODE OF RECURRENT MAJOR DEPRESSIVE DISORDER (HCC): ICD-10-CM

## 2022-01-26 PROBLEM — Z97.5 NEXPLANON IN PLACE: Status: RESOLVED | Noted: 2021-04-08 | Resolved: 2022-01-26

## 2022-01-26 PROCEDURE — 3008F BODY MASS INDEX DOCD: CPT | Performed by: FAMILY MEDICINE

## 2022-01-26 PROCEDURE — 99213 OFFICE O/P EST LOW 20 MIN: CPT | Performed by: FAMILY MEDICINE

## 2022-01-26 PROCEDURE — 4004F PT TOBACCO SCREEN RCVD TLK: CPT | Performed by: FAMILY MEDICINE

## 2022-01-26 RX ORDER — ERYTHROMYCIN 5 MG/G
OINTMENT OPHTHALMIC
COMMUNITY
Start: 2022-01-06 | End: 2022-05-25

## 2022-01-26 RX ORDER — ESCITALOPRAM OXALATE 10 MG/1
10 TABLET ORAL DAILY
Qty: 30 TABLET | Refills: 5 | Status: SHIPPED | OUTPATIENT
Start: 2022-01-26 | End: 2022-03-21 | Stop reason: ALTCHOICE

## 2022-01-26 NOTE — PROGRESS NOTES
Assessment/Plan:  Patient will discontinue the buspirone  I will start her on Lexapro 10 mg a day  She will continue taking her Wellbutrin  She will call us if she has any problems  I will see her back in 1 month or p r n  Problem List Items Addressed This Visit        Other    Moderate episode of recurrent major depressive disorder (HCC)    Relevant Medications    escitalopram (Lexapro) 10 mg tablet    Generalized anxiety disorder - Primary    Relevant Medications    escitalopram (Lexapro) 10 mg tablet           Diagnoses and all orders for this visit:    Generalized anxiety disorder  -     escitalopram (Lexapro) 10 mg tablet; Take 1 tablet (10 mg total) by mouth daily    Moderate episode of recurrent major depressive disorder (HCC)  -     escitalopram (Lexapro) 10 mg tablet; Take 1 tablet (10 mg total) by mouth daily    Other orders  -     erythromycin (ILOTYCIN) ophthalmic ointment        No problem-specific Assessment & Plan notes found for this encounter  Subjective:      Patient ID: Marylen Corns is a 27 y o  female  Patient here today for follow-up on her anxiety and depression  Patient feels the Wellbutrin has helped a great deal with her depression, she still has trouble with anxiety  Buspirone does not seem to help  She has trouble sleeping at night, because she can not shut her mind off  No SI or HI  Anxiety  Presents for follow-up visit  Symptoms include excessive worry, insomnia, nervous/anxious behavior and restlessness  Patient reports no suicidal ideas  Symptoms occur constantly  The severity of symptoms is moderate  The quality of sleep is fair  Nighttime awakenings: occasional      Compliance with medications is %         The following portions of the patient's history were reviewed and updated as appropriate:   She has a past medical history of Chlamydia, Depression, Gestational hypertension, third trimester (1/4/2021), Insulin controlled gestational diabetes mellitus (GDM) in third trimester (10/29/2020), Urinary tract infection, and Varicella  ,  does not have any pertinent problems on file  ,   has a past surgical history that includes [place] tooth extraction; [place] tooth extraction (Bilateral, 2010); Cholecystectomy; and pr remv pilonidal lesion extens (N/A, 8/3/2021)  ,  family history includes Breast cancer in her maternal grandmother; Diabetes in her father and paternal grandmother; Kidney disease in her father; Other in her mother; Thyroid disease in her mother  ,   reports that she has been smoking  She has a 5 00 pack-year smoking history  She has never used smokeless tobacco  She reports previous alcohol use  She reports previous drug use  Drug: Marijuana  ,  has No Known Allergies     Current Outpatient Medications   Medication Sig Dispense Refill    buPROPion (WELLBUTRIN SR) 200 MG 12 hr tablet Take 1 tablet (200 mg total) by mouth 2 (two) times a day 60 tablet 1    erythromycin (ILOTYCIN) ophthalmic ointment       escitalopram (Lexapro) 10 mg tablet Take 1 tablet (10 mg total) by mouth daily 30 tablet 5     No current facility-administered medications for this visit  Review of Systems   Constitutional: Negative  Respiratory: Negative  Cardiovascular: Negative  Gastrointestinal: Negative  Genitourinary: Negative  Psychiatric/Behavioral: Negative for suicidal ideas  The patient is nervous/anxious and has insomnia  Objective:  Vitals:    01/26/22 1322   BP: 140/96   Pulse: 97   Temp: (!) 97 °F (36 1 °C)   SpO2: 98%   Weight: (!) 149 kg (328 lb 6 4 oz)   Height: 6' (1 829 m)     Body mass index is 44 54 kg/m²  Physical Exam  Vitals reviewed  Constitutional:       General: She is not in acute distress  Appearance: Normal appearance  She is well-developed  She is not diaphoretic  HENT:      Head: Normocephalic and atraumatic     Eyes:      Conjunctiva/sclera: Conjunctivae normal    Pulmonary:      Comments: Patient talking in full sentences in no distress  Musculoskeletal:      Right lower leg: No edema  Left lower leg: No edema  Neurological:      General: No focal deficit present  Mental Status: She is alert and oriented to person, place, and time  Psychiatric:         Mood and Affect: Mood normal          Behavior: Behavior normal          Thought Content:  Thought content normal          Judgment: Judgment normal

## 2022-02-08 ENCOUNTER — VBI (OUTPATIENT)
Dept: ADMINISTRATIVE | Facility: OTHER | Age: 27
End: 2022-02-08

## 2022-02-16 ENCOUNTER — TELEPHONE (OUTPATIENT)
Dept: OBGYN CLINIC | Facility: MEDICAL CENTER | Age: 27
End: 2022-02-16

## 2022-02-16 DIAGNOSIS — N92.6 MISSED MENSES: Primary | ICD-10-CM

## 2022-02-16 NOTE — TELEPHONE ENCOUNTER
Patient called about a positive pregnancy her lmp was on 01/07/22  Patietnt would be going to a lab to get her blood work done at [facility] and patient is aware of waiting to get her blood work and does have some concerns about medication  Please review and contact patient when you can

## 2022-02-16 NOTE — TELEPHONE ENCOUNTER
Orders placed and she is aware to get labs drawn after this Friday   Answered all questions she had at this time

## 2022-02-18 ENCOUNTER — APPOINTMENT (OUTPATIENT)
Dept: LAB | Facility: MEDICAL CENTER | Age: 27
End: 2022-02-18
Payer: COMMERCIAL

## 2022-02-18 DIAGNOSIS — N92.6 MISSED MENSES: ICD-10-CM

## 2022-02-18 LAB
B-HCG SERPL-ACNC: 941 MIU/ML
PROGEST SERPL-MCNC: 9.8 NG/ML

## 2022-02-18 PROCEDURE — 36415 COLL VENOUS BLD VENIPUNCTURE: CPT

## 2022-02-18 PROCEDURE — 84144 ASSAY OF PROGESTERONE: CPT

## 2022-02-18 PROCEDURE — 84702 CHORIONIC GONADOTROPIN TEST: CPT

## 2022-02-22 ENCOUNTER — APPOINTMENT (OUTPATIENT)
Dept: LAB | Facility: MEDICAL CENTER | Age: 27
End: 2022-02-22
Payer: COMMERCIAL

## 2022-02-22 DIAGNOSIS — N92.6 MISSED MENSES: ICD-10-CM

## 2022-02-22 DIAGNOSIS — N92.6 MISSED MENSES: Primary | ICD-10-CM

## 2022-02-22 LAB — B-HCG SERPL-ACNC: 3479 MIU/ML

## 2022-02-22 PROCEDURE — 36415 COLL VENOUS BLD VENIPUNCTURE: CPT

## 2022-02-22 PROCEDURE — 84702 CHORIONIC GONADOTROPIN TEST: CPT

## 2022-02-27 ENCOUNTER — NURSE TRIAGE (OUTPATIENT)
Dept: OTHER | Facility: OTHER | Age: 27
End: 2022-02-27

## 2022-02-28 NOTE — TELEPHONE ENCOUNTER
Spoke with [place] this am Denies any discomfort, bleeding or spotting at this time  Pt states that she had some spotting a few times during the weekend when she used the bathroom  Once she wiped, spotting was gone  No heavy bleeding  Pt was scheduled for a dating ultrasound in 2 weeks   Moved ultrasound for this week

## 2022-02-28 NOTE — TELEPHONE ENCOUNTER
Regardin weeks bleeding   ----- Message from Russell Jane sent at 2022  6:53 PM EST -----  "I am 8 weeks pregnant and I have been bleeding all weekend and I am concerned

## 2022-02-28 NOTE — TELEPHONE ENCOUNTER
Patient did have intercourse Friday before spotting began  Please call patient to schedule  Reason for Disposition   SPOTTING lasts > 48 hours or spotting happens more than once in a week    Answer Assessment - Initial Assessment Questions  1  ONSET: "When did this bleeding start?"        Friday night into Saturday  2  DESCRIPTION: "Describe the bleeding that you are having " "How much bleeding is there?"     - SPOTTING: spotting, or pinkish / brownish mucous discharge; does not fill panti-liner or pad     - MILD:  less than 1 pad / hour; less than patient's usual menstrual bleeding    - MODERATE: 1-2 pads / hour; 1 menstrual cup every 6 hours; small-medium blood clots (e g , pea, grape, small coin)    - SEVERE: soaking 2 or more pads/hour for 2 or more hours; 1 menstrual cup every 2 hours; bleeding not contained by pads or continuous red blood from vagina; large blood clots (e g , golf ball, large coin)       Mild only noted when I wipe  It was dark brown and now I noticed a more red color  3  ABDOMINAL PAIN SEVERITY: If present, ask: "How bad is it?"  (e g , Scale 1-10; mild, moderate, or severe)    - MILD (1-3): doesn't interfere with normal activities, abdomen soft and not tender to touch     - MODERATE (4-7): interferes with normal activities or awakens from sleep, tender to touch     - SEVERE (8-10): excruciating pain, doubled over, unable to do any normal activities      Mild dull cramping but that has been since I found out I was pregnant  4  PREGNANCY: "Do you know how many weeks or months pregnant you are?" "When was the first day of your last normal menstrual period?"      8 weeks     5  HEMODYNAMIC STATUS: "Are you weak or feeling lightheaded?" If Yes, ask: "Can you stand and walk normally?"       No     6   OTHER SYMPTOMS: "What other symptoms are you having with the bleeding?" (e g , passed tissue, vaginal discharge, fever, menstrual-type cramps)      Dull cramps been ongoing      Protocols used: PREGNANCY - VAGINAL BLEEDING LESS THAN 20 WEEKS EGA-ADULT-AH

## 2022-03-02 ENCOUNTER — ULTRASOUND (OUTPATIENT)
Dept: OBGYN CLINIC | Facility: MEDICAL CENTER | Age: 27
End: 2022-03-02
Payer: COMMERCIAL

## 2022-03-02 VITALS — DIASTOLIC BLOOD PRESSURE: 85 MMHG | SYSTOLIC BLOOD PRESSURE: 140 MMHG | BODY MASS INDEX: 44.48 KG/M2 | WEIGHT: 293 LBS

## 2022-03-02 DIAGNOSIS — N92.6 MISSED MENSES: Primary | ICD-10-CM

## 2022-03-02 DIAGNOSIS — O09.891 SHORT INTERVAL BETWEEN PREGNANCIES AFFECTING PREGNANCY IN FIRST TRIMESTER, ANTEPARTUM: ICD-10-CM

## 2022-03-02 DIAGNOSIS — F41.1 GENERALIZED ANXIETY DISORDER: ICD-10-CM

## 2022-03-02 DIAGNOSIS — O16.1 HTN COMPLICATING PERIPREGNANCY, ANTEPARTUM, FIRST TRIMESTER: ICD-10-CM

## 2022-03-02 DIAGNOSIS — Z86.32 HX GESTATIONAL DIABETES: ICD-10-CM

## 2022-03-02 DIAGNOSIS — Z86.59 H/O MAJOR DEPRESSION: ICD-10-CM

## 2022-03-02 PROCEDURE — 99214 OFFICE O/P EST MOD 30 MIN: CPT | Performed by: OBSTETRICS & GYNECOLOGY

## 2022-03-02 PROCEDURE — 76801 OB US < 14 WKS SINGLE FETUS: CPT | Performed by: OBSTETRICS & GYNECOLOGY

## 2022-03-03 NOTE — PROGRESS NOTES
Assessment/Plan  Humaira Evans was seen today for pregnancy ultrasound  Diagnoses and all orders for this visit:    Missed menses  -     AMB US OB < 14 weeks single or first gestation level 1  - needs nurse OB intake   See US report   Final EDC from [address] 10/23/2022    BMI 40 0-44 9, adult ([facility] )  - we discussed early 1 hr gtt   Exercise and healthy habits     Generalized anxiety disorder  Stopped meds , states ding well currently    H/O major depression  Stopped meds states doing well currently and having support    Short interval between pregnancies affecting pregnancy in first trimester, antepartum  We discussed risks associated with this such as  labor and aware will need follow up with MFM  HTN complicating peripregnancy, antepartum, first trimester  Not on meds   We discussed initiation of ASA 162mg   Will need PIH baseline labs   Recommend cardiology work up      Hx gestational diabetes  Need for early 1 hr gtt discussed    Smoking cessation encouraged             Subjective   Doug Vaughan is a 27 y o  female here for a problem visit  Patient is complaining of missed menses / states has nery which tells her LMP 2022   Was not attempting to conceive but welcomed/ states has typical early pregnancy symptoms , nausea but no vomiting      Patient Active Problem List   Diagnosis    Functional diarrhea    Generalized abdominal pain    Tobacco user    H/O major depression    Left ovarian cyst    Pilonidal cyst    Insulin controlled gestational diabetes mellitus (GDM) in third trimester    BMI 40 0-44 9, adult ([facility] )    Hyperglycemia in pregnancy    Vulvovaginal candidiasis     (spontaneous vaginal delivery)    Postpartum depression    Hidradenitis    Moderate episode of recurrent major depressive disorder ([facility]    Generalized anxiety disorder       Gynecologic History  Patient's last menstrual period was 2022  The current method of family planning is none      Past Medical History: Diagnosis Date    Chlamydia     Depression     no medications for 1 year    Gestational hypertension, third trimester 1/4/2021    Insulin controlled gestational diabetes mellitus (GDM) in third trimester 10/29/2020    Urinary tract infection     Varicella      Past Surgical History:   Procedure Laterality Date    CHOLECYSTECTOMY      SD REMV PILONIDAL LESION EXTENS N/A 8/3/2021    Procedure: EXCISION PILONIDAL CYST;  Surgeon: Mima Parsons DO;  Location: [facility];  Service: General    WISDOM TOOTH EXTRACTION      WISDOM TOOTH EXTRACTION Bilateral 2010     Family History   Problem Relation Age of Onset    Thyroid disease Mother     Other Mother         cerebellar atrophy dx   at 22 years    Diabetes Father     Kidney disease Father     Breast cancer Maternal Grandmother     Diabetes Paternal Grandmother     Colon cancer Neg Hx     Ovarian cancer Neg Hx      Social History     Socioeconomic History    Marital status: /Civil Union     Spouse name: Not on file    Number of children: Not on file    Years of education: Not on file    Highest education level: Not on file   Occupational History    Not on file   Tobacco Use    Smoking status: Current Every Day Smoker     Packs/day: 0 50     Years: 10 00     Pack years: 5 00    Smokeless tobacco: Never Used   Vaping Use    Vaping Use: Former    Substances: Nicotine   Substance and Sexual Activity    Alcohol use: Not Currently     Comment: rare    Drug use: Not Currently     Types: Marijuana     Comment: quit with knowledge of pregnancy    Sexual activity: Not Currently     Partners: Male   Other Topics Concern    Not on file   Social History Narrative    Not on file     Social Determinants of Health     Financial Resource Strain: Not on file   Food Insecurity: Not on file   Transportation Needs: Not on file   Physical Activity: Not on file   Stress: Not on file   Social Connections: Not on file   Intimate Partner Violence: Not on file   Housing Stability: Not on file     No Known Allergies    Current Outpatient Medications:     buPROPion (WELLBUTRIN SR) 200 MG 12 hr tablet, Take 1 tablet (200 mg total) by mouth 2 (two) times a day (Patient not taking: Reported on 3/2/2022 ), Disp: 60 tablet, Rfl: 1    erythromycin (ILOTYCIN) ophthalmic ointment, , Disp: , Rfl:     escitalopram (Lexapro) 10 mg tablet, Take 1 tablet (10 mg total) by mouth daily (Patient not taking: Reported on 3/2/2022 ), Disp: 30 tablet, Rfl: 5    Review of Systems  Constitutional :no fever, feels well, no tiredness, no recent weight gain or loss  ENT: no ear ache, no loss of hearing, no nosebleeds or nasal discharge, no sore throat or hoarseness  Cardiovascular: no complaints of slow or fast heart beat, no chest pain, no palpitations, no leg claudication or lower extremity edema  Respiratory: no complaints of shortness of shortness of breath, no BARBER  Breasts:no complaints of breast pain, breast lump, or nipple discharge  Gastrointestinal: no complaints of abdominal pain, constipation, nausea, vomiting, or diarrhea or bloody stools  Genitourinary : no complaints of dysuria, incontinence, pelvic pain, no dysmenorrhea, vaginal discharge or abnormal vaginal bleeding and as noted in HPI  Musculoskeletal: no complaints of arthralgia, no myalgia, no joint swelling or stiffness, no limb pain or swelling  Integumentary: no complaints of skin rash or lesion, itching or dry skin  Neurological: no complaints of headache, no confusion, no numbness or tingling, no dizziness or fainting     Objective     /85   Wt (!) 149 kg (328 lb)   LMP 01/07/2022   BMI 44 48 kg/m²     General:   appears stated age, cooperative, alert normal mood and affect   Lungs: Unlabored breathing    Abdomen: soft, non-tender, without masses or organomegaly   Vulva: normal   Vagina: not evaluated   Skin normal skin turgor and no rashes     Psychiatric orientation to person, place, and time: normal  mood and affect: normal   See US report

## 2022-03-17 ENCOUNTER — INITIAL PRENATAL (OUTPATIENT)
Dept: OBGYN CLINIC | Facility: MEDICAL CENTER | Age: 27
End: 2022-03-17

## 2022-03-17 VITALS
DIASTOLIC BLOOD PRESSURE: 72 MMHG | HEIGHT: 72 IN | SYSTOLIC BLOOD PRESSURE: 130 MMHG | BODY MASS INDEX: 39.68 KG/M2 | WEIGHT: 293 LBS

## 2022-03-17 DIAGNOSIS — Z34.81 PRENATAL CARE, SUBSEQUENT PREGNANCY, FIRST TRIMESTER: Primary | ICD-10-CM

## 2022-03-17 PROCEDURE — OBC: Performed by: OBSTETRICS & GYNECOLOGY

## 2022-03-17 RX ORDER — ASPIRIN 81 MG/1
162 TABLET, CHEWABLE ORAL DAILY
COMMUNITY

## 2022-03-17 NOTE — PROGRESS NOTES
[id number]  OB History    Para Term  AB Living   3 1 1   1 1   SAB IAB Ectopic Multiple Live Births   1       1      # Outcome Date GA Lbr Satish/2nd Weight Sex Delivery Anes PTL Lv   3 Current            2 Term 21 37w6d / 02:01 3535 g (7 lb 12 7 oz) M Vag-Spont EPI, Local  JOE   1 SAB 2016 6w0d    SAB            * Pt presents for OB intake  *  *Pt's LMP was Patient's last menstrual period was 2022  *Ultrasound date:3/2/2022   6weeks 3days  *Estimated date of delivery: 10/23/2022   * confirmed by us    *Signs/Symptoms of Pregnancy   *no Constipation    *yes Headaches   *no Cramping  *no Spotting    *no PICA cravings    Diabetes     *yes Hx of GDM    *yes BMI >35    *yes First degree relative with type 2 diabetes    Hypertension-    *no Hx of chronic HTN    *yes Hx of gestational HTN   *yes hx of preeclampsia without severe features    *Infection Screening   *no Does the pt have a hx of MRSA? *no History of herpes? *no History of COVID? *    *Immunizations:   *yes Discussed influenza vaccine     ref   *yes Discussed TDaP vaccine   *yes COVID Vaccine * Received       ACTIVE MEDICAL/MENTAL HEALTH CONDITIONS  Autoimmune Disease *n  Asthma: *n   Cardiac Disease *n  Chronic HTN, Pregestational  *n  Hepatitis  *n     treated: *n  Thalassemia Alpha*n  Beta *n  Renal Disease *n  Sickle Cell Ds    Trait *n Disease *n  Depression *yes   Anxiety*no  Medications*no      *Interview education   *Handouts given:    *Baby and Me support center     *MyChart sign up instructions    *Lab Locations    *[facility]ians List/Choosing Pediatrician Sheet    *[facility] Childbirth and Parenting Classes    *Schedule for Prenatal Visits    *Pregnancy Warning Signs Reviewed    *Safe Medications During Pregnancy    *SMA and CF Testing information sheet    *CPT Code Sheet/MFM 13week NT pamphlet    *[employer]        SBIRT Screen:  Depression Screening Follow-up Plan: Patient's depression screening was negative with an [place] score of  5        *  [facility]   *yes discussed genetic testing- pt interested    Rosalba Gong is aware to call M to schedule US and genetic testing          Patient has been informed of basic prenatal advice such as avoiding alcohol, drugs, and smoking  She should remain hydrated and take daily prenatal vitamins  Patient should avoid caffeine, raw sprouts, high mercury fish, undercooked fish, raw eggs, organ meat, unwashed produce, and unpasteurized cheeses, milk, and fruit juice and undercooked meats  She has been informed about toxoplasmosis and to avoid cat feces  *Details that I feel the provider should be aware of:  Rosalba Gong was seen for her ob intake at [facility]  She has some complaints of nausea  Advised her to trial vitamin b6 and unisom and to eat small frequent meals throughout the day  She was agreeable  Rosalba Gong has a hx of depression which she currently is not on any medication but is seeing a provider for  She Stopped taking her welbutrin 5 weeks ago and has been doing fine  She will be reaching out to her provider to talk about medication if needed  She does currently smoke about 5 cigarettes a day but has been cutting back  Previous pregnancy was complicated by insulin controlled GDM and GHTN that progressed to pre e without severe features  BP Labs and 1 hr GTT advised along with prenatal panel  MFM referral placed  PN1 visit scheduled for *4/12/2022  The patient was oriented to our practice and all questions were answered      Interviewed by:  Daniel Vazquez

## 2022-03-17 NOTE — PATIENT INSTRUCTIONS
Pregnancy at 7 to 10 Weeks   AMBULATORY CARE:   Changes happening with your body:  Pregnancy hormones may cause your body to go through many changes during this stage of your pregnancy  You may feel more tired than usual, and have mood swings, nausea and vomiting, and headaches  You may gain or lose some weight  Your breasts may feel tender and swollen and you may urinate more often  You may have cravings for certain foods or dislike of foods you normally eat  You may also have heartburn or constipation  Seek care immediately if:   · You have pain or cramping in your abdomen or low back  · You have heavy vaginal bleeding or clotting  · You pass material that looks like tissue or large clots  Collect the material and bring it with you  Call your doctor or obstetrician if:   · You have light bleeding  · You have chills or a fever  · You have vaginal itching, burning, or pain  · You have yellow, green, white, or foul-smelling vaginal discharge  · You have pain or burning when you urinate, less urine than usual, or pink or bloody urine  · You have questions or concerns about your condition or care  How to care for yourself at this stage of your pregnancy:       · Manage nausea and vomiting  Avoid fatty and spicy foods  Eat small meals throughout the day instead of large meals  Marion may help to decrease nausea  Ask your healthcare provider about other ways of decreasing nausea and vomiting  · Eat a variety of healthy foods  Healthy foods include fruits, vegetables, whole-grain breads, low-fat dairy foods, beans, lean meats, and fish  Drink liquids as directed  Ask how much liquid to drink each day and which liquids are best for you  Limit caffeine to less than 200 milligrams each day  Limit your intake of fish to 2 servings each week  Choose fish low in mercury such as canned light tuna, shrimp, salmon, cod, or tilapia   Do not  eat fish high in mercury such as swordfish, tilefish, basilio mackerel, and shark  · Take prenatal vitamins as directed  Your need for certain vitamins and minerals, such as folic acid, increases during pregnancy  Prenatal vitamins provide some of the extra vitamins and minerals you need  Prenatal vitamins may also help to decrease the risk of certain birth defects  · Ask how much weight you should gain each month  Too much or too little weight gain can be unhealthy for you and your baby  · Talk to your healthcare provider about exercise  Moderate exercise can help you stay fit  Your healthcare provider will help you plan an exercise program that is safe for you during pregnancy  · Do not smoke  Smoking increases your risk of a miscarriage and other health problems during your pregnancy  Smoking can cause your baby to be born too early or weigh less at birth  Quit smoking as soon as you think you might be pregnant  Ask your healthcare provider for information if you need help quitting  · Do not drink alcohol  Alcohol passes from your body to your baby through the placenta  It can affect your baby's brain development and cause fetal alcohol syndrome (FAS)  FAS is a group of conditions that causes mental, behavior, and growth problems  · Talk to your healthcare provider before you take any medicines  Many medicines may harm your baby if you take them when you are pregnant  Do not take any medicines, vitamins, herbs, or supplements without first talking to your healthcare provider  Never use illegal or street drugs (such as marijuana or cocaine) while you are pregnant  Safety tips during pregnancy:   · Avoid hot tubs and saunas  Do not use a hot tub or sauna while you are pregnant, especially during your first trimester  Hot tubs and saunas may raise your baby's temperature and increase the risk of birth defects  · Avoid toxoplasmosis  This is an infection caused by eating raw meat or being around infected cat feces   It can cause birth defects, miscarriages, and other problems  Wash your hands after you touch raw meat  Make sure any meat is well-cooked before you eat it  Avoid raw eggs and unpasteurized milk  Use gloves or ask someone else to clean your cat's litter box while you are pregnant  Changes happening with your baby:  By 10 weeks, your baby will be about 2½ inches long from the top of the head to the rump (baby's bottom)  Your baby weighs about ½ ounce  Major body organs, such as the brain, heart, and lungs, are forming  Your baby's facial features are also starting to form  Prenatal care:  Prenatal care is a series of visits with your healthcare provider throughout your pregnancy  During the first 28 weeks of your pregnancy, you will see your healthcare provider 1 time each month  Prenatal care can help prevent problems during pregnancy and childbirth  Your healthcare provider will check your blood pressure and weight  Your baby's heart rate will also be checked  You may also need the following at some visits:  · A pelvic exam  allows your healthcare provider to see your cervix (the bottom part of your uterus)  Your healthcare provider will use a speculum to open your vagina  He or she will check the size and shape of your uterus  You may also have a Pap smear at your first prenatal visit  This is a test to check your cervix for abnormal cells  · Blood tests  may be done to check for any of the following:     ? Gestational diabetes or anemia (low iron level)    ? Blood type or Rh factor, or certain birth defects    ? Immunity to certain diseases, such as chickenpox or rubella    ? An infection, such as a sexually transmitted infection, HIV, or hepatitis B    · Hepatitis B  may need to be prevented or treated  Hepatitis B is inflammation of the liver caused by the hepatitis B virus (HBV)  HBV can spread from a mother to her baby during delivery   You will be checked for HBV as early as possible in the first trimester of each pregnancy  You need the test even if you received the hepatitis B vaccine or were tested before  You may need to have an HBV infection treated before you give birth  · Urine tests  may also be done to check for sugar and protein  These can be signs of gestational diabetes or preeclampsia  Urine tests may also be done to check for signs of infection  · A fetal ultrasound  shows pictures of your baby inside your uterus  The pictures are used to check your baby's development, movement, and position  · Genetic disorder screening tests  may be offered to you  These screening tests check your baby's risk for genetic disorders such as Down syndrome  A screening test includes a blood test and ultrasound  Follow up with your doctor or obstetrician as directed:  Go to all prenatal visits  Write down your questions so you remember to ask them during your visits  © [employer] 2022 Information is for End User's use only and may not be sold, redistributed or otherwise used for commercial purposes  All illustrations and images included in CareNotes® are the copyrighted property of A D A M , Inc  or [facility]   The above information is an  only  It is not intended as medical advice for individual conditions or treatments  Talk to your doctor, nurse or pharmacist before following any medical regimen to see if it is safe and effective for you  Cigarette Smoking and Your Health   AMBULATORY CARE:   Risks to your health if you smoke:  Nicotine and other chemicals found in tobacco and e-cigarettes can damage every cell in your body  Even if you are a light smoker, you have an increased risk for cancer, heart disease, and lung disease  If you are pregnant or have diabetes, smoking increases your risk for complications  Nicotine can affect an adolescent's developing brain  This can lead to trouble thinking, learning, or paying attention    Benefits to your health if you stop smoking: · You decrease respiratory symptoms such as coughing, wheezing, and shortness of breath  · You reduce your risk for cancers of the lung, mouth, throat, kidney, bladder, pancreas, stomach, and cervix  If you already have cancer, you increase the benefits of chemotherapy  You also reduce your risk for cancer returning or a second cancer from developing  · You reduce your risk for heart disease, blood clots, heart attack, and stroke  · You reduce your risk for lung infections, and diseases such as pneumonia, asthma, chronic bronchitis, and emphysema  · Your circulation improves  More oxygen can be delivered to your body  If you have diabetes, you lower your risk for complications, such as kidney, artery, and eye diseases  You also lower your risk for nerve damage  Nerve damage can lead to amputations, poor vision, and blindness  · You improve your body's ability to heal and to fight infections  · An adolescent can help his or her brain and body develop in a healthy way  Talk to your adolescent about all the health risks of nicotine  If you can, start talking about nicotine when your child is younger than 12 years  This may make it easier for him or her not to start using nicotine as a teenager or adult  Explain to him or her that it is best never to start  It can be hard to try to quit later  Benefits to the health of others if you stop smoking:  Tobacco is harmful to nonsmokers who breathe in your secondhand smoke  The following are ways the health of others around you may improve when you stop smoking:  · You lower the risks for lung cancer and heart disease in nonsmoking adults  · If you are pregnant, you lower the risk for miscarriage, early delivery, low birth weight, and stillbirth  You also lower your baby's risk for SIDS, obesity, developmental delay, and neurobehavioral problems, such as ADHD      · If you have children, you lower their risk for ear infections, colds, pneumonia, bronchitis, and asthma  Follow up with your doctor as directed:  Write down your questions so you remember to ask them during your visits  For support and more information:   · American [employer] Association  [address] [facility]  [address]  Phone: [facility] [address]  Phone: 7- 799 - 247-5100  Web Address: [employer]    · Smokefree  gov  Phone: 1- 313 - 020-8159  Web Address: www smokefree  [facility] 2022 Information is for End User's use only and may not be sold, redistributed or otherwise used for commercial purposes  All illustrations and images included in CareNotes® are the copyrighted property of A D A M , Inc  or [address] [place], [place] [address]   The above information is an  only  It is not intended as medical advice for individual conditions or treatments  Talk to your doctor, nurse or pharmacist before following any medical regimen to see if it is safe and effective for you

## 2022-03-21 ENCOUNTER — OFFICE VISIT (OUTPATIENT)
Dept: FAMILY MEDICINE CLINIC | Facility: CLINIC | Age: 27
End: 2022-03-21
Payer: COMMERCIAL

## 2022-03-21 VITALS
TEMPERATURE: 97.7 F | HEART RATE: 94 BPM | BODY MASS INDEX: 39.68 KG/M2 | WEIGHT: 293 LBS | SYSTOLIC BLOOD PRESSURE: 146 MMHG | OXYGEN SATURATION: 96 % | HEIGHT: 72 IN | DIASTOLIC BLOOD PRESSURE: 84 MMHG

## 2022-03-21 DIAGNOSIS — Z72.0 TOBACCO USER: ICD-10-CM

## 2022-03-21 DIAGNOSIS — F41.1 GENERALIZED ANXIETY DISORDER: Primary | ICD-10-CM

## 2022-03-21 DIAGNOSIS — F33.1 MODERATE EPISODE OF RECURRENT MAJOR DEPRESSIVE DISORDER (HCC): ICD-10-CM

## 2022-03-21 PROCEDURE — 99214 OFFICE O/P EST MOD 30 MIN: CPT | Performed by: PHYSICIAN ASSISTANT

## 2022-03-21 PROCEDURE — 4004F PT TOBACCO SCREEN RCVD TLK: CPT | Performed by: PHYSICIAN ASSISTANT

## 2022-03-21 PROCEDURE — 3008F BODY MASS INDEX DOCD: CPT | Performed by: PHYSICIAN ASSISTANT

## 2022-03-21 NOTE — PROGRESS NOTES
Assessment/Plan:    Problem List Items Addressed This Visit        Other    Moderate episode of recurrent major depressive disorder (HCC)    Relevant Medications    sertraline (ZOLOFT) 50 mg tablet    Generalized anxiety disorder - Primary    Relevant Medications    sertraline (ZOLOFT) 50 mg tablet           Diagnoses and all orders for this visit:    Generalized anxiety disorder  -     sertraline (ZOLOFT) 50 mg tablet; Take 1 tablet (50 mg total) by mouth daily    Moderate episode of recurrent major depressive disorder (HCC)  -     sertraline (ZOLOFT) 50 mg tablet; Take 1 tablet (50 mg total) by mouth daily        Will switch back to zoloft 50 mg daily  Explained that this is the safest option during pregnancy  She will continue to quit smoking and plans to start a program through [facility] office  She will return in one month or sooner if needed  Subjective:      Patient ID: Andrea Best is a 27 y o  female  Quinn Rivera is a very pleasant 27year old female who is here today for a follow-up for anxiety and depression  She found out that she was pregnant and stopped her Wellbutrin and Lexapro about 4 weeks ago  She is noticing that she has less interest and is more anxious  She started smoking more, which she would like to quit all together  She spoke to her OB/GYN who advised her to follow up with us about restarting an antidepressant  She was on zoloft in the past and did okay on it  She denies any suicidal or homicidal thoughts  The following portions of the patient's history were reviewed and updated as appropriate:   She has a past medical history of Chlamydia, Depression, Gestational hypertension, third trimester (1/4/2021), Hypertension, Insulin controlled gestational diabetes mellitus (GDM) in third trimester (10/29/2020), Urinary tract infection, and Varicella  ,  does not have any pertinent problems on file  ,   has a past surgical history that includes [place] tooth extraction; [place] tooth extraction (Bilateral, 2010); Cholecystectomy; and pr remv pilonidal lesion extens (N/A, 8/3/2021)  ,  family history includes Breast cancer in her maternal grandmother; Clotting disorder in her father and paternal grandfather; Diabetes in her father and paternal grandmother; Kidney disease in her father; Other in her mother; Thyroid disease in her mother  ,   reports that she has been smoking  She has a 2 50 pack-year smoking history  She has never used smokeless tobacco  She reports previous alcohol use  She reports previous drug use  Drug: Marijuana  ,  has No Known Allergies     Current Outpatient Medications   Medication Sig Dispense Refill    aspirin 81 mg chewable tablet Chew 162 mg daily      Prenatal MV-Min-Fe Fum-FA-DHA (PRENATAL 1 PO) Take by mouth      erythromycin (ILOTYCIN) ophthalmic ointment  (Patient not taking: Reported on 3/2/2022 )      sertraline (ZOLOFT) 50 mg tablet Take 1 tablet (50 mg total) by mouth daily 30 tablet 1     No current facility-administered medications for this visit  Review of Systems   Constitutional: Negative for chills, diaphoresis, fatigue and fever  HENT: Negative for congestion, ear pain, postnasal drip, rhinorrhea, sneezing, sore throat and trouble swallowing  Eyes: Negative for pain and visual disturbance  Respiratory: Negative for apnea, cough, shortness of breath and wheezing  Cardiovascular: Negative for chest pain and palpitations  Gastrointestinal: Negative for abdominal pain, constipation, diarrhea, nausea and vomiting  Genitourinary: Negative for dysuria and hematuria  Musculoskeletal: Negative for arthralgias, gait problem and myalgias  Neurological: Negative for dizziness, syncope, weakness, light-headedness, numbness and headaches  Psychiatric/Behavioral: Positive for dysphoric mood  Negative for sleep disturbance and suicidal ideas  The patient is nervous/anxious            Objective:  Vitals:    03/21/22 1032   BP: 146/84   Pulse: 94   Temp: 97 7 °F (36 5 °C)   SpO2: 96%   Weight: (!) 153 kg (336 lb 9 6 oz)   Height: 6' (1 829 m)     Body mass index is 45 65 kg/m²  Physical Exam  Vitals and nursing note reviewed  Constitutional:       Appearance: She is well-developed  HENT:      Head: Normocephalic and atraumatic  Right Ear: External ear normal       Left Ear: External ear normal       Nose: Nose normal       Mouth/Throat:      Pharynx: No oropharyngeal exudate or posterior oropharyngeal erythema  Eyes:      Extraocular Movements: Extraocular movements intact  Cardiovascular:      Rate and Rhythm: Normal rate and regular rhythm  Heart sounds: Normal heart sounds  No murmur heard  No friction rub  No gallop  Pulmonary:      Effort: Pulmonary effort is normal  No respiratory distress  Breath sounds: Normal breath sounds  No wheezing or rales  Musculoskeletal:         General: Normal range of motion  Cervical back: Normal range of motion and neck supple  Lymphadenopathy:      Cervical: No cervical adenopathy  Skin:     General: Skin is warm and dry  Neurological:      Mental Status: She is alert and oriented to person, place, and time  Psychiatric:         Mood and Affect: Mood is anxious  Mood is not depressed  Behavior: Behavior normal          Thought Content: Thought content normal  Thought content does not include homicidal or suicidal ideation  Thought content does not include homicidal or suicidal plan           Judgment: Judgment normal

## 2022-03-24 ENCOUNTER — LAB (OUTPATIENT)
Dept: LAB | Facility: MEDICAL CENTER | Age: 27
End: 2022-03-24
Payer: COMMERCIAL

## 2022-03-24 DIAGNOSIS — Z34.81 PRENATAL CARE, SUBSEQUENT PREGNANCY, FIRST TRIMESTER: ICD-10-CM

## 2022-03-24 LAB
ABO GROUP BLD: NORMAL
ALBUMIN SERPL BCP-MCNC: 3.2 G/DL (ref 3.5–5)
ALP SERPL-CCNC: 61 U/L (ref 46–116)
ALT SERPL W P-5'-P-CCNC: 19 U/L (ref 12–78)
ANION GAP SERPL CALCULATED.3IONS-SCNC: 6 MMOL/L (ref 4–13)
AST SERPL W P-5'-P-CCNC: 12 U/L (ref 5–45)
BASOPHILS # BLD AUTO: 0.03 THOUSANDS/ΜL (ref 0–0.1)
BASOPHILS NFR BLD AUTO: 0 % (ref 0–1)
BILIRUB SERPL-MCNC: 0.25 MG/DL (ref 0.2–1)
BILIRUB UR QL STRIP: NEGATIVE
BLD GP AB SCN SERPL QL: NEGATIVE
BUN SERPL-MCNC: 8 MG/DL (ref 5–25)
CALCIUM ALBUM COR SERPL-MCNC: 9.7 MG/DL (ref 8.3–10.1)
CALCIUM SERPL-MCNC: 9.1 MG/DL (ref 8.3–10.1)
CHLORIDE SERPL-SCNC: 108 MMOL/L (ref 100–108)
CLARITY UR: CLEAR
CO2 SERPL-SCNC: 23 MMOL/L (ref 21–32)
COLOR UR: COLORLESS
CREAT SERPL-MCNC: 0.75 MG/DL (ref 0.6–1.3)
CREAT UR-MCNC: 22.2 MG/DL
EOSINOPHIL # BLD AUTO: 0.16 THOUSAND/ΜL (ref 0–0.61)
EOSINOPHIL NFR BLD AUTO: 2 % (ref 0–6)
ERYTHROCYTE [DISTWIDTH] IN BLOOD BY AUTOMATED COUNT: 13.7 % (ref 11.6–15.1)
GFR SERPL CREATININE-BSD FRML MDRD: 108 ML/MIN/1.73SQ M
GLUCOSE 1H P 50 G GLC PO SERPL-MCNC: 208 MG/DL (ref 40–134)
GLUCOSE P FAST SERPL-MCNC: 196 MG/DL (ref 65–99)
GLUCOSE UR STRIP-MCNC: ABNORMAL MG/DL
HBV SURFACE AG SER QL: NORMAL
HCT VFR BLD AUTO: 38.6 % (ref 34.8–46.1)
HGB BLD-MCNC: 12.7 G/DL (ref 11.5–15.4)
HGB UR QL STRIP.AUTO: NEGATIVE
IMM GRANULOCYTES # BLD AUTO: 0.02 THOUSAND/UL (ref 0–0.2)
IMM GRANULOCYTES NFR BLD AUTO: 0 % (ref 0–2)
KETONES UR STRIP-MCNC: NEGATIVE MG/DL
LEUKOCYTE ESTERASE UR QL STRIP: NEGATIVE
LYMPHOCYTES # BLD AUTO: 1.67 THOUSANDS/ΜL (ref 0.6–4.47)
LYMPHOCYTES NFR BLD AUTO: 19 % (ref 14–44)
MCH RBC QN AUTO: 30.5 PG (ref 26.8–34.3)
MCHC RBC AUTO-ENTMCNC: 32.9 G/DL (ref 31.4–37.4)
MCV RBC AUTO: 93 FL (ref 82–98)
MONOCYTES # BLD AUTO: 0.48 THOUSAND/ΜL (ref 0.17–1.22)
MONOCYTES NFR BLD AUTO: 6 % (ref 4–12)
NEUTROPHILS # BLD AUTO: 6.43 THOUSANDS/ΜL (ref 1.85–7.62)
NEUTS SEG NFR BLD AUTO: 73 % (ref 43–75)
NITRITE UR QL STRIP: NEGATIVE
NRBC BLD AUTO-RTO: 0 /100 WBCS
PH UR STRIP.AUTO: 7 [PH]
PLATELET # BLD AUTO: 295 THOUSANDS/UL (ref 149–390)
PMV BLD AUTO: 10.7 FL (ref 8.9–12.7)
POTASSIUM SERPL-SCNC: 3.8 MMOL/L (ref 3.5–5.3)
PROT SERPL-MCNC: 6.9 G/DL (ref 6.4–8.2)
PROT UR STRIP-MCNC: NEGATIVE MG/DL
PROT UR-MCNC: <6 MG/DL
PROT/CREAT UR: <0.27 MG/G{CREAT} (ref 0–0.1)
RBC # BLD AUTO: 4.16 MILLION/UL (ref 3.81–5.12)
RH BLD: POSITIVE
RUBV IGG SERPL IA-ACNC: 32.7 IU/ML
SODIUM SERPL-SCNC: 137 MMOL/L (ref 136–145)
SP GR UR STRIP.AUTO: 1.01 (ref 1–1.03)
SPECIMEN EXPIRATION DATE: NORMAL
URATE SERPL-MCNC: 4.8 MG/DL (ref 2–6.8)
UROBILINOGEN UR STRIP-ACNC: <2 MG/DL
WBC # BLD AUTO: 8.79 THOUSAND/UL (ref 4.31–10.16)

## 2022-03-24 PROCEDURE — 82570 ASSAY OF URINE CREATININE: CPT

## 2022-03-24 PROCEDURE — 82950 GLUCOSE TEST: CPT

## 2022-03-24 PROCEDURE — 80053 COMPREHEN METABOLIC PANEL: CPT

## 2022-03-24 PROCEDURE — 84550 ASSAY OF BLOOD/URIC ACID: CPT

## 2022-03-24 PROCEDURE — 80081 OBSTETRIC PANEL INC HIV TSTG: CPT

## 2022-03-24 PROCEDURE — 84156 ASSAY OF PROTEIN URINE: CPT

## 2022-03-24 PROCEDURE — 36415 COLL VENOUS BLD VENIPUNCTURE: CPT

## 2022-03-24 PROCEDURE — 87086 URINE CULTURE/COLONY COUNT: CPT

## 2022-03-25 DIAGNOSIS — Z3A.10 10 WEEKS GESTATION OF PREGNANCY: ICD-10-CM

## 2022-03-25 DIAGNOSIS — Z34.81 PRENATAL CARE, SUBSEQUENT PREGNANCY, FIRST TRIMESTER: Primary | ICD-10-CM

## 2022-03-25 LAB
BACTERIA UR CULT: NORMAL
HIV 1+2 AB+HIV1 P24 AG SERPL QL IA: NORMAL
RPR SER QL: NORMAL

## 2022-03-25 NOTE — RESULT ENCOUNTER NOTE
Please inform patient elevated  glucose reading confirming diabetes - likely pre gestational / recommend diabetic education at MFM follow up ASAP thanks

## 2022-03-29 ENCOUNTER — VBI (OUTPATIENT)
Dept: ADMINISTRATIVE | Facility: OTHER | Age: 27
End: 2022-03-29

## 2022-04-04 ENCOUNTER — TELEMEDICINE (OUTPATIENT)
Dept: PERINATAL CARE | Facility: CLINIC | Age: 27
End: 2022-04-04
Payer: COMMERCIAL

## 2022-04-04 VITALS — BODY MASS INDEX: 39.68 KG/M2 | WEIGHT: 293 LBS | HEIGHT: 72 IN

## 2022-04-04 DIAGNOSIS — Z3A.10 10 WEEKS GESTATION OF PREGNANCY: ICD-10-CM

## 2022-04-04 DIAGNOSIS — O24.419 GESTATIONAL DIABETES MELLITUS (GDM), ANTEPARTUM, GESTATIONAL DIABETES METHOD OF CONTROL UNSPECIFIED: Primary | ICD-10-CM

## 2022-04-04 DIAGNOSIS — E66.01 MATERNAL MORBID OBESITY, ANTEPARTUM (HCC): ICD-10-CM

## 2022-04-04 DIAGNOSIS — Z34.81 PRENATAL CARE, SUBSEQUENT PREGNANCY, FIRST TRIMESTER: ICD-10-CM

## 2022-04-04 DIAGNOSIS — O99.210 MATERNAL MORBID OBESITY, ANTEPARTUM (HCC): ICD-10-CM

## 2022-04-04 DIAGNOSIS — O99.810 HYPERGLYCEMIA IN PREGNANCY: ICD-10-CM

## 2022-04-04 PROBLEM — O24.414 INSULIN CONTROLLED GESTATIONAL DIABETES MELLITUS (GDM) IN THIRD TRIMESTER: Status: RESOLVED | Noted: 2020-10-29 | Resolved: 2022-04-04

## 2022-04-04 PROCEDURE — 99214 OFFICE O/P EST MOD 30 MIN: CPT | Performed by: NURSE PRACTITIONER

## 2022-04-04 RX ORDER — LANCETS 33 GAUGE
EACH MISCELLANEOUS
Qty: 100 EACH | Refills: 6 | Status: SHIPPED | OUTPATIENT
Start: 2022-04-04

## 2022-04-04 RX ORDER — BLOOD-GLUCOSE METER
EACH MISCELLANEOUS
Qty: 1 KIT | Refills: 0 | Status: SHIPPED | OUTPATIENT
Start: 2022-04-04

## 2022-04-04 RX ORDER — BLOOD SUGAR DIAGNOSTIC
STRIP MISCELLANEOUS
Qty: 100 EACH | Refills: 6 | Status: SHIPPED | OUTPATIENT
Start: 2022-04-04

## 2022-04-04 NOTE — PATIENT INSTRUCTIONS
-1 hour    -Check A1c, goal is 5 6%  -Keep 3 day food log to review with dietitian   -Follow up with dietitian   -Start self monitoring blood glucose fasting and 2 hours after start of each meal  Keep glucose log  Glucose goals: fasting 60-90 mg/dL, 140 mg/dL or less 1 hour post meals, and 120 mg/dL or less 2 hours post meal    -Report glucose readings weekly via [facility]   -Start 2200/2400 GDM diet with 3 meals and 3 snacks including recommended combination of carb, protein and fat per meal/snack   -Please eat meal or snack every 2-3 5 hours while awake   -No more than 8 to 10 hours of fasting overnight   -Stay active if no restriction from your OB, walk up to 30 minutes a day  -Always have glucose available to treat hypoglycemia  Use 15:15 rule  Refer to hypoglycemia patient education sheet  Test blood sugar when experiencing signs and symptoms of hypoglycemia and prior to driving    -Continue prenatal vitamin as recommended   -Continue follow-up with your OB and MFM as recommended   -Stay in close contact with diabetes education team   -Insulin requirements during pregnancy; basal/bolus concept and Metformin discussed  -Very important to maintain tight glucose control during pregnancy to decrease risk factors including fetal macrosomia; birth injury; risk of ; polyhydramnios; pre-term labor; pre-eclampsia;  hypoglycemia; jaundice and stillbirth  -Via [facility] diabetes and pregnancy booklet; diet plan and hypoglycemia patient education

## 2022-04-04 NOTE — PROGRESS NOTES
Virtual Regular Visit    Verification of patient location:[place]    Patient is located in the following state in which I hold an active license [place]      Assessment/Plan:    Problem List Items Addressed This Visit        Endocrine    Hyperglycemia in pregnancy    Relevant Medications    Blood Glucose Monitoring Suppl (OneTouch Verio Flex System) w/Device KIT    OneTouch Delica Lancets 23I MISC    OneTouch Verio test strip    Other Relevant Orders    Vamo glucose flowsheet    Hemoglobin A1C    Gestational diabetes mellitus (GDM), antepartum - Primary     -1 hour    -Check A1c, goal is 5 6%  -Keep 3 day food log to review with dietitian   -Follow up with dietitian   -Start self monitoring blood glucose fasting and 2 hours after start of each meal  Keep glucose log  Glucose goals: fasting 60-90 mg/dL, 140 mg/dL or less 1 hour post meals, and 120 mg/dL or less 2 hours post meal    -Report glucose readings weekly via [facility]   -Start 2200/2400 GDM diet with 3 meals and 3 snacks including recommended combination of carb, protein and fat per meal/snack   -Please eat meal or snack every 2-3 5 hours while awake   -No more than 8 to 10 hours of fasting overnight   -Stay active if no restriction from your OB, walk up to 30 minutes a day  -Always have glucose available to treat hypoglycemia  Use 15:15 rule  Refer to hypoglycemia patient education sheet  Test blood sugar when experiencing signs and symptoms of hypoglycemia and prior to driving    -Continue prenatal vitamin as recommended   -Continue follow-up with your OB and MFM as recommended   -Stay in close contact with diabetes education team   -Insulin requirements during pregnancy; basal/bolus concept and Metformin discussed    -Very important to maintain tight glucose control during pregnancy to decrease risk factors including fetal macrosomia; birth injury; risk of ; polyhydramnios; pre-term labor; pre-eclampsia;  hypoglycemia; jaundice and stillbirth  -Via My[facility] diabetes and pregnancy booklet; diet plan and hypoglycemia patient education  Lab Results   Component Value Date    HGBA1C 5 6 09/28/2021            Relevant Medications    Blood Glucose Monitoring Suppl (OneTouch Verio Flex System) w/Device KIT    OneTouch Delica Lancets 57N MISC    OneTouch Verio test strip    Other Relevant Orders    [facility] glucose flowsheet    Hemoglobin A1C       Other    BMI 45 0-49 9, adult (HCC)    Relevant Medications    Blood Glucose Monitoring Suppl (OneTouch Verio Flex System) w/Device KIT    OneTouch Delica Lancets 32Q MISC    OneTouch Verio test strip    Other Relevant Orders    [facility] glucose flowsheet    Hemoglobin A1C    Maternal morbid obesity, antepartum ([facility] [address] )     -Current weight 336 lbs  -Recommended weight gain during pregnancy 11 to 20 lbs  -Follow up with dietitian  Relevant Medications    Blood Glucose Monitoring Suppl (OneTouch Verio Flex System) w/Device KIT    OneTouch Delica Lancets 33V MISC    OneTouch Verio test strip    Other Relevant Orders    [facility] glucose flowsheet    Hemoglobin A1C      Other Visit Diagnoses     Prenatal care, subsequent pregnancy, first trimester        10 weeks gestation of pregnancy                   Reason for visit is   Chief Complaint   Patient presents with    Virtual Regular Visit    Gestational Diabetes        Encounter provider Kelby Romeo    Provider located at [address] [place] [id number] [phone]      Recent Visits  No visits were found meeting these conditions  Showing recent visits within past 7 days and meeting all other requirements  Today's Visits  Date Type Provider Dept   04/04/22 [address] [facility], [facility] today's visits and meeting all other requirements  Future Appointments  No visits were found meeting these conditions    Showing future appointments within next 150 days and meeting all other requirements       The patient was identified by name and date of birth  Stephanie Nguyen was informed that this is a telemedicine visit and that the visit is being conducted through [address] and patient was informed this is a secure, HIPAA-complaint platform  She agrees to proceed     My office door was closed  No one else was in the room  She acknowledged consent and understanding of privacy and security of the video platform  The patient has agreed to participate and understands they can discontinue the visit at any time  Connected virtually briefly then visit completed via phone with patient's permission  Patient is aware this is a billable service  Angelica Schilder is a 27 y o  female  CALLIE 10/23/2022 GDM  History of insulin controlled GDM on Levemir 78 units and lispro before meals  Completed diabetes screening post delivery and it was normal  Last A1c 5 6%  History of pre-eclampsia with last delivery  Eating 3 meals and 2 snacks a day except having issues with nausea and dry heaving  Has 17 month old son who weighed 7 lbs 12 oz  Currently not exercising  Smoking less than 10 cigs a day and trying to cut back  Remembers how to use insulin pen         HPI     Past Medical History:   Diagnosis Date    Chlamydia     Depression     no medications for 1 year    Gestational hypertension, third trimester 2021    Hypertension     Insulin controlled gestational diabetes mellitus (GDM) in third trimester 10/29/2020    Urinary tract infection     Varicella        Past Surgical History:   Procedure Laterality Date    CHOLECYSTECTOMY      GA REMV PILONIDAL LESION EXTENS N/A 8/3/2021    Procedure: EXCISION PILONIDAL CYST;  Surgeon: Roberto Barcenas DO;  Location: [facility];  Service: General    WISDOM TOOTH EXTRACTION      WISDOM TOOTH EXTRACTION Bilateral        Current Outpatient Medications   Medication Sig Dispense Refill    aspirin 81 mg chewable tablet Chew 162 mg daily      Blood Glucose Monitoring Suppl (OneTouch Verio Flex System) w/Device KIT Dispense 1 kit per insurance formulary  1 kit 0    erythromycin (ILOTYCIN) ophthalmic ointment  (Patient not taking: Reported on 3/2/2022 )      OneTouch Delica Lancets 77Z MISC Use 4 a day or as directed  100 each 6    OneTouch Verio test strip Test 4 times a day and as instructed  Gestational diabetes  100 each 6    Prenatal MV-Min-Fe Fum-FA-DHA (PRENATAL 1 PO) Take by mouth      sertraline (ZOLOFT) 50 mg tablet Take 1 tablet (50 mg total) by mouth daily 30 tablet 1     No current facility-administered medications for this visit  No Known Allergies    Review of Systems   Constitutional: Positive for fatigue (improving )  Negative for fever  HENT: Negative for congestion, sore throat and trouble swallowing  Eyes: Negative for visual disturbance  Respiratory: Negative for cough and shortness of breath  Cardiovascular: Negative for chest pain, palpitations and leg swelling  Gastrointestinal: Positive for diarrhea (intermittently), nausea and vomiting  Negative for constipation  Endocrine: Positive for polyuria  Negative for polydipsia and polyphagia  Genitourinary: Negative for difficulty urinating and vaginal bleeding  Neurological: Negative for headaches  Psychiatric/Behavioral: Negative for sleep disturbance  Video Exam    Vitals:    04/04/22 1301   Weight: (!) 152 kg (336 lb)   Height: 6' (1 829 m)       Physical Exam  Constitutional:       Appearance: She is obese  HENT:      Head: Normocephalic  Nose: Nose normal    Eyes:      Conjunctiva/sclera: Conjunctivae normal    Pulmonary:      Effort: Pulmonary effort is normal    Neurological:      Mental Status: She is alert and oriented to person, place, and time  Psychiatric:         Mood and Affect: Mood normal          Behavior: Behavior normal          Thought Content:  Thought content normal          Judgment: Judgment normal           I spent 30 minutes directly with the patient during this visit    [address] verbally agrees to participate in [employer]  Pt is aware that [employer] could be limited without vital signs or the ability to perform a full hands-on physical Miles Batch understands she or the provider may request at any time to terminate the video visit and request the patient to seek care or treatment in person

## 2022-04-04 NOTE — ASSESSMENT & PLAN NOTE
-1 hour    -Check A1c, goal is 5 6%  -Keep 3 day food log to review with dietitian   -Follow up with dietitian   -Start self monitoring blood glucose fasting and 2 hours after start of each meal  Keep glucose log  Glucose goals: fasting 60-90 mg/dL, 140 mg/dL or less 1 hour post meals, and 120 mg/dL or less 2 hours post meal    -Report glucose readings weekly via [employer] [facility]   -Start 2200/2400 GDM diet with 3 meals and 3 snacks including recommended combination of carb, protein and fat per meal/snack   -Please eat meal or snack every 2-3 5 hours while awake   -No more than 8 to 10 hours of fasting overnight   -Stay active if no restriction from your OB, walk up to 30 minutes a day  -Always have glucose available to treat hypoglycemia  Use 15:15 rule  Refer to hypoglycemia patient education sheet  Test blood sugar when experiencing signs and symptoms of hypoglycemia and prior to driving    -Continue prenatal vitamin as recommended   -Continue follow-up with your OB and MFM as recommended   -Stay in close contact with diabetes education team   -Insulin requirements during pregnancy; basal/bolus concept and Metformin discussed  -Very important to maintain tight glucose control during pregnancy to decrease risk factors including fetal macrosomia; birth injury; risk of ; polyhydramnios; pre-term labor; pre-eclampsia;  hypoglycemia; jaundice and stillbirth  -Via [employer] diabetes and pregnancy booklet; diet plan and hypoglycemia patient education           Lab Results   Component Value Date    HGBA1C 5 6 2021

## 2022-04-04 NOTE — ASSESSMENT & PLAN NOTE
-Current weight 336 lbs  -Recommended weight gain during pregnancy 11 to 20 lbs  -Follow up with dietitian

## 2022-04-12 ENCOUNTER — INITIAL PRENATAL (OUTPATIENT)
Dept: OBGYN CLINIC | Facility: CLINIC | Age: 27
End: 2022-04-12

## 2022-04-12 VITALS — WEIGHT: 293 LBS | BODY MASS INDEX: 44.67 KG/M2 | SYSTOLIC BLOOD PRESSURE: 126 MMHG | DIASTOLIC BLOOD PRESSURE: 74 MMHG

## 2022-04-12 DIAGNOSIS — Z34.91 FIRST TRIMESTER PREGNANCY: Primary | ICD-10-CM

## 2022-04-12 DIAGNOSIS — O24.410 DIET CONTROLLED GESTATIONAL DIABETES MELLITUS (GDM), ANTEPARTUM: ICD-10-CM

## 2022-04-12 DIAGNOSIS — Z3A.12 12 WEEKS GESTATION OF PREGNANCY: ICD-10-CM

## 2022-04-12 DIAGNOSIS — O99.210 MATERNAL MORBID OBESITY, ANTEPARTUM (HCC): ICD-10-CM

## 2022-04-12 DIAGNOSIS — F33.1 MODERATE EPISODE OF RECURRENT MAJOR DEPRESSIVE DISORDER (HCC): ICD-10-CM

## 2022-04-12 DIAGNOSIS — F41.1 GENERALIZED ANXIETY DISORDER: ICD-10-CM

## 2022-04-12 DIAGNOSIS — E66.01 MATERNAL MORBID OBESITY, ANTEPARTUM (HCC): ICD-10-CM

## 2022-04-12 DIAGNOSIS — Z72.0 TOBACCO USER: ICD-10-CM

## 2022-04-12 PROCEDURE — 87591 N.GONORRHOEAE DNA AMP PROB: CPT | Performed by: ADVANCED PRACTICE MIDWIFE

## 2022-04-12 PROCEDURE — PNV: Performed by: ADVANCED PRACTICE MIDWIFE

## 2022-04-12 PROCEDURE — 87491 CHLMYD TRACH DNA AMP PROBE: CPT | Performed by: ADVANCED PRACTICE MIDWIFE

## 2022-04-12 NOTE — ASSESSMENT & PLAN NOTE
Started modifying diet  Has appointment 4/14/22 with diabetes in pregnancy  Discussed ANFS in third trimester

## 2022-04-12 NOTE — ASSESSMENT & PLAN NOTE
Has appointment with diabetes in pregnancy 4/14/22  MFM appointment on 4/15/22- plans to do Sequential screening  162 mg ASA daily until 36 weeks  Taking PNV  Feeling good about pregnancy  Next visit 4 weeks

## 2022-04-12 NOTE — PROGRESS NOTES
Initial Prenatal Visit  [facility] [address] [place], [place]    Assessment/Plan:  Branda Dancer is a 27y o  year old  at [address] who presents for initial prenatal visit  Supervision of normal pregnancy  - Prenatal labs reviewed and normal   Blood type: A positive  - Aneuploidy screening discussed  Patient opts for sequential aneuploidy screening   - Routine cervical cancer screening: Pap Up to date  - Routine STI Screening: GC/Chlamydia sent today  HIV/Hep B/Syphilis ordered in prenatal panel   - Patient Education: Patient was counseled regarding diet, exercise, weight gain, foods to avoid, vaccines in pregnancy, aneuploidy screening, travel precautions to include seat belt use and VTE risk reduction  She has been provided our pregnancy packet which includes how and when to contact providers, medication recommendations, dietary suggestions, breastfeeding information as well as websites for additional information, hospital and delivery concerns  Additional Pregnancy Problems:   1  First trimester pregnancy  -     Chlamydia/GC amplified DNA by PCR    2  12 weeks gestation of pregnancy  Assessment & Plan:  Has appointment with diabetes in pregnancy 22  [facility] appointment on 4/15/22- plans to do Sequential screening  162 mg ASA daily until 36 weeks  Taking PNV  Feeling good about pregnancy  Next visit 4 weeks    Orders:  -     Chlamydia/GC amplified DNA by PCR    3  Diet controlled gestational diabetes mellitus (GDM), antepartum  Assessment & Plan:  Started modifying diet  Has appointment 22 with diabetes in pregnancy  Discussed ANFS in third trimester          4  Generalized anxiety disorder    5  Moderate episode of recurrent major depressive disorder [facility])  Assessment & Plan:  Zoloft is effective  Good family support      6  Maternal morbid obesity, antepartum ([facility] [address] )    7  BMI 45 0-49 9, adult ([facility] [address] )    8  Tobacco user  Assessment & Plan:  - At 6-7 cigs per day   States that she is quitting  Plans to be done by end of May  Subjective:   CC:  Desires prenatal care  Micheline Montalvo is a 27 y o  [id number] female who presents for prenatal care  Pregnancy ROS: No leakage of fluid, pelvic pain, or vaginal bleeding  Notes mostly nausea/ has some occasional vomiting  States that the vitamin B6 only helps for an hour  The following portions of the patient's history were reviewed and updated as appropriate: allergies, current medications, past family history, past medical history, obstetric history, gynecologic history, past social history, past surgical history and problem list       Objective:  /74   Wt (!) 149 kg (329 lb 6 4 oz)   LMP 2022   BMI 44 67 kg/m²   Pregravid Weight/BMI: Pregravid weight not on file (BMI Could not be calculated)  Current Weight: (!) 149 kg (329 lb 6 4 oz)   Total Weight Gain: Not found  Pre-Arline Vitals      Most Recent Value   Prenatal Assessment    Fetal Heart Rate 156   Prenatal Vitals    Blood Pressure 126/74   Weight - Scale 149 kg (329 lb 6 4 oz)   Urine Albumin/Glucose    Dilation/Effacement/Station    Vaginal Drainage    Edema    LLE Edema None   RLE Edema None         General: Well appearing, no distress  Respiratory: Normal respiratory rate, lungs clear to auscultation, no wheezing or rales  Cardiovascular: Regular rate and rhythm, no murmurs, rubs, or gallops  Breasts: Normal bilaterally, nontender without masses, asymmetry, or nipple discharge  Abdomen: Soft, gravid, nontender  : Urethra normal  Normal labia majora and minora  Vagina normal   No vaginal bleeding  No vaginal discharge  Cervix visually closed  Extremities: Warm and well perfused  Non tender  No edema

## 2022-04-14 ENCOUNTER — TELEMEDICINE (OUTPATIENT)
Dept: PERINATAL CARE | Facility: CLINIC | Age: 27
End: 2022-04-14
Payer: COMMERCIAL

## 2022-04-14 DIAGNOSIS — O09.299 HISTORY OF GESTATIONAL DIABETES IN PRIOR PREGNANCY, CURRENTLY PREGNANT: ICD-10-CM

## 2022-04-14 DIAGNOSIS — Z3A.12 12 WEEKS GESTATION OF PREGNANCY: ICD-10-CM

## 2022-04-14 DIAGNOSIS — Z86.32 HISTORY OF GESTATIONAL DIABETES IN PRIOR PREGNANCY, CURRENTLY PREGNANT: ICD-10-CM

## 2022-04-14 DIAGNOSIS — O99.211 OBESITY AFFECTING PREGNANCY IN FIRST TRIMESTER: ICD-10-CM

## 2022-04-14 DIAGNOSIS — O24.419 GESTATIONAL DIABETES MELLITUS (GDM), ANTEPARTUM, GESTATIONAL DIABETES METHOD OF CONTROL UNSPECIFIED: Primary | ICD-10-CM

## 2022-04-14 DIAGNOSIS — O99.810 HYPERGLYCEMIA IN PREGNANCY: ICD-10-CM

## 2022-04-14 LAB
C TRACH DNA SPEC QL NAA+PROBE: NEGATIVE
N GONORRHOEA DNA SPEC QL NAA+PROBE: NEGATIVE

## 2022-04-14 PROCEDURE — G0108 DIAB MANAGE TRN  PER INDIV: HCPCS

## 2022-04-14 NOTE — PROGRESS NOTES
Virtual Regular Visit    Verification of patient location:    Patient is located in the following state in which I hold an active license [place]      Assessment/Plan:    Problem List Items Addressed This Visit        Endocrine    Hyperglycemia in pregnancy    Gestational diabetes mellitus (GDM), antepartum - Primary       Other    12 weeks gestation of pregnancy      Other Visit Diagnoses     History of gestational diabetes in prior pregnancy, currently pregnant        Obesity affecting pregnancy in first trimester                   Reason for visit is   Chief Complaint   Patient presents with    Gestational Diabetes    Patient Education    Virtual Regular Visit        Encounter provider David Muhammad    Provider located at [address] [place] [address] Dr Maurilio Leblancjohan [place] [address] [phone]      Recent Visits  No visits were found meeting these conditions  Showing recent visits within past 7 days and meeting all other requirements  Today's Visits  Date Type Provider Dept   04/14/22 1401 [address]   Showing today's visits and meeting all other requirements  Future Appointments  No visits were found meeting these conditions  Showing future appointments within next 150 days and meeting all other requirements       The patient was identified by name and date of birth  Lisa Trevino was informed that this is a telemedicine visit and that the visit is being conducted through [facility] and patient was informed this is a secure, HIPAA-complaint platform  She agrees to proceed     My office door was closed  No one else was in the room  She acknowledged consent and understanding of privacy and security of the video platform  The patient has agreed to participate and understands they can discontinue the visit at any time  Patient is aware this is a billable service  Subjective  Lisa Trevino is a 27 y  o pregnant female         HPI Past Medical History:   Diagnosis Date    Chlamydia     Depression     no medications for 1 year    Gestational hypertension, third trimester 1/4/2021    Hypertension     Insulin controlled gestational diabetes mellitus (GDM) in third trimester 10/29/2020    Urinary tract infection     Varicella        Past Surgical History:   Procedure Laterality Date    CHOLECYSTECTOMY      PA REMV PILONIDAL LESION EXTENS N/A 8/3/2021    Procedure: EXCISION PILONIDAL CYST;  Surgeon: Shelby Barr DO;  Location: [facility];  Service: General    WISDOM TOOTH EXTRACTION      WISDOM TOOTH EXTRACTION Bilateral 2010       Current Outpatient Medications   Medication Sig Dispense Refill    aspirin 81 mg chewable tablet Chew 162 mg daily      Blood Glucose Monitoring Suppl (OneTouch Verio Flex System) w/Device KIT Dispense 1 kit per insurance formulary  1 kit 0    OneTouch Delica Lancets 66H MISC Use 4 a day or as directed  100 each 6    OneTouch Verio test strip Test 4 times a day and as instructed  Gestational diabetes  100 each 6    Prenatal MV-Min-Fe Fum-FA-DHA (PRENATAL 1 PO) Take by mouth      sertraline (ZOLOFT) 50 mg tablet Take 1 tablet (50 mg total) by mouth daily 30 tablet 1    erythromycin (ILOTYCIN) ophthalmic ointment  (Patient not taking: Reported on 3/2/2022 )       No current facility-administered medications for this visit  No Known Allergies    Review of Systems    Video Exam    There were no vitals filed for this visit  Physical Exam     I spent 60  minutes with patient today in which greater than 50% of the time was spent in counseling/coordination of care regarding gestational diabetes  VIRTUAL VISIT DISCLAIMER          Thank you for referring your patient to [facility] Diabetes in Pregnancy Program      Rachel Alvarez is a  27 y o  female who presents today for Combo Class 1 and 2   Patient has a history of gestational diabetes in a prior pregnancy 1 year ago requiring insulin therapy both basal and bolus  Patient is at 12w4d gestation, Estimated Date of Delivery: 10/23/22  Reviewed and updated the following from patients medical record: PMH, Problem List, Allergies, and Current Medications  Visit Diagnosis:  GDM in pregnancy method of control unspecified    Discussed with patient pathophysiology of GDM, untreated hyperglycemia in pregnancy and maternal fetal complications including fetal macrosomia,  hypoglycemia, polyhydramnios, increased incidence of  section,  labor, and in severe cases fetal demise and still birth   Discussed importance of blood glucose monitoring, nutrition, and medication if necessary in achieving BG goals  Additional Pregnancy Complications:  Obesity and history of GDM 1 yr ago in a prior pregnancy requiring insulin therapy       Labs:  Lab Results   Component Value Date    [id number] 208 (H) 2022       Lab Results   Component Value Date    GLUF 196 (H) 2022        No components found for: HGA1C   Reminded patient to complete active blood work for A1c  Noted CMP completed 3/24/22    Medications:  No diabetes related medications    Anthropometrics:  Ht Readings from Last 3 Encounters:   22 6' (1 829 m)   22 6' (1 829 m)   22 6' (1 829 m)     Wt Readings from Last 3 Encounters:   22 (!) 149 kg (329 lb 6 4 oz)   22 (!) 152 kg (336 lb)   22 (!) 153 kg (336 lb 9 6 oz)     Pre-gravid weight: 336 Pounds  Pre-gravid BMI: 45 56  Weight Change: 7 pound weight loss based on above information  Weight gain recommendations: BMI (> 30) 11-20 lbs  Comments: Patient may gain up to 11-20 pounds weight gain for duration of her pregnancy    Recent Ultra Sound Results:  Date: 3/2/22 US at Ob's ooffice  Next US: next 4/15 NT scheduled    Blood Glucose Monitoring:   Glucose Meter: OneTouch Verio Flex  Instructed on testing blood sugars: 4 x per day (Fasting, 2 hour after start of each meal)    Gave instruction on site selection, skin preparation, loading strips and lancet device, meter activation, obtaining blood sample, test strip and lancet disposal and storage, and recording log book entries  Patient has good understanding of material covered and was able to test their own blood sugar in office today  Instruction for reporting blood sugar results weekly via:  Phone: [phone]   OR  My Chart (Message with image attachment, or Glucose Flowsheet)    Goal Blood Sugar Ranges:   Fastin-90 mg/dL  1 hour after the start of each meal: 140 mg/dL or less  2 hours after start of each meal: 120 mg/dL or less          Meal Plan (daily calorie and protein needs):  Calories: 2400 calorie (CHO: 07-05-55-30-60-30) (PRO: 3-2-3/4-2-3/4-2) Advised to change bedtime snack to 1 serving CHO (15 gms) and 2-3 ounces of protein  Examples of snack ideas reviewed including patient's food preferences  Advised patient to fast no longer than 10 hrs and at least 8 hrs prior to FBG measurement  Suggested setting a phone alarm for reminders  Patient is sometimes drinking regular sugar sweetened iced tea when she forgets, causing 2 hr pp >120 mg/dl  Suggested unsweetened iced tea with artificial sweetener maintaining no more than 3-4 servings per day  Type of Diet:Regular  Additional Nutrition Concerns: sometimes patient is undereating carbohydrate on her meal plan  Advised to not go lower than 1 serving of carbohydrate on her meals and no less than 15 gms CHO for her bedtime snack  When decreasing a carbohydrate serving increase a protein serving will balance calories of diet  Minimum CHO in second trimester reviewed 175 gms/day  Meal Plan Tips:  1  Patient was provided with a meal plan including 3 meals and 3 snacks  2  Discussed appropriate amounts of CHO, PRO, and Fat at each meal and snack  3  Reviewed CHO exchange list, and portion sizes for both CHO and PRO via food models  4   Instruction on how to read a food label  5  Provided suggested meal/snack options to increase nutrition and maintain consistent meal and snack intakes  6  Instructed on how to keep a 3-day food diary to be brought to follow- up appointment  7  Encouraged  patient to eat every 2 0-3 5 hours while awake  8  Encouraged patient to go no longer than 8-10 hours fasting overnight until first meal of the day  Physical Activity:  Discussed benefits of physical activity to optimize blood glucose control, encouraged activity at patient is physically able  Always consult a physician prior to starting an exercise program  Recommend 20-30 minutes daily  Is patient physically active? No; patient to check with OB for exercise guidelines  Sick day Guidelines:   Patient advised that sickness will raise blood sugar and need to continue medication regimen as directed  If blood sugar is > 160 mg/dL twice in one day call doctor  Instructed on what to do when unable to consume normal meal plan  Hypoglycemia & Treatment Guidelines:  Reviewed what hypoglycemia is, signs and symptoms, and how to treat  Post-Partum Guidelines:  Completion of 75 gm CHO 2 hr gtt at 6 weeks post-partum to check for Type 2 DM diagnosis    Breastfeeding Guidelines:  Continue GDM meal plan plus additional 350-500 calories daily  Stay hydrated by drinking 8-10 (8 oz ) fluids daily  Examples of protein and carbohydrate snacks provided  Dining Out & Travel Guidelines:  Patient advised to be prepared with extra diabetes supplies, medications, and snacks, as well as sticking to the same time schedule and portions eaten at home for meals and snacks  Maternal-Fetal Testing:    Ultrasounds- growth scans every 4 weeks     NST- twice weekly starting at 32nd week GA   ARTURO- weekly starting at 32 weeks GA       Patient Stated Goal: "I will eat 3 meals and 3 snacks each day, including protein at each"    Diabetes Self Management Support Plan outside of ongoing care: Spouse/Family    Learner/s Present:Learners Present: Patient   Barriers to Learning/Change: No Barriers  Expected Compliance: very good    Date to report blood sugars: Patient to update flow sheet every Tuesday  F/U Date: 4/19/21 follow up reviewing class 2 which was already covered today given history of GDM  Blood sugars and medication options will be covered on next meeting  Patient was on basal/bolus insulin with her last pregnancy and is agreeable to start insulin therapy following bedtime snack and discussion with OB  Begin Time: 12:30 PM  End Time: 1:30PM   pdmi  It was a pleasure working with them today  Please feel free to call with any questions or concerns      Esteban Salas RD,LDN,CDE  Diabetes Educator  [facility]  Diabetes in Pregnancy Program  [address]  [address]

## 2022-04-15 ENCOUNTER — APPOINTMENT (OUTPATIENT)
Dept: LAB | Facility: MEDICAL CENTER | Age: 27
End: 2022-04-15
Payer: COMMERCIAL

## 2022-04-15 ENCOUNTER — ROUTINE PRENATAL (OUTPATIENT)
Dept: PERINATAL CARE | Facility: CLINIC | Age: 27
End: 2022-04-15
Payer: COMMERCIAL

## 2022-04-15 VITALS
SYSTOLIC BLOOD PRESSURE: 122 MMHG | BODY MASS INDEX: 39.68 KG/M2 | HEART RATE: 118 BPM | DIASTOLIC BLOOD PRESSURE: 70 MMHG | WEIGHT: 293 LBS | HEIGHT: 72 IN

## 2022-04-15 DIAGNOSIS — Z3A.12 12 WEEKS GESTATION OF PREGNANCY: ICD-10-CM

## 2022-04-15 DIAGNOSIS — Z33.1 PREGNANT STATE, INCIDENTAL: ICD-10-CM

## 2022-04-15 DIAGNOSIS — O24.410 DIET CONTROLLED GESTATIONAL DIABETES MELLITUS (GDM) IN FIRST TRIMESTER: Primary | ICD-10-CM

## 2022-04-15 DIAGNOSIS — Z36.82 ENCOUNTER FOR (NT) NUCHAL TRANSLUCENCY SCAN: ICD-10-CM

## 2022-04-15 DIAGNOSIS — Z36.9 UNSPECIFIED ANTENATAL SCREENING: ICD-10-CM

## 2022-04-15 DIAGNOSIS — O99.210 OBESITY IN PREGNANCY, ANTEPARTUM: ICD-10-CM

## 2022-04-15 DIAGNOSIS — O09.299 HX OF PREECLAMPSIA, PRIOR PREGNANCY, CURRENTLY PREGNANT: ICD-10-CM

## 2022-04-15 DIAGNOSIS — Z34.81 PRENATAL CARE, SUBSEQUENT PREGNANCY, FIRST TRIMESTER: ICD-10-CM

## 2022-04-15 LAB
EST. AVERAGE GLUCOSE BLD GHB EST-MCNC: 117 MG/DL
HBA1C MFR BLD: 5.7 %

## 2022-04-15 PROCEDURE — 36415 COLL VENOUS BLD VENIPUNCTURE: CPT | Performed by: NURSE PRACTITIONER

## 2022-04-15 PROCEDURE — 76801 OB US < 14 WKS SINGLE FETUS: CPT | Performed by: OBSTETRICS & GYNECOLOGY

## 2022-04-15 PROCEDURE — 76813 OB US NUCHAL MEAS 1 GEST: CPT | Performed by: OBSTETRICS & GYNECOLOGY

## 2022-04-15 PROCEDURE — 99214 OFFICE O/P EST MOD 30 MIN: CPT | Performed by: NURSE PRACTITIONER

## 2022-04-15 PROCEDURE — 83036 HEMOGLOBIN GLYCOSYLATED A1C: CPT | Performed by: NURSE PRACTITIONER

## 2022-04-15 NOTE — PATIENT INSTRUCTIONS
Thank you for choosing us for your  care today  If you have any questions about your ultrasound or care, please do not hesitate to contact us or your primary obstetrician  The use of low dose aspirin in pregnancy (162mg) is recommended in women with a high risk, or multiple moderate risk factors for preeclampsia  Aspirin therapy should be initiated between 12-28 weeks gestation, and is most effective if started prior to 16 weeks gestation, and continued until 36 weeks gestation  Low dose aspirin in pregnancy has been shown to reduce the incidence of preeclampsia in women with risk factors, and has been shown to be safe and without significant maternal or fetal risk  In light of your risk factors for preeclampsia, including: Body Mass Index 30 or greater and history of preeclampsia  I recommend initiating aspirin therapy, which was prescribed today

## 2022-04-15 NOTE — PROGRESS NOTES
OFFICE CONSULT  Referring physician:   Rossana Winston      Dear Dr Rossana Winston    Thank you for requesting a  consultation on your patient Ms Ramona Morocho for the following indications:  Genetic screening    History  Medications: Zoloft, Baby ASA, Prenatal Vitamins   Allergies to medications: NKDA  Past medical history: depression (on Zoloft), GDM A2 and preeclampsia in prior pregnancy   Past surgical history: Cholecystectomy, [place] Te[facility]  Past obstetrical history:   # 1  SAB  # 2 19 male/7lb 12 oz/term/vaginal/GDM A2 and preeclampsia   # 3 current   Social history: she does smoke 1/2 pack per day x 10 years-cessation encouraged   First generation family history: Dad with NIDDM and mom with HTN     An early Glucola was abnormal at 0 giving her the diagnosis of gestational diabetes  Baseline preeclamptic labs were normal     Ultrasound findings: The ultrasound shows a fetus concordant with dates  The nasal bone and nuchal translucency appears normal  No malformations are seen on today's early ultrasound  The patient was informed of the findings and counseled about the limitations of the exam in detecting all forms of fetal congenital abnormalities  She does not report any vaginal bleeding or uterine cramping or contractions  Specific counseling was provided on the following problems: We discussed the options for genetic screening which include invasive testing on the fetal placenta or on fetal skin cells within the amniotic fluid and compared this to noninvasive testing which includes cell free DNA screening and the sequential screen  We reviewed the risks, the benefits and the limitations of each  In the end patient chose to complete the cell free DNA screen  She has already started low dose aspirin  The use of low dose aspirin in pregnancy (162mg) is recommended in women with a high risk, or multiple moderate risk factors for preeclampsia   Aspirin therapy should be initiated between 12-28 weeks gestation, and is most effective if started prior to 16 weeks gestation, and stopped by 36 weeks gestation  Low dose aspirin in pregnancy has been shown to reduce the incidence of preeclampsia in women with risk factors, and has been shown to be safe and without significant maternal or fetal risk  In light of her risk factors which include obesity and prior preeclampsia  Body mass index > 30 kg/m2 is associated with an increased risk of several pregnancy complications, including hypertensive disorders, diabetes, abnormal fetal growth, fetal malformations  The risk of  delivery is also increased, as are wound complications in the event of  delivery  A healthy diet and exercise, as well as appropriate gestational weight gain (no more than 11-20 pounds) can help reduce risk of these complications  150 minutes of moderate exercise per week is recommended for all pregnant women  Nutrition counseling is also available if desired  Early screening for gestational diabetes is recommended, as well as routine re-screening at 24-28 weeks if early screening results are normal     Patients with early-onset gestational diabetes have a higher risk for malformations  Recommend  early 16 week anatomy scan, 20 week anatomy scan and 24 week fetal echo  Recommend also follow-up serial growth scans after 28 weeks   Recommend starting twice weekly NSTs and weekly ARTURO is at 32 weeks in patients that require medical treatment of their gestational diabetes  Delivery is recommended after 39 weeks in patients on medical treatment and by 41 weeks in patients that do not require medical treatment for their gestational diabetes  Patients with a history of gestational diabetes have a higher risk of becoming an overt diabetic in her lifetime so recommend yearly screening for overt diabetes    After pregnancy is over recommend a 2 hour glucose tolerance test to confirm that her gestational diabetes has resolved  Future tests recommended: The results of her NIPT will return in 7-10 days and her OB office will order an MSAFP screen at 16-18 weeks to screen for spina bifida  Future ultrasounds ordered today:         Early fetal Level II ultrasound imaging is recommended at 16 weeks   Fetal Level II ultrasound imaging is recommended at 19-20 weeks' gestation  Agnes SIDDIQI    The face to face time, in addition to time spent discussing ultrasound results, was approximately 15 minutes, greater than 50% of which was spent during counseling and coordination of care  I supervised the Advanced Practitioner  I discussed the case with the Advanced Practitioner and reviewed the AP note, prescribed medications, and orders placed      Sandro Wyatt MD

## 2022-04-15 NOTE — PROGRESS NOTES
Patient chose to have [address] screen  Instructed patient on process for checking her OOP cost via [employer] [facility]  Provided [id number] instruction card toll free # [phone]  Patient made aware if [id number]  unable to give an estimate she will need to contact M office prior to blood draw  Patient aware that  is provided by third party and is only an estimate of cost not a guarantee  Insurance may require prior authorization, authorization may take up to 14 business days  Authorization is not a guarantee of payment  Patient notified if test drawn without prior authorization she may be responsible for full cost of test   For definitive OOP cost, lab deductible or if lab authorization is required patient encouraged to call her insurance provider  Explained customer service insurance phone # located on the back of her ID card  Maternal Fetal Medicine will have results in approximately 7-10 business days and will call patient or notify via [address]  Patient aware viewing lab result online will reveal gender  [id number] lab kit with prepaid [employer]  given to patient  Patient verbalized understanding of all instructions and no questions at this time

## 2022-04-15 NOTE — LETTER
April 15, 2022     Mackenzie Bartholomew MD  [address]     Patient: Rachele Hdz   YOB: 1995   Date of Visit: 4/15/2022       Dear Dr Franks Jenny: Thank you for referring Alda Hernandez to me for evaluation  Below are my notes for this consultation  If you have questions, please do not hesitate to call me  I look forward to following your patient along with you  Sincerely,        Jose Antonio Salcido MD        CC: No Recipients  Jose Antonio Salcido MD  4/15/2022  6:14 PM  Sign when Signing Visit  OFFICE CONSULT  Referring physician:   Mackenzie Bartholomew      Dear Dr Mackenzie Bartholomew    Thank you for requesting a  consultation on your patient Ms Vasquez Crisostomo for the following indications:  Genetic screening    History  Medications: Zoloft, Baby ASA, Prenatal Vitamins   Allergies to medications: NKDA  Past medical history: depression (on Zoloft), GDM A2 and preeclampsia in prior pregnancy   Past surgical history: Cholecystectomy, [place] Teeth  Past obstetrical history:   # 1  SAB  # 2 19 male/7lb 12 oz/term/vaginal/GDM A2 and preeclampsia   # 3 current   Social history: she does smoke 1/2 pack per day x 10 years-cessation encouraged   First generation family history: Dad with NIDDM and mom with HTN     An early Glucola was abnormal at 0 giving her the diagnosis of gestational diabetes  Baseline preeclamptic labs were normal     Ultrasound findings: The ultrasound shows a fetus concordant with dates  The nasal bone and nuchal translucency appears normal  No malformations are seen on today's early ultrasound  The patient was informed of the findings and counseled about the limitations of the exam in detecting all forms of fetal congenital abnormalities  She does not report any vaginal bleeding or uterine cramping or contractions  Specific counseling was provided on the following problems:   We discussed the options for genetic screening which include invasive testing on the fetal placenta or on fetal skin cells within the amniotic fluid and compared this to noninvasive testing which includes cell free DNA screening and the sequential screen  We reviewed the risks, the benefits and the limitations of each  In the end patient chose to complete the cell free DNA screen  She has already started low dose aspirin  The use of low dose aspirin in pregnancy (162mg) is recommended in women with a high risk, or multiple moderate risk factors for preeclampsia  Aspirin therapy should be initiated between 12-28 weeks gestation, and is most effective if started prior to 16 weeks gestation, and stopped by 36 weeks gestation  Low dose aspirin in pregnancy has been shown to reduce the incidence of preeclampsia in women with risk factors, and has been shown to be safe and without significant maternal or fetal risk  In light of her risk factors which include obesity and prior preeclampsia  Body mass index > 30 kg/m2 is associated with an increased risk of several pregnancy complications, including hypertensive disorders, diabetes, abnormal fetal growth, fetal malformations  The risk of  delivery is also increased, as are wound complications in the event of  delivery  A healthy diet and exercise, as well as appropriate gestational weight gain (no more than 11-20 pounds) can help reduce risk of these complications  150 minutes of moderate exercise per week is recommended for all pregnant women  Nutrition counseling is also available if desired  Early screening for gestational diabetes is recommended, as well as routine re-screening at 24-28 weeks if early screening results are normal     Patients with early-onset gestational diabetes have a higher risk for malformations  Recommend  early 16 week anatomy scan, 20 week anatomy scan and 24 week fetal echo  Recommend also follow-up serial growth scans after 28 weeks   Recommend starting twice weekly NSTs and weekly ARTURO is at 32 weeks in patients that require medical treatment of their gestational diabetes  Delivery is recommended after 39 weeks in patients on medical treatment and by 41 weeks in patients that do not require medical treatment for their gestational diabetes  Patients with a history of gestational diabetes have a higher risk of becoming an overt diabetic in her lifetime so recommend yearly screening for overt diabetes  After pregnancy is over recommend a 2 hour glucose tolerance test to confirm that her gestational diabetes has resolved  Future tests recommended: The results of her NIPT will return in 7-10 days and her OB office will order an MSAFP screen at 16-18 weeks to screen for spina bifida  Future ultrasounds ordered today:         Early fetal Level II ultrasound imaging is recommended at 16 weeks   Fetal Level II ultrasound imaging is recommended at 19-20 weeks' gestation  Sindhu SIDDIQI    The face to face time, in addition to time spent discussing ultrasound results, was approximately 15 minutes, greater than 50% of which was spent during counseling and coordination of care  I supervised the Advanced Practitioner  I discussed the case with the Advanced Practitioner and reviewed the AP note, prescribed medications, and orders placed      Anival Sanz MD

## 2022-04-21 ENCOUNTER — TELEPHONE (OUTPATIENT)
Dept: PERINATAL CARE | Facility: CLINIC | Age: 27
End: 2022-04-21

## 2022-04-21 ENCOUNTER — OFFICE VISIT (OUTPATIENT)
Dept: FAMILY MEDICINE CLINIC | Facility: CLINIC | Age: 27
End: 2022-04-21
Payer: COMMERCIAL

## 2022-04-21 ENCOUNTER — DOCUMENTATION (OUTPATIENT)
Dept: PERINATAL CARE | Facility: CLINIC | Age: 27
End: 2022-04-21

## 2022-04-21 VITALS
OXYGEN SATURATION: 97 % | WEIGHT: 293 LBS | SYSTOLIC BLOOD PRESSURE: 126 MMHG | BODY MASS INDEX: 39.68 KG/M2 | DIASTOLIC BLOOD PRESSURE: 76 MMHG | HEART RATE: 111 BPM | TEMPERATURE: 96.8 F | HEIGHT: 72 IN

## 2022-04-21 DIAGNOSIS — F41.1 GENERALIZED ANXIETY DISORDER: Primary | ICD-10-CM

## 2022-04-21 DIAGNOSIS — F33.1 MODERATE EPISODE OF RECURRENT MAJOR DEPRESSIVE DISORDER (HCC): ICD-10-CM

## 2022-04-21 PROCEDURE — 3008F BODY MASS INDEX DOCD: CPT | Performed by: FAMILY MEDICINE

## 2022-04-21 PROCEDURE — 4004F PT TOBACCO SCREEN RCVD TLK: CPT | Performed by: FAMILY MEDICINE

## 2022-04-21 PROCEDURE — 99213 OFFICE O/P EST LOW 20 MIN: CPT | Performed by: FAMILY MEDICINE

## 2022-04-21 NOTE — PROGRESS NOTES
Date: 04/21/22  Zandra Burch  1995  Estimated Date of Delivery: 10/23/22  13w4d  OB/GYN: [facility]  GDM in pregnancy method of control unspecified  Additional Pregnancy Complications: Obesity and history of GDM 1 yr ago in a prior pregnancy requiring insulin therapy      Plan:    Diet: : 2400 calorie (CHO: 89-06-14-30-60-30) (PRO: 3-2-3/4-2-3/4-2)   Patient was advised on 4/14/22  to change bedtime snack to 1 serving CHO (15 gms) and 2-3 ounces of protein  Examples were provided  Gestational diabetes meal plan; 3 meals and 3 snacks  Patient was undereating CHO on her meal plan based on food diary review on 4/14/22  Advised minimum CHO goals in 2nd and 3rd trimester are 175 gms/day  Testing blood sugars: 4 x per day (Fasting, 2 hour after start of each meal)  Meter: OneTouch Verio Flex   Maintain at least 8 hrs fasting and no more than 10 hrs overnight  Requested patient update flow sheet every TUESDAY  Activity: Walks 30 minutes daily, follow OB recommendations  Support System: Significant Other / family   Patient Goal: "I will eat 3 meals and 3 snacks each day, including protein at each"  Education:  Initial diabetes education completed on 4/4/22 by CRNP  Combination class 1 and class 2 completed on 4/14/22 considering patient has a history of gestational diabetes in a prior pregnancy 1 yr ago requiring basal/bolus insulin therapy  Patient was a no show for follow up appointment 4/21 to discuss medication options  In-basket message sent to Niyah Bustos to reschedule  Also, sent [employer] message to patient  Weight Change:    Pre-gravid weight: 336 pounds  45 56 BMI  Noted 6 pound weight loss to date       Labs  4/15/22 HgA1c 5 7% ; 9/28/21 5 6%      Ultrasounds  4/15/22 Nuchal Translucency US:  Normal growth and ARTURO      Next US scheduled for 5/10/22 Early Anatomy; 5/31/22 Detailed Ultrasond    Follow up appointment scheduled with Tonya Koch on 5/24/22     Further fetal surveillance  Beginning at 32 weeks, NST / ARTURO twice a week, if indicated    Kenya Tejada, RD,LDN,CDE

## 2022-04-21 NOTE — PROGRESS NOTES
Assessment/Plan:  I will increase her sertraline to 75 mg daily  Encouraged to continue to cut back and hopefully quit smoking  In 2-3 months or p r n     Continue to follow up with her obstetrician  Problem List Items Addressed This Visit        Other    Moderate episode of recurrent major depressive disorder (HCC)    Relevant Medications    sertraline (ZOLOFT) 50 mg tablet    Generalized anxiety disorder - Primary    Relevant Medications    sertraline (ZOLOFT) 50 mg tablet           Diagnoses and all orders for this visit:    Generalized anxiety disorder  -     sertraline (ZOLOFT) 50 mg tablet; Take 1 5 tablets (75 mg total) by mouth daily    Moderate episode of recurrent major depressive disorder (HCC)  -     sertraline (ZOLOFT) 50 mg tablet; Take 1 5 tablets (75 mg total) by mouth daily        No problem-specific Assessment & Plan notes found for this encounter  Subjective:      Patient ID: Tanesha Banda is a 27 y o  female  Patient here for follow-up on her anxiety/depression  Patient is 14 weeks pregnant  Patient was switched to sertraline, due to decreased risk of birth defects  Was doing well on the Wellbutrin and Lexapro prior to her pregnancy  She feels the sertraline helps with the anxiety, but not much with the motivation  She denies any SI or HI  Patient is still working on quitting smoking      The following portions of the patient's history were reviewed and updated as appropriate:   She has a past medical history of Chlamydia, Depression, Gestational hypertension, third trimester (1/4/2021), Hypertension, Insulin controlled gestational diabetes mellitus (GDM) in third trimester (10/29/2020), Urinary tract infection, and Varicella  ,  does not have any pertinent problems on file  ,   has a past surgical history that includes [place] tooth extraction; [place] tooth extraction (Bilateral, 2010); Cholecystectomy; and pr remv pilonidal lesion extens (N/A, 8/3/2021)  ,  family history includes Breast cancer in her maternal grandmother; Clotting disorder in her father and paternal grandfather; Diabetes in her father and paternal grandmother; Kidney disease in her father; Other in her mother; Thyroid disease in her mother  ,   reports that she has been smoking  She has a 2 50 pack-year smoking history  She has never used smokeless tobacco  She reports previous alcohol use  She reports previous drug use  Drug: Marijuana  ,  has No Known Allergies     Current Outpatient Medications   Medication Sig Dispense Refill    aspirin 81 mg chewable tablet Chew 162 mg daily      Blood Glucose Monitoring Suppl (OneTouch Verio Flex System) w/Device KIT Dispense 1 kit per insurance formulary  1 kit 0    OneTouch Delica Lancets 10H MISC Use 4 a day or as directed  100 each 6    OneTouch Verio test strip Test 4 times a day and as instructed  Gestational diabetes  100 each 6    Prenatal MV-Min-Fe Fum-FA-DHA (PRENATAL 1 PO) Take by mouth      sertraline (ZOLOFT) 50 mg tablet Take 1 5 tablets (75 mg total) by mouth daily 45 tablet 3    erythromycin (ILOTYCIN) ophthalmic ointment  (Patient not taking: Reported on 3/2/2022 )       No current facility-administered medications for this visit  Review of Systems   Constitutional: Negative  Respiratory: Negative  Cardiovascular: Negative  Gastrointestinal: Negative  Genitourinary: Negative  Psychiatric/Behavioral: Negative for suicidal ideas  Objective:  Vitals:    04/21/22 0912   BP: 126/76   Pulse: (!) 111   Temp: (!) 96 8 °F (36 °C)   SpO2: 97%   Weight: (!) 150 kg (330 lb 12 8 oz)   Height: 6' (1 829 m)     Body mass index is 44 86 kg/m²  Physical Exam  Vitals reviewed  Constitutional:       General: She is not in acute distress  Appearance: Normal appearance  She is well-developed  She is not ill-appearing, toxic-appearing or diaphoretic  HENT:      Head: Normocephalic and atraumatic     Eyes:      Conjunctiva/sclera: Conjunctivae normal    Cardiovascular:      Rate and Rhythm: Normal rate and regular rhythm  Heart sounds: Normal heart sounds  No murmur heard  No friction rub  No gallop  Pulmonary:      Effort: Pulmonary effort is normal  No respiratory distress  Breath sounds: Normal breath sounds  No wheezing, rhonchi or rales  Musculoskeletal:      Right lower leg: No edema  Left lower leg: No edema  Neurological:      General: No focal deficit present  Mental Status: She is alert and oriented to person, place, and time  Psychiatric:         Mood and Affect: Mood normal          Behavior: Behavior normal          Thought Content:  Thought content normal          Judgment: Judgment normal

## 2022-04-21 NOTE — TELEPHONE ENCOUNTER
Attempted to complete an embedded virtual visit as well as a non-embedded visit  Patient was a no show for both appointments  Attempted to call patient to assist if she was having difficulty connecting to virtual visit  Voicemail message left for patient to contact Lora Olivas to reschedule this appointment to discuss blood sugars and possible start of medication  Also, sent in-basket message to Dois Gilford to schedule if patient did not call to reschedule

## 2022-04-22 ENCOUNTER — TELEPHONE (OUTPATIENT)
Dept: PERINATAL CARE | Facility: CLINIC | Age: 27
End: 2022-04-22

## 2022-04-22 NOTE — TELEPHONE ENCOUNTER
LMOM to return call to schedule an appt to discuss her blood sugars and meds      Thank you
Admission

## 2022-04-25 ENCOUNTER — TELEPHONE (OUTPATIENT)
Dept: PERINATAL CARE | Facility: CLINIC | Age: 27
End: 2022-04-25

## 2022-04-25 ENCOUNTER — TELEPHONE (OUTPATIENT)
Dept: OBGYN CLINIC | Facility: MEDICAL CENTER | Age: 27
End: 2022-04-25

## 2022-04-25 NOTE — TELEPHONE ENCOUNTER
1:56 pm: patient left voicemail wanting to reschedule her appointment that she missed last week due to a family emergency  3:50 pm: Left voicemail for patient to call [phone] to reschedule class 2 with a dietician and review medications

## 2022-05-02 ENCOUNTER — ROUTINE PRENATAL (OUTPATIENT)
Dept: OBGYN CLINIC | Facility: CLINIC | Age: 27
End: 2022-05-02

## 2022-05-02 VITALS — WEIGHT: 293 LBS | BODY MASS INDEX: 44.62 KG/M2 | SYSTOLIC BLOOD PRESSURE: 120 MMHG | DIASTOLIC BLOOD PRESSURE: 70 MMHG

## 2022-05-02 DIAGNOSIS — F33.1 MODERATE EPISODE OF RECURRENT MAJOR DEPRESSIVE DISORDER (HCC): ICD-10-CM

## 2022-05-02 DIAGNOSIS — O09.92 SUPERVISION OF HIGH RISK PREGNANCY IN SECOND TRIMESTER: Primary | ICD-10-CM

## 2022-05-02 DIAGNOSIS — Z3A.15 15 WEEKS GESTATION OF PREGNANCY: ICD-10-CM

## 2022-05-02 DIAGNOSIS — F41.1 GENERALIZED ANXIETY DISORDER: ICD-10-CM

## 2022-05-02 DIAGNOSIS — O24.419 GESTATIONAL DIABETES MELLITUS (GDM), ANTEPARTUM, GESTATIONAL DIABETES METHOD OF CONTROL UNSPECIFIED: ICD-10-CM

## 2022-05-02 PROCEDURE — PNV: Performed by: STUDENT IN AN ORGANIZED HEALTH CARE EDUCATION/TRAINING PROGRAM

## 2022-05-02 NOTE — ASSESSMENT & PLAN NOTE
- Had insulin depending A2GDM in recent pregnancy so is exempt from Class 2 per patient   - Reporting persistent fasting hyperglycemia 105-115  Agree with Diabetes in pregnancy recommendation for basal insulin

## 2022-05-02 NOTE — PROGRESS NOTES
Routine Prenatal Visit  [facility] [place], [place]    Assessment/Plan:  Lesley Honeycutt is a 27y o  year old  at 15w1d who presents for routine prenatal visit  1  Supervision of high risk pregnancy in second trimester    2  15 weeks gestation of pregnancy  Assessment & Plan:  - Reviewed normal NIPT, AFP ordered  - Continue  mg daily    Orders:  -     Alpha fetoprotein, maternal; Future    3  Gestational diabetes mellitus (GDM), antepartum, gestational diabetes method of control unspecified  Assessment & Plan:  - Had insulin depending A2GDM in recent pregnancy so is exempt from Class 2 per patient   - Reporting persistent fasting hyperglycemia 105-115  Agree with Diabetes in pregnancy recommendation for basal insulin  4  Moderate episode of recurrent major depressive disorder (HCC)  -     sertraline (ZOLOFT) 50 mg tablet; Take 2 tablets (100 mg total) by mouth daily  -     Ambulatory Referral to [facility]; Future    5  Generalized anxiety disorder  Assessment & Plan:  - Overall coping well, no thoughts of self harm, but having increased anxiety at night  - Increase Zoloft from 75 mg to 100 mg  - Referral to behavioral health therapist    Orders:  -     sertraline (ZOLOFT) 50 mg tablet; Take 2 tablets (100 mg total) by mouth daily  -     Ambulatory Referral to [facility]rap[facility]; Future        Subjective:     CC: Prenatal care    Renee Madera is a 27 y o   female who presents for routine prenatal care at 15w1d  Pregnancy ROS: Denies leakage of fluid, pelvic pain, or vaginal bleeding        The following portions of the patient's history were reviewed and updated as appropriate: allergies, current medications, past family history, past medical history, obstetric history, gynecologic history, past social history, past surgical history and problem list       Objective:  /70   Wt (!) 149 kg (329 lb)   LMP 2022 BMI 44 62 kg/m²   Pregravid Weight/BMI: Pregravid weight not on file (BMI Could not be calculated)  Current Weight: (!) 149 kg (329 lb)   Total Weight Gain: Not found  Pre- Vitals      Most Recent Value   Prenatal Assessment    Prenatal Vitals    Blood Pressure 120/70   Weight - Scale 149 kg (329 lb)   Urine Albumin/Glucose    Dilation/Effacement/Station    Vaginal Drainage    Edema    LLE Edema None   RLE Edema None           General: Well appearing, no distress  Respiratory: Unlabored breathing  Cardiovascular: Regular rate  Abdomen: Soft, gravid, nontender  Fundal Height: Appropriate for gestational age  Extremities: Warm and well perfused  Non tender      Genny Harris MD  [facility]  2022 1:05 PM

## 2022-05-02 NOTE — ASSESSMENT & PLAN NOTE
- Overall coping well, no thoughts of self harm, but having increased anxiety at night  - Increase Zoloft from 75 mg to 100 mg  - Referral to behavioral health therapist

## 2022-05-03 ENCOUNTER — TELEPHONE (OUTPATIENT)
Dept: PERINATAL CARE | Facility: CLINIC | Age: 27
End: 2022-05-03

## 2022-05-03 NOTE — TELEPHONE ENCOUNTER
LMOM to return call to schedule a sooner appt with Rossi and to report her blood sugars      Thank you

## 2022-05-05 ENCOUNTER — TELEMEDICINE (OUTPATIENT)
Dept: PERINATAL CARE | Facility: CLINIC | Age: 27
End: 2022-05-05
Payer: COMMERCIAL

## 2022-05-05 ENCOUNTER — TELEPHONE (OUTPATIENT)
Dept: PERINATAL CARE | Facility: CLINIC | Age: 27
End: 2022-05-05

## 2022-05-05 VITALS — BODY MASS INDEX: 39.68 KG/M2 | WEIGHT: 293 LBS | HEIGHT: 72 IN

## 2022-05-05 DIAGNOSIS — O24.419 GESTATIONAL DIABETES MELLITUS (GDM), ANTEPARTUM, GESTATIONAL DIABETES METHOD OF CONTROL UNSPECIFIED: ICD-10-CM

## 2022-05-05 DIAGNOSIS — E66.01 MATERNAL MORBID OBESITY, ANTEPARTUM (HCC): ICD-10-CM

## 2022-05-05 DIAGNOSIS — O99.810 HYPERGLYCEMIA IN PREGNANCY: Primary | ICD-10-CM

## 2022-05-05 DIAGNOSIS — Z3A.15 15 WEEKS GESTATION OF PREGNANCY: ICD-10-CM

## 2022-05-05 DIAGNOSIS — O99.210 MATERNAL MORBID OBESITY, ANTEPARTUM (HCC): ICD-10-CM

## 2022-05-05 PROCEDURE — 99215 OFFICE O/P EST HI 40 MIN: CPT | Performed by: NURSE PRACTITIONER

## 2022-05-05 RX ORDER — INSULIN GLARGINE 100 [IU]/ML
30 INJECTION, SOLUTION SUBCUTANEOUS
Qty: 15 ML | Refills: 0 | Status: SHIPPED | OUTPATIENT
Start: 2022-05-05 | End: 2022-06-07 | Stop reason: SDUPTHER

## 2022-05-05 NOTE — ASSESSMENT & PLAN NOTE
-Starting weight 336 lbs  -Current weight 329 lbs   -Lost 7 lbs  -Recommended weight gain during pregnancy 11 to 20 lbs  -Follow GDM diet   -continue walking 30 minutes a day

## 2022-05-05 NOTE — ASSESSMENT & PLAN NOTE
-A1c 5 7% not at goal; aim for 5 6% or less  -Due to hyperglycemia; start Lantus 30 units at 9 PM daily  Set an alarm   -Add a 2 to 4 AM glucose check for first 3 days post starting insulin   -Have at least a bedtime snack with 15 grams of carbohydrates and 2 to 3 ounces of protein    -Continue GDM diet   -Continue SMBG fasting; 2 hours post meal and with hypoglycemia   -Glucose goals fasting 60-90; 2 hours post meal 120 or less and overnight     -Continue reporting via glucose flowsheet every Monday and Thursday until FBS 90 or less    -always have glucose available for hypoglycemia; use 15 by 15 rule  -continue follow up with your OB and MFM as recommended   -After Level II ultrasound, fetal growth every 4 to 6 weeks as recommended   -Starting at 32 weeks gestation; NST twice a week and ARTURO    -Stay in close contact with diabetes team    Lab Results   Component Value Date    HGBA1C 5 7 (H) 04/15/2022

## 2022-05-05 NOTE — PROGRESS NOTES
Virtual Regular Visit    Verification of patient location:[place]    Patient is located in the following state in which I hold an active license [place]      Assessment/Plan:    Problem List Items Addressed This Visit        Endocrine    Hyperglycemia in pregnancy - Primary     -Due to hyperglycemia; starting on basal insulin  Relevant Medications    insulin glargine (Lantus SoloStar) 100 units/mL injection pen    Insulin Pen Needle 31G X 5 MM MISC    Gestational diabetes mellitus (GDM), antepartum     -A1c 5 7% not at goal; aim for 5 6% or less  -Due to hyperglycemia; start Lantus 30 units at 9 PM daily  Set an alarm   -Add a 2 to 4 AM glucose check for first 3 days post starting insulin   -Have at least a bedtime snack with 15 grams of carbohydrates and 2 to 3 ounces of protein    -Continue GDM diet   -Continue SMBG fasting; 2 hours post meal and with hypoglycemia   -Glucose goals fasting 60-90; 2 hours post meal 120 or less and overnight     -Continue reporting via glucose flowsheet every Monday and Thursday until FBS 90 or less    -always have glucose available for hypoglycemia; use 15 by 15 rule  -continue follow up with your OB and MFM as recommended   -After Level II ultrasound, fetal growth every 4 to 6 weeks as recommended   -Starting at 32 weeks gestation; NST twice a week and ARTURO  -Stay in close contact with diabetes team    Lab Results   Component Value Date    HGBA1C 5 7 (H) 04/15/2022            Relevant Medications    insulin glargine (Lantus SoloStar) 100 units/mL injection pen    Insulin Pen Needle 31G X 5 MM MISC       Other    BMI 40 0-44 9, adult (HCC)    Relevant Medications    insulin glargine (Lantus SoloStar) 100 units/mL injection pen    Insulin Pen Needle 31G X 5 MM MISC    Maternal morbid obesity, antepartum ([facility] [address] )     -Starting weight 336 lbs  -Current weight 329 lbs   -Lost 7 lbs  -Recommended weight gain during pregnancy 11 to 20 lbs    -Follow GDM diet   -continue walking 30 minutes a day  Relevant Medications    insulin glargine (Lantus SoloStar) 100 units/mL injection pen    Insulin Pen Needle 31G X 5 MM MISC    15 weeks gestation of pregnancy    Relevant Medications    insulin glargine (Lantus SoloStar) 100 units/mL injection pen    Insulin Pen Needle 31G X 5 MM MISC        Insulin pen review and quick insulin pen references provided via [email or web address]  History of insulin pen use with last pregnancy  · Insulin administration times, insulin action  · Hypoglycemia signs, symptoms and treatment  · Increase in maternal-fetal surveillance with insulin initiation  · Side effects of hyperglycemia in pregnancy including macrosomia,  hypoglycemia, polyhydramnios, pre-term labor and stillbirth           Reason for visit is   Chief Complaint   Patient presents with    Virtual Regular Visit    Gestational Diabetes        Encounter provider Hayley Calloway, [facility]    Provider located at [address] [place] [address] Drive [place] [address] [phone]      Recent Visits  Date Type Provider Dept   22 Telephone Kristy Amaya, [address] recent visits within past 7 days and meeting all other requirements  Today's Visits  Date Type Provider Dept   22 [address] [facility], [address] [place] [address],[address]   22 Telephone [facility]   Showing today's visits and meeting all other requirements  Future Appointments  No visits were found meeting these conditions  Showing future appointments within next 150 days and meeting all other requirements       The patient was identified by name and date of birth  Luna Rocha was informed that this is a telemedicine visit and that the visit is being conducted through Telephone  My office door was closed  No one else was in the room  She acknowledged consent and understanding of privacy and security of the video platform   The patient has agreed to participate and understands they can discontinue the visit at any time  Patient is aware this is a billable service  Charlie Perry is a 27 y o  female  15 4/7 weeks gestation GDM  Eating 3 meals and 2 snacks a day  Testing fasting and 2 hours post meal  Glucose readings above goal  History of insulin controlled GDM, familiar with insulin pens  Walking 30 minutes a day  Has lost 7 pounds with dietary modifications  HPI     Past Medical History:   Diagnosis Date    Chlamydia     Depression     no medications for 1 year    Gestational hypertension, third trimester 2021    Hypertension     Insulin controlled gestational diabetes mellitus (GDM) in third trimester 10/29/2020    Urinary tract infection     Varicella        Past Surgical History:   Procedure Laterality Date    CHOLECYSTECTOMY      DC REMV PILONIDAL LESION EXTENS N/A 8/3/2021    Procedure: EXCISION PILONIDAL CYST;  Surgeon: Kate Gardner DO;  Location: [facility];  Service: General    WISDOM TOOTH EXTRACTION      WISDOM TOOTH EXTRACTION Bilateral        Current Outpatient Medications   Medication Sig Dispense Refill    aspirin 81 mg chewable tablet Chew 162 mg daily      Blood Glucose Monitoring Suppl (OneTouch Verio Flex System) w/Device KIT Dispense 1 kit per insurance formulary  1 kit 0    erythromycin (ILOTYCIN) ophthalmic ointment  (Patient not taking: Reported on 3/2/2022 )      insulin glargine (Lantus SoloStar) 100 units/mL injection pen Inject 30 Units under the skin daily at bedtime At 9 PM daily  To be titrated  GDM  15 mL 0    Insulin Pen Needle 31G X 5 MM MISC Inject under the skin daily at bedtime Use one a day or as directed  100 each 1    OneTouch Delica Lancets 57Z MISC Use 4 a day or as directed  100 each 6    OneTouch Verio test strip Test 4 times a day and as instructed  Gestational diabetes   100 each 6    Prenatal MV-Min-Fe Fum-FA-DHA (PRENATAL 1 PO) Take by mouth      sertraline (ZOLOFT) 50 mg tablet Take 2 tablets (100 mg total) by mouth daily 45 tablet 3     No current facility-administered medications for this visit  No Known Allergies    Review of Systems   Constitutional: Positive for fatigue  Negative for fever  HENT: Negative for trouble swallowing  Eyes: Negative for visual disturbance  Respiratory: Negative for cough and shortness of breath  Cardiovascular: Negative for chest pain and palpitations  Gastrointestinal: Positive for diarrhea (intermittently) and nausea  Negative for vomiting  Endocrine: Positive for polyuria  Negative for polydipsia and polyphagia  Genitourinary: Negative for difficulty urinating and vaginal bleeding  Neurological: Negative for headaches  Psychiatric/Behavioral: Negative for sleep disturbance  Video Exam  Refer to glucose flowsheet  Vitals:    05/05/22 1505   Weight: (!) 149 kg (329 lb)   Height: 6' (1 829 m)       Physical Exam   It was my intent to perform this visit via video technology but the patient was not able to do a video connection so the visit was completed via audio telephone only  I spent 40 minutes directly with the patient during this visit  VIRTUAL VISIT DISCLAIMER      Shantanu Hansen verbally agrees to participate in [employer]  Pt is aware that [employer] could be limited without vital signs or the ability to perform a full hands-on physical [employer] understands she or the provider may request at any time to terminate the video visit and request the patient to seek care or treatment in person

## 2022-05-05 NOTE — PATIENT INSTRUCTIONS
-A1c 5 7% not at goal; aim for 5 6% or less  -Due to hyperglycemia; start Lantus 30 units at 9 PM daily  Set an alarm   -Add a 2 to 4 AM glucose check for first 3 days post starting insulin   -Have at least a bedtime snack with 15 grams of carbohydrates and 2 to 3 ounces of protein    -Continue GDM diet   -Continue SMBG fasting; 2 hours post meal and with hypoglycemia   -Glucose goals fasting 60-90; 2 hours post meal 120 or less and overnight     -Continue reporting via glucose flowsheet every Monday and Thursday until FBS 90 or less    -always have glucose available for hypoglycemia; use 15 by 15 rule  -continue follow up with your OB and MFM as recommended   -After Level II ultrasound, fetal growth every 4 to 6 weeks as recommended   -Starting at 32 weeks gestation; NST twice a week and ARTURO    -Stay in close contact with diabetes team

## 2022-05-05 NOTE — TELEPHONE ENCOUNTER
Patient was called, message left for her to set up appointment with the diabetic department to go over her medications  Asked patient to download her sugars and call us back

## 2022-05-10 ENCOUNTER — DOCUMENTATION (OUTPATIENT)
Dept: PERINATAL CARE | Facility: CLINIC | Age: 27
End: 2022-05-10

## 2022-05-10 ENCOUNTER — ROUTINE PRENATAL (OUTPATIENT)
Dept: PERINATAL CARE | Facility: OTHER | Age: 27
End: 2022-05-10
Payer: COMMERCIAL

## 2022-05-10 VITALS
BODY MASS INDEX: 39.68 KG/M2 | WEIGHT: 293 LBS | HEIGHT: 72 IN | DIASTOLIC BLOOD PRESSURE: 75 MMHG | SYSTOLIC BLOOD PRESSURE: 139 MMHG | HEART RATE: 103 BPM

## 2022-05-10 DIAGNOSIS — O24.414 INSULIN CONTROLLED GESTATIONAL DIABETES MELLITUS (GDM) IN SECOND TRIMESTER: Primary | ICD-10-CM

## 2022-05-10 DIAGNOSIS — Z36.3 ENCOUNTER FOR ANTENATAL SCREENING FOR MALFORMATION USING ULTRASOUND: ICD-10-CM

## 2022-05-10 DIAGNOSIS — Z3A.16 16 WEEKS GESTATION OF PREGNANCY: ICD-10-CM

## 2022-05-10 PROCEDURE — 76805 OB US >/= 14 WKS SNGL FETUS: CPT | Performed by: OBSTETRICS & GYNECOLOGY

## 2022-05-10 PROCEDURE — 99214 OFFICE O/P EST MOD 30 MIN: CPT | Performed by: OBSTETRICS & GYNECOLOGY

## 2022-05-10 PROCEDURE — 4004F PT TOBACCO SCREEN RCVD TLK: CPT | Performed by: OBSTETRICS & GYNECOLOGY

## 2022-05-10 NOTE — PROGRESS NOTES
Date: 05/10/22  Davian Jean  1995  Estimated Date of Delivery: 10/23/22  16w2d  OB/GYN: [facility]  GDM in pregnancy method of control unspecified  Additional Pregnancy Complications: Obesity and history of GDM 1 yr ago in a prior pregnancy requiring insulin therapy    Requested patient update flow sheet every TUESDAY          Plan:  Lantus 30 units at 9 pm daily - ordered 05/05/2022  Started 05/09/2022   Report Friday 05/13/2022 for review   Monitor post meals: add bolus insulin before meals if readings continue to stay elevated above 120 mg/dL  For now decrease dinner to 45 grams and 4 oz protein     Diet: : 2400 calorie   CHO: 68-28-14-30-45-15    PRO: 3-2-3/4-2-4-2/3   Gestational diabetes meal plan; 3 meals and 3 snacks  Testing blood sugars: 4 x per day (Fasting, 2 hour after start of each meal)  Meter: OneTouch Verio Flex   Activity: Walks 30 minutes daily, follow OB recommendations  Support System: Significant Other / family   Patient Goal: "I will eat 3 meals and 3 snacks each day, including protein at each"  Education:  Initial diabetes education completed on 4/4/22 by DEVANG  Combination class 1 and class 2 completed on 4/14/22 considering patient has a history of gestational diabetes in a prior pregnancy 1 yr ago requiring basal/bolus insulin therapy  CRNP: 05/05/2022  NEXT 05/24/2022    Weight Change:    Pre-gravid weight: 336 pounds  45 56 BMI  Current weight: 329 lbs  Noted 7 pound weight loss to date       Labs  09/28/2021 A1c 5 6%  04/15/2022 A1c 5 7%    Ultrasounds  4/15/22 Nuchal Translucency   Next US scheduled for 5/10/22 Early Anatomy @ 9 am       5/31/22 Detailed Ultrasond    Further fetal surveillance  Beginning at 32 weeks, NST / ARTURO twice a week    Farzana Barnhart RN

## 2022-05-10 NOTE — PROGRESS NOTES
The patient was seen today for an ultrasound  Please see ultrasound report (located under Ob Procedures) for additional details  Thank you very much for allowing us to participate in the care of this very nice patient  Should you have any questions, please do not hesitate to contact me  Solomon Perry MD [address] [place]  Attending Physician, Genia

## 2022-05-12 ENCOUNTER — ROUTINE PRENATAL (OUTPATIENT)
Dept: OBGYN CLINIC | Facility: CLINIC | Age: 27
End: 2022-05-12

## 2022-05-12 VITALS — BODY MASS INDEX: 43.81 KG/M2 | WEIGHT: 293 LBS | SYSTOLIC BLOOD PRESSURE: 124 MMHG | DIASTOLIC BLOOD PRESSURE: 68 MMHG

## 2022-05-12 DIAGNOSIS — O99.210 MATERNAL MORBID OBESITY, ANTEPARTUM (HCC): ICD-10-CM

## 2022-05-12 DIAGNOSIS — Z3A.16 16 WEEKS GESTATION OF PREGNANCY: Primary | ICD-10-CM

## 2022-05-12 DIAGNOSIS — E66.01 MATERNAL MORBID OBESITY, ANTEPARTUM (HCC): ICD-10-CM

## 2022-05-12 DIAGNOSIS — O24.414 INSULIN CONTROLLED GESTATIONAL DIABETES MELLITUS (GDM) IN SECOND TRIMESTER: ICD-10-CM

## 2022-05-12 PROCEDURE — PNV: Performed by: OBSTETRICS & GYNECOLOGY

## 2022-05-12 NOTE — PROGRESS NOTES
Joyce Rausch is a 27y o  year old  at 16w4d for routine prenatal visit    + FM, no vaginal bleeding, contractions, or LOF  Complaints: Yes - states has been feeling very anxious / did have increase in zoloft which she feels is helpful as well as has appointment with therapist ,states she does not feel baby move yet and this provokes anxiety  I offered patient closer visits for reassurance for which she was appreciative and desires to return in 2 weeks instead of 4 weeks   likely pre gestational diabetes given failed early 1 hr gtt  - seen by diabetic educator with continued elevated glucose - will start insulin tonight / we discussed importance of good control - continue close follow up with diabetic educators and [facility]   Most recent ultrasound and labs reviewed    Will return in 2 weeks   Has level 2 scheduled at [facility] and order for [facility]

## 2022-05-20 ENCOUNTER — DOCUMENTATION (OUTPATIENT)
Dept: PERINATAL CARE | Facility: OTHER | Age: 27
End: 2022-05-20

## 2022-05-20 PROBLEM — Z3A.17 17 WEEKS GESTATION OF PREGNANCY: Status: ACTIVE | Noted: 2022-04-12

## 2022-05-20 NOTE — PROGRESS NOTES
Date: 05/20/22  Emilio Obando  1995  Estimated Date of Delivery: 10/23/22  17w5d  OB/GYN: [facility]  GDM in pregnancy method of control unspecified  Additional Pregnancy Complications: Obesity and history of GDM 1 yr ago in a prior pregnancy requiring insulin therapy    Requested patient update flow sheet every TUESDAY          Plan:  Increase Lantus from 44 up to 52 units at 9 pm daily - split into 26 units each    Monitor post meals: add bolus insulin before meals if readings continue to stay elevated above 120 mg/dL  Dinner to 45 grams and 4 oz protein     Diet: : 2400 calorie   CHO: [id number]    PRO: 3-2-3/4-2-4-2/3   Gestational diabetes meal plan; 3 meals and 3 snacks  Testing blood sugars: 4 x per day (Fasting, 2 hour after start of each meal)  Meter: OneTouch Verio Flex   Activity: Walks 30 minutes daily, follow OB recommendations  Support System: Significant Other / family   Patient Goal: "I will eat 3 meals and 3 snacks each day, including protein at each"  Education:  Initial diabetes education completed on 4/4/22 by DEVANG  Combination class 1 and class 2 completed on 4/14/22 considering patient has a history of gestational diabetes in a prior pregnancy 1 yr ago requiring basal/bolus insulin therapy  CRNP: 05/05/2022  NEXT 06/14/2022    Weight Change:    Pre-gravid weight: 336 pounds  45 56 BMI  Current weight: 323 lbs  Noted 13 pound weight loss to date       Labs  09/28/2021 A1c 5 6%  04/15/2022 A1c 5 7%    Ultrasounds  4/15/22 Nuchal Translucency    5/10/22 Early Anatomy   Next US scheduled for 05/31/2022 Detailed Level 2    Further fetal surveillance  Beginning at 32 weeks, NST / ARTURO twice a week    Bernardo Valencia RN

## 2022-05-24 DIAGNOSIS — O24.414 INSULIN CONTROLLED GESTATIONAL DIABETES MELLITUS (GDM) IN SECOND TRIMESTER: ICD-10-CM

## 2022-05-24 DIAGNOSIS — O99.810 HYPERGLYCEMIA IN PREGNANCY: Primary | ICD-10-CM

## 2022-05-24 RX ORDER — INSULIN ASPART 100 [IU]/ML
4 INJECTION, SOLUTION INTRAVENOUS; SUBCUTANEOUS
Qty: 15 ML | Refills: 0 | Status: SHIPPED | OUTPATIENT
Start: 2022-05-24

## 2022-05-25 ENCOUNTER — PATIENT MESSAGE (OUTPATIENT)
Dept: PERINATAL CARE | Facility: CLINIC | Age: 27
End: 2022-05-25

## 2022-05-25 ENCOUNTER — ROUTINE PRENATAL (OUTPATIENT)
Dept: OBGYN CLINIC | Facility: MEDICAL CENTER | Age: 27
End: 2022-05-25

## 2022-05-25 ENCOUNTER — DOCUMENTATION (OUTPATIENT)
Dept: PERINATAL CARE | Facility: CLINIC | Age: 27
End: 2022-05-25

## 2022-05-25 VITALS — DIASTOLIC BLOOD PRESSURE: 60 MMHG | BODY MASS INDEX: 44.21 KG/M2 | SYSTOLIC BLOOD PRESSURE: 124 MMHG | WEIGHT: 293 LBS

## 2022-05-25 DIAGNOSIS — O24.419 GESTATIONAL DIABETES MELLITUS (GDM), ANTEPARTUM, GESTATIONAL DIABETES METHOD OF CONTROL UNSPECIFIED: ICD-10-CM

## 2022-05-25 DIAGNOSIS — O99.810 HYPERGLYCEMIA IN PREGNANCY: ICD-10-CM

## 2022-05-25 DIAGNOSIS — O99.210 MATERNAL MORBID OBESITY, ANTEPARTUM (HCC): ICD-10-CM

## 2022-05-25 DIAGNOSIS — O99.332 TOBACCO USE AFFECTING PREGNANCY IN SECOND TRIMESTER, ANTEPARTUM: ICD-10-CM

## 2022-05-25 DIAGNOSIS — Z3A.15 15 WEEKS GESTATION OF PREGNANCY: ICD-10-CM

## 2022-05-25 DIAGNOSIS — O24.414 INSULIN CONTROLLED GESTATIONAL DIABETES MELLITUS (GDM) IN SECOND TRIMESTER: Primary | ICD-10-CM

## 2022-05-25 DIAGNOSIS — F32.A DEPRESSION AFFECTING PREGNANCY IN SECOND TRIMESTER, ANTEPARTUM: ICD-10-CM

## 2022-05-25 DIAGNOSIS — Z3A.18 18 WEEKS GESTATION OF PREGNANCY: ICD-10-CM

## 2022-05-25 DIAGNOSIS — E66.01 MATERNAL MORBID OBESITY, ANTEPARTUM (HCC): ICD-10-CM

## 2022-05-25 DIAGNOSIS — O99.342 DEPRESSION AFFECTING PREGNANCY IN SECOND TRIMESTER, ANTEPARTUM: ICD-10-CM

## 2022-05-25 DIAGNOSIS — O99.210 OBESITY IN PREGNANCY: ICD-10-CM

## 2022-05-25 PROCEDURE — PNV: Performed by: NURSE PRACTITIONER

## 2022-05-25 NOTE — PROGRESS NOTES
Denies loss of fluid, vaginal bleeding and abdominal pain  Confirms fetal movement, flutters  Tolerating low-dose aspirin prenatal vitamin and Zoloft well  Denies worsening symptoms, thoughts of self-harm or harm to others  Gestational diabetes fasting blood sugars 80-100s recent increase in Lantus 52 units nightly  After meal blood sugars 130-150  Has NovoLog ordered for lunch and dinner  Has not started yet  Continues to smoke tobacco approximately half a pack per day  Has not had AFP drawn  Denies other questions or concerns at today's visit  BP: 124/60  Weight: -12lb  Plan  -continue low-dose aspirin and prenatal vitamin daily  -GDM encouraged to continue insulin as ordered  Reviewed goal blood sugars of fasting less than 90 and 2 hour postprandial less than 120  Encouraged to keep close contact diabetes in pregnancy program  -encouraged smoking cessation  -depression in pregnancy encouraged to continue Zoloft  Call office with worsening symptoms, thoughts of self-harm or harm to others  -follow-up  Center scheduled for 22  -reviewed patient can get AFP drawn until 22 gestational weeks  -common discomforts of pregnancy and precautions reviewed  Signs and symptoms report reviewed    Written information provided about COVID 19  RTO 4 weeks

## 2022-05-25 NOTE — PROGRESS NOTES
Date: 05/25/22  Emilio Obando  1995  Estimated Date of Delivery: 10/23/22  17w5d  OB/GYN: [facility]  GDM in pregnancy method of control unspecified  Additional Pregnancy Complications: Obesity and history of GDM 1 yr ago in a prior pregnancy requiring insulin therapy    Requested patient update flow sheet every TUESDAY          Plan:  Increase Lantus from 52 to 60 units at 9 pm daily - split into 2- 30 units each   Begin 4 units Novolog  At both lunch & dinner--take 15 minutes before eating the meal        Diet: : 2400 calorie   CHO: [id number]    PRO: 3-2-3/4-2-4-2/3   Has changed dinner to 3 CHO serving (45 gm) & 4 oz protein  Gestational diabetes meal plan; 3 meals and 3 snacks  Testing blood sugars: 4 x per day (Fasting, 2 hour after start of each meal)  Meter: OneTouch Verio Flex   Activity: Walks 30 minutes daily, follow OB recommendations  Support System: Significant Other / family   Patient Goal: "I will eat 3 meals and 3 snacks each day, including protein at each"  Education:  Initial diabetes education completed on 4/4/22 by DEVANG  Combination class 1 and class 2 completed on 4/14/22 considering patient has a history of gestational diabetes in a prior pregnancy 1 yr ago requiring basal/bolus insulin therapy  CRNP: 05/05/2022  NEXT 06/14/2022    Weight Change:    Pre-gravid weight: 336 pounds  45 56 BMI  Current weight: 323 lbs  Noted 13 pound weight loss to date  Labs  09/28/2021 A1c 5 6%  04/15/2022 A1c 5 7%  HbA1c reordered 5/13/22    Ultrasounds  4/15/22 Nuchal Translucency    5/10/22 Early Anatomy --Normal growth but AC-92% & normal fluids     Next US scheduled for 05/31/2022 Detailed Level 2    Further fetal surveillance  Beginning at 32 weeks, NST / ARTURO twice a week    Date to report next: Every Tuesday    Dereck Olivas MS, RD,CDE  Diabetes Educator  Diabetes [facility] Pregnancy Program

## 2022-05-31 ENCOUNTER — ROUTINE PRENATAL (OUTPATIENT)
Dept: PERINATAL CARE | Facility: OTHER | Age: 27
End: 2022-05-31
Payer: COMMERCIAL

## 2022-05-31 VITALS
HEART RATE: 109 BPM | BODY MASS INDEX: 39.68 KG/M2 | DIASTOLIC BLOOD PRESSURE: 78 MMHG | SYSTOLIC BLOOD PRESSURE: 123 MMHG | WEIGHT: 293 LBS | HEIGHT: 72 IN

## 2022-05-31 DIAGNOSIS — O99.210 MATERNAL MORBID OBESITY, ANTEPARTUM (HCC): ICD-10-CM

## 2022-05-31 DIAGNOSIS — E66.01 MATERNAL MORBID OBESITY, ANTEPARTUM (HCC): ICD-10-CM

## 2022-05-31 DIAGNOSIS — Z3A.19 19 WEEKS GESTATION OF PREGNANCY: ICD-10-CM

## 2022-05-31 DIAGNOSIS — Z36.86 ENCOUNTER FOR ANTENATAL SCREENING FOR CERVICAL LENGTH: ICD-10-CM

## 2022-05-31 DIAGNOSIS — O24.414 INSULIN CONTROLLED GESTATIONAL DIABETES MELLITUS (GDM) IN SECOND TRIMESTER: Primary | ICD-10-CM

## 2022-05-31 PROCEDURE — 76817 TRANSVAGINAL US OBSTETRIC: CPT | Performed by: OBSTETRICS & GYNECOLOGY

## 2022-05-31 PROCEDURE — 76811 OB US DETAILED SNGL FETUS: CPT | Performed by: OBSTETRICS & GYNECOLOGY

## 2022-05-31 PROCEDURE — 99214 OFFICE O/P EST MOD 30 MIN: CPT | Performed by: OBSTETRICS & GYNECOLOGY

## 2022-05-31 PROCEDURE — 3008F BODY MASS INDEX DOCD: CPT | Performed by: OBSTETRICS & GYNECOLOGY

## 2022-05-31 NOTE — PROGRESS NOTES
Ultrasound Probe Disinfection    A transvaginal ultrasound was performed  Prior to use, disinfection was performed with High Level Disinfection Process (Trophon)  Probe serial number [id number]: [id number] was used        Grady Bounds 05/31/22  10:52 AM

## 2022-05-31 NOTE — PROGRESS NOTES
The patient was seen today for an ultrasound  Please see ultrasound report (located under Ob Procedures) for additional details  Thank you very much for allowing us to participate in the care of this very nice patient  Should you have any questions, please do not hesitate to contact me  Solomon Sweet MD [address]  Attending Physician, [place]

## 2022-06-07 DIAGNOSIS — Z3A.15 15 WEEKS GESTATION OF PREGNANCY: ICD-10-CM

## 2022-06-07 DIAGNOSIS — O99.810 HYPERGLYCEMIA IN PREGNANCY: ICD-10-CM

## 2022-06-07 DIAGNOSIS — O99.210 MATERNAL MORBID OBESITY, ANTEPARTUM (HCC): ICD-10-CM

## 2022-06-07 DIAGNOSIS — E66.01 MATERNAL MORBID OBESITY, ANTEPARTUM (HCC): ICD-10-CM

## 2022-06-07 DIAGNOSIS — O24.419 GESTATIONAL DIABETES MELLITUS (GDM), ANTEPARTUM, GESTATIONAL DIABETES METHOD OF CONTROL UNSPECIFIED: ICD-10-CM

## 2022-06-07 RX ORDER — INSULIN GLARGINE 100 [IU]/ML
70 INJECTION, SOLUTION SUBCUTANEOUS
Qty: 30 ML | Refills: 0 | Status: SHIPPED | OUTPATIENT
Start: 2022-06-07 | End: 2022-07-18 | Stop reason: SDUPTHER

## 2022-06-14 ENCOUNTER — TELEPHONE (OUTPATIENT)
Dept: PERINATAL CARE | Facility: CLINIC | Age: 27
End: 2022-06-14

## 2022-06-14 NOTE — TELEPHONE ENCOUNTER
Left patient a message that her MFM appointment had to be rescheduled  The new time, date and location were provided  The patient has been instructed to please call us back at [phone] with any questions or concerns

## 2022-06-16 ENCOUNTER — TELEPHONE (OUTPATIENT)
Dept: PERINATAL CARE | Facility: CLINIC | Age: 27
End: 2022-06-16

## 2022-06-16 NOTE — TELEPHONE ENCOUNTER
Called patient to reschedule follow up appointment cancelled in [facility]:    Appointment canceled for Lisa Trevino ([phone])   Visit Type: VIRTUAL VISIT PG   Date        Time      Length    Provider                  Department   6/14/2022    1:00 PM  60 mins  [facility] [address]      Reason for Cancellation: Canceled via MyChart      Patient Comments: I'll be rescheduling asap, I just realized its the 14th and I won't be home for the appointment  Spoke with patient and rescheduled appointment - 6/27/22  1:00  DIABETES NURSE

## 2022-06-21 ENCOUNTER — ROUTINE PRENATAL (OUTPATIENT)
Dept: PERINATAL CARE | Facility: OTHER | Age: 27
End: 2022-06-21
Payer: COMMERCIAL

## 2022-06-21 VITALS
HEART RATE: 97 BPM | DIASTOLIC BLOOD PRESSURE: 80 MMHG | WEIGHT: 293 LBS | SYSTOLIC BLOOD PRESSURE: 125 MMHG | HEIGHT: 72 IN | BODY MASS INDEX: 39.68 KG/M2

## 2022-06-21 DIAGNOSIS — O35.8XX0 FETAL CARDIAC ANOMALY COMPLICATING PREGNANCY, ANTEPARTUM, SINGLE GESTATION: ICD-10-CM

## 2022-06-21 DIAGNOSIS — O24.414 INSULIN CONTROLLED GESTATIONAL DIABETES MELLITUS (GDM) IN SECOND TRIMESTER: Primary | ICD-10-CM

## 2022-06-21 DIAGNOSIS — Z3A.22 22 WEEKS GESTATION OF PREGNANCY: ICD-10-CM

## 2022-06-21 PROBLEM — O35.BXX0 FETAL CARDIAC ANOMALY COMPLICATING PREGNANCY, ANTEPARTUM, SINGLE GESTATION: Status: ACTIVE | Noted: 2022-06-21

## 2022-06-21 PROCEDURE — 93325 DOPPLER ECHO COLOR FLOW MAPG: CPT | Performed by: OBSTETRICS & GYNECOLOGY

## 2022-06-21 PROCEDURE — 76827 ECHO EXAM OF FETAL HEART: CPT | Performed by: OBSTETRICS & GYNECOLOGY

## 2022-06-21 PROCEDURE — 76825 ECHO EXAM OF FETAL HEART: CPT | Performed by: OBSTETRICS & GYNECOLOGY

## 2022-06-21 PROCEDURE — 99213 OFFICE O/P EST LOW 20 MIN: CPT | Performed by: OBSTETRICS & GYNECOLOGY

## 2022-06-21 NOTE — PROGRESS NOTES
The patient was seen today for an ultrasound  Please see ultrasound report (located under Ob Procedures) for additional details  Thank you very much for allowing us to participate in the care of this very nice patient  Should you have any questions, please do not hesitate to contact me  Solomon Harvey MD [address] [facility]  Attending Physician, [place]

## 2022-06-21 NOTE — LETTER
June 21, 2022     Asmita Clemons MD  [address]    Patient: Shantanu Hansen   YOB: 1995   Date of Visit: 6/21/2022       Dear Dr Ankit Cronin: Thank you for referring Sierra Yao to me for evaluation  Below are my notes for this consultation  If you have questions, please do not hesitate to call me  I look forward to following your patient along with you  Sincerely,        Bakari Cardoso MD        CC: MD Bakari Chowdhury MD  6/21/2022 11:21 AM  Sign when Signing Visit  The patient was seen today for an ultrasound  Please see ultrasound report (located under Ob Procedures) for additional details  Thank you very much for allowing us to participate in the care of this very nice patient  Should you have any questions, please do not hesitate to contact me  Solomon Jalloh MD [facility]  Attending Physician, [place]

## 2022-06-24 ENCOUNTER — ROUTINE PRENATAL (OUTPATIENT)
Dept: OBGYN CLINIC | Facility: MEDICAL CENTER | Age: 27
End: 2022-06-24

## 2022-06-24 VITALS — WEIGHT: 293 LBS | BODY MASS INDEX: 44.48 KG/M2 | SYSTOLIC BLOOD PRESSURE: 132 MMHG | DIASTOLIC BLOOD PRESSURE: 68 MMHG

## 2022-06-24 DIAGNOSIS — F41.1 GENERALIZED ANXIETY DISORDER: ICD-10-CM

## 2022-06-24 DIAGNOSIS — F33.1 MODERATE EPISODE OF RECURRENT MAJOR DEPRESSIVE DISORDER (HCC): ICD-10-CM

## 2022-06-24 DIAGNOSIS — O24.414 INSULIN CONTROLLED GESTATIONAL DIABETES MELLITUS (GDM) IN SECOND TRIMESTER: ICD-10-CM

## 2022-06-24 DIAGNOSIS — O99.210 MATERNAL MORBID OBESITY, ANTEPARTUM (HCC): ICD-10-CM

## 2022-06-24 DIAGNOSIS — O35.8XX0 FETAL CARDIAC ANOMALY COMPLICATING PREGNANCY, ANTEPARTUM, SINGLE GESTATION: ICD-10-CM

## 2022-06-24 DIAGNOSIS — E66.01 MATERNAL MORBID OBESITY, ANTEPARTUM (HCC): ICD-10-CM

## 2022-06-24 DIAGNOSIS — Z3A.22 22 WEEKS GESTATION OF PREGNANCY: Primary | ICD-10-CM

## 2022-06-24 PROCEDURE — PNV: Performed by: OBSTETRICS & GYNECOLOGY

## 2022-06-24 NOTE — ASSESSMENT & PLAN NOTE
Seeing DM center  Cont checking and management per them   Will need  testing at 32 weeks 2 x week     Novolog to 8 units before lunch and 10 units before dinner      Lab Results   Component Value Date    HGBA1C 5 7 (H) 04/15/2022

## 2022-06-24 NOTE — PROGRESS NOTES
Routine Prenatal Visit  [facility] [address] [place] [address]    Assessment/Plan:  Radny Cook is a 27y o  year old  at 22w4d who presents for routine prenatal visit  1  22 weeks gestation of pregnancy  Assessment & Plan:  Level 2 reviewed   Fetal echo reviewed      2  Fetal cardiac anomaly complicating pregnancy, antepartum, single gestation  Assessment & Plan:  Echo done concern for fetal aortic stenosis will see peds cardiology on       3  Maternal morbid obesity, antepartum ([address] )  Assessment & Plan:  Starting BMI 45  Growth 3rd trimester   Asa till 36 weeks       4  Insulin controlled gestational diabetes mellitus (GDM) in second trimester  Assessment & Plan:  Seeing DM center  Cont checking and management per them   Will need  testing at 32 weeks 2 x week     Novolog to 8 units before lunch and 10 units before dinner      Lab Results   Component Value Date    HGBA1C 5 7 (H) 04/15/2022         5  Moderate episode of recurrent major depressive disorder (HCC)  Assessment & Plan:  Cont the zoloft      6  Generalized anxiety disorder        Subjective:     CC: Prenatal care    Magalis Duvall is a 27 y o   female who presents for routine prenatal care at 22w4d  Pregnancy ROS: no leakage of fluid, pelvic pain, or vaginal bleeding  yes fetal movement      The following portions of the patient's history were reviewed and updated as appropriate: allergies, current medications, past family history, past medical history, obstetric history, gynecologic history, past social history, past surgical history and problem list       Objective:  /68   Wt (!) 149 kg (328 lb)   LMP 2022   BMI 44 48 kg/m²   Pregravid Weight/BMI: 153 kg (338 lb) (BMI 45 83)  Current Weight: (!) 149 kg (328 lb)   Total Weight Gain: -4 536 kg (-10 lb)   Pre- Vitals    Flowsheet Row Most Recent Value   Prenatal Assessment    Movement Present   Prenatal Vitals    Blood Pressure 132/68   Weight - Scale 149 kg (328 lb)   Urine Albumin/Glucose    Dilation/Effacement/Station    Vaginal Drainage    Edema            General: Well appearing, no distress  Respiratory: Unlabored breathing  Cardiovascular: Regular rate  Abdomen: Soft, gravid, nontender  Fundal Height: Appropriate for gestational age  Extremities: Warm and well perfused  Non tender

## 2022-06-27 ENCOUNTER — TELEMEDICINE (OUTPATIENT)
Dept: PERINATAL CARE | Facility: CLINIC | Age: 27
End: 2022-06-27
Payer: COMMERCIAL

## 2022-06-27 VITALS — HEIGHT: 72 IN | BODY MASS INDEX: 39.68 KG/M2 | WEIGHT: 293 LBS

## 2022-06-27 DIAGNOSIS — E66.01 MATERNAL MORBID OBESITY, ANTEPARTUM (HCC): ICD-10-CM

## 2022-06-27 DIAGNOSIS — O99.210 MATERNAL MORBID OBESITY, ANTEPARTUM (HCC): ICD-10-CM

## 2022-06-27 DIAGNOSIS — O99.810 HYPERGLYCEMIA IN PREGNANCY: Primary | ICD-10-CM

## 2022-06-27 DIAGNOSIS — O24.414 INSULIN CONTROLLED GESTATIONAL DIABETES MELLITUS (GDM) IN SECOND TRIMESTER: ICD-10-CM

## 2022-06-27 DIAGNOSIS — Z3A.23 23 WEEKS GESTATION OF PREGNANCY: ICD-10-CM

## 2022-06-27 PROCEDURE — 4004F PT TOBACCO SCREEN RCVD TLK: CPT | Performed by: NURSE PRACTITIONER

## 2022-06-27 PROCEDURE — 99215 OFFICE O/P EST HI 40 MIN: CPT | Performed by: NURSE PRACTITIONER

## 2022-06-27 PROCEDURE — 3008F BODY MASS INDEX DOCD: CPT | Performed by: NURSE PRACTITIONER

## 2022-06-27 RX ORDER — INSULIN PUMP CONTROLLER
1 EACH MISCELLANEOUS EVERY OTHER DAY
Qty: 15 EACH | Refills: 4 | Status: SHIPPED | OUTPATIENT
Start: 2022-06-27 | End: 2022-06-27 | Stop reason: CLARIF

## 2022-06-27 RX ORDER — INSULIN PMP CART,AUT,G6/7,CNTR
EACH SUBCUTANEOUS
Qty: 1 KIT | Refills: 0 | Status: SHIPPED | OUTPATIENT
Start: 2022-06-27

## 2022-06-27 RX ORDER — INSULIN ASPART 100 [IU]/ML
INJECTION, SOLUTION INTRAVENOUS; SUBCUTANEOUS
Qty: 30 ML | Refills: 0 | Status: SHIPPED | OUTPATIENT
Start: 2022-06-27

## 2022-06-27 NOTE — PATIENT INSTRUCTIONS
-A1c 5 7%, goal is 5 6% or less  -Repeat A1c, CMP and urine protein creatinine ratio  -Due to 2 hours post meal above 120, increase Novolog to 12 before lunch and 16 units before dinner  If still having readings above 120 okay to increase by 2 units each day until Thursday, 6/30/2022   -Send a message on 6/30/2022 for readings to be reviewed  -Continue Lantus 70 units daily split dose  -Referral for Omnipod insulin pump for better glycemia control   -continue GDM diet and SMBG  -Glucose goals fasting 60-90; 1 hour post meal 140 or less; 2 hours post meal 120 or less; overnight/before meals   -Always have glucose available for hypoglycemia; use 15 by 15 rule    -Continue follow up with OB and MFM as recommended   -Fetal growth ultrasound every 4 to 6 weeks gestation    -Starting at 32 weeks gestation; NST twice a week and ARTURO weekly   -Follow up dietitian    -Continue close contact with diabetes team

## 2022-06-27 NOTE — PROGRESS NOTES
Virtual Regular Visit    Verification of patient location:[place]    Patient is located in the following state in which I hold an active license [place]      Assessment/Plan:    Problem List Items Addressed This Visit        Endocrine    Hyperglycemia in pregnancy - Primary    Relevant Medications    Insulin Disposable Pump (Omnipod DASH Pods, Gen 4,) MISC    Other Relevant Orders    Comprehensive metabolic panel    Protein / creatinine ratio, urine    Insulin controlled gestational diabetes mellitus (GDM) in second trimester     -A1c 5 7%, goal is 5 6% or less  -Repeat A1c, CMP and urine protein creatinine ratio  -Due to 2 hours post meal above 120, increase Novolog to 12 before lunch and 16 units before dinner  If still having readings above 120 okay to increase by 2 units each day until Thursday, 6/30/2022   -Send a message on 6/30/2022 for readings to be reviewed  -Continue Lantus 70 units daily split dose  -Referral for Omnipod insulin pump for better glycemia control   -continue GDM diet and SMBG  -Glucose goals fasting 60-90; 1 hour post meal 140 or less; 2 hours post meal 120 or less; overnight/before meals   -Always have glucose available for hypoglycemia; use 15 by 15 rule    -Continue follow up with OB and MFM as recommended   -Fetal growth ultrasound every 4 to 6 weeks gestation    -Starting at 32 weeks gestation; NST twice a week and ARTURO weekly   -Follow up dietitian    -Continue close contact with diabetes team    Lab Results   Component Value Date    HGBA1C 5 7 (H) 04/15/2022              Relevant Medications    Insulin Disposable Pump (Omnipod DASH Pods, Gen 4,) MISC    Other Relevant Orders    Comprehensive metabolic panel    Protein / creatinine ratio, urine       Other    BMI 40 0-44 9, adult (HCC)    Relevant Medications    Insulin Disposable Pump (Omnipod DASH Pods, Gen 4,) MISC    Other Relevant Orders    Comprehensive metabolic panel    Protein / creatinine ratio, urine    Maternal morbid obesity, antepartum ([facility] [address] )     -Starting weight 338 lbs  -Current weight 328 lbs  -TWL  -10 lbs  -Recommended weight gain 11 to 20 lbs   -continue GDM diet   -follow up with dietitian  Relevant Medications    Insulin Disposable Pump (Omnipod DASH Pods, Gen 4,) MISC    Other Relevant Orders    Comprehensive metabolic panel    Protein / creatinine ratio, urine    23 weeks gestation of pregnancy    Relevant Medications    Insulin Disposable Pump (Omnipod DASH Pods, Gen 4,) MISC    Other Relevant Orders    Comprehensive metabolic panel    Protein / creatinine ratio, urine               Reason for visit is   Chief Complaint   Patient presents with    Virtual Regular Visit    Gestational Diabetes        Encounter provider Andrei Williamson, [facility]    Provider located at [address] [place] [id number] [phone]      Recent Visits  No visits were found meeting these conditions  Showing recent visits within past 7 days and meeting all other requirements  Today's Visits  Date Type Provider Dept   06/27/22 [id number], [facility] today's visits and meeting all other requirements  Future Appointments  No visits were found meeting these conditions  Showing future appointments within next 150 days and meeting all other requirements       The patient was identified by name and date of birth  Micheline Montalvo was informed that this is a telemedicine visit and that the visit is being conducted through Telephone  My office door was closed  No one else was in the room  She acknowledged consent and understanding of privacy and security of the video platform  The patient has agreed to participate and understands they can discontinue the visit at any time  Unable to connect virtually with patient due to connection issues on my side  [employer] agreed to telephone visit  Patient is aware this is a billable service  Angelica Schilder is a 27 y o  female  23 1/7 weeks gestation GDM on Lantus 70 units daily and Novolog 8 units before lunch and 10 units before dinner  Trying to follow GDM diet but at times will eat off of plan  Testing fasting and 2 hours post start of each meal  Have 2 hours post meal above goal  Walking 30 to 60 minutes a day depending on weather  Given current gestation; amounts of insulin and hyperglycemia; discussed switching to insulin pump and is agreeable  Made aware pay as you go plan and can be used for future pregnancies  Follow up with dietitian scheduled for more dietary recommendations and to assist with sweet tooth; has tried sugar free products  HPI     Past Medical History:   Diagnosis Date    Chlamydia     Depression     no medications for 1 year    Gestational hypertension, third trimester 2021    Hypertension     Insulin controlled gestational diabetes mellitus (GDM) in third trimester 10/29/2020    Urinary tract infection     Varicella        Past Surgical History:   Procedure Laterality Date    CHOLECYSTECTOMY      CT REMV PILONIDAL LESION EXTENS N/A 8/3/2021    Procedure: EXCISION PILONIDAL CYST;  Surgeon: Roberto Barcenas DO;  Location: [facility];  Service: General    WISDOM TOOTH EXTRACTION      WISDOM TOOTH EXTRACTION Bilateral        Current Outpatient Medications   Medication Sig Dispense Refill    Insulin Disposable Pump (Omnipod DASH Pods, Gen 4,) MISC Inject 1 Cartridge under the skin every other day GDM  Insulin pump  15 each 4    aspirin 81 mg chewable tablet Chew 162 mg daily      Blood Glucose Monitoring Suppl (OneTouch Verio Flex System) w/Device KIT Dispense 1 kit per insurance formulary  1 kit 0    insulin aspart (NovoLOG FlexPen) 100 UNIT/ML injection pen Inject 4 Units under the skin 2 (two) times a day before lunch and dinner To be titrated  GDM   (Patient taking differently: Inject 4 Units under the skin 2 (two) times a day before lunch and dinner To be titrated  GDM  Patient reports 8 units before lunch and 10 units before dinner) 15 mL 0    insulin glargine (Lantus SoloStar) 100 units/mL injection pen Inject 70 Units under the skin daily at bedtime At 9 PM daily  To be titrated  GDM  30 mL 0    Insulin Pen Needle 31G X 5 MM MISC Inject under the skin daily at bedtime Use up to 5 a day or as directed  150 each 1    OneTouch Delica Lancets 94T MISC Use 4 a day or as directed  100 each 6    OneTouch Verio test strip Test 4 times a day and as instructed  Gestational diabetes  100 each 6    Prenatal MV-Min-Fe Fum-FA-DHA (PRENATAL 1 PO) Take by mouth      sertraline (ZOLOFT) 50 mg tablet Take 2 tablets (100 mg total) by mouth daily 45 tablet 3     No current facility-administered medications for this visit  No Known Allergies    Review of Systems   Constitutional: Positive for fatigue (improved)  Negative for fever  Eyes: Negative for visual disturbance  Respiratory: Negative for cough and shortness of breath  Cardiovascular: Negative for chest pain and palpitations  Gastrointestinal: Positive for diarrhea (intermittently, possibly related to sugar alcohols) and nausea (occasionally )  Endocrine: Positive for polyuria  Negative for polydipsia and polyphagia  Genitourinary: Negative for difficulty urinating and vaginal bleeding  Neurological: Negative for headaches  Psychiatric/Behavioral: Negative for sleep disturbance  Video Exam  Refer to glucose flowsheet  Vitals:    06/27/22 1355   Weight: (!) 149 kg (328 lb)   Height: 6' (1 829 m)       Physical Exam   It was my intent to perform this visit via video technology but the patient was not able to do a video connection so the visit was completed via audio telephone only  I spent 40 minutes directly with the patient during this visit    [address] verbally agrees to participate in [employer]   Pt is aware that Virtual Care Services could be limited without vital signs or the ability to perform a full hands-on physical Mundo Fender understands she or the provider may request at any time to terminate the video visit and request the patient to seek care or treatment in person

## 2022-06-27 NOTE — ASSESSMENT & PLAN NOTE
-A1c 5 7%, goal is 5 6% or less  -Repeat A1c, CMP and urine protein creatinine ratio  -Due to 2 hours post meal above 120, increase Novolog to 12 before lunch and 16 units before dinner  If still having readings above 120 okay to increase by 2 units each day until Thursday, 6/30/2022   -Send a message on 6/30/2022 for readings to be reviewed  -Continue Lantus 70 units daily split dose  -Referral for Omnipod insulin pump for better glycemia control   -continue GDM diet and SMBG  -Glucose goals fasting 60-90; 1 hour post meal 140 or less; 2 hours post meal 120 or less; overnight/before meals   -Always have glucose available for hypoglycemia; use 15 by 15 rule    -Continue follow up with OB and MFM as recommended   -Fetal growth ultrasound every 4 to 6 weeks gestation    -Starting at 32 weeks gestation; NST twice a week and ARTURO weekly   -Follow up dietitian    -Continue close contact with diabetes team    Lab Results   Component Value Date    HGBA1C 5 7 (H) 04/15/2022

## 2022-06-27 NOTE — ASSESSMENT & PLAN NOTE
-Starting weight 338 lbs  -Current weight 328 lbs  -TWL  -10 lbs  -Recommended weight gain 11 to 20 lbs   -continue GDM diet   -follow up with dietitian

## 2022-07-03 ENCOUNTER — NURSE TRIAGE (OUTPATIENT)
Dept: OTHER | Facility: OTHER | Age: 27
End: 2022-07-03

## 2022-07-03 NOTE — TELEPHONE ENCOUNTER
Reason for Disposition   [1] Pregnant 24-36 weeks () AND [2] pinkish or brownish mucous discharge    Answer Assessment - Initial Assessment Questions  1  ONSET: "When did this bleeding start?"         This morning     2  DESCRIPTION: "Describe the bleeding that you are having " "How much bleeding is there?"     - SPOTTING: spotting, or pinkish / brownish mucous discharge; does not fill panti-liner or pad     - MILD:  less than 1 pad / hour; less than patient's usual menstrual bleeding    - MODERATE: 1-2 pads / hour; 1 menstrual cup every 6 hours; small-medium blood clots (e g , pea, grape, small coin)    - SEVERE: soaking 2 or more pads/hour for 2 or more hours; 1 menstrual cup every 2 hours; bleeding not contained by pads or continuous red blood from vagina; large blood clots (e g , golf ball, large coin)       Spotting and when wiping, bright red in color     3  ABDOMINAL PAIN SEVERITY: If present, ask: "How bad is it?"  (e g , Scale 1-10; mild, moderate, or severe)    - MILD (1-3): doesn't interfere with normal activities, abdomen soft and not tender to touch     - MODERATE (4-7): interferes with normal activities or awakens from sleep, tender to touch     - SEVERE (8-10): excruciating pain, doubled over, unable to do any normal activities      None     4  PREGNANCY: "Do you know how many weeks or months pregnant you are?"       24w0d     5  CALLIE: "What date are you expecting to deliver?"      10/24    6  FETAL MOVEMENT: "Has the baby's movement decreased or changed significantly from normal?"     Normal FM     7  HEMODYNAMIC STATUS: "Are you weak or feeling lightheaded?" If Yes, ask: "Can you stand and walk normally?"      Feeling okay     8   OTHER SYMPTOMS: "What other symptoms are you having with the bleeding?" (e g , leaking fluid from vagina, contractions)      None    Protocols used: PREGNANCY - VAGINAL BLEEDING GREATER THAN 20 WEEKS [id number]

## 2022-07-03 NOTE — TELEPHONE ENCOUNTER
Regardinw pregnant bleeding  ----- Message from Alexys Gutierrez sent at 7/3/2022  8:29 AM EDT -----  "I am 24w pregnant and I'm having some bleeding   It's very red and it's there every time I wipe "

## 2022-07-03 NOTE — TELEPHONE ENCOUNTER
Patient calling with bright red spotting when she woke up this morning that was still there when she wiped  Patient is 24w0d  Has no diagnosed placental abnormalities and has no pain or cramping at this time  She is feeling the baby move normal  Patient did report having intercourse last night  On call Provider paged regarding patient's symptoms  On call advises that patient monitor bleeding at home and if it worsens or she develops other symptoms she should call back to be reevaluated  Patient made aware and verbalized understanding stating that she feels like it is already getting better

## 2022-07-15 PROBLEM — Z3A.26 26 WEEKS GESTATION OF PREGNANCY: Status: ACTIVE | Noted: 2022-04-12

## 2022-07-18 ENCOUNTER — ROUTINE PRENATAL (OUTPATIENT)
Dept: OBGYN CLINIC | Facility: MEDICAL CENTER | Age: 27
End: 2022-07-18

## 2022-07-18 VITALS
SYSTOLIC BLOOD PRESSURE: 132 MMHG | WEIGHT: 293 LBS | HEIGHT: 72 IN | BODY MASS INDEX: 39.68 KG/M2 | DIASTOLIC BLOOD PRESSURE: 76 MMHG

## 2022-07-18 DIAGNOSIS — Z3A.26 26 WEEKS GESTATION OF PREGNANCY: ICD-10-CM

## 2022-07-18 DIAGNOSIS — O99.810 HYPERGLYCEMIA IN PREGNANCY: ICD-10-CM

## 2022-07-18 DIAGNOSIS — O24.414 INSULIN CONTROLLED GESTATIONAL DIABETES MELLITUS (GDM) IN SECOND TRIMESTER: Primary | ICD-10-CM

## 2022-07-18 DIAGNOSIS — E66.01 MATERNAL MORBID OBESITY, ANTEPARTUM (HCC): ICD-10-CM

## 2022-07-18 DIAGNOSIS — O24.419 GESTATIONAL DIABETES MELLITUS (GDM), ANTEPARTUM, GESTATIONAL DIABETES METHOD OF CONTROL UNSPECIFIED: ICD-10-CM

## 2022-07-18 DIAGNOSIS — O99.210 MATERNAL MORBID OBESITY, ANTEPARTUM (HCC): ICD-10-CM

## 2022-07-18 DIAGNOSIS — K21.9 GASTROESOPHAGEAL REFLUX IN PREGNANCY IN SECOND TRIMESTER: ICD-10-CM

## 2022-07-18 DIAGNOSIS — Z3A.15 15 WEEKS GESTATION OF PREGNANCY: ICD-10-CM

## 2022-07-18 DIAGNOSIS — O99.612 GASTROESOPHAGEAL REFLUX IN PREGNANCY IN SECOND TRIMESTER: ICD-10-CM

## 2022-07-18 PROCEDURE — PNV: Performed by: NURSE PRACTITIONER

## 2022-07-18 RX ORDER — INSULIN GLARGINE 100 [IU]/ML
70 INJECTION, SOLUTION SUBCUTANEOUS
Qty: 30 ML | Refills: 0 | Status: SHIPPED | OUTPATIENT
Start: 2022-07-18 | End: 2022-08-22 | Stop reason: SDUPTHER

## 2022-07-18 RX ORDER — FAMOTIDINE 20 MG/1
20 TABLET, FILM COATED ORAL 2 TIMES DAILY
Qty: 60 TABLET | Refills: 1 | Status: SHIPPED | OUTPATIENT
Start: 2022-07-18 | End: 2022-10-21 | Stop reason: ALTCHOICE

## 2022-07-18 NOTE — PROGRESS NOTES
Denies loss of fluid, vaginal bleeding and abdominal pain  Confirms frequent fetal movement  Tolerating prenatal vitamin, low-dose aspirin and Zoloft well  Denies worsening symptoms, thoughts of self-harm or harm to others  Diabetes in pregnancy fasting blood sugars 90s and 2 hour post prandials 130-140s  Current regimen includes Lantus 70u nightly and NovoLog 16u lunch/ 20u dinner  Continues to use tobacco <1/2 PPD  Is complaining of increased heartburn not always relieved with Tums  Denies other questions or concerns at today's visit  BP: 132/76  Weight: -5lb  Plan  -continue prenatal vitamin and low-dose aspirin daily  -gestational diabetes continue close contact with diabetes in pregnancy program, continue insulin as ordered, reviewed goal blood sugars fasting less than 90 and 2 hour post prandials less than 120  Patient encouraged to continue following diet  Reviewed with patient she has additional labs ordered by [facility]  Patient plans to have labs drawn this week  -CBC with reflex anemia panel ordered  To be drawn with other labs  -depression in pregnancy continue Zoloft  Encouraged to call office with worsening symptoms, thoughts of self-harm or harm to others  -encouraged tobacco cessation  -reflux in pregnancy Rx Pepcid 20 mg 1 tablet twice daily  Encouraged to avoid fried, spicy and other triggering foods  -follow-up  Center scheduled for 22 &  fetal echo scheduled 22  -common discomforts of pregnancy and precautions including  labor reviewed  Signs and symptoms report reviewed    Written information provided about COVID 19  RTO 2 weeks f/u labs & US

## 2022-07-25 ENCOUNTER — ULTRASOUND (OUTPATIENT)
Dept: PERINATAL CARE | Facility: OTHER | Age: 27
End: 2022-07-25
Payer: COMMERCIAL

## 2022-07-25 ENCOUNTER — APPOINTMENT (OUTPATIENT)
Dept: LAB | Facility: CLINIC | Age: 27
End: 2022-07-25
Payer: COMMERCIAL

## 2022-07-25 VITALS
WEIGHT: 293 LBS | SYSTOLIC BLOOD PRESSURE: 122 MMHG | BODY MASS INDEX: 39.68 KG/M2 | HEIGHT: 72 IN | DIASTOLIC BLOOD PRESSURE: 78 MMHG | HEART RATE: 103 BPM

## 2022-07-25 DIAGNOSIS — Z3A.27 27 WEEKS GESTATION OF PREGNANCY: Primary | ICD-10-CM

## 2022-07-25 DIAGNOSIS — O24.414 INSULIN CONTROLLED GESTATIONAL DIABETES MELLITUS (GDM) IN SECOND TRIMESTER: ICD-10-CM

## 2022-07-25 DIAGNOSIS — O24.414 INSULIN CONTROLLED GESTATIONAL DIABETES MELLITUS (GDM) IN THIRD TRIMESTER: ICD-10-CM

## 2022-07-25 DIAGNOSIS — O35.BXX0 FETAL CARDIAC ANOMALY COMPLICATING PREGNANCY, ANTEPARTUM, SINGLE GESTATION: ICD-10-CM

## 2022-07-25 DIAGNOSIS — Z72.0 TOBACCO USER: ICD-10-CM

## 2022-07-25 DIAGNOSIS — O99.810 HYPERGLYCEMIA IN PREGNANCY: ICD-10-CM

## 2022-07-25 DIAGNOSIS — O99.210 MATERNAL MORBID OBESITY, ANTEPARTUM (HCC): ICD-10-CM

## 2022-07-25 DIAGNOSIS — Z3A.26 26 WEEKS GESTATION OF PREGNANCY: ICD-10-CM

## 2022-07-25 DIAGNOSIS — Z3A.15 15 WEEKS GESTATION OF PREGNANCY: ICD-10-CM

## 2022-07-25 DIAGNOSIS — Z3A.23 23 WEEKS GESTATION OF PREGNANCY: ICD-10-CM

## 2022-07-25 DIAGNOSIS — E66.01 MATERNAL MORBID OBESITY, ANTEPARTUM (HCC): ICD-10-CM

## 2022-07-25 LAB
ALBUMIN SERPL BCP-MCNC: 2.6 G/DL (ref 3.5–5)
ALP SERPL-CCNC: 60 U/L (ref 46–116)
ALT SERPL W P-5'-P-CCNC: 15 U/L (ref 12–78)
ANION GAP SERPL CALCULATED.3IONS-SCNC: 7 MMOL/L (ref 4–13)
AST SERPL W P-5'-P-CCNC: 9 U/L (ref 5–45)
BASOPHILS # BLD AUTO: 0.04 THOUSANDS/ΜL (ref 0–0.1)
BASOPHILS NFR BLD AUTO: 0 % (ref 0–1)
BILIRUB SERPL-MCNC: 0.13 MG/DL (ref 0.2–1)
BUN SERPL-MCNC: 5 MG/DL (ref 5–25)
CALCIUM ALBUM COR SERPL-MCNC: 10.2 MG/DL (ref 8.3–10.1)
CALCIUM SERPL-MCNC: 9.1 MG/DL (ref 8.3–10.1)
CHLORIDE SERPL-SCNC: 110 MMOL/L (ref 96–108)
CO2 SERPL-SCNC: 22 MMOL/L (ref 21–32)
CREAT SERPL-MCNC: 0.62 MG/DL (ref 0.6–1.3)
CREAT UR-MCNC: 22.4 MG/DL
EOSINOPHIL # BLD AUTO: 0.18 THOUSAND/ΜL (ref 0–0.61)
EOSINOPHIL NFR BLD AUTO: 1 % (ref 0–6)
ERYTHROCYTE [DISTWIDTH] IN BLOOD BY AUTOMATED COUNT: 13.4 % (ref 11.6–15.1)
GFR SERPL CREATININE-BSD FRML MDRD: 123 ML/MIN/1.73SQ M
GLUCOSE SERPL-MCNC: 122 MG/DL (ref 65–140)
HCT VFR BLD AUTO: 33.2 % (ref 34.8–46.1)
HGB BLD-MCNC: 10.9 G/DL (ref 11.5–15.4)
IMM GRANULOCYTES # BLD AUTO: 0.07 THOUSAND/UL (ref 0–0.2)
IMM GRANULOCYTES NFR BLD AUTO: 1 % (ref 0–2)
LYMPHOCYTES # BLD AUTO: 1.79 THOUSANDS/ΜL (ref 0.6–4.47)
LYMPHOCYTES NFR BLD AUTO: 14 % (ref 14–44)
MCH RBC QN AUTO: 31.3 PG (ref 26.8–34.3)
MCHC RBC AUTO-ENTMCNC: 32.8 G/DL (ref 31.4–37.4)
MCV RBC AUTO: 95 FL (ref 82–98)
MONOCYTES # BLD AUTO: 0.86 THOUSAND/ΜL (ref 0.17–1.22)
MONOCYTES NFR BLD AUTO: 7 % (ref 4–12)
NEUTROPHILS # BLD AUTO: 10.3 THOUSANDS/ΜL (ref 1.85–7.62)
NEUTS SEG NFR BLD AUTO: 77 % (ref 43–75)
NRBC BLD AUTO-RTO: 0 /100 WBCS
PLATELET # BLD AUTO: 274 THOUSANDS/UL (ref 149–390)
PMV BLD AUTO: 11.1 FL (ref 8.9–12.7)
POTASSIUM SERPL-SCNC: 4.1 MMOL/L (ref 3.5–5.3)
PROT SERPL-MCNC: 6.9 G/DL (ref 6.4–8.4)
PROT UR-MCNC: <6 MG/DL
PROT/CREAT UR: <0.27 MG/G{CREAT} (ref 0–0.1)
RBC # BLD AUTO: 3.48 MILLION/UL (ref 3.81–5.12)
SODIUM SERPL-SCNC: 139 MMOL/L (ref 135–147)
WBC # BLD AUTO: 13.24 THOUSAND/UL (ref 4.31–10.16)

## 2022-07-25 PROCEDURE — 80053 COMPREHEN METABOLIC PANEL: CPT

## 2022-07-25 PROCEDURE — 76816 OB US FOLLOW-UP PER FETUS: CPT | Performed by: OBSTETRICS & GYNECOLOGY

## 2022-07-25 PROCEDURE — 84156 ASSAY OF PROTEIN URINE: CPT | Performed by: NURSE PRACTITIONER

## 2022-07-25 PROCEDURE — 82570 ASSAY OF URINE CREATININE: CPT | Performed by: NURSE PRACTITIONER

## 2022-07-25 PROCEDURE — 85025 COMPLETE CBC W/AUTO DIFF WBC: CPT

## 2022-07-25 PROCEDURE — 99213 OFFICE O/P EST LOW 20 MIN: CPT | Performed by: OBSTETRICS & GYNECOLOGY

## 2022-07-25 PROCEDURE — 82105 ALPHA-FETOPROTEIN SERUM: CPT

## 2022-07-25 NOTE — LETTER
July 29, 2022     Fritz Weaver MD  [address]    Patient: Abdoul Snell   YOB: 1995   Date of Visit: 7/25/2022       Dear Dr Douglas Bernardo: Thank you for referring Patricia Major to me for evaluation  Below are my notes for this consultation  If you have questions, please do not hesitate to call me  I look forward to following your patient along with you  Sincerely,        Roque Brewer MD        CC: No Recipients  Roque Brewer MD  7/29/2022  6:35 PM  Sign when Signing Visit  A fetal ultrasound was completed  See Ob procedures in Epic for an interpretation and recommendations  Do not hesitate to contact us in [facility] if you have questions  Gloria Villagomez MD, [facility]  Maternal Fetal Medicine

## 2022-07-25 NOTE — LETTER
July 29, 2022     Beryle Shu, MD  [address]    Patient: Agusto Rahman   YOB: 1995   Date of Visit: 7/25/2022       Dear Dr Huertas [facility]: Thank you for referring Esmer Gross to me for evaluation  Below are my notes for this consultation  If you have questions, please do not hesitate to call me  I look forward to following your patient along with you  Sincerely,        Yuan Campos MD        CC: MD Yuan Barksdale MD  7/29/2022  6:35 PM  Sign when Signing Visit  A fetal ultrasound was completed  See Ob procedures in Epic for an interpretation and recommendations  Do not hesitate to contact us in [facility] if you have questions  Jonel Powers MD, [facility]  Maternal Fetal Medicine

## 2022-07-27 PROBLEM — Z3A.27 27 WEEKS GESTATION OF PREGNANCY: Status: ACTIVE | Noted: 2022-04-12

## 2022-07-27 PROBLEM — O99.613 GASTROESOPHAGEAL REFLUX IN PREGNANCY IN THIRD TRIMESTER: Status: ACTIVE | Noted: 2022-07-18

## 2022-07-29 NOTE — PROGRESS NOTES
A fetal ultrasound was completed  See Ob procedures in Epic for an interpretation and recommendations  Do not hesitate to contact us in [facility] if you have questions  Lizz Ding MD, [address] [facility]  Maternal Fetal Medicine

## 2022-08-02 ENCOUNTER — TELEPHONE (OUTPATIENT)
Dept: PERINATAL CARE | Facility: CLINIC | Age: 27
End: 2022-08-02

## 2022-08-02 LAB
2ND TRIMESTER 4 SCREEN SERPL-IMP: NORMAL
AFP ADJ MOM SERPL: NORMAL
AFP INTERP AMN-IMP: NORMAL
AFP INTERP SERPL-IMP: NORMAL
AFP INTERP SERPL-IMP: NORMAL
AFP SERPL-MCNC: 83.6 NG/ML
AGE AT DELIVERY: 28.9 YR
GA METHOD: NORMAL
GA: 27.1 WEEKS
IDDM PATIENT QL: YES
MULTIPLE PREGNANCY: NO
NEURAL TUBE DEFECT RISK FETUS: NORMAL %

## 2022-08-02 NOTE — TELEPHONE ENCOUNTER
Message left regarding today's appointment and to contact the office at [phone]  Pending return phone call

## 2022-08-09 ENCOUNTER — ROUTINE PRENATAL (OUTPATIENT)
Dept: OBGYN CLINIC | Facility: MEDICAL CENTER | Age: 27
End: 2022-08-09
Payer: COMMERCIAL

## 2022-08-09 VITALS — WEIGHT: 293 LBS | BODY MASS INDEX: 45.43 KG/M2 | DIASTOLIC BLOOD PRESSURE: 78 MMHG | SYSTOLIC BLOOD PRESSURE: 118 MMHG

## 2022-08-09 DIAGNOSIS — O35.BXX0 FETAL CARDIAC ANOMALY COMPLICATING PREGNANCY, ANTEPARTUM, SINGLE GESTATION: ICD-10-CM

## 2022-08-09 DIAGNOSIS — O24.414 INSULIN CONTROLLED GESTATIONAL DIABETES MELLITUS (GDM) IN THIRD TRIMESTER: ICD-10-CM

## 2022-08-09 DIAGNOSIS — Z3A.29 29 WEEKS GESTATION OF PREGNANCY: ICD-10-CM

## 2022-08-09 DIAGNOSIS — O99.210 MATERNAL MORBID OBESITY, ANTEPARTUM (HCC): ICD-10-CM

## 2022-08-09 DIAGNOSIS — Z34.93 THIRD TRIMESTER PREGNANCY: Primary | ICD-10-CM

## 2022-08-09 DIAGNOSIS — E66.01 MATERNAL MORBID OBESITY, ANTEPARTUM (HCC): ICD-10-CM

## 2022-08-09 DIAGNOSIS — Z23 NEED FOR TDAP VACCINATION: ICD-10-CM

## 2022-08-09 LAB
BASOPHILS # BLD AUTO: 0.02 THOUSANDS/ΜL (ref 0–0.1)
BASOPHILS NFR BLD AUTO: 0 % (ref 0–1)
EOSINOPHIL # BLD AUTO: 0.16 THOUSAND/ΜL (ref 0–0.61)
EOSINOPHIL NFR BLD AUTO: 1 % (ref 0–6)
ERYTHROCYTE [DISTWIDTH] IN BLOOD BY AUTOMATED COUNT: 13.2 % (ref 11.6–15.1)
HCT VFR BLD AUTO: 32 % (ref 34.8–46.1)
HGB BLD-MCNC: 10.8 G/DL (ref 11.5–15.4)
IMM GRANULOCYTES # BLD AUTO: 0.05 THOUSAND/UL (ref 0–0.2)
IMM GRANULOCYTES NFR BLD AUTO: 0 % (ref 0–2)
LYMPHOCYTES # BLD AUTO: 1.81 THOUSANDS/ΜL (ref 0.6–4.47)
LYMPHOCYTES NFR BLD AUTO: 15 % (ref 14–44)
MCH RBC QN AUTO: 31.4 PG (ref 26.8–34.3)
MCHC RBC AUTO-ENTMCNC: 33.8 G/DL (ref 31.4–37.4)
MCV RBC AUTO: 93 FL (ref 82–98)
MONOCYTES # BLD AUTO: 0.8 THOUSAND/ΜL (ref 0.17–1.22)
MONOCYTES NFR BLD AUTO: 7 % (ref 4–12)
NEUTROPHILS # BLD AUTO: 9.48 THOUSANDS/ΜL (ref 1.85–7.62)
NEUTS SEG NFR BLD AUTO: 77 % (ref 43–75)
NRBC BLD AUTO-RTO: 0 /100 WBCS
PLATELET # BLD AUTO: 264 THOUSANDS/UL (ref 149–390)
PMV BLD AUTO: 10.4 FL (ref 8.9–12.7)
RBC # BLD AUTO: 3.44 MILLION/UL (ref 3.81–5.12)
WBC # BLD AUTO: 12.32 THOUSAND/UL (ref 4.31–10.16)

## 2022-08-09 PROCEDURE — 90715 TDAP VACCINE 7 YRS/> IM: CPT | Performed by: OBSTETRICS & GYNECOLOGY

## 2022-08-09 PROCEDURE — 36415 COLL VENOUS BLD VENIPUNCTURE: CPT | Performed by: OBSTETRICS & GYNECOLOGY

## 2022-08-09 PROCEDURE — PNV: Performed by: OBSTETRICS & GYNECOLOGY

## 2022-08-09 PROCEDURE — 90471 IMMUNIZATION ADMIN: CPT | Performed by: OBSTETRICS & GYNECOLOGY

## 2022-08-09 PROCEDURE — 85025 COMPLETE CBC W/AUTO DIFF WBC: CPT | Performed by: OBSTETRICS & GYNECOLOGY

## 2022-08-09 NOTE — PROGRESS NOTES
Assessment  27 y o   at 29w2d presenting for routine prenatal visit  Plan  Diagnoses and all orders for this visit:    Third trimester pregnancy  29 weeks gestation of pregnancy  -  labor precautions  - [address] teaching done  - Birth plan given, peds list given, birth consent signed  - CBC drawn  - Tdap given  - Rhogam not indicated  - Return in 2wks for PN    Insulin controlled gestational diabetes mellitus (GDM) in third trimester  - Continue glucose checking and reporting  - Continue insulin; dose adjustments being made per [facility] GDM program  - Continue MF follow up for serial growth assessment, later antepartum fetal surveillance    Fetal cardiac anomaly complicating pregnancy, antepartum, single gestation  - s/p fetal echo  - Upcoming peds cardiology consult    Maternal morbid obesity, antepartum ([facility] [address] )  - Follows with M    Need for Tdap vaccination  -     Tdap Vaccine greater than or equal to 6yo        ____________________________________________________________        Subjective    Nneka Salazar is a 27 y o   at 29w2d who presents for routine prenatal visit  She has some mild cramping, intermittent and not overall painful  Denies regular contractions, loss of fluid, or vaginal bleeding  She feels regular fetal movements       Pregnancy Problems:  Patient Active Problem List   Diagnosis    Functional diarrhea    Generalized abdominal pain    Tobacco user    H/O major depression    Left ovarian cyst    Pilonidal cyst    BMI 40 0-44 9, adult (HCC)    Hyperglycemia in pregnancy    Postpartum depression    Hidradenitis    Moderate episode of recurrent major depressive disorder (HCC)    Generalized anxiety disorder    Insulin controlled gestational diabetes mellitus (GDM) in third trimester    Maternal morbid obesity, antepartum ([facility] [address] )    29 weeks gestation of pregnancy    Fetal cardiac anomaly complicating pregnancy, antepartum, single gestation    Gastroesophageal reflux in pregnancy in third trimester         Objective  /78   Wt (!) 152 kg (335 lb)   LMP 01/07/2022   BMI 45 43 kg/m²     FHT: 150 BPM   Uterine Size: 31 cm     Physical Exam:  Physical Exam  Constitutional:       General: She is not in acute distress  Appearance: Normal appearance  She is well-developed  She is not ill-appearing, toxic-appearing or diaphoretic  HENT:      Head: Normocephalic and atraumatic  Eyes:      General: No scleral icterus  Right eye: No discharge  Left eye: No discharge  Conjunctiva/sclera: Conjunctivae normal    Pulmonary:      Effort: Pulmonary effort is normal  No accessory muscle usage or respiratory distress  Abdominal:      General: There is distension (gravid)  Tenderness: There is no abdominal tenderness  There is no guarding or rebound  Skin:     General: Skin is warm and dry  Coloration: Skin is not jaundiced  Findings: No bruising, erythema or rash  Neurological:      Mental Status: She is alert  Psychiatric:         Mood and Affect: Mood normal          Behavior: Behavior normal          Thought Content:  Thought content normal          Judgment: Judgment normal

## 2022-08-22 DIAGNOSIS — O99.810 HYPERGLYCEMIA IN PREGNANCY: Primary | ICD-10-CM

## 2022-08-22 DIAGNOSIS — E66.01 MATERNAL MORBID OBESITY, ANTEPARTUM (HCC): ICD-10-CM

## 2022-08-22 DIAGNOSIS — O24.419 GESTATIONAL DIABETES MELLITUS (GDM), ANTEPARTUM, GESTATIONAL DIABETES METHOD OF CONTROL UNSPECIFIED: ICD-10-CM

## 2022-08-22 DIAGNOSIS — O99.210 MATERNAL MORBID OBESITY, ANTEPARTUM (HCC): ICD-10-CM

## 2022-08-22 PROBLEM — Z3A.31 31 WEEKS GESTATION OF PREGNANCY: Status: ACTIVE | Noted: 2022-04-12

## 2022-08-22 RX ORDER — INSULIN GLARGINE 100 [IU]/ML
84 INJECTION, SOLUTION SUBCUTANEOUS
Qty: 30 ML | Refills: 0 | Status: SHIPPED | OUTPATIENT
Start: 2022-08-22 | End: 2022-10-03

## 2022-08-22 RX ORDER — BLOOD SUGAR DIAGNOSTIC
STRIP MISCELLANEOUS
Qty: 100 EACH | Refills: 2 | Status: SHIPPED | OUTPATIENT
Start: 2022-08-22 | End: 2022-10-15

## 2022-08-22 RX ORDER — LANCETS 33 GAUGE
EACH MISCELLANEOUS
Qty: 100 EACH | Refills: 2 | Status: SHIPPED | OUTPATIENT
Start: 2022-08-22

## 2022-08-23 ENCOUNTER — ROUTINE PRENATAL (OUTPATIENT)
Dept: PEDIATRIC CARDIOLOGY | Facility: CLINIC | Age: 27
End: 2022-08-23
Payer: COMMERCIAL

## 2022-08-23 VITALS
HEART RATE: 101 BPM | DIASTOLIC BLOOD PRESSURE: 76 MMHG | WEIGHT: 293 LBS | BODY MASS INDEX: 39.68 KG/M2 | HEIGHT: 72 IN | SYSTOLIC BLOOD PRESSURE: 132 MMHG

## 2022-08-23 DIAGNOSIS — O35.BXX0 FETAL CARDIAC ANOMALY COMPLICATING PREGNANCY, ANTEPARTUM, SINGLE GESTATION: Primary | ICD-10-CM

## 2022-08-23 PROCEDURE — 93325 DOPPLER ECHO COLOR FLOW MAPG: CPT | Performed by: PEDIATRICS

## 2022-08-23 PROCEDURE — 76827 ECHO EXAM OF FETAL HEART: CPT | Performed by: PEDIATRICS

## 2022-08-23 PROCEDURE — 99205 OFFICE O/P NEW HI 60 MIN: CPT | Performed by: PEDIATRICS

## 2022-08-23 PROCEDURE — 76825 ECHO EXAM OF FETAL HEART: CPT | Performed by: PEDIATRICS

## 2022-08-23 PROCEDURE — 76820 UMBILICAL ARTERY ECHO: CPT | Performed by: PEDIATRICS

## 2022-08-23 NOTE — PROGRESS NOTES
Fetal Cardiology Consult    Date of Visit:    2022  Gestational Age:  32w1d   Estimated Date of Delivery: 10/23/22       Dear Dr Monique Darby     I had the opportunity to meet with Donte Infante today for a Fetal Cardiology Consult and Fetal Echocardiogram  The reason for consultation was small aortic valve measurement  The Fetal Echocardiogram demonstrated normal cardiac anatomy and function (see full report under OB procedures)  Aortic valve measures normal size 5 8mm (z=0 9) on today's echo with normal velocity and flow profile across the valve  Mitral valve is normal size and there is no LV/RV size discrepancy  I reviewed the normal findings  We also discussed, that due to the technical limitations of fetal echocardiography and the nature of fetal circulation, a number of cardiovascular defects cannot be definitively ruled out at this stage  Examples include but are not limited to: ASD, PDA, small VSD, coarctation of the aorta, partial anomalous pulmonary venous connection  Assessment and Recommendations:  -Normal fetal cardiac anatomy and function    -Normal  care and prenatal care are recommended     -There is no follow-up needed  Thank you for allowing me to participate in Renu's care  Feel free to contact me with questions  I spent 60minutes - on the day of service - reviewing the patient's chart, reviewing previous imaging studies, counseling the patient about the fetal findings, coordinating care, and documenting care  Home Ortega MD  Pediatric Cardiology  [address] [place] [address]  Fax: [phone]  [email or web address]

## 2022-08-25 ENCOUNTER — ROUTINE PRENATAL (OUTPATIENT)
Dept: OBGYN CLINIC | Facility: MEDICAL CENTER | Age: 27
End: 2022-08-25

## 2022-08-25 ENCOUNTER — TELEPHONE (OUTPATIENT)
Dept: PERINATAL CARE | Facility: CLINIC | Age: 27
End: 2022-08-25

## 2022-08-25 VITALS
SYSTOLIC BLOOD PRESSURE: 132 MMHG | HEIGHT: 72 IN | WEIGHT: 293 LBS | DIASTOLIC BLOOD PRESSURE: 68 MMHG | BODY MASS INDEX: 39.68 KG/M2

## 2022-08-25 DIAGNOSIS — O24.414 INSULIN CONTROLLED GESTATIONAL DIABETES MELLITUS (GDM) IN THIRD TRIMESTER: Primary | ICD-10-CM

## 2022-08-25 DIAGNOSIS — Z3A.31 31 WEEKS GESTATION OF PREGNANCY: ICD-10-CM

## 2022-08-25 DIAGNOSIS — O99.210 OBESITY IN PREGNANCY: ICD-10-CM

## 2022-08-25 DIAGNOSIS — O99.613 GASTROESOPHAGEAL REFLUX IN PREGNANCY IN THIRD TRIMESTER: ICD-10-CM

## 2022-08-25 DIAGNOSIS — O99.333 MATERNAL TOBACCO USE IN THIRD TRIMESTER: ICD-10-CM

## 2022-08-25 DIAGNOSIS — K21.9 GASTROESOPHAGEAL REFLUX IN PREGNANCY IN THIRD TRIMESTER: ICD-10-CM

## 2022-08-25 LAB
DME PARACHUTE DELIVERY DATE REQUESTED: NORMAL
DME PARACHUTE ITEM DESCRIPTION: NORMAL
DME PARACHUTE ORDER STATUS: NORMAL
DME PARACHUTE SUPPLIER NAME: NORMAL
DME PARACHUTE SUPPLIER PHONE: NORMAL

## 2022-08-25 PROCEDURE — PNV: Performed by: NURSE PRACTITIONER

## 2022-08-25 NOTE — PROGRESS NOTES
Denies loss of fluid, vaginal bleeding and abdominal pain  Confirms frequent fetal movement  Tolerating prenatal vitamin, low-dose aspirin and Zoloft well  Denies worsening symptoms, thoughts of self-harm or harm to others  GDM fasting blood sugars 's and 2 hour post prandials 130s  Current regimen includes Lantus 84u nightly and NovoLog 16u lunch/ 20u dinner  Continues to smoke approximately less than half a pack per day  Denies other questions or concerns at today's visit  BP: 132/68  Weight: -1  Plan  -continue prenatal vitamin and low-dose aspirin daily  -continue fetal kick counts daily  -gestational diabetes continue close contact with diabetes in pregnancy program, continue insulin as ordered  Reviewed goal blood sugars fasting less than 90 and 2 hour post prandials less than 120  -depression in pregnancy continue Zoloft  Encouraged to call office with thoughts of self-harm, harm to others or worsening symptoms  -encouraged tobacco cessation  -follow-up with  Center scheduled for 9/15/22  - ANFS recommended starting at 32 weeks for GDM and BMI 45  Referral placed to MFM    Patient encouraged to call to schedule appointment  RTO 2 weeks f/u ANFS
Detail Level: Detailed

## 2022-08-25 NOTE — TELEPHONE ENCOUNTER
Left message for pt to call back and emerita her nst & sara's for week of 8/29 she needs those 2 times a week  Advised pt to call [phone] so that we can emerita her

## 2022-08-26 ENCOUNTER — NURSE TRIAGE (OUTPATIENT)
Dept: OTHER | Facility: OTHER | Age: 27
End: 2022-08-26

## 2022-08-26 NOTE — TELEPHONE ENCOUNTER
Reason for Disposition   HIGH RISK for severe COVID complications (e g , weak immune system, age > 59 years, obesity with BMI > 22, pregnant, chronic lung disease or other chronic medical condition)  (Exception: Already seen by PCP and no new or worsening symptoms )    Answer Assessment - Initial Assessment Questions  1  COVID-19 DIAGNOSIS: "Who made your COVID-19 diagnosis?" "Was it confirmed by a positive lab test or self-test?" If not diagnosed by a doctor (or NP/PA), ask "Are there lots of cases (community spread) where you live?" Note: See public health department website, if unsure  Home test 8/26    2  COVID-19 EXPOSURE: "Was there any known exposure to COVID before the symptoms began?" CDC Definition of close contact: within 6 feet (2 meters) for a total of 15 minutes or more over a 24-hour period  Spouse positive 8/26    3  ONSET: "When did the COVID-19 symptoms start?"       8/25    4  WORST SYMPTOM: "What is your worst symptom?" (e g , cough, fever, shortness of breath, muscle aches)      Sore throat    5  COUGH: "Do you have a cough?" If Yes, ask: "How bad is the cough?"        Denies    6  FEVER: "Do you have a fever?" If Yes, ask: "What is your temperature, how was it measured, and when did it start?"      Denies    7  RESPIRATORY STATUS: "Describe your breathing?" (e g , shortness of breath, wheezing, unable to speak)       Denies    8  BETTER-SAME-WORSE: "Are you getting better, staying the same or getting worse compared to yesterday?"  If getting worse, ask, "In what way?"      Same    9  HIGH RISK DISEASE: "Do you have any chronic medical problems?" (e g , asthma, heart or lung disease, weak immune system, obesity, etc )      Obesity    10  VACCINE: "Have you had the COVID-19 vaccine?" If Yes, ask: "Which one, how many shots, when did you get it?"        x2    11  BOOSTER: "Have you received your COVID-19 booster?" If Yes, ask: "Which one and when did you get it?"        1    12  PREGNANCY: "Is there any chance you are pregnant?" "When was your last menstrual period?"        31w5d    13  OTHER SYMPTOMS: "Do you have any other symptoms?"  (e g , chills, fatigue, headache, loss of smell or taste, muscle pain, sore throat)        Denies    14   O2 SATURATION MONITOR:  "Do you use an oxygen saturation monitor (pulse oximeter) at home?" If Yes, ask "What is your reading (oxygen level) today?" "What is your usual oxygen saturation reading?" (e g , 95%)        wu    Protocols used: CORONAVIRUS (COVID-19) DIAGNOSED OR SUSPECTED-ADULT-

## 2022-08-26 NOTE — TELEPHONE ENCOUNTER
Pt's wife called in with concerns that pt health is deteriorating. Bandar Flores (wife) states that pt has not been responding well. When she ask him a question he stares at her with no response. Sometimes she has to ask him a question several times before he can respond.  Pt is currently scheduled for a f/u on 3/10/2020, please advise if he should be sooner Regardin weeks, covid positive concern   ----- Message from Rajwinder Monahan sent at 2022  5:26 PM EDT -----  " I am 31 weeks and just tested positive for covid "

## 2022-08-26 NOTE — TELEPHONE ENCOUNTER
On call provider agrees with home care recommendations and strict call back instructions  Pt informed

## 2022-09-06 ENCOUNTER — ULTRASOUND (OUTPATIENT)
Dept: PERINATAL CARE | Facility: OTHER | Age: 27
End: 2022-09-06
Payer: COMMERCIAL

## 2022-09-06 ENCOUNTER — DOCUMENTATION (OUTPATIENT)
Dept: PERINATAL CARE | Facility: CLINIC | Age: 27
End: 2022-09-06

## 2022-09-06 ENCOUNTER — TELEPHONE (OUTPATIENT)
Dept: PERINATAL CARE | Facility: CLINIC | Age: 27
End: 2022-09-06

## 2022-09-06 ENCOUNTER — PATIENT MESSAGE (OUTPATIENT)
Dept: PERINATAL CARE | Facility: CLINIC | Age: 27
End: 2022-09-06

## 2022-09-06 VITALS
SYSTOLIC BLOOD PRESSURE: 134 MMHG | HEIGHT: 72 IN | HEART RATE: 82 BPM | WEIGHT: 293 LBS | DIASTOLIC BLOOD PRESSURE: 68 MMHG | BODY MASS INDEX: 39.68 KG/M2

## 2022-09-06 DIAGNOSIS — O36.63X0 MACROSOMIA OF FETUS AFFECTING MANAGEMENT OF MOTHER IN THIRD TRIMESTER, SINGLE OR UNSPECIFIED FETUS: ICD-10-CM

## 2022-09-06 DIAGNOSIS — E66.01 MATERNAL MORBID OBESITY, ANTEPARTUM (HCC): ICD-10-CM

## 2022-09-06 DIAGNOSIS — Z72.0 TOBACCO USER: ICD-10-CM

## 2022-09-06 DIAGNOSIS — O24.414 INSULIN CONTROLLED GESTATIONAL DIABETES MELLITUS (GDM) IN THIRD TRIMESTER: Primary | ICD-10-CM

## 2022-09-06 DIAGNOSIS — F41.1 GENERALIZED ANXIETY DISORDER: ICD-10-CM

## 2022-09-06 DIAGNOSIS — Z3A.31 31 WEEKS GESTATION OF PREGNANCY: ICD-10-CM

## 2022-09-06 DIAGNOSIS — F33.1 MODERATE EPISODE OF RECURRENT MAJOR DEPRESSIVE DISORDER (HCC): ICD-10-CM

## 2022-09-06 DIAGNOSIS — O99.210 MATERNAL MORBID OBESITY, ANTEPARTUM (HCC): ICD-10-CM

## 2022-09-06 PROCEDURE — 59025 FETAL NON-STRESS TEST: CPT | Performed by: OBSTETRICS & GYNECOLOGY

## 2022-09-06 PROCEDURE — 76816 OB US FOLLOW-UP PER FETUS: CPT | Performed by: OBSTETRICS & GYNECOLOGY

## 2022-09-06 PROCEDURE — 99214 OFFICE O/P EST MOD 30 MIN: CPT | Performed by: OBSTETRICS & GYNECOLOGY

## 2022-09-06 RX ORDER — SULFAMETHOXAZOLE AND TRIMETHOPRIM 800; 160 MG/1; MG/1
1 TABLET ORAL 2 TIMES DAILY
COMMUNITY
Start: 2022-09-01 | End: 2022-09-11

## 2022-09-06 NOTE — PATIENT INSTRUCTIONS
Kick Counts in Pregnancy   WHAT YOU NEED TO KNOW:   What do I need to know about kick counts? Kick counts measure how much your baby is moving in your womb  A kick from your baby can be felt as a twist, turn, swish, roll, or jab  It is common to feel your baby kicking at 26 to 28 weeks of pregnancy  You may feel your baby kick as early as 20 weeks of pregnancy  You may want to start counting at 28 weeks  Why should I measure kick counts? Your baby's movement may provide information about your baby's health  He or she may move less, or not at all, if there are problems  Your baby may move less if he or she is not getting enough oxygen or nutrition from the placenta  Do not smoke while you are pregnant  Smoking decreases the amount of oxygen that gets to your baby  Talk to your healthcare provider if you need help to quit smoking  Tell your healthcare provider as soon as you feel a change in your baby's movements  When do I measure kick counts? Measure kick counts at the same time every day  Measure kick counts when your baby is awake and most active  Your baby may be most active in the evening  How do I measure kick counts? Check that your baby is awake before you measure kick counts  You can wake up your baby by lightly pushing on your belly, walking, or drinking something cold  Your healthcare provider may tell you different ways to measure kick counts  You may be told to do the following:  Use a chart or clock to keep track of the time you start and finish counting  Sit in a chair or lie on your left side  Place your hands on the largest part of your belly  Count until you reach 10 kicks  Write down how much time it takes to count 10 kicks  It may take 30 minutes to 2 hours to count 10 kicks  It should not take more than 2 hours to count 10 kicks  When should I contact my doctor? You feel a change in the number of kicks or movements of your baby       You feel fewer than 10 kicks within 2 hours  You have questions or concerns about your baby's movements  CARE AGREEMENT:   You have the right to help plan your care  Learn about your health condition and how it may be treated  Discuss treatment options with your healthcare providers to decide what care you want to receive  You always have the right to refuse treatment  The above information is an  only  It is not intended as medical advice for individual conditions or treatments  Talk to your doctor, nurse or pharmacist before following any medical regimen to see if it is safe and effective for you  © Copyright [employer] 2022 Information is for End User's use only and may not be sold, redistributed or otherwise used for commercial purposes  All illustrations and images included in CareNotes® are the copyrighted property of [employer]  or [address] [place] [address] Counts in Pregnancy   WHAT YOU NEED TO KNOW:   What do I need to know about kick counts? Kick counts measure how much your baby is moving in your womb  A kick from your baby can be felt as a twist, turn, swish, roll, or jab  It is common to feel your baby kicking at 26 to 28 weeks of pregnancy  You may feel your baby kick as early as 20 weeks of pregnancy  You may want to start counting at 28 weeks  Why should I measure kick counts? Your baby's movement may provide information about your baby's health  He or she may move less, or not at all, if there are problems  Your baby may move less if he or she is not getting enough oxygen or nutrition from the placenta  Do not smoke while you are pregnant  Smoking decreases the amount of oxygen that gets to your baby  Talk to your healthcare provider if you need help to quit smoking  Tell your healthcare provider as soon as you feel a change in your baby's movements  When do I measure kick counts? Measure kick counts at the same time every day        Measure kick counts when your baby is awake and most active  Your baby may be most active in the evening  How do I measure kick counts? Check that your baby is awake before you measure kick counts  You can wake up your baby by lightly pushing on your belly, walking, or drinking something cold  Your healthcare provider may tell you different ways to measure kick counts  You may be told to do the following:  Use a chart or clock to keep track of the time you start and finish counting  Sit in a chair or lie on your left side  Place your hands on the largest part of your belly  Count until you reach 10 kicks  Write down how much time it takes to count 10 kicks  It may take 30 minutes to 2 hours to count 10 kicks  It should not take more than 2 hours to count 10 kicks  When should I contact my doctor? You feel a change in the number of kicks or movements of your baby  You feel fewer than 10 kicks within 2 hours  You have questions or concerns about your baby's movements  CARE AGREEMENT:   You have the right to help plan your care  Learn about your health condition and how it may be treated  Discuss treatment options with your healthcare providers to decide what care you want to receive  You always have the right to refuse treatment  The above information is an  only  It is not intended as medical advice for individual conditions or treatments  Talk to your doctor, nurse or pharmacist before following any medical regimen to see if it is safe and effective for you  © Copyright [employer] 2022 Information is for End User's use only and may not be sold, redistributed or otherwise used for commercial purposes   All illustrations and images included in CareNotes® are the copyrighted property of [employer]  or [address] [place] [address] [employer]

## 2022-09-06 NOTE — PROGRESS NOTES
Date: 09/06/22  Kristina Jiménez  1995  Estimated Date of Delivery: 10/23/22  33w2d  OB/GYN: [facility]  Insulin controlled GDM  Additional Pregnancy Complications: Obesity and history of GDM 1 yr ago in a prior pregnancy requiring insulin therapy    Requested patient update flow sheet every Tuesday          Plan:  Lantus--Increase from 84 to 93 units at 9 pm daily - split into 3- 31 units each   Novolog-- Increase from 30 to 36 units at lunch & from 36 yo 42 units at dinner  Schedule a follow-up a Nutrition DSMT follow-up  Diet: : 2400 calorie   CHO: [id number]    PRO: 3-2-3/4-2-4-2/3   Has changed dinner to 3 CHO serving (45 gm) & 4 oz protein  Gestational diabetes meal plan; 3 meals and 3 snacks  Testing blood sugars: 4 x per day (Fasting, 2 hour after start of each meal)  Meter: OneTouch Verio Flex   Activity: Walks 30 minutes daily, follow OB recommendations  Support System: Spouse / family   Patient Goal: "I will eat 3 meals and 3 snacks each day, including protein at each"  Education:  Initial diabetes education completed on 4/4/22 by DEVANG  Combination class 1 and class 2 completed on 4/14/22 considering patient has a history of gestational diabetes in a prior pregnancy 1 yr ago requiring basal/bolus insulin therapy  CRNP: 05/02/2022, 6/27/22   Weight Change:    Pre-gravid weight: 336 pounds  45 56 BMI  Current weight: 332 lbs  Noted 4 pound weight loss to date  Labs  09/28/2021 A1c 5 6%  04/15/2022 A1c 5 7%  7/25/22- CeM5l-3 9%    Ultrasounds  7/25/22- normal growth & fluids     Next US scheduled for 09/15/2022    Further fetal surveillance  Beginning at 32 weeks, NST / ARTURO twice a week    Date to report next: Every Tuesday    Mabel Marlow, MS, RD,CDE  Diabetes Educator  Diabetes & Pregnancy Program

## 2022-09-06 NOTE — TELEPHONE ENCOUNTER
----- Message from María Newman sent at 9/6/2022  1:15 PM EDT -----  Regarding: Schedule a Nutrition follow-up with this patient  Kishan Velázquez,  This lady has not spoken to an RD since 4/22  Please call her to schedule a follow-up for nutrition  Thanks!   Jemima Rodriguez

## 2022-09-06 NOTE — PROGRESS NOTES
Toy Dad has no complaints today  She reports regular fetal movements and does not report any problems  She is here today at 33w2d for an ultrasound for fetal growth  Her last baby weighed 7 pounds 12 ounces at 37 weeks and 6 days in 2021  Problem list:  1  Pre gravid BMI of 45  2  Early-onset gestational diabetes  She is currently on 84 units of Lantus at nighttime and 30 units of NovoLog before lunch and 36 units of NovoLog before dinner  Her blood sugars continue to show fasting blood sugars greater than 90 and 2 hour postprandial blood sugars at lunch and dinner greater than 120    3  History of gestational diabetes in preeclampsia in a prior pregnancy  4  Macrosomia suspected on her 27 week ultrasound  5  In [facility] her fetal echo was suspicious for possible aortic stenosis  A follow-up fetal echo through Pediatric Cardiology was normal    6  Recent treatment for an cutaneous abscess on the back of her thigh through [facility] with antibiotics on 09/01/22  Her sugars both before and after this infection were similar so this is not the reason behind her elevated blood sugars  Ultrasound findings: The ultrasound today shows a symmetric baby who is in the 91st percentile  ARTURO is normal    NST is reactive  Pregnancy ultrasound has limitations and is unable to detect all forms of fetal congenital abnormalities  The inaccuracy in the EFW can be off by 1 lb either way in the third trimester  Follow up recommended:   1  Recommending twice weekly NSTs and weekly ARTURO till delivery  2  Recommend a follow-up growth scan in 4 weeks  3  With her history of preeclampsia I called in a prescription for a blood pressure cuff to [facility] pharmacy so she can monitor her blood pressures twice a day at home  4  Recommend an increase in her insulin dosing by 15%  Will alert diabetes education to contact her with her new doses       Pre visit time reviewing her records   10 minutes  Face to face time 10 minutes  Post visit time on documentation of note, updating her problem list, adding orders and prescriptions 10 minutes  Procedures that were completed today were charged separately  The level of decision making was moderate complexity      Chery Treviño MD

## 2022-09-09 ENCOUNTER — TELEPHONE (OUTPATIENT)
Dept: PERINATAL CARE | Facility: CLINIC | Age: 27
End: 2022-09-09

## 2022-09-09 LAB
DME PARACHUTE DELIVERY DATE EXPECTED: NORMAL
DME PARACHUTE DELIVERY DATE REQUESTED: NORMAL
DME PARACHUTE ITEM DESCRIPTION: NORMAL
DME PARACHUTE ORDER STATUS: NORMAL
DME PARACHUTE SUPPLIER NAME: NORMAL
DME PARACHUTE SUPPLIER PHONE: NORMAL

## 2022-09-09 NOTE — TELEPHONE ENCOUNTER
Called patient to schedule reschedule non stress test and fluid check from 9/8  Left voicemail request patient to call back to schedule at [phone]

## 2022-09-12 LAB
DME PARACHUTE DELIVERY DATE ACTUAL: NORMAL
DME PARACHUTE DELIVERY DATE EXPECTED: NORMAL
DME PARACHUTE DELIVERY DATE REQUESTED: NORMAL
DME PARACHUTE ITEM DESCRIPTION: NORMAL
DME PARACHUTE ORDER STATUS: NORMAL
DME PARACHUTE SUPPLIER NAME: NORMAL
DME PARACHUTE SUPPLIER PHONE: NORMAL

## 2022-09-13 ENCOUNTER — TELEPHONE (OUTPATIENT)
Dept: PERINATAL CARE | Facility: OTHER | Age: 27
End: 2022-09-13

## 2022-09-13 NOTE — TELEPHONE ENCOUNTER
I left a message for pt regarding her growth and nst that was schelduled for 9/15 in [place] I did change that to a nst/sara due to her not needing growth until beginning of October around 10/6, she also needs weekly nst/sara's,[place] phone number was given to pt

## 2022-09-14 ENCOUNTER — TELEPHONE (OUTPATIENT)
Dept: PERINATAL CARE | Facility: OTHER | Age: 27
End: 2022-09-14

## 2022-09-14 NOTE — TELEPHONE ENCOUNTER
FYI -  We reached out to this pt and left a message three times    We were unable to schedule the pt for an appointment as indicated in your referral     Yoana Truong Maternal Fetal Medicine  [facility]

## 2022-09-15 ENCOUNTER — ULTRASOUND (OUTPATIENT)
Dept: PERINATAL CARE | Facility: OTHER | Age: 27
End: 2022-09-15
Payer: COMMERCIAL

## 2022-09-15 VITALS
BODY MASS INDEX: 39.68 KG/M2 | HEIGHT: 72 IN | SYSTOLIC BLOOD PRESSURE: 127 MMHG | HEART RATE: 111 BPM | WEIGHT: 293 LBS | DIASTOLIC BLOOD PRESSURE: 84 MMHG

## 2022-09-15 DIAGNOSIS — O99.210 MATERNAL MORBID OBESITY, ANTEPARTUM (HCC): ICD-10-CM

## 2022-09-15 DIAGNOSIS — Z3A.34 34 WEEKS GESTATION OF PREGNANCY: ICD-10-CM

## 2022-09-15 DIAGNOSIS — E66.01 MATERNAL MORBID OBESITY, ANTEPARTUM (HCC): ICD-10-CM

## 2022-09-15 DIAGNOSIS — O24.414 INSULIN CONTROLLED GESTATIONAL DIABETES MELLITUS (GDM) IN THIRD TRIMESTER: Primary | ICD-10-CM

## 2022-09-15 PROCEDURE — 59025 FETAL NON-STRESS TEST: CPT | Performed by: OBSTETRICS & GYNECOLOGY

## 2022-09-15 PROCEDURE — 76815 OB US LIMITED FETUS(S): CPT | Performed by: OBSTETRICS & GYNECOLOGY

## 2022-09-16 ENCOUNTER — ROUTINE PRENATAL (OUTPATIENT)
Dept: OBGYN CLINIC | Facility: MEDICAL CENTER | Age: 27
End: 2022-09-16

## 2022-09-16 ENCOUNTER — DOCUMENTATION (OUTPATIENT)
Dept: PERINATAL CARE | Facility: CLINIC | Age: 27
End: 2022-09-16

## 2022-09-16 VITALS
DIASTOLIC BLOOD PRESSURE: 76 MMHG | SYSTOLIC BLOOD PRESSURE: 126 MMHG | HEIGHT: 72 IN | BODY MASS INDEX: 39.68 KG/M2 | WEIGHT: 293 LBS

## 2022-09-16 DIAGNOSIS — F33.1 MODERATE EPISODE OF RECURRENT MAJOR DEPRESSIVE DISORDER (HCC): ICD-10-CM

## 2022-09-16 DIAGNOSIS — Z72.0 TOBACCO USER: ICD-10-CM

## 2022-09-16 DIAGNOSIS — F41.1 GENERALIZED ANXIETY DISORDER: ICD-10-CM

## 2022-09-16 DIAGNOSIS — Z3A.34 34 WEEKS GESTATION OF PREGNANCY: Primary | ICD-10-CM

## 2022-09-16 DIAGNOSIS — O99.210 MATERNAL MORBID OBESITY, ANTEPARTUM (HCC): ICD-10-CM

## 2022-09-16 DIAGNOSIS — E66.01 MATERNAL MORBID OBESITY, ANTEPARTUM (HCC): ICD-10-CM

## 2022-09-16 DIAGNOSIS — O24.414 INSULIN CONTROLLED GESTATIONAL DIABETES MELLITUS (GDM) IN THIRD TRIMESTER: ICD-10-CM

## 2022-09-16 PROCEDURE — PNV: Performed by: OBSTETRICS & GYNECOLOGY

## 2022-09-16 NOTE — PROGRESS NOTES
Date: 09/16/22  Arnie Lama  1995  Estimated Date of Delivery: 10/23/22  34w5d  OB/GYN: [facility]  Insulin controlled GDM  Additional Pregnancy Complications: Obesity and history of GDM 1 yr ago in a prior pregnancy requiring insulin therapy    Requested patient update flow sheet every Tuesday          Plan:  Lantus--Increase from 93 to 102 units at 9 pm daily - split into 3 injections of 34 units each   Novolog-- Continue 42 units before lunch and increase from 50 to 54 units before dinner      Schedule a follow-up a Nutrition DSMT follow-up  Diet: : 2400 calorie   CHO: [id number]    PRO: 3-2-3/4-2-4-2/3   Has changed dinner to 3 CHO serving (45 gm) & 4 oz protein  Gestational diabetes meal plan; 3 meals and 3 snacks  Testing blood sugars: 4 x per day (Fasting, 2 hour after start of each meal)  Meter: OneTouch Verio Flex   Activity: Walks 30 minutes daily, follow OB recommendations  Support System: Spouse / family   Patient Goal: "I will eat 3 meals and 3 snacks each day, including protein at each"  Education:  Initial diabetes education completed on 4/4/22 by DEVANG  Combination class 1 and class 2 completed on 4/14/22 considering patient has a history of gestational diabetes in a prior pregnancy 1 yr ago requiring basal/bolus insulin therapy  CRNP: 05/02/2022, 6/27/22   Weight Change:    Pre-gravid weight: 336 pounds  45 56 BMI  Current weight: 332 lbs  Noted 4 pound weight loss to date  Labs  09/28/2021 A1c 5 6%  04/15/2022 A1c 5 7%  7/25/22- [id number] 9%  Reordered A1c and requested patient complete    Ultrasounds  7/25/22- normal growth & fluids     9/6/22 US: fetal growth and fluids normal  Noted 09/15/2022 US; ARTURO normal and NST reactive with no decelerations  Based on 9/6/22 ultrasound notes patient needs to schedule next growth ultrasound     Further fetal surveillance  Beginning at 32 weeks, NST / ARTURO twice a week    Date to report next: Every Tuesday    Hanson Needs, RD,CDE  Diabetes Educator  Diabetes [facility]regnancy [facility]

## 2022-09-19 ENCOUNTER — ROUTINE PRENATAL (OUTPATIENT)
Dept: PERINATAL CARE | Facility: OTHER | Age: 27
End: 2022-09-19
Payer: COMMERCIAL

## 2022-09-19 VITALS
WEIGHT: 293 LBS | HEART RATE: 105 BPM | SYSTOLIC BLOOD PRESSURE: 138 MMHG | BODY MASS INDEX: 39.68 KG/M2 | DIASTOLIC BLOOD PRESSURE: 80 MMHG | HEIGHT: 72 IN

## 2022-09-19 DIAGNOSIS — O24.414 INSULIN CONTROLLED GESTATIONAL DIABETES MELLITUS (GDM) IN THIRD TRIMESTER: Primary | ICD-10-CM

## 2022-09-19 DIAGNOSIS — Z3A.35 35 WEEKS GESTATION OF PREGNANCY: ICD-10-CM

## 2022-09-19 PROCEDURE — 76815 OB US LIMITED FETUS(S): CPT | Performed by: OBSTETRICS & GYNECOLOGY

## 2022-09-19 PROCEDURE — NC001 PR NO CHARGE: Performed by: OBSTETRICS & GYNECOLOGY

## 2022-09-19 PROCEDURE — 59025 FETAL NON-STRESS TEST: CPT | Performed by: OBSTETRICS & GYNECOLOGY

## 2022-09-19 NOTE — PROGRESS NOTES
[address] 89: Ms Buzz Griffith was seen today for NST (found under the pregnancy episode) which I reviewed the RN assessment and agree, and ARTURO (see ultrasound report under OB procedures tab)  Please don't hesitate to contact our office with any concerns or questions    Garland Sarah MD

## 2022-09-19 NOTE — PATIENT INSTRUCTIONS
Kick Counts in Pregnancy   WHAT YOU NEED TO KNOW:   What do I need to know about kick counts? Kick counts measure how much your baby is moving in your womb  A kick from your baby can be felt as a twist, turn, swish, roll, or jab  It is common to feel your baby kicking at 26 to 28 weeks of pregnancy  You may feel your baby kick as early as 20 weeks of pregnancy  You may want to start counting at 28 weeks  Why should I measure kick counts? Your baby's movement may provide information about your baby's health  He or she may move less, or not at all, if there are problems  Your baby may move less if he or she is not getting enough oxygen or nutrition from the placenta  Do not smoke while you are pregnant  Smoking decreases the amount of oxygen that gets to your baby  Talk to your healthcare provider if you need help to quit smoking  Tell your healthcare provider as soon as you feel a change in your baby's movements  When do I measure kick counts? Measure kick counts at the same time every day  Measure kick counts when your baby is awake and most active  Your baby may be most active in the evening  How do I measure kick counts? Check that your baby is awake before you measure kick counts  You can wake up your baby by lightly pushing on your belly, walking, or drinking something cold  Your healthcare provider may tell you different ways to measure kick counts  You may be told to do the following:  Use a chart or clock to keep track of the time you start and finish counting  Sit in a chair or lie on your left side  Place your hands on the largest part of your belly  Count until you reach 10 kicks  Write down how much time it takes to count 10 kicks  It may take 30 minutes to 2 hours to count 10 kicks  It should not take more than 2 hours to count 10 kicks  When should I contact my doctor? You feel a change in the number of kicks or movements of your baby       You feel fewer than 10 kicks within 2 hours  You have questions or concerns about your baby's movements  CARE AGREEMENT:   You have the right to help plan your care  Learn about your health condition and how it may be treated  Discuss treatment options with your healthcare providers to decide what care you want to receive  You always have the right to refuse treatment  The above information is an  only  It is not intended as medical advice for individual conditions or treatments  Talk to your doctor, nurse or pharmacist before following any medical regimen to see if it is safe and effective for you  © Copyright [employer] 2022 Information is for End User's use only and may not be sold, redistributed or otherwise used for commercial purposes   All illustrations and images included in CareNotes® are the copyrighted property of A D A M , Inc  or [address] [place], [place] [address]

## 2022-09-19 NOTE — PATIENT INSTRUCTIONS
Please communicate your blood sugars at least weekly with the diabetic educators at the [facility] Diabetes in Pregnancy program

## 2022-09-20 ENCOUNTER — DOCUMENTATION (OUTPATIENT)
Dept: PERINATAL CARE | Facility: CLINIC | Age: 27
End: 2022-09-20

## 2022-09-20 NOTE — PROGRESS NOTES
Veronica Macdonald is a 27y o  year old  at 34w5d for routine prenatal visit  + FM, no vaginal bleeding, contractions, or LOF  Complaints: No   Most recent ultrasound and labs reviewed  A2GDM - we discussed current insulin regime and that she has seemed to require increased dosages frequently , we discussed having discussion with [facility] re timing of delivery    Normally we recommend IOL from 39 weeks on but she might benefit from earlier delivery - continue APFS as scheduled with [facility] and PTL precautions reviewed   GBS needed next visit

## 2022-09-20 NOTE — PROGRESS NOTES
Date: 09/20/22  Deshawn Hairston  1995  Estimated Date of Delivery: 10/23/22  35w2d  OB/GYN: [facility]  Insulin controlled GDM  Additional Pregnancy Complications: Obesity and history of GDM 1 yr ago in a prior pregnancy requiring insulin therapy    Updates flow sheet every Tuesday        Plan:  Lantus--Increase from 102 to 112 units at 9 pm daily - split into 4 injections of 28 units each   Novolog--   Begin 10 units before breakfast, increase from 42 to 48  units before lunch & increase from 54 to 62 units before dinner      Schedule a follow-up a Nutrition DSMT follow-up  Diet: : 2400 calorie   CHO: [id number]    PRO: 3-2-3/4-2-4-2/3   Has changed dinner to 3 CHO serving (45 gm) & 4 oz protein  Gestational diabetes meal plan; 3 meals and 3 snacks  Testing blood sugars: 4 x per day (Fasting, 2 hour after start of each meal)  Meter: OneTouch Verio Flex   Activity: Walks 30 minutes daily, follow OB recommendations  Support System: Spouse / family   Patient Goal: "I will eat 3 meals and 3 snacks each day, including protein at each"  Education:  Initial diabetes education completed on 4/4/22 by DEVANG  Combination class 1 and class 2 completed on 4/14/22 considering patient has a history of gestational diabetes in a prior pregnancy 1 yr ago requiring basal/bolus insulin therapy  CRNP: 05/02/2022, 6/27/22   Weight Change:    Pre-gravid weight: 336 pounds  45 56 BMI  Current weight: 338 5 lbs  Noted 2 5 pound weight gain to date       Labs  04/15/2022 A1c 5 7%  7/25/22- UzF2v-6 9%  Reordered A1c and requested patient complete    Ultrasounds  9/6/22 US: fetal growth (both AC & EFW-91% and fluids normal  Noted 09/15/2022 US; ARTURO normal and NST reactive with no decelerations  Next: 10/10/22    Further fetal surveillance  Beginning at 32 weeks, NST / ARTURO twice a week    Date to report next: Every Tuesday    Yang Harding, MS,  RD,CDE  Diabetes Educator  Diabetes & Pregnancy Program

## 2022-09-21 ENCOUNTER — RA CDI HCC (OUTPATIENT)
Dept: OTHER | Facility: HOSPITAL | Age: 27
End: 2022-09-21

## 2022-09-21 PROBLEM — E66.01 MORBID (SEVERE) OBESITY DUE TO EXCESS CALORIES (HCC): Status: ACTIVE | Noted: 2022-09-21

## 2022-09-21 NOTE — PROGRESS NOTES
[facility] 73  coding opportunities          Chart Reviewed number of suggestions sent to Provider: 1   [phone]    Patients Insurance        Commercial Insurance: [employer]

## 2022-09-22 PROBLEM — Z3A.35 35 WEEKS GESTATION OF PREGNANCY: Status: ACTIVE | Noted: 2022-04-12

## 2022-09-23 ENCOUNTER — TELEPHONE (OUTPATIENT)
Dept: OBGYN CLINIC | Facility: MEDICAL CENTER | Age: 27
End: 2022-09-23

## 2022-09-23 ENCOUNTER — TELEPHONE (OUTPATIENT)
Dept: PERINATAL CARE | Facility: CLINIC | Age: 27
End: 2022-09-23

## 2022-09-27 ENCOUNTER — ROUTINE PRENATAL (OUTPATIENT)
Dept: OBGYN CLINIC | Facility: MEDICAL CENTER | Age: 27
End: 2022-09-27
Payer: COMMERCIAL

## 2022-09-27 ENCOUNTER — ROUTINE PRENATAL (OUTPATIENT)
Dept: OBGYN CLINIC | Facility: MEDICAL CENTER | Age: 27
End: 2022-09-27

## 2022-09-27 VITALS — DIASTOLIC BLOOD PRESSURE: 78 MMHG | SYSTOLIC BLOOD PRESSURE: 128 MMHG | WEIGHT: 293 LBS | BODY MASS INDEX: 45.98 KG/M2

## 2022-09-27 DIAGNOSIS — Z3A.36 36 WEEKS GESTATION OF PREGNANCY: ICD-10-CM

## 2022-09-27 DIAGNOSIS — O24.414 INSULIN CONTROLLED GESTATIONAL DIABETES MELLITUS (GDM) IN THIRD TRIMESTER: Primary | ICD-10-CM

## 2022-09-27 DIAGNOSIS — O99.820 GBS (GROUP B STREPTOCOCCUS CARRIER), +RV CULTURE, CURRENTLY PREGNANT: ICD-10-CM

## 2022-09-27 DIAGNOSIS — O24.419 GDM, CLASS A2: ICD-10-CM

## 2022-09-27 DIAGNOSIS — Z3A.36 36 WEEKS GESTATION OF PREGNANCY: Primary | ICD-10-CM

## 2022-09-27 PROCEDURE — 59025 FETAL NON-STRESS TEST: CPT | Performed by: OBSTETRICS & GYNECOLOGY

## 2022-09-27 PROCEDURE — 87150 DNA/RNA AMPLIFIED PROBE: CPT | Performed by: OBSTETRICS & GYNECOLOGY

## 2022-09-27 PROCEDURE — PNV: Performed by: OBSTETRICS & GYNECOLOGY

## 2022-09-27 NOTE — PATIENT INSTRUCTIONS
Kick Counts in Pregnancy   WHAT YOU NEED TO KNOW:   Kick counts measure how much your baby is moving in your womb  A kick from your baby can be felt as a twist, turn, swish, roll, or jab  It is common to feel your baby kicking at 26 to 28 weeks of pregnancy  You may feel your baby kick as early as 20 weeks of pregnancy  You may want to start counting at 28 weeks  DISCHARGE INSTRUCTIONS:   Contact your doctor immediately if:   You feel a change in the number of kicks or movements of your baby  You feel fewer than 10 kicks within 2 hours  You have questions or concerns about your baby's movements  Why measure kick counts:  Your baby's movement may provide information about your baby's health  He or she may move less, or not at all, if there are problems  Your baby may move less if he or she is not getting enough oxygen or nutrition from the placenta  Do not smoke while you are pregnant  Smoking decreases the amount of oxygen that gets to your baby  Talk to your healthcare provider if you need help to quit smoking  Tell your healthcare provider as soon as you feel a change in your baby's movements  When to measure kick counts:   Measure kick counts at the same time every day  Measure kick counts when your baby is awake and most active  Your baby may be most active in the evening  How to measure kick counts:  Check that your baby is awake before you measure kick counts  You can wake up your baby by lightly pushing on your belly, walking, or drinking something cold  Your healthcare provider may tell you different ways to measure kick counts  You may be told to do the following:  Use a chart or clock to keep track of the time you start and finish counting  Sit in a chair or lie on your left side  Place your hands on the largest part of your belly  Count until you reach 10 kicks  Write down how much time it takes to count 10 kicks  It may take 30 minutes to 2 hours to count 10 kicks  It should not take more than 2 hours to count 10 kicks  Follow up with your doctor as directed:  Write down your questions so you remember to ask them during your visits  © Copyright [employer] 2022 Information is for End User's use only and may not be sold, redistributed or otherwise used for commercial purposes  All illustrations and images included in CareNotes® are the copyrighted property of A D A M , Inc  or [facility] Neema Gomez   The above information is an  only  It is not intended as medical advice for individual conditions or treatments  Talk to your doctor, nurse or pharmacist before following any medical regimen to see if it is safe and effective for you

## 2022-09-27 NOTE — PATIENT INSTRUCTIONS
Kick Counts in Pregnancy   WHAT YOU NEED TO KNOW:   Kick counts measure how much your baby is moving in your womb  A kick from your baby can be felt as a twist, turn, swish, roll, or jab  It is common to feel your baby kicking at 26 to 28 weeks of pregnancy  You may feel your baby kick as early as 20 weeks of pregnancy  You may want to start counting at 28 weeks  DISCHARGE INSTRUCTIONS:   Contact your doctor immediately if:   You feel a change in the number of kicks or movements of your baby  You feel fewer than 10 kicks within 2 hours  You have questions or concerns about your baby's movements  Why measure kick counts:  Your baby's movement may provide information about your baby's health  He or she may move less, or not at all, if there are problems  Your baby may move less if he or she is not getting enough oxygen or nutrition from the placenta  Do not smoke while you are pregnant  Smoking decreases the amount of oxygen that gets to your baby  Talk to your healthcare provider if you need help to quit smoking  Tell your healthcare provider as soon as you feel a change in your baby's movements  When to measure kick counts:   Measure kick counts at the same time every day  Measure kick counts when your baby is awake and most active  Your baby may be most active in the evening  How to measure kick counts:  Check that your baby is awake before you measure kick counts  You can wake up your baby by lightly pushing on your belly, walking, or drinking something cold  Your healthcare provider may tell you different ways to measure kick counts  You may be told to do the following:  Use a chart or clock to keep track of the time you start and finish counting  Sit in a chair or lie on your left side  Place your hands on the largest part of your belly  Count until you reach 10 kicks  Write down how much time it takes to count 10 kicks  It may take 30 minutes to 2 hours to count 10 kicks  It should not take more than 2 hours to count 10 kicks  Follow up with your doctor as directed:  Write down your questions so you remember to ask them during your visits  © Copyright [employer] 2022 Information is for End User's use only and may not be sold, redistributed or otherwise used for commercial purposes  All illustrations and images included in CareNotes® are the copyrighted property of A D A M , Inc  or [facility] Neema Gomez   The above information is an  only  It is not intended as medical advice for individual conditions or treatments  Talk to your doctor, nurse or pharmacist before following any medical regimen to see if it is safe and effective for you

## 2022-09-27 NOTE — PROGRESS NOTES
Hue Beatty is a 27y o  year old  at 37w1d for routine prenatal visit  GBS done Yes  PCN allergy No  Labor precautions given  [address] reviewed  NST today reactive - A2GDM   We discussed IOL for A2GDM - messaged coordinator to schedule for  10/15 pm into 10/16      Labor precautions reviewed   Continue APFS 2 times per week   Currently on 112 units of insulin nighttime and 48 unit after lunch , 62 units with dinner  - we discussed possible need to move IOL earlier if continued need to increase insulin (we discussed possible earlier IOL at 38 weeks)

## 2022-09-29 ENCOUNTER — ULTRASOUND (OUTPATIENT)
Dept: PERINATAL CARE | Facility: OTHER | Age: 27
End: 2022-09-29
Payer: COMMERCIAL

## 2022-09-29 VITALS
SYSTOLIC BLOOD PRESSURE: 128 MMHG | HEIGHT: 72 IN | DIASTOLIC BLOOD PRESSURE: 66 MMHG | WEIGHT: 293 LBS | HEART RATE: 96 BPM | BODY MASS INDEX: 39.68 KG/M2

## 2022-09-29 DIAGNOSIS — O24.414 INSULIN CONTROLLED GESTATIONAL DIABETES MELLITUS (GDM) IN THIRD TRIMESTER: Primary | ICD-10-CM

## 2022-09-29 DIAGNOSIS — O99.210 MATERNAL MORBID OBESITY, ANTEPARTUM (HCC): ICD-10-CM

## 2022-09-29 DIAGNOSIS — Z3A.36 36 WEEKS GESTATION OF PREGNANCY: ICD-10-CM

## 2022-09-29 DIAGNOSIS — E66.01 MATERNAL MORBID OBESITY, ANTEPARTUM (HCC): ICD-10-CM

## 2022-09-29 PROCEDURE — 76815 OB US LIMITED FETUS(S): CPT | Performed by: OBSTETRICS & GYNECOLOGY

## 2022-09-29 PROCEDURE — 59025 FETAL NON-STRESS TEST: CPT | Performed by: OBSTETRICS & GYNECOLOGY

## 2022-09-30 LAB — GP B STREP DNA SPEC QL NAA+PROBE: POSITIVE

## 2022-10-03 ENCOUNTER — ULTRASOUND (OUTPATIENT)
Dept: PERINATAL CARE | Facility: OTHER | Age: 27
End: 2022-10-03
Payer: COMMERCIAL

## 2022-10-03 VITALS
WEIGHT: 293 LBS | HEIGHT: 72 IN | SYSTOLIC BLOOD PRESSURE: 134 MMHG | DIASTOLIC BLOOD PRESSURE: 88 MMHG | HEART RATE: 102 BPM | BODY MASS INDEX: 39.68 KG/M2

## 2022-10-03 DIAGNOSIS — Z3A.37 37 WEEKS GESTATION OF PREGNANCY: Primary | ICD-10-CM

## 2022-10-03 DIAGNOSIS — O24.414 INSULIN CONTROLLED GESTATIONAL DIABETES MELLITUS (GDM) IN THIRD TRIMESTER: ICD-10-CM

## 2022-10-03 PROCEDURE — 59025 FETAL NON-STRESS TEST: CPT | Performed by: OBSTETRICS & GYNECOLOGY

## 2022-10-03 PROCEDURE — 76815 OB US LIMITED FETUS(S): CPT | Performed by: OBSTETRICS & GYNECOLOGY

## 2022-10-03 NOTE — PATIENT INSTRUCTIONS
Kick Counts in Pregnancy   WHAT YOU NEED TO KNOW:   What do I need to know about kick counts? Kick counts measure how much your baby is moving in your womb  A kick from your baby can be felt as a twist, turn, swish, roll, or jab  It is common to feel your baby kicking at 26 to 28 weeks of pregnancy  You may feel your baby kick as early as 20 weeks of pregnancy  You may want to start counting at 28 weeks  Why should I measure kick counts? Your baby's movement may provide information about your baby's health  He or she may move less, or not at all, if there are problems  Your baby may move less if he or she is not getting enough oxygen or nutrition from the placenta  Do not smoke while you are pregnant  Smoking decreases the amount of oxygen that gets to your baby  Talk to your healthcare provider if you need help to quit smoking  Tell your healthcare provider as soon as you feel a change in your baby's movements  When do I measure kick counts? Measure kick counts at the same time every day  Measure kick counts when your baby is awake and most active  Your baby may be most active in the evening  How do I measure kick counts? Check that your baby is awake before you measure kick counts  You can wake up your baby by lightly pushing on your belly, walking, or drinking something cold  Your healthcare provider may tell you different ways to measure kick counts  You may be told to do the following:  Use a chart or clock to keep track of the time you start and finish counting  Sit in a chair or lie on your left side  Place your hands on the largest part of your belly  Count until you reach 10 kicks  Write down how much time it takes to count 10 kicks  It may take 30 minutes to 2 hours to count 10 kicks  It should not take more than 2 hours to count 10 kicks  When should I contact my doctor? You feel a change in the number of kicks or movements of your baby        You feel fewer than 10 kicks within 2 hours  You have questions or concerns about your baby's movements  CARE AGREEMENT:   You have the right to help plan your care  Learn about your health condition and how it may be treated  Discuss treatment options with your healthcare providers to decide what care you want to receive  You always have the right to refuse treatment  The above information is an  only  It is not intended as medical advice for individual conditions or treatments  Talk to your doctor, nurse or pharmacist before following any medical regimen to see if it is safe and effective for you  © Copyright [employer] 2022 Information is for End User's use only and may not be sold, redistributed or otherwise used for commercial purposes  All illustrations and images included in CareNotes® are the copyrighted property of [employer]  or [address] [place] [address] Counts in Pregnancy   WHAT YOU NEED TO KNOW:   What do I need to know about kick counts? Kick counts measure how much your baby is moving in your womb  A kick from your baby can be felt as a twist, turn, swish, roll, or jab  It is common to feel your baby kicking at 26 to 28 weeks of pregnancy  You may feel your baby kick as early as 20 weeks of pregnancy  You may want to start counting at 28 weeks  Why should I measure kick counts? Your baby's movement may provide information about your baby's health  He or she may move less, or not at all, if there are problems  Your baby may move less if he or she is not getting enough oxygen or nutrition from the placenta  Do not smoke while you are pregnant  Smoking decreases the amount of oxygen that gets to your baby  Talk to your healthcare provider if you need help to quit smoking  Tell your healthcare provider as soon as you feel a change in your baby's movements  When do I measure kick counts? Measure kick counts at the same time every day         Measure kick counts when your baby is awake and most active  Your baby may be most active in the evening  How do I measure kick counts? Check that your baby is awake before you measure kick counts  You can wake up your baby by lightly pushing on your belly, walking, or drinking something cold  Your healthcare provider may tell you different ways to measure kick counts  You may be told to do the following:  Use a chart or clock to keep track of the time you start and finish counting  Sit in a chair or lie on your left side  Place your hands on the largest part of your belly  Count until you reach 10 kicks  Write down how much time it takes to count 10 kicks  It may take 30 minutes to 2 hours to count 10 kicks  It should not take more than 2 hours to count 10 kicks  When should I contact my doctor? You feel a change in the number of kicks or movements of your baby  You feel fewer than 10 kicks within 2 hours  You have questions or concerns about your baby's movements  CARE AGREEMENT:   You have the right to help plan your care  Learn about your health condition and how it may be treated  Discuss treatment options with your healthcare providers to decide what care you want to receive  You always have the right to refuse treatment  The above information is an  only  It is not intended as medical advice for individual conditions or treatments  Talk to your doctor, nurse or pharmacist before following any medical regimen to see if it is safe and effective for you  © Copyright [employer] 2022 Information is for End User's use only and may not be sold, redistributed or otherwise used for commercial purposes   All illustrations and images included in CareNotes® are the copyrighted property of A D A M , Inc  or [address] [place], [place] [address]

## 2022-10-03 NOTE — PROGRESS NOTES
NST was done today  Pt  Reported active fetal movement at home between visit  Daily fetal kick count reviewed and emphasized  Patient verbalized understanding of all and was receptive      Fairy Chamber

## 2022-10-05 ENCOUNTER — TELEPHONE (OUTPATIENT)
Dept: OBGYN CLINIC | Facility: MEDICAL CENTER | Age: 27
End: 2022-10-05

## 2022-10-05 ENCOUNTER — ROUTINE PRENATAL (OUTPATIENT)
Dept: OBGYN CLINIC | Facility: MEDICAL CENTER | Age: 27
End: 2022-10-05
Payer: COMMERCIAL

## 2022-10-05 VITALS — WEIGHT: 293 LBS | SYSTOLIC BLOOD PRESSURE: 124 MMHG | BODY MASS INDEX: 46.57 KG/M2 | DIASTOLIC BLOOD PRESSURE: 82 MMHG

## 2022-10-05 DIAGNOSIS — Z3A.37 37 WEEKS GESTATION OF PREGNANCY: Primary | ICD-10-CM

## 2022-10-05 DIAGNOSIS — O24.414 INSULIN CONTROLLED GESTATIONAL DIABETES MELLITUS (GDM) IN THIRD TRIMESTER: ICD-10-CM

## 2022-10-05 DIAGNOSIS — O36.63X0 MACROSOMIA OF FETUS AFFECTING MANAGEMENT OF MOTHER IN THIRD TRIMESTER, SINGLE OR UNSPECIFIED FETUS: ICD-10-CM

## 2022-10-05 PROCEDURE — PNV: Performed by: OBSTETRICS & GYNECOLOGY

## 2022-10-05 PROCEDURE — 59025 FETAL NON-STRESS TEST: CPT | Performed by: OBSTETRICS & GYNECOLOGY

## 2022-10-05 NOTE — TELEPHONE ENCOUNTER
----- Message from Rodrigue Mcmullen MD sent at 9/27/2022  4:03 PM EDT -----  Regarding: IOL  Please schedule Renu for 10/15 pm into 10/16   A2GDM   I included greg and campos because they will be the ones on

## 2022-10-05 NOTE — PROGRESS NOTES
Katalina Mccord is a 27y o  year old  at 37w3d for routine prenatal visit    + FM, no vaginal bleeding, contractions, or LOF  Complaints: No - feeling very tired today   States sugars overall ok but some slight elevated readings- encouraged to continue APFS and communication with diabetic nurse    Most recent ultrasound and labs reviewed    NST reactive today  Will confirm IOL once scheduled  - should get call from office later today  [address] and labor precautions reviewed

## 2022-10-05 NOTE — PATIENT INSTRUCTIONS
Kick Counts in Pregnancy   WHAT YOU NEED TO KNOW:   Kick counts measure how much your baby is moving in your womb  A kick from your baby can be felt as a twist, turn, swish, roll, or jab  It is common to feel your baby kicking at 26 to 28 weeks of pregnancy  You may feel your baby kick as early as 20 weeks of pregnancy  You may want to start counting at 28 weeks  DISCHARGE INSTRUCTIONS:   Contact your doctor immediately if:   You feel a change in the number of kicks or movements of your baby  You feel fewer than 10 kicks within 2 hours  You have questions or concerns about your baby's movements  Why measure kick counts:  Your baby's movement may provide information about your baby's health  He or she may move less, or not at all, if there are problems  Your baby may move less if he or she is not getting enough oxygen or nutrition from the placenta  Do not smoke while you are pregnant  Smoking decreases the amount of oxygen that gets to your baby  Talk to your healthcare provider if you need help to quit smoking  Tell your healthcare provider as soon as you feel a change in your baby's movements  When to measure kick counts:   Measure kick counts at the same time every day  Measure kick counts when your baby is awake and most active  Your baby may be most active in the evening  How to measure kick counts:  Check that your baby is awake before you measure kick counts  You can wake up your baby by lightly pushing on your belly, walking, or drinking something cold  Your healthcare provider may tell you different ways to measure kick counts  You may be told to do the following:  Use a chart or clock to keep track of the time you start and finish counting  Sit in a chair or lie on your left side  Place your hands on the largest part of your belly  Count until you reach 10 kicks  Write down how much time it takes to count 10 kicks  It may take 30 minutes to 2 hours to count 10 kicks  It should not take more than 2 hours to count 10 kicks  Follow up with your doctor as directed:  Write down your questions so you remember to ask them during your visits  © Copyright [employer] 2022 Information is for End User's use only and may not be sold, redistributed or otherwise used for commercial purposes  All illustrations and images included in CareNotes® are the copyrighted property of A [employer] A M , Inc  or [facility] Neema Gomez   The above information is an  only  It is not intended as medical advice for individual conditions or treatments  Talk to your doctor, nurse or pharmacist before following any medical regimen to see if it is safe and effective for you

## 2022-10-10 ENCOUNTER — ULTRASOUND (OUTPATIENT)
Dept: PERINATAL CARE | Facility: CLINIC | Age: 27
End: 2022-10-10
Payer: COMMERCIAL

## 2022-10-10 VITALS
BODY MASS INDEX: 39.68 KG/M2 | WEIGHT: 293 LBS | DIASTOLIC BLOOD PRESSURE: 80 MMHG | SYSTOLIC BLOOD PRESSURE: 138 MMHG | HEART RATE: 100 BPM | HEIGHT: 72 IN

## 2022-10-10 DIAGNOSIS — Z3A.38 38 WEEKS GESTATION OF PREGNANCY: Primary | ICD-10-CM

## 2022-10-10 DIAGNOSIS — O24.414 INSULIN CONTROLLED GESTATIONAL DIABETES MELLITUS (GDM) IN THIRD TRIMESTER: ICD-10-CM

## 2022-10-10 PROCEDURE — 59025 FETAL NON-STRESS TEST: CPT | Performed by: OBSTETRICS & GYNECOLOGY

## 2022-10-10 PROCEDURE — 99213 OFFICE O/P EST LOW 20 MIN: CPT | Performed by: OBSTETRICS & GYNECOLOGY

## 2022-10-10 PROCEDURE — 76816 OB US FOLLOW-UP PER FETUS: CPT | Performed by: OBSTETRICS & GYNECOLOGY

## 2022-10-10 NOTE — LETTER
NST sleeve cover sheet    Patient name: Henry Cantu  : 1995  MRN: [id number]    CALLIE: Estimated Date of Delivery: 10/23/22    Obstetrician: [facility]       Reason(s) for testing: GEST DM       Testing frequency:    _X__ 2x/wk  ___ 1x/wk  ___ Dopplers  ___ BPP?       Last growth scan: __________________________________________

## 2022-10-10 NOTE — LETTER
October 10, 2022     Nilsa Callejas, [address]    Patient: Batool Golden   YOB: 1995   Date of Visit: 10/10/2022       Dear Ms Jennifer Albright: Thank you for referring Rosi Guaman to me for evaluation  Below are my notes for this consultation  If you have questions, please do not hesitate to call me  I look forward to following your patient along with you  Sincerely,        Gianluca Pina MD        CC: No Recipients  Gianluca Pina MD  10/10/2022 10:48 AM  Sign when Signing Visit  A fetal ultrasound and NST were completed  See Ob procedures in Epic for an interpretation and recommendations  Do not hesitate to contact us in [facility] if you have questions  Shannon Bueno MD, [address] [facility]  Maternal Fetal Medicine

## 2022-10-10 NOTE — PROGRESS NOTES
Repeat Non-Stress Testing:    Patient verbalizes +FM  Pt denies ALL:               Leaking of fluid   Contractions   Vaginal bleeding   Decreased fetal movement    Patient is performing daily kick counts  Patient has no questions or concerns  NST strip reviewed by Dr Mendieta

## 2022-10-10 NOTE — PROGRESS NOTES
A fetal ultrasound and NST were completed  See Ob procedures in Epic for an interpretation and recommendations  Do not hesitate to contact us in [facility] if you have questions  Pearline Bers Sherren Flicker MD, [address] [facility]  Maternal Fetal Medicine

## 2022-10-12 ENCOUNTER — ROUTINE PRENATAL (OUTPATIENT)
Dept: OBGYN CLINIC | Facility: MEDICAL CENTER | Age: 27
End: 2022-10-12
Payer: COMMERCIAL

## 2022-10-12 VITALS — BODY MASS INDEX: 46.93 KG/M2 | WEIGHT: 293 LBS | DIASTOLIC BLOOD PRESSURE: 78 MMHG | SYSTOLIC BLOOD PRESSURE: 130 MMHG

## 2022-10-12 DIAGNOSIS — E66.01 MATERNAL MORBID OBESITY, ANTEPARTUM (HCC): ICD-10-CM

## 2022-10-12 DIAGNOSIS — Z3A.38 38 WEEKS GESTATION OF PREGNANCY: ICD-10-CM

## 2022-10-12 DIAGNOSIS — Z72.0 TOBACCO USER: ICD-10-CM

## 2022-10-12 DIAGNOSIS — O24.414 INSULIN CONTROLLED GESTATIONAL DIABETES MELLITUS (GDM) IN THIRD TRIMESTER: ICD-10-CM

## 2022-10-12 DIAGNOSIS — O09.93 HIGH-RISK PREGNANCY IN THIRD TRIMESTER: Primary | ICD-10-CM

## 2022-10-12 DIAGNOSIS — O99.210 MATERNAL MORBID OBESITY, ANTEPARTUM (HCC): ICD-10-CM

## 2022-10-12 DIAGNOSIS — F33.1 MODERATE EPISODE OF RECURRENT MAJOR DEPRESSIVE DISORDER (HCC): ICD-10-CM

## 2022-10-12 DIAGNOSIS — F41.1 GENERALIZED ANXIETY DISORDER: ICD-10-CM

## 2022-10-12 PROCEDURE — 59025 FETAL NON-STRESS TEST: CPT | Performed by: OBSTETRICS & GYNECOLOGY

## 2022-10-12 PROCEDURE — 76815 OB US LIMITED FETUS(S): CPT | Performed by: OBSTETRICS & GYNECOLOGY

## 2022-10-12 PROCEDURE — PNV: Performed by: OBSTETRICS & GYNECOLOGY

## 2022-10-12 NOTE — PROGRESS NOTES
10/12/22 1606   Nonstress Test   Reason for NST Gestational Diabetes;Obesity   Variability 6-25 BPM   Accelerations Yes   Acoustic Stimulator No   Baby's Response (1) Accel > or equal to 15 BPM   Baseline 140 BPM   ARTURO (If Indicated) (cm) 14 3 cm   Uterine Irritability No   Contractions Not present   Interpretation   Nonstress Test Interpretation Reactive   Overall Impression Reassuring

## 2022-10-12 NOTE — PROGRESS NOTES
Routine Prenatal Visit  [facility] [address] [place] [address]    Assessment/Plan:  Brendon Schulz is a 27y o  year old  at 38w3d who presents for routine prenatal visit  1  High-risk pregnancy in third trimester    2  38 weeks gestation of pregnancy    3  Insulin controlled gestational diabetes mellitus (GDM) in third trimester    4  Maternal morbid obesity, antepartum ([facility] [address] )    5  Generalized anxiety disorder    6  Moderate episode of recurrent major depressive disorder ([facility] [address] )    7  BMI 45 0-49 9, adult ([facility] [address] )    8  Tobacco user        Subjective:     CC: Prenatal care    Abner Kerns is a 27 y o  Naveen Mathews female who presents for routine prenatal care at 38w3d  Pregnancy ROS: no leakage of fluid, pelvic pain, or vaginal bleeding   + fetal movement  IOL scheduled    The following portions of the patient's history were reviewed and updated as appropriate: allergies, current medications, past family history, past medical history, obstetric history, gynecologic history, past social history, past surgical history and problem list       Objective:  /78   Wt (!) 157 kg (346 lb)   LMP 2022   BMI 46 93 kg/m²   Pregravid Weight/BMI: 153 kg (338 lb) (BMI 45 83)  Current Weight: (!) 157 kg (346 lb)   Total Weight Gain: 3 629 kg (8 lb)   Pre- Vitals    Flowsheet Row Most Recent Value   Prenatal Assessment    Fetal Heart Rate 140   Movement Present   Prenatal Vitals    Blood Pressure 130/78   Weight - Scale 157 kg (346 lb)   Urine Albumin/Glucose    Dilation/Effacement/Station    Vaginal Drainage    Edema    LLE Edema None   RLE Edema None   Facial Edema None           General: Well appearing, no distress  Respiratory: Unlabored breathing  Cardiovascular: Regular rate  Abdomen: Soft, gravid, nontender  Fundal Height: Appropriate for gestational age  Extremities: Warm and well perfused  Non tender

## 2022-10-13 ENCOUNTER — ANESTHESIA (INPATIENT)
Dept: ANESTHESIOLOGY | Facility: HOSPITAL | Age: 27
End: 2022-10-13
Payer: COMMERCIAL

## 2022-10-13 ENCOUNTER — ANESTHESIA EVENT (INPATIENT)
Dept: ANESTHESIOLOGY | Facility: HOSPITAL | Age: 27
End: 2022-10-13
Payer: COMMERCIAL

## 2022-10-13 ENCOUNTER — HOSPITAL ENCOUNTER (INPATIENT)
Facility: HOSPITAL | Age: 27
LOS: 2 days | Discharge: HOME | End: 2022-10-15
Attending: OBSTETRICS & GYNECOLOGY | Admitting: OBSTETRICS & GYNECOLOGY
Payer: COMMERCIAL

## 2022-10-13 DIAGNOSIS — Z3A.38 38 WEEKS GESTATION OF PREGNANCY: Primary | ICD-10-CM

## 2022-10-13 PROBLEM — O13.9 GESTATIONAL HTN: Status: ACTIVE | Noted: 2022-10-13

## 2022-10-13 PROBLEM — O14.93 PRE-ECLAMPSIA IN THIRD TRIMESTER: Status: ACTIVE | Noted: 2022-10-13

## 2022-10-13 LAB
ABO GROUP BLD: NORMAL
ALBUMIN SERPL BCP-MCNC: 2.2 G/DL (ref 3.5–5)
ALP SERPL-CCNC: 99 U/L (ref 46–116)
ALT SERPL W P-5'-P-CCNC: 13 U/L (ref 12–78)
ANION GAP SERPL CALCULATED.3IONS-SCNC: 10 MMOL/L (ref 4–13)
AST SERPL W P-5'-P-CCNC: 9 U/L (ref 5–45)
BASOPHILS # BLD AUTO: 0.06 THOUSANDS/ΜL (ref 0–0.1)
BASOPHILS NFR BLD AUTO: 0 % (ref 0–1)
BILIRUB SERPL-MCNC: 0.18 MG/DL (ref 0.2–1)
BLD GP AB SCN SERPL QL: NEGATIVE
BUN SERPL-MCNC: 7 MG/DL (ref 5–25)
CALCIUM ALBUM COR SERPL-MCNC: 10.2 MG/DL (ref 8.3–10.1)
CALCIUM SERPL-MCNC: 8.8 MG/DL (ref 8.3–10.1)
CHLORIDE SERPL-SCNC: 104 MMOL/L (ref 96–108)
CO2 SERPL-SCNC: 24 MMOL/L (ref 21–32)
CREAT SERPL-MCNC: 0.6 MG/DL (ref 0.6–1.3)
CREAT UR-MCNC: 66.8 MG/DL
EOSINOPHIL # BLD AUTO: 0.13 THOUSAND/ΜL (ref 0–0.61)
EOSINOPHIL NFR BLD AUTO: 1 % (ref 0–6)
ERYTHROCYTE [DISTWIDTH] IN BLOOD BY AUTOMATED COUNT: 13.2 % (ref 11.6–15.1)
GFR SERPL CREATININE-BSD FRML MDRD: 124 ML/MIN/1.73SQ M
GLUCOSE SERPL-MCNC: 111 MG/DL (ref 65–140)
GLUCOSE SERPL-MCNC: 119 MG/DL (ref 65–140)
GLUCOSE SERPL-MCNC: 122 MG/DL (ref 65–140)
GLUCOSE SERPL-MCNC: 132 MG/DL (ref 65–140)
GLUCOSE SERPL-MCNC: 138 MG/DL (ref 65–140)
GLUCOSE SERPL-MCNC: 90 MG/DL (ref 65–140)
GLUCOSE SERPL-MCNC: 92 MG/DL (ref 65–140)
HCT VFR BLD AUTO: 32 % (ref 34.8–46.1)
HGB BLD-MCNC: 10.4 G/DL (ref 11.5–15.4)
IMM GRANULOCYTES # BLD AUTO: 0.11 THOUSAND/UL (ref 0–0.2)
IMM GRANULOCYTES NFR BLD AUTO: 1 % (ref 0–2)
LYMPHOCYTES # BLD AUTO: 2.21 THOUSANDS/ΜL (ref 0.6–4.47)
LYMPHOCYTES NFR BLD AUTO: 14 % (ref 14–44)
MCH RBC QN AUTO: 28.7 PG (ref 26.8–34.3)
MCHC RBC AUTO-ENTMCNC: 32.5 G/DL (ref 31.4–37.4)
MCV RBC AUTO: 88 FL (ref 82–98)
MONOCYTES # BLD AUTO: 1.01 THOUSAND/ΜL (ref 0.17–1.22)
MONOCYTES NFR BLD AUTO: 6 % (ref 4–12)
NEUTROPHILS # BLD AUTO: 12.56 THOUSANDS/ΜL (ref 1.85–7.62)
NEUTS SEG NFR BLD AUTO: 78 % (ref 43–75)
NRBC BLD AUTO-RTO: 0 /100 WBCS
PLATELET # BLD AUTO: 310 THOUSANDS/UL (ref 149–390)
PMV BLD AUTO: 9.8 FL (ref 8.9–12.7)
POTASSIUM SERPL-SCNC: 3.7 MMOL/L (ref 3.5–5.3)
PROT SERPL-MCNC: 6.9 G/DL (ref 6.4–8.4)
PROT UR-MCNC: 44 MG/DL
PROT/CREAT UR: 0.66 MG/G{CREAT} (ref 0–0.1)
RBC # BLD AUTO: 3.62 MILLION/UL (ref 3.81–5.12)
RH BLD: POSITIVE
SODIUM SERPL-SCNC: 138 MMOL/L (ref 135–147)
SPECIMEN EXPIRATION DATE: NORMAL
WBC # BLD AUTO: 16.08 THOUSAND/UL (ref 4.31–10.16)

## 2022-10-13 PROCEDURE — 86850 RBC ANTIBODY SCREEN: CPT | Performed by: OBSTETRICS & GYNECOLOGY

## 2022-10-13 PROCEDURE — 82570 ASSAY OF URINE CREATININE: CPT | Performed by: OBSTETRICS & GYNECOLOGY

## 2022-10-13 PROCEDURE — 86900 BLOOD TYPING SEROLOGIC ABO: CPT | Performed by: OBSTETRICS & GYNECOLOGY

## 2022-10-13 PROCEDURE — 86901 BLOOD TYPING SEROLOGIC RH(D): CPT | Performed by: OBSTETRICS & GYNECOLOGY

## 2022-10-13 PROCEDURE — 84156 ASSAY OF PROTEIN URINE: CPT | Performed by: OBSTETRICS & GYNECOLOGY

## 2022-10-13 PROCEDURE — 86592 SYPHILIS TEST NON-TREP QUAL: CPT | Performed by: OBSTETRICS & GYNECOLOGY

## 2022-10-13 PROCEDURE — 80053 COMPREHEN METABOLIC PANEL: CPT | Performed by: OBSTETRICS & GYNECOLOGY

## 2022-10-13 PROCEDURE — 4A1HXCZ MONITORING OF PRODUCTS OF CONCEPTION, CARDIAC RATE, EXTERNAL APPROACH: ICD-10-PCS | Performed by: STUDENT IN AN ORGANIZED HEALTH CARE EDUCATION/TRAINING PROGRAM

## 2022-10-13 PROCEDURE — 85025 COMPLETE CBC W/AUTO DIFF WBC: CPT | Performed by: OBSTETRICS & GYNECOLOGY

## 2022-10-13 PROCEDURE — NC001 PR NO CHARGE: Performed by: OBSTETRICS & GYNECOLOGY

## 2022-10-13 PROCEDURE — 82948 REAGENT STRIP/BLOOD GLUCOSE: CPT

## 2022-10-13 RX ORDER — DEXTROSE AND SODIUM CHLORIDE 5; .9 G/100ML; G/100ML
100 INJECTION, SOLUTION INTRAVENOUS CONTINUOUS
Status: DISCONTINUED | OUTPATIENT
Start: 2022-10-13 | End: 2022-10-14

## 2022-10-13 RX ORDER — ROPIVACAINE HYDROCHLORIDE 2 MG/ML
INJECTION, SOLUTION EPIDURAL; INFILTRATION; PERINEURAL AS NEEDED
Status: DISCONTINUED | OUTPATIENT
Start: 2022-10-13 | End: 2022-10-14 | Stop reason: HOSPADM

## 2022-10-13 RX ORDER — SODIUM CHLORIDE 9 MG/ML
125 INJECTION, SOLUTION INTRAVENOUS CONTINUOUS
Status: DISCONTINUED | OUTPATIENT
Start: 2022-10-13 | End: 2022-10-13

## 2022-10-13 RX ORDER — SODIUM CHLORIDE 9 MG/ML
25 INJECTION, SOLUTION INTRAVENOUS CONTINUOUS
Status: DISCONTINUED | OUTPATIENT
Start: 2022-10-13 | End: 2022-10-14

## 2022-10-13 RX ORDER — ROPIVACAINE HYDROCHLORIDE 2 MG/ML
INJECTION, SOLUTION EPIDURAL; INFILTRATION; PERINEURAL CONTINUOUS PRN
Status: DISCONTINUED | OUTPATIENT
Start: 2022-10-13 | End: 2022-10-14 | Stop reason: HOSPADM

## 2022-10-13 RX ADMIN — ROPIVACAINE HYDROCHLORIDE: 2 INJECTION, SOLUTION EPIDURAL; INFILTRATION at 17:47

## 2022-10-13 RX ADMIN — ROPIVACAINE HYDROCHLORIDE 10 ML/HR: 2 INJECTION, SOLUTION EPIDURAL; INFILTRATION; PERINEURAL at 17:23

## 2022-10-13 RX ADMIN — SODIUM CHLORIDE 999 ML/HR: 0.9 INJECTION, SOLUTION INTRAVENOUS at 17:14

## 2022-10-13 RX ADMIN — ROPIVACAINE HYDROCHLORIDE 3 ML: 2 INJECTION, SOLUTION EPIDURAL; INFILTRATION at 17:22

## 2022-10-13 RX ADMIN — SODIUM CHLORIDE 125 ML/HR: 0.9 INJECTION, SOLUTION INTRAVENOUS at 15:25

## 2022-10-13 RX ADMIN — SODIUM CHLORIDE 0.5 UNITS/HR: 9 INJECTION, SOLUTION INTRAVENOUS at 19:56

## 2022-10-13 RX ADMIN — ROPIVACAINE HYDROCHLORIDE 5 ML: 2 INJECTION, SOLUTION EPIDURAL; INFILTRATION at 17:18

## 2022-10-13 RX ADMIN — DEXTROSE AND SODIUM CHLORIDE 100 ML/HR: 5; .9 INJECTION, SOLUTION INTRAVENOUS at 19:56

## 2022-10-13 RX ADMIN — SODIUM CHLORIDE 2.5 MILLION UNITS: 9 INJECTION, SOLUTION INTRAVENOUS at 19:25

## 2022-10-13 RX ADMIN — ROPIVACAINE HYDROCHLORIDE: 2 INJECTION, SOLUTION EPIDURAL; INFILTRATION at 22:28

## 2022-10-13 RX ADMIN — SODIUM CHLORIDE 5 MILLION UNITS: 0.9 INJECTION, SOLUTION INTRAVENOUS at 15:26

## 2022-10-13 RX ADMIN — SODIUM CHLORIDE 25 ML/HR: 0.9 INJECTION, SOLUTION INTRAVENOUS at 19:26

## 2022-10-13 NOTE — ANESTHESIA PROCEDURE NOTES
Epidural Block    Patient location during procedure: OB  Start time: 10/13/2022 5:15 PM  Reason for block: at surgeon's request and primary anesthetic  Staffing  Performed: Anesthesiologist   Anesthesiologist: Emily Gomez MD  Preanesthetic Checklist  Completed: patient identified, IV checked, site marked, risks and benefits discussed, surgical consent, monitors and equipment checked, pre-op evaluation and timeout performed  Epidural  Patient position: sitting  Prep: Betadine  Patient monitoring: heart rate, continuous pulse ox and frequent blood pressure checks  Approach: midline  Location: lumbar  Injection technique: RIA saline  Needle  Needle type: Tuohy   Needle gauge: 17 G  Catheter type: side hole  Catheter size: 20 G  Catheter at skin depth: 13 cm  Catheter securement method: clear occlusive dressing  Test dose: negative  Assessment  Number of attempts: 1negative aspiration for CSF, negative aspiration for heme and no paresthesia on injection  patient tolerated the procedure well with no immediate complications

## 2022-10-13 NOTE — OB LABOR/OXYTOCIN SAFETY PROGRESS
Labor Progress Note - Chaitanya Angeles 27 y o  female MRN: [id number]    Unit/Bed#: L&D 322-01 Encounter: [id number]       Contraction Frequency (minutes): 3  Contraction Quality: Strong  Tachysystole: No   Cervical Dilation: 4        Cervical Effacement: 60  Fetal Station: -3  Baseline Rate: 135 bpm  Fetal Heart Rate: 148 BPM  FHR Category: Category I               Vital Signs:   Vitals:    10/13/22 1335   BP: 132/78   Pulse: 97   Resp: 18   Temp: 98 2 °F (36 8 °C)       Notes/comments:   Pt continues to be uncomfortable and is now 4 cm and requesting epidural  Will give IV fluids and consult anesthesia  I reviewed expectations for labor and delivery  I explained the diagnosis of GEST HTN and that this may change pending lab results  Pt understands           Katina Peraza 10/13/2022 4:00 PM

## 2022-10-13 NOTE — ASSESSMENT & PLAN NOTE
Admit to OBGYN   Clear liquid diet   F/u T&S, CBC, RPR   IVF NS 125cc/hr   Continuous fetal monitoring and tocometry   Analgesia at maternal request   Vertex by TAUS  Expecting management

## 2022-10-13 NOTE — OB LABOR/OXYTOCIN SAFETY PROGRESS
Labor Progress Note - Gus Hilario 27 y o  female MRN: [id number]    Unit/Bed#: L&D 322-01 Encounter: [id number]       Contraction Frequency (minutes): 4-5  Contraction Quality: Moderate, Mild  Tachysystole: No   Cervical Dilation: 5        Cervical Effacement: 70  Fetal Station: -2  Baseline Rate: 130 bpm  Fetal Heart Rate: 130 BPM  FHR Category: Category I               Vital Signs:   Vitals:    10/13/22 1729   BP: 140/70   Pulse: 96   Resp:    Temp:    SpO2:        Notes/comments:   Patient resting comfortably  SVE now 5/70/-2  FHT category 1 with contractions every 4-5 min  Continue expectant management  Will initiate insulin gtt for elevated blood sugars x2  Dr Amaya Saunders aware          Umu Greenberg 10/13/2022 7:07 PM

## 2022-10-13 NOTE — H&P
[address] 27 y o  female MRN: [id number]  Unit/Bed#: L&D 322-01 Encounter: [id number]    Assessment: 27 y o   at 38w4d admitted for IOL for GHTN  SVE: 50/-2  FHT:  Baseline of 130/moderate variability/15 x 15 accels present/no decelerations  Category 1 tracing  Clinical EFW: 64th percentile ; Cephalic confirmed by ultrasound  GBS status: positive        Plan:   38 weeks gestation of pregnancy  Assessment & Plan  Admit to OBGYN   Clear liquid diet   F/u T&S, CBC, RPR   IVF NS 125cc/hr   Continuous fetal monitoring and tocometry   Analgesia at maternal request   Vertex by TAUS  Expecting management     Hyperglycemia in pregnancy  Assessment & Plan  A2GDM protocol    * Gestational hypertension  Assessment & Plan  Systolic (81EAT), DFL:089 , Min:121 , JYL:251      Diastolic (45ATQ), JNA:08, Min:69, Max:78      PreE Labs pending  P:C ratio:  Pending            Discussed case and plan w/ Dr Kinga Schultz      Chief Complaint: contractions    HPI: Pedro Luis Arceo is a 27 y o   with an CALLIE of 10/23/2022, Alternate CALLIE Entry at 38w4d who 1st presented to labor and delivery with come complaint of painful contractions beginning at 10:00 a m  This morning  Upon presentation she was noted to be 1/50/3  And having minimal contractions on tocometry  However her 1st blood pressure was elevated, as she had a previous elevated blood pressure at 27 weeks gestation she now qualify for gestational hypertension therefore the decision was made to admit her and manage her expectantly and further induce labor if she did not progress  At time of admission she endorsed a slight decrease in fetal movement since the onset of contractions as well as vaginal bleeding  Following SVE a small amount of dark red blood was noted on the glove  She denies loss of fluid         Patient Active Problem List   Diagnosis   • Functional diarrhea   • Generalized abdominal pain   • Tobacco user   • H/O major depression • Left ovarian cyst   • Pilonidal cyst   • BMI 45 0-49 9, adult (HCC)   • Hyperglycemia in pregnancy   • Postpartum depression   • Hidradenitis   • Moderate episode of recurrent major depressive disorder (HCC)   • Generalized anxiety disorder   • Insulin controlled gestational diabetes mellitus (GDM) in third trimester   • Maternal morbid obesity, antepartum ([facility] [address] )   • 38 weeks gestation of pregnancy   • Gastroesophageal reflux in pregnancy in third trimester   • Macrosomia affecting management of mother in third trimester   • Morbid (severe) obesity due to excess calories ([facility] )   • Gestational hypertension       Baby complications/comments: none    Review of Systems   Constitutional: Negative for chills and fever  Respiratory: Negative for cough, shortness of breath and wheezing  Cardiovascular: Negative for chest pain and leg swelling  Gastrointestinal: Positive for abdominal pain  Negative for diarrhea, nausea and vomiting  Genitourinary: Negative for pelvic pain, vaginal bleeding and vaginal discharge  Musculoskeletal: Negative for back pain  Neurological: Negative for weakness, light-headedness and headaches         OB Hx:  OB History    Para Term  AB Living   3 1 1   1 1   SAB IAB Ectopic Multiple Live Births   1       1      # Outcome Date GA Lbr Satish/2nd Weight Sex Delivery Anes PTL Lv   3 Current            2 Term 21 37w6d / 02:01 3535 g (7 lb 12 7 oz) M Vag-Spont EPI, Local  JOE   1 SAB 2016 6w0d    SAB          Past Medical Hx:  Past Medical History:   Diagnosis Date   • Chlamydia    • Depression     no medications for 1 year   • Gestational hypertension, third trimester 2021   • Hypertension    • Insulin controlled gestational diabetes mellitus (GDM) in third trimester 10/29/2020   • Urinary tract infection    • Varicella        Past Surgical hx:  Past Surgical History:   Procedure Laterality Date   • CHOLECYSTECTOMY     • PA REMV PILONIDAL LESION EXTENS N/A 8/3/2021    Procedure: EXCISION PILONIDAL CYST;  Surgeon: Aaron Yepez DO;  Location: [facility];  Service: General   • WISDOM TOOTH EXTRACTION     • WISDOM TOOTH EXTRACTION Bilateral 2010       Social Hx:  Alcohol use: denies  Tobacco use: endoreses  Other substance use: denies        No Known Allergies    Medications Prior to Admission   Medication   • Blood Glucose Monitoring Suppl (OneTouch Verio Flex System) w/Device KIT   • famotidine (PEPCID) 20 mg tablet   • insulin aspart (NovoLOG FlexPen) 100 UNIT/ML injection pen   • Insulin Aspart (NovoLOG) 100 UNIT/ML SOLN   • Insulin Glargine Solostar 100 UNIT/ML SOPN   • OneTouch Delica Lancets 78Q MISC   • OneTouch Verio test strip   • Prenatal MV-Min-Fe Fum-FA-DHA (PRENATAL 1 PO)   • sertraline (ZOLOFT) 50 mg tablet   • Insulin Disposable Pump (Omnipod 5 G6 Intro, Gen 5,) KIT   • Insulin Pen Needle 31G X 5 MM MISC       Objective:  Temp:  [98 2 °F (36 8 °C)-98 6 °F (37 °C)] 98 6 °F (37 °C)  HR:  [] 86  Resp:  [16-18] 16  BP: (121-140)/(69-78) 131/75  Body mass index is 46 93 kg/m²  Physical Exam:  Physical Exam  Constitutional:       Appearance: Normal appearance  Cardiovascular:      Rate and Rhythm: Normal rate and regular rhythm  Pulmonary:      Effort: Pulmonary effort is normal  No respiratory distress  Abdominal:      Palpations: Abdomen is soft  Tenderness: There is no abdominal tenderness  Musculoskeletal:         General: No swelling or tenderness  Neurological:      General: No focal deficit present  Mental Status: She is alert and oriented to person, place, and time  Skin:     General: Skin is warm and dry  Vitals reviewed              FHT:  Baseline Rate: 135 bpm  Variability: Moderate 6-25 bpm  Accelerations: 15 x 15 or greater  Decelerations: None  FHR Category: Category I    TOCO:   Contraction Frequency (minutes): 3-5  Contraction Duration (seconds): 50-80  Contraction Quality: Mild    Lab Results   Component Value Date    WBC 12 32 (H) 08/09/2022    HGB 10 8 (L) 08/09/2022    HCT 32 0 (L) 08/09/2022     08/09/2022     Lab Results   Component Value Date    K 4 1 07/25/2022     (H) 07/25/2022    CO2 22 07/25/2022    BUN 5 07/25/2022    CREATININE 0 62 07/25/2022    AST 9 07/25/2022    ALT 15 07/25/2022     Prenatal Labs: Reviewed      Blood type: A+  Antibody: Negative  GBS: Positve  HIV:  Nonreactive  Rubella: Immune  VDRL/RPR: Non reactive  HBsAg: Negative  Chlamydia: Negative  Gonorrhea: Negative  Diabetes 1 hour screen:  208  3 hour glucose:  Not done patient has A2 GDM  Platelets:  578  Hgb:  10 8  >2 Midnights  INPATIENT     Signature/Title: Sarah Tuttle  Date: 10/13/2022  Time: 4:50 PM

## 2022-10-13 NOTE — ASSESSMENT & PLAN NOTE
Systolic (71UWA), HRV:736 , Min:117 , BOW:645      Diastolic (30LKM), YTY:42, Min:55, Max:131      PreE Labs pending  P:C ratio:  Pending

## 2022-10-13 NOTE — ANESTHESIA PREPROCEDURE EVALUATION
Procedure:  LABOR ANALGESIA    Relevant Problems   CARDIO   (+) Gestational hypertension      GYN   (+) 38 weeks gestation of pregnancy      NEURO/PSYCH                      Pt denies increase in bleeding and bruising during pregnancy and not on any blood thinners  Physical Exam    Airway    Mallampati score: III  TM Distance: >3 FB  Neck ROM: full     Dental       Cardiovascular  Rhythm: regular, Rate: normal,     Pulmonary  Comment: Bilateral chest rise, not using accessory muscles for breathing,     Other Findings        Anesthesia Plan  ASA Score- 3     Anesthesia Type- epidural with ASA Monitors  Additional Monitors:   Airway Plan:           Plan Factors-    Chart reviewed  Existing labs reviewed  Induction-     Postoperative Plan-     Informed Consent- Anesthetic plan and risks discussed with patient

## 2022-10-14 LAB
BASE EXCESS BLDCOA CALC-SCNC: -5.4 MMOL/L (ref 3–11)
BASE EXCESS BLDCOV CALC-SCNC: -4.3 MMOL/L (ref 1–9)
GLUCOSE SERPL-MCNC: 106 MG/DL (ref 65–140)
GLUCOSE SERPL-MCNC: 111 MG/DL (ref 65–140)
GLUCOSE SERPL-MCNC: 111 MG/DL (ref 65–140)
GLUCOSE SERPL-MCNC: 113 MG/DL (ref 65–140)
GLUCOSE SERPL-MCNC: 114 MG/DL (ref 65–140)
GLUCOSE SERPL-MCNC: 114 MG/DL (ref 65–140)
GLUCOSE SERPL-MCNC: 117 MG/DL (ref 65–140)
GLUCOSE SERPL-MCNC: 120 MG/DL (ref 65–140)
GLUCOSE SERPL-MCNC: 133 MG/DL (ref 65–140)
GLUCOSE SERPL-MCNC: 156 MG/DL (ref 65–140)
GLUCOSE SERPL-MCNC: 88 MG/DL (ref 65–140)
GLUCOSE SERPL-MCNC: 93 MG/DL (ref 65–140)
GLUCOSE SERPL-MCNC: 95 MG/DL (ref 65–140)
HCO3 BLDCOA-SCNC: 22.3 MMOL/L (ref 17.3–27.3)
HCO3 BLDCOV-SCNC: 20.1 MMOL/L (ref 12.2–28.6)
O2 CT VFR BLDCOA CALC: 7.6 ML/DL
OXYHGB MFR BLDCOA: 38.4 %
OXYHGB MFR BLDCOV: 85.4 %
PCO2 BLDCOA: 52.1 MM[HG] (ref 30–60)
PCO2 BLDCOV: 35.3 MM HG (ref 27–43)
PH BLDCOA: 7.25 [PH] (ref 7.23–7.43)
PH BLDCOV: 7.37 [PH] (ref 7.19–7.49)
PO2 BLDCOA: 21.1 MM HG (ref 5–25)
PO2 BLDCOV: 45.7 MM HG (ref 15–45)
RPR SER QL: NORMAL
SAO2 % BLDCOV: 15.8 ML/DL

## 2022-10-14 PROCEDURE — 59400 OBSTETRICAL CARE: CPT | Performed by: STUDENT IN AN ORGANIZED HEALTH CARE EDUCATION/TRAINING PROGRAM

## 2022-10-14 PROCEDURE — NC001 PR NO CHARGE: Performed by: OBSTETRICS & GYNECOLOGY

## 2022-10-14 PROCEDURE — 82948 REAGENT STRIP/BLOOD GLUCOSE: CPT

## 2022-10-14 PROCEDURE — 82805 BLOOD GASES W/O2 SATURATION: CPT | Performed by: STUDENT IN AN ORGANIZED HEALTH CARE EDUCATION/TRAINING PROGRAM

## 2022-10-14 RX ORDER — ROPIVACAINE HYDROCHLORIDE 5 MG/ML
INJECTION, SOLUTION EPIDURAL; INFILTRATION; PERINEURAL AS NEEDED
Status: DISCONTINUED | OUTPATIENT
Start: 2022-10-14 | End: 2022-10-14 | Stop reason: HOSPADM

## 2022-10-14 RX ORDER — FENTANYL CITRATE 50 UG/ML
INJECTION, SOLUTION INTRAMUSCULAR; INTRAVENOUS AS NEEDED
Status: DISCONTINUED | OUTPATIENT
Start: 2022-10-14 | End: 2022-10-14 | Stop reason: HOSPADM

## 2022-10-14 RX ORDER — PROMETHAZINE HYDROCHLORIDE 25 MG/ML
12.5 INJECTION, SOLUTION INTRAMUSCULAR; INTRAVENOUS ONCE
Status: COMPLETED | OUTPATIENT
Start: 2022-10-14 | End: 2022-10-14

## 2022-10-14 RX ORDER — DOCUSATE SODIUM 100 MG/1
100 CAPSULE, LIQUID FILLED ORAL 2 TIMES DAILY
Status: DISCONTINUED | OUTPATIENT
Start: 2022-10-14 | End: 2022-10-15 | Stop reason: HOSPADM

## 2022-10-14 RX ORDER — ACETAMINOPHEN 325 MG/1
650 TABLET ORAL EVERY 4 HOURS PRN
Status: DISCONTINUED | OUTPATIENT
Start: 2022-10-14 | End: 2022-10-15 | Stop reason: HOSPADM

## 2022-10-14 RX ORDER — IBUPROFEN 600 MG/1
600 TABLET ORAL EVERY 6 HOURS SCHEDULED
Status: DISCONTINUED | OUTPATIENT
Start: 2022-10-14 | End: 2022-10-15 | Stop reason: HOSPADM

## 2022-10-14 RX ORDER — CALCIUM CARBONATE 200(500)MG
1000 TABLET,CHEWABLE ORAL DAILY PRN
Status: DISCONTINUED | OUTPATIENT
Start: 2022-10-14 | End: 2022-10-15 | Stop reason: HOSPADM

## 2022-10-14 RX ORDER — BUTORPHANOL TARTRATE 1 MG/ML
1 INJECTION, SOLUTION INTRAMUSCULAR; INTRAVENOUS ONCE
Status: COMPLETED | OUTPATIENT
Start: 2022-10-14 | End: 2022-10-14

## 2022-10-14 RX ORDER — OXYTOCIN/RINGER'S LACTATE 30/500 ML
250 PLASTIC BAG, INJECTION (ML) INTRAVENOUS ONCE
Status: DISCONTINUED | OUTPATIENT
Start: 2022-10-14 | End: 2022-10-15 | Stop reason: HOSPADM

## 2022-10-14 RX ORDER — DIPHENHYDRAMINE HCL 25 MG
25 TABLET ORAL EVERY 6 HOURS PRN
Status: DISCONTINUED | OUTPATIENT
Start: 2022-10-14 | End: 2022-10-15 | Stop reason: HOSPADM

## 2022-10-14 RX ORDER — ONDANSETRON 2 MG/ML
4 INJECTION INTRAMUSCULAR; INTRAVENOUS EVERY 4 HOURS PRN
Status: DISCONTINUED | OUTPATIENT
Start: 2022-10-14 | End: 2022-10-14 | Stop reason: SDUPTHER

## 2022-10-14 RX ORDER — FENTANYL CITRATE 50 UG/ML
INJECTION, SOLUTION INTRAMUSCULAR; INTRAVENOUS
Status: DISPENSED
Start: 2022-10-14 | End: 2022-10-14

## 2022-10-14 RX ORDER — ONDANSETRON 2 MG/ML
4 INJECTION INTRAMUSCULAR; INTRAVENOUS EVERY 8 HOURS PRN
Status: DISCONTINUED | OUTPATIENT
Start: 2022-10-14 | End: 2022-10-15 | Stop reason: HOSPADM

## 2022-10-14 RX ORDER — DIAPER,BRIEF,INFANT-TODD,DISP
1 EACH MISCELLANEOUS DAILY PRN
Status: DISCONTINUED | OUTPATIENT
Start: 2022-10-14 | End: 2022-10-15 | Stop reason: HOSPADM

## 2022-10-14 RX ORDER — OXYTOCIN/RINGER'S LACTATE 30/500 ML
1-30 PLASTIC BAG, INJECTION (ML) INTRAVENOUS
Status: DISCONTINUED | OUTPATIENT
Start: 2022-10-14 | End: 2022-10-14

## 2022-10-14 RX ORDER — LIDOCAINE HYDROCHLORIDE AND EPINEPHRINE 20; 5 MG/ML; UG/ML
INJECTION, SOLUTION EPIDURAL; INFILTRATION; INTRACAUDAL; PERINEURAL AS NEEDED
Status: DISCONTINUED | OUTPATIENT
Start: 2022-10-14 | End: 2022-10-14 | Stop reason: HOSPADM

## 2022-10-14 RX ADMIN — FENTANYL CITRATE 100 MCG: 50 INJECTION, SOLUTION INTRAMUSCULAR; INTRAVENOUS at 11:44

## 2022-10-14 RX ADMIN — SODIUM CHLORIDE 2.5 MILLION UNITS: 9 INJECTION, SOLUTION INTRAVENOUS at 00:38

## 2022-10-14 RX ADMIN — IBUPROFEN 600 MG: 600 TABLET ORAL at 23:01

## 2022-10-14 RX ADMIN — ROPIVACAINE HYDROCHLORIDE: 2 INJECTION, SOLUTION EPIDURAL; INFILTRATION at 10:30

## 2022-10-14 RX ADMIN — SODIUM CHLORIDE 2.5 MILLION UNITS: 9 INJECTION, SOLUTION INTRAVENOUS at 08:14

## 2022-10-14 RX ADMIN — LIDOCAINE HYDROCHLORIDE,EPINEPHRINE BITARTRATE 5 ML: 20; .005 INJECTION, SOLUTION EPIDURAL; INFILTRATION; INTRACAUDAL; PERINEURAL at 09:58

## 2022-10-14 RX ADMIN — DOCUSATE SODIUM 100 MG: 100 CAPSULE, LIQUID FILLED ORAL at 17:32

## 2022-10-14 RX ADMIN — ROPIVACAINE HYDROCHLORIDE 5 ML: 5 INJECTION EPIDURAL; INFILTRATION; PERINEURAL at 10:06

## 2022-10-14 RX ADMIN — BENZOCAINE AND LEVOMENTHOL 1 APPLICATION: 200; 5 SPRAY TOPICAL at 16:57

## 2022-10-14 RX ADMIN — ONDANSETRON 4 MG: 2 INJECTION INTRAMUSCULAR; INTRAVENOUS at 08:42

## 2022-10-14 RX ADMIN — SODIUM CHLORIDE 25 ML/HR: 0.9 INJECTION, SOLUTION INTRAVENOUS at 10:10

## 2022-10-14 RX ADMIN — ROPIVACAINE HYDROCHLORIDE 8 ML: 5 INJECTION EPIDURAL; INFILTRATION; PERINEURAL at 11:44

## 2022-10-14 RX ADMIN — DEXTROSE AND SODIUM CHLORIDE 100 ML/HR: 5; .9 INJECTION, SOLUTION INTRAVENOUS at 10:10

## 2022-10-14 RX ADMIN — ROPIVACAINE HYDROCHLORIDE: 2 INJECTION, SOLUTION EPIDURAL; INFILTRATION at 08:33

## 2022-10-14 RX ADMIN — PROMETHAZINE HYDROCHLORIDE 12.5 MG: 25 INJECTION INTRAMUSCULAR; INTRAVENOUS at 08:46

## 2022-10-14 RX ADMIN — SODIUM CHLORIDE 2.5 MILLION UNITS: 9 INJECTION, SOLUTION INTRAVENOUS at 04:43

## 2022-10-14 RX ADMIN — Medication 2 MILLI-UNITS/MIN: at 02:00

## 2022-10-14 RX ADMIN — ACETAMINOPHEN 650 MG: 325 TABLET, FILM COATED ORAL at 20:09

## 2022-10-14 RX ADMIN — SODIUM CHLORIDE 2.5 MILLION UNITS: 9 INJECTION, SOLUTION INTRAVENOUS at 12:23

## 2022-10-14 RX ADMIN — IBUPROFEN 600 MG: 600 TABLET ORAL at 16:57

## 2022-10-14 RX ADMIN — ROPIVACAINE HYDROCHLORIDE 7 ML/HR: 2 INJECTION, SOLUTION EPIDURAL; INFILTRATION; PERINEURAL at 10:08

## 2022-10-14 RX ADMIN — ROPIVACAINE HYDROCHLORIDE 5 ML: 5 INJECTION EPIDURAL; INFILTRATION; PERINEURAL at 10:00

## 2022-10-14 RX ADMIN — BUTORPHANOL TARTRATE 1 MG: 1 INJECTION, SOLUTION INTRAMUSCULAR; INTRAVENOUS at 08:45

## 2022-10-14 RX ADMIN — ROPIVACAINE HYDROCHLORIDE: 2 INJECTION, SOLUTION EPIDURAL; INFILTRATION at 03:32

## 2022-10-14 NOTE — OB LABOR/OXYTOCIN SAFETY PROGRESS
Oxytocin Safety Progress Check Note - Cal Lugo 27 y o  female MRN: [id number]    Unit/Bed#: L&D 322-01 Encounter: [id number]    Dose (dia-units/min) Oxytocin: 16 dia-units/min  Contraction Frequency (minutes): 4  Contraction Quality: Mild, Moderate  Tachysystole: No   Cervical Dilation: 5        Cervical Effacement: 70  Fetal Station: -2  Baseline Rate: 140 bpm  Fetal Heart Rate: 150 BPM  FHR Category: Category I               Vital Signs:   Vitals:    10/14/22 0751   BP: 142/80   Pulse: 88   Resp:    Temp:    SpO2:        Notes/comments:   Pt is very uncomfortable, she is feeling the intensity of every contraction  Cervical exam is unchanged  She will need re-evaluation from anesthesia and likely replacement of epidural  Will hold Pitocin for now   Will plan to give IV pain medications until Anesthesia can replace epidural         Yu Niño 10/14/2022 8:24 AM

## 2022-10-14 NOTE — ANESTHESIA PROCEDURE NOTES
Epidural Block    Patient location during procedure: OB  Start time: 10/13/2022 9:56 AM  Reason for block: procedure for pain and at surgeon's request  Staffing  Performed: Anesthesiologist   Anesthesiologist: Odilia Azevedo DO  Preanesthetic Checklist  Completed: patient identified, IV checked, site marked, risks and benefits discussed, surgical consent, monitors and equipment checked, pre-op evaluation and timeout performed  Epidural  Patient position: sitting  Prep: Betadine  Patient monitoring: frequent blood pressure checks  Approach: midline  Location: lumbar  Injection technique: RIA air  Needle  Needle type: Tuohy   Needle gauge: 18 G  Catheter at skin depth: 13 cm  Catheter securement method: clear occlusive dressing  Test dose: negative  Assessment  Number of attempts: 1negative aspiration for CSF, negative aspiration for heme and no paresthesia on injection  patient tolerated the procedure well with no immediate complications

## 2022-10-14 NOTE — OB LABOR/OXYTOCIN SAFETY PROGRESS
Oxytocin Safety Progress Check Note - Shantanu Duty 27 y o  female MRN: [id number]    Unit/Bed#: L&D 322-01 Encounter: [id number]    Dose (dia-units/min) Oxytocin: 12 dia-units/min  Contraction Frequency (minutes): 2-3  Contraction Quality: Mild  Tachysystole: No   Cervical Dilation: 8        Cervical Effacement: 90  Fetal Station: 0  Baseline Rate: 130 bpm  Fetal Heart Rate: 150 BPM  FHR Category: Category I               Vital Signs:   Vitals:    10/14/22 1216   BP: 122/57   Pulse: 93   Resp:    Temp:    SpO2:        Notes/comments:   FHT:  Baseline of 120/moderate variability/15 x 15 accels present/no decelerations  Category 1 tracing  On cervical recheck patient is now 8/90/0  Patient is currently comfortable with epidural    New IUPC placed  [place] catheter became dislodged (baloon inflated)  Will be replaced by nursing          Hasmukh Tom 10/14/2022 1:44 PM

## 2022-10-14 NOTE — OB LABOR/OXYTOCIN SAFETY PROGRESS
Labor Progress Note - Rosa Colemanand 27 y o  female MRN: [id number]    Unit/Bed#: L&D 322-01 Encounter: [id number]       Contraction Frequency (minutes): not tracing  Contraction Quality: Mild, Moderate  Tachysystole: No   Cervical Dilation: 5        Cervical Effacement: 70  Fetal Station: -2  Baseline Rate: 130 bpm  Fetal Heart Rate: 130 BPM  FHR Category: Category I               Vital Signs:   Vitals:    10/13/22 1853   BP: 135/71   Pulse: 88   Resp:    Temp:    SpO2:        Notes/comments:   Patient resting comfortably  FHT category 1  Contractions not tracing well, will continue repositioning in attempt to better trace them  Continue expectant management  Will recheck in 2 hours and consider pitocin if unchanged  Dr Kenn Castellano 10/13/2022 10:36 PM

## 2022-10-14 NOTE — OB LABOR/OXYTOCIN SAFETY PROGRESS
Oxytocin Safety Progress Check Note - Shantanu Duty 27 y o  female MRN: [id number]    Unit/Bed#: L&D 322-01 Encounter: [id number]    Dose (dia-units/min) Oxytocin: 8 dia-units/min  Contraction Frequency (minutes): absent  Contraction Quality: Mild  Tachysystole: No   Cervical Dilation: 6        Cervical Effacement: 80  Fetal Station: -2  Baseline Rate: 130 bpm  Fetal Heart Rate: 150 BPM  FHR Category: Category I               Vital Signs:   Vitals:    10/14/22 1030   BP: 132/68   Pulse: 93   Resp:    Temp:    SpO2:        Notes/comments: SVE 6/80/-2, FHT Cat II with intermittent variables  [place] q3  FSE fell out during check, replaced  Discussed with attending          Marlon Zavala 10/14/2022 10:56 AM

## 2022-10-14 NOTE — OB LABOR/OXYTOCIN SAFETY PROGRESS
Oxytocin Safety Progress Check Note - Clemetine Carbon 27 y o  female MRN: [id number]    Unit/Bed#: L&D 322-01 Encounter: [id number]    Dose (dia-units/min) Oxytocin: 6 dia-units/min (Per Dr Sergo Sagastume)  Contraction Frequency (minutes): TBD w/ IUPC now in place  Contraction Quality: Mild, Moderate  Tachysystole: No   Cervical Dilation: 5        Cervical Effacement: 70  Fetal Station: -2  Baseline Rate: 135 bpm  Fetal Heart Rate: 135 BPM  FHR Category: Category I               Vital Signs:   Vitals:    10/14/22 0536   BP: 137/79   Pulse: 81   Resp:    Temp:    SpO2:        Notes/comments:   Patient resting comfortably  SVE unchanged at 5/70/-2  Fetal head well applied, and AROM performed for meconium stained fluid  IUPC and FSE placed to better monitor FHR and contractions and to better titrate pitocin  FHT category 1  Contractions not traced prior to IUPC, now tracing but very spaced thus far  Titrate pit to contractions  Dr Chadwick Fam aware          Babar Hernandez 10/14/2022 6:16 AM

## 2022-10-14 NOTE — OB LABOR/OXYTOCIN SAFETY PROGRESS
Labor Progress Note - Ottoniel De La Garza 27 y o  female MRN: [id number]    Unit/Bed#: L&D 322-01 Encounter: [id number]       Contraction Frequency (minutes): not tracing vs  none  Contraction Quality: Mild  Tachysystole: No   Cervical Dilation: 5        Cervical Effacement: 70  Fetal Station: -2  Baseline Rate: 140 bpm  Fetal Heart Rate: 140 BPM  FHR Category: Category I               Vital Signs:   Vitals:    10/13/22 2251   BP: 129/69   Pulse: 103   Resp:    Temp:    SpO2:        Notes/comments:   SVE remains unchanged  FHT category 1 with contractions not currently tracing  Will plan to place IUPC once ruptured to better trace contractions  Will start pitocin for augmentation for protracted dilation           Fallon Anderson 10/14/2022 1:31 AM

## 2022-10-14 NOTE — LACTATION NOTE
This note was copied from a baby's chart  CONSULT - LACTATION  Baby Boy Wendy Nathan) Marsha Dockery 0 days male MRN: [id number]    [facility] [address] NURSERY Room / Bed: L&D 322(N)/L&D 322(N) Encounter: [id number]    Maternal Information     MOTHER:  Baljinder Ludwig  Maternal Age: 27 y o    OB History: # 1 - Date: 2018, Sex: None, Weight: None, GA: 6w0d, Delivery: Spontaneous , Apgar1: None, Apgar5: None, Living: None, Birth Comments: None    # 2 - Date: 21, Sex: Male, Weight: 3535 g (7 lb 12 7 oz), GA: 37w6d, Delivery: Vaginal, Spontaneous, Apgar1: 8, Apgar5: 9, Living: Living, Birth Comments: None    # 3 - Date: None, Sex: None, Weight: None, GA: None, Delivery: None, Apgar1: None, Apgar5: None, Living: None, Birth Comments: None   Previouse breast reduction surgery? No    Lactation history:   Has patient previously breast fed: Yes   How long had patient previously breast fed: 1 week   Previous breast feeding complications:       Past Surgical History:   Procedure Laterality Date   • CHOLECYSTECTOMY     • DE REMV PILONIDAL LESION EXTENS N/A 8/3/2021    Procedure: EXCISION PILONIDAL CYST;  Surgeon: Aaron Yepez DO;  Location: [facility] MAIN OR;  Service: General   • WISDOM TOOTH EXTRACTION     • WISDOM TOOTH EXTRACTION Bilateral         Birth information:  YOB: 2024   Time of birth: 2:53 PM   Sex: male   Delivery type: Vaginal, Spontaneous   Birth Weight: No birth weight on file     Percent of Weight Change: Birth weight not on file     Gestational Age: 44w7d   [unfilled]    Assessment     Breast and nipple assessment: normal assessment     Assessment: normal assessment    Feeding assessment: feeding well  LATCH:  Latch: Grasps breast, tongue down, lips flanged, rhythmic sucking   Audible Swallowing: A few with stimulation   Type of Nipple: Everted (After stimulation)   Comfort (Breast/Nipple): Soft/non-tender   Hold (Positioning): Partial assist, teach one side, mother does other, staff holds   [facility] Score: 8          Feeding recommendations:  breast feed on demand     Reviewed RSB  D/C booklet at bedside  Vania Nick latched well to the breast  HE effective  Worked on positioning infant up at chest level and starting to feed infant with nose arriving at the nipple  Then, using areolar compression to achieve a deep latch that is comfortable and exchanges optimum amounts of milk  Reviewed how to bring baby to the breast so that his lower lip and chin touch the breast with his nose just above the nipple to encourage a wider, more asymmetric latch  Information on hand expression given  Discussed benefits of knowing how to manually express breast including stimulating milk supply, softening nipple for latch and evacuating breast in the event of engorgement  Met with mother  Provided mother with Ready, Set, Baby booklet which contained information on:  Hand expression with access to QR codes to review hand expression  Positioning and latch reviewed as well as showing images of other feeding positions  Discussed the properties of a good latch in any position  Feeding on cue and what that means for recognizing infant's hunger, s/s that baby is getting enough milk and some s/s that breastfeeding dyad may need further help  Skin to Skin contact an benefits to mom and baby  Avoidance of pacifiers for the first month discussed  Gave information on common concerns, what to expect the first few weeks after delivery, preparing for other caregivers, and how partners can help  Resources for support also provided  Encouraged parents to call for assistance, questions, and concerns about breastfeeding  Extension provided        Rodolfo Lockett 10/14/2022 5:37 PM

## 2022-10-14 NOTE — OB LABOR/OXYTOCIN SAFETY PROGRESS
Oxytocin Safety Progress Check Note - Rocky Ceballos 27 y o  female MRN: [id number]    Unit/Bed#: L&D 322-01 Encounter: [id number]    Dose (dia-units/min) Oxytocin: 2 dia-units/min  Contraction Frequency (minutes): Not tracing  Contraction Quality: Mild  Tachysystole: No   Cervical Dilation: 5        Cervical Effacement: 70  Fetal Station: -2  Baseline Rate: 130 bpm  Fetal Heart Rate: 130 BPM  FHR Category: Category I               Vital Signs:   Vitals:    10/14/22 0252   BP: 130/59   Pulse: (!) 109   Resp:    Temp:    SpO2:        Notes/comments: [place] not tracing contractions  [place] replaced with no improvement  Patient now switched to novi in attempt to better trace contractions  Pit running at 2, and patient feels her contractions are less painful but cannot time them as she is comfortable with her epidural   Nursing unable to palpate patient's contractions when the patient thinks they are occurring as belly feels soft at this time  Will attempt to titrate pitocin and likely AROM at next check  Will defer SVE now as patient is comfortable, and will defer AROM until patient is anabella more regularly as determined by patient's comfort, nursing palpation of patient's fundus, and novi tracked contractions          Grace Polk 10/14/2022 4:20 AM

## 2022-10-14 NOTE — L&D DELIVERY NOTE
DELIVERY NOTE  Abdoul Snell 27 y o  female MRN: [id number]  Unit/Bed#: L&D 322-01 Encounter: [id number]    Obstetrician:    Dr Ray Martinez MD    Assistant:   Dr Nataly Mabry    Pre-Delivery Diagnosis:   Patient Active Problem List   Diagnosis   • Functional diarrhea   • Generalized abdominal pain   • Tobacco user   • H/O major depression   • Left ovarian cyst   • Pilonidal cyst   • BMI 45 0-49 9, adult ([facility] [address] )   • Hyperglycemia in pregnancy   • Postpartum depression   • Hidradenitis   • Moderate episode of recurrent major depressive disorder (HCC)   • Generalized anxiety disorder   • Insulin controlled gestational diabetes mellitus (GDM) in third trimester   • Maternal morbid obesity, antepartum ([address] )   •  (spontaneous vaginal delivery)   • Gastroesophageal reflux in pregnancy in third trimester   • Macrosomia affecting management of mother in third trimester   • Morbid (severe) obesity due to excess calories ([facility] [address] )   • Pre-eclampsia in third trimester       Post-Delivery Diagnosis:   Same as above - Delivered  1st degree laceration    Procedure:  Spontaneous vaginal delivery  Repair 1st degree spontaneous laceration      Anesthesia:  epidural    Specimens:   Cord blood obtained   Placenta; normal appearing, eccentric insertion, intact   Arterial and venous blood gases (below)     Gases:  Umbilical Cord Venous Blood Gas:  Results from last 7 days   Lab Units 10/14/22  1455   PH COV  7 374   PCO2 COV mm HG 35 3   HCO3 COV mmol/L 20 1   BASE EXC COV mmol/L -4 3*   O2 CT CD VB mL/dL 15 8   O2 HGB, VENOUS CORD % 21 4     Umbilical Cord Arterial Blood Gas:  Results from last 7 days   Lab Units 10/14/22  1455   PH COA  7 249   PCO2 COA  52 1   PO2 COA mm HG 21 1   HCO3 COA mmol/L 22 3   BASE EXC COA mmol/L -5 4*   O2 CONTENT CORD ART ml/dl 7 6   O2 HGB, ARTERIAL CORD % 38 4       Quantitative Blood Loss:   268 mL           Complications:    None    Brief Description of Labor Course:  Abdoul Snell is a 27 y o  [id number] female at 38w5d who was admitted to L&D for labor  Her labor course was notable for initial check of 50/-3  She progressed to /-2, then was unchanged at next check and started on pitocin  She had AROM for meconium fluid, and was internalized with FSE and IUPC due to difficulty with tracing  The patient was receiving inadequate anesthesia through the epidural, so pitocin was temporarily turned off and epidural was replaced  When the patient was comfortable again, pitocin was restarted  She progressed to complete at 1445, pushed for 3 min, and delivered a healthy  at [phone]  Description of Delivery:   With  the assistance of maternal expulsive forces, the fetal vertex delivered spontaneously  There was no nuchal cord noted  The anterior right shoulder was delivered atraumatically with gentle downward traction  The contralateral arm was delivered with gentle upward traction and maternal expulsive forces  The remainder of the fetus delivered spontaneously at [phone], resulting in a viable male   Upon delivery, the infant was placed on the mothers abdomen and the cord was doubly clamped and cut after 30 seconds  The  was noted to have good tone and cry spontaneously  There was no evidence of injury  The  was passed off to  staff for evaluation  Umbilical cord blood and umbilical artery and venous gases were collected and sent to the lab  An intact placenta was delivered spontaneously at 1507 using fundal massage and gentle cord traction and was noted to have a eccentrically-inserted 3-vessel cord  Active management of the third stage of labor was undertaken with IV pitocin at 250milliunits/min  Inspection of the perineum, vagina, labia, cervix, and urethra revealed a 1st degree laceration  Bleeding was noted to be under control  A bimanual exam was performed and the fundus was noted to be firm         Outcome:  Living  with APGARS 9 (1 min) and 9 (5 min)     weight: pending    Perineal Inspection/Laceration Repair  Inspection of the perineum, vagina, labia, cervix, and urethra revealed a 1st degree laceration, which was repaired in standard fashion with 4-0 Vicryl rapide  Patient was comfortable with epidural at that time  The laceration showed good tissue reapproximation and hemostasis  At the conclusion of the delivery, all needle, sponge, and instrument counts were noted to be correct  Patient tolerated the procedure well and was allowed to recover in labor and delivery room with family and  before being transferred to the post-partum floor  Conclusion:  Mother and baby are currently recovering nicely in stable condition  Attending Supervision:   Dr Donal Mancini MD was present for the entire procedure      Benitez Salinas   OB/GYN PGY-1  10/14/2022 3:59 PM

## 2022-10-14 NOTE — DISCHARGE SUMMARY
Discharge Summary - OB/GYN   Agusto Rahman 27 y o  female MRN: [id number]  Unit/Bed#: L&D 322-01 Encounter: [id number]      Admission Date: 10/13/2022     Discharge Date: 10/15/22    Admitting Diagnosis:   1  Pregnancy at 38w5d  2  Pre-E w/o SF  3  A2GDM    Discharge Diagnosis:   Same, delivered      Procedures: spontaneous vaginal delivery    Delivering Attending: Shahrzad Powers MD  Discharge Attending: Dr Talisha Lovelace Course:     Agusto Rahman is a 27 y o   female at 38w5d who was admitted to L&D for labor  Her labor course was notable for initial check of /-3  She progressed to /-2, then was unchanged at next check and started on pitocin  She had AROM for meconium fluid, and was internalized with FSE and IUPC due to difficulty with tracing  The patient was receiving inadequate anesthesia through the epidural, so pitocin was temporarily turned off and epidural was replaced  When the patient was comfortable again, pitocin was restarted  She progressed to complete at 1445, pushed for 3 min, and delivered a healthy  at [phone]  She delivered a viable male  on 10/14/22  Weight 7lbs 7 1oz via spontaneous vaginal delivery  Apgars were 9 (1 min) and 9 (5 min)   was transferred to  nursery  Patient tolerated the procedure well and was transferred to recovery in stable condition  Her post-partum course was uncomplicated  Her post-partum pain was well controlled with oral analgesics  On day of discharge, she was ambulating and able to reasonably perform all ADLs  She was voiding and had appropriate bowel function  Pain was well controlled  She was discharged home on post-partum day #1 without complications  Patient was instructed to follow up with her OB as an outpatient and was given appropriate warnings to call provider if she develops signs of infection or uncontrolled pain      Complications: none apparent    Condition at discharge: good     Discharge instructions/Information to patient and family:   See after visit summary for information provided to patient and family  Provisions for Follow-Up Care:  See after visit summary for information related to follow-up care and any pertinent home health orders  Disposition: Home    Planned Readmission: No    Discharge Medications: For a complete list of the patient's medications, please refer to her med rec

## 2022-10-15 VITALS
BODY MASS INDEX: 39.68 KG/M2 | HEIGHT: 72 IN | WEIGHT: 293 LBS | OXYGEN SATURATION: 98 % | SYSTOLIC BLOOD PRESSURE: 133 MMHG | TEMPERATURE: 98.3 F | RESPIRATION RATE: 16 BRPM | DIASTOLIC BLOOD PRESSURE: 87 MMHG | HEART RATE: 88 BPM

## 2022-10-15 LAB
AMPHETAMINES SERPL QL SCN: NEGATIVE
BARBITURATES UR QL: NEGATIVE
BENZODIAZ UR QL: NEGATIVE
COCAINE UR QL: NEGATIVE
METHADONE UR QL: NEGATIVE
OPIATES UR QL SCN: NEGATIVE
OXYCODONE+OXYMORPHONE UR QL SCN: NEGATIVE
PCP UR QL: NEGATIVE
THC UR QL: NEGATIVE

## 2022-10-15 PROCEDURE — 99024 POSTOP FOLLOW-UP VISIT: CPT | Performed by: OBSTETRICS & GYNECOLOGY

## 2022-10-15 PROCEDURE — 80307 DRUG TEST PRSMV CHEM ANLYZR: CPT | Performed by: OBSTETRICS & GYNECOLOGY

## 2022-10-15 RX ORDER — IBUPROFEN 600 MG/1
600 TABLET ORAL EVERY 6 HOURS SCHEDULED
Qty: 30 TABLET | Refills: 0 | Status: SHIPPED | OUTPATIENT
Start: 2022-10-15 | End: 2022-10-21 | Stop reason: ALTCHOICE

## 2022-10-15 RX ORDER — DOCUSATE SODIUM 100 MG/1
100 CAPSULE, LIQUID FILLED ORAL 2 TIMES DAILY
Qty: 20 CAPSULE | Refills: 0 | Status: SHIPPED | OUTPATIENT
Start: 2022-10-15 | End: 2022-10-25

## 2022-10-15 RX ORDER — ACETAMINOPHEN 325 MG/1
650 TABLET ORAL EVERY 4 HOURS PRN
Qty: 60 TABLET | Refills: 0 | Status: SHIPPED | OUTPATIENT
Start: 2022-10-15 | End: 2022-10-25

## 2022-10-15 RX ADMIN — ACETAMINOPHEN 650 MG: 325 TABLET, FILM COATED ORAL at 12:50

## 2022-10-15 RX ADMIN — IBUPROFEN 600 MG: 600 TABLET ORAL at 09:05

## 2022-10-15 RX ADMIN — DOCUSATE SODIUM 100 MG: 100 CAPSULE, LIQUID FILLED ORAL at 08:53

## 2022-10-15 NOTE — NURSING NOTE
Discharge teaching reviewed with patient  "Save Your Life" magnet given and reviewed  Questions encouraged and all questions answered to her satisfaction

## 2022-10-15 NOTE — PROGRESS NOTES
Progress Note - OB/GYN   Casimiro Brownms 27 y o  female MRN: [id number]  Unit/Bed#: L&D 308-01 Encounter: [id number]    Assessment:  Post partum Day #1 s/p , stable, baby in room    Plan:  1  Pre-eclampsia without severe features   - BP: 120-148/56-92   - Denies any symptoms   - Labs normal   2  A2gDM  3  BMI: 47     Subjective: Post delivery  Patient is doing well  Lochia WNL  Pain well controlled  Pain: yes, cramping, improved with meds  Tolerating PO: yes  Voiding: yes  Flatus: yes  BM: no, declined laxatives   Ambulating: yes  Breastfeeding:  no  Chest pain: no  Shortness of breath: no  Leg pain: no  Lochia: minimal    Objective:     Vitals: /83 (BP Location: Right arm)   Pulse 95   Temp 98 2 °F (36 8 °C) (Oral)   Resp 16   Ht 6' (1 829 m)   Wt (!) 157 kg (346 lb)   LMP 2022   SpO2 99%   Breastfeeding Yes   BMI 46 93 kg/m²       Lab Results   Component Value Date    WBC 16 08 (H) 10/13/2022    HGB 10 4 (L) 10/13/2022    HCT 32 0 (L) 10/13/2022    MCV 88 10/13/2022     10/13/2022       Physical Exam:     Gen: AAOx3, NAD  CV: RRR  Lungs: CTA b/l  Abd: Soft, non-tender, non-distended, no rebound or guarding  Uterine fundus firm and non-tender, Uterine position difficult to assess due to patient habitus     Ext: Non tender    Nayeli Jimenez  10/15/2022  8:00 AM

## 2022-10-15 NOTE — PLAN OF CARE
Problem: PAIN - ADULT  Goal: Verbalizes/displays adequate comfort level or baseline comfort level  Description: Interventions:  - Encourage patient to monitor pain and request assistance  - Assess pain using appropriate pain scale  - Administer analgesics based on type and severity of pain and evaluate response  - Implement non-pharmacological measures as appropriate and evaluate response  - Consider cultural and social influences on pain and pain management  - Notify physician/advanced practitioner if interventions unsuccessful or patient reports new pain  Outcome: Progressing     Problem: INFECTION - ADULT  Goal: Absence or prevention of progression during hospitalization  Description: INTERVENTIONS:  - Assess and monitor for signs and symptoms of infection  - Monitor lab/diagnostic results  - Monitor all insertion sites, i e  indwelling lines, tubes, and drains  - Monitor endotracheal if appropriate and nasal secretions for changes in amount and color  - [place] appropriate cooling/warming therapies per order  - Administer medications as ordered  - Instruct and encourage patient and family to use good hand hygiene technique  - Identify and instruct in appropriate isolation precautions for identified infection/condition  Outcome: Progressing  Goal: Absence of fever/infection during neutropenic period  Description: INTERVENTIONS:  - Monitor WBC    Outcome: Progressing     Problem: SAFETY ADULT  Goal: Patient will remain free of falls  Description: INTERVENTIONS:  - Educate patient/family on patient safety including physical limitations  - Instruct patient to call for assistance with activity   - Consult OT/PT to assist with strengthening/mobility   - Keep Call bell within reach  - Keep bed low and locked with side rails adjusted as appropriate  - Keep care items and personal belongings within reach  - Initiate and maintain comfort rounds  - Make Fall Risk Sign visible to staff  - Apply yellow socks and bracelet for high fall risk patients  - Consider moving patient to room near nurses station  Outcome: Progressing  Goal: Maintain or return to baseline ADL function  Description: INTERVENTIONS:  -  Assess patient's ability to carry out ADLs; assess patient's baseline for ADL function and identify physical deficits which impact ability to perform ADLs (bathing, care of mouth/teeth, toileting, grooming, dressing, etc )  - Assess/evaluate cause of self-care deficits   - Assess range of motion  - Assess patient's mobility; develop plan if impaired  - Assess patient's need for assistive devices and provide as appropriate  - Encourage maximum independence but intervene and supervise when necessary  - Involve family in performance of ADLs  - Assess for home care needs following discharge   - Consider OT consult to assist with ADL evaluation and planning for discharge  - Provide patient education as appropriate  Outcome: Progressing  Goal: Maintains/Returns to pre admission functional level  Description: INTERVENTIONS:  - Perform BMAT or MOVE assessment daily    - Set and communicate daily mobility goal to care team and patient/family/caregiver  - Collaborate with rehabilitation services on mobility goals if consulted  - Out of bed for toileting  - Record patient progress and toleration of activity level   Outcome: Progressing     Problem: Knowledge Deficit  Goal: Patient/family/caregiver demonstrates understanding of disease process, treatment plan, medications, and discharge instructions  Description: Complete learning assessment and assess knowledge base    Interventions:  - Provide teaching at level of understanding  - Provide teaching via preferred learning methods  Outcome: Progressing     Problem: DISCHARGE PLANNING  Goal: Discharge to home or other facility with appropriate resources  Description: INTERVENTIONS:  - Identify barriers to discharge w/patient and caregiver  - Arrange for needed discharge resources and transportation as appropriate  - Identify discharge learning needs (meds, wound care, etc )  - Arrange for interpretive services to assist at discharge as needed  - Refer to Case Management Department for coordinating discharge planning if the patient needs post-hospital services based on physician/advanced practitioner order or complex needs related to functional status, cognitive ability, or social support system  Outcome: Progressing

## 2022-10-15 NOTE — CASE MANAGEMENT
Case Management Progress Note    Patient name Barth Blizzard  Location L&D 308/L&D 476-46 MRN [id number]  : 1995 Date 10/15/2022       LOS (days): 2  Geometric Mean LOS (GMLOS) (days):   Days to GMLOS:        OBJECTIVE:        Current admission status: Inpatient  Preferred Pharmacy:   [address] [place], [place] - [address]  [address] [facility] [address]  Phone: [phone] Fax: [phone]    Primary Care Provider: Tessie Alvarenga PA-C    Primary Insurance: [employer]  Secondary Insurance: [place] [employer]    PROGRESS NOTE:  Consult(s):   consulted for Drugs       CM met w/MOB who provided the following information:      · Baby's name/gender: Baby Boy Rudy Jay  · Mother of baby: Tessie Connell  · Phone #: [phone]  · Father of baby//SO: Niraj Muniz  · Phone #: [phone]  · Other Legal Guardian(s) for baby: MOB denies  · Alternate emergency contact: MOB denies  · Other children: Nanette Caldwell, male  · Lives with: MOB, FOB, Brother  · Support System: Extended family  · Baby Supplies:  MOB reports having all necessary supplies  · Bottle or Breast Feeding: Both  · Breast Pump if breast feeding: Spectra  · Government Assistance Programs/WIC/EBT/SSI:  Jason Davis  · Work/School:  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; FOB -   · Transportation:  Both parents drive and share 1 vehicle  · [place]  Fl[place] Narvae[place]  · Mental Health Hx or Treatment: MOB Hx major depression  · Substance Abuse: MOB admits to [facility] use, denies having a medical marijuana card  MOB UDS pending   UDS (-)  MOB aware that cord blood will be sent off to be tested for illegal substances  MOB reports using THC to help with sleep  MOB aware that a (+) UDS or (+) cord blood will result in a referral to [facility]  MOB became tearful  CM remained with pt to answer questions and offer support    · Hx Dom Violence/Intimate Part Violence: MOB denies  · Community Referrals/C&Y/NFP:  MOB denies  · Insurance for baby: MOB verbalizes understanding       MOB denies any other CM needs currently  Encouraged family to contact CM as needed  1540 - MOB UDS NEGATIVE at this admission  MOB has been previously been made aware that cord blood will be sent and referrals made as necessary

## 2022-10-15 NOTE — PROGRESS NOTES
Discuss CM consult from NICU with Humaira Evans  Patient states she used THC during the pregnancy, but cannot remember the last time she used  She used it to help sleep  She was upset regarding the consult

## 2022-10-15 NOTE — LACTATION NOTE
This note was copied from a baby's chart  CONSULT - LACTATION  Baby Boy Cailin Iraheta) Maral Sánchez 1 days male MRN: [id number]    [address] [facility] NURSERY Room / Bed: L&D 308(N)/L&D 308(N) Encounter: [id number]    Maternal Information     MOTHER:  Amina So  Maternal Age: 27 y o    OB History: # 1 - Date: 2018, Sex: None, Weight: None, GA: 6w0d, Delivery: Spontaneous , Apgar1: None, Apgar5: None, Living: None, Birth Comments: None    # 2 - Date: 21, Sex: Male, Weight: 3535 g (7 lb 12 7 oz), GA: 37w6d, Delivery: Vaginal, Spontaneous, Apgar1: 8, Apgar5: 9, Living: Living, Birth Comments: None    # 3 - Date: None, Sex: None, Weight: None, GA: None, Delivery: None, Apgar1: None, Apgar5: None, Living: None, Birth Comments: None   Previouse breast reduction surgery? No    Lactation history:   Has patient previously breast fed: Yes   How long had patient previously breast fed: 1 week   Previous breast feeding complications:       Past Surgical History:   Procedure Laterality Date   • CHOLECYSTECTOMY     • HI REMV PILONIDAL LESION EXTENS N/A 8/3/2021    Procedure: EXCISION PILONIDAL CYST;  Surgeon: Kiara Kc DO;  Location: [facility] MA[facility];  Service: General   • WISDOM TOOTH EXTRACTION     • WISDOM TOOTH EXTRACTION Bilateral         Birth information:  YOB: 2024   Time of birth: 2:53 PM   Sex: male   Delivery type: Vaginal, Spontaneous   Birth Weight: 3375 g (7 lb 7 1 oz)   Percent of Weight Change: -1%     Gestational Age: 44w7d   [unfilled]    Assessment     Breast and nipple assessment: wants to pump exclusively    [place] Assessment: sleepy    Feeding assessment: feeding well    Feeding recommendations:  pump every 2-3 hours     Reviewed D/C booklet   Changed to feeding formula for now  wants to provide formula until milk is in, then pump exclusively    Reviewed breastfeeding and your lifestyle, storage and preparation of breast milk, how to keep you breast pump clean, the employed breastfeeding mother and paced bottle feeding handouts  Booklet included Breastfeeding Resources for after discharge including access to the number for the [address]  Discussed s/s engorgement, blocked milk ducts, and mastitis  Discussed how to remedy at home and when to contact physician  Encouraged parents to call for assistance, questions, and concerns about breastfeeding  Extension provided        Marielena Bronson 10/15/2022 1:50 PM

## 2022-10-15 NOTE — PLAN OF CARE
Problem: PAIN - ADULT  Goal: Verbalizes/displays adequate comfort level or baseline comfort level  Description: Interventions:  - Encourage patient to monitor pain and request assistance  - Assess pain using appropriate pain scale  - Administer analgesics based on type and severity of pain and evaluate response  - Implement non-pharmacological measures as appropriate and evaluate response  - Consider cultural and social influences on pain and pain management  - Notify physician/advanced practitioner if interventions unsuccessful or patient reports new pain  10/15/2022 0911 by Tong Fong RN  Outcome: Progressing  10/15/2022 0911 by Tong Fong RN  Outcome: Progressing     Problem: INFECTION - ADULT  Goal: Absence or prevention of progression during hospitalization  Description: INTERVENTIONS:  - Assess and monitor for signs and symptoms of infection  - Monitor lab/diagnostic results  - Monitor all insertion sites, i e  indwelling lines, tubes, and drains  - Monitor endotracheal if appropriate and nasal secretions for changes in amount and color  - [place] appropriate cooling/warming therapies per order  - Administer medications as ordered  - Instruct and encourage patient and family to use good hand hygiene technique  - Identify and instruct in appropriate isolation precautions for identified infection/condition  Outcome: Progressing  Goal: Absence of fever/infection during neutropenic period  Description: INTERVENTIONS:  - Monitor WBC    Outcome: Progressing     Problem: SAFETY ADULT  Goal: Patient will remain free of falls  Description: INTERVENTIONS:  - Educate patient/family on patient safety including physical limitations  - Instruct patient to call for assistance with activity   - Consult OT/PT to assist with strengthening/mobility   - Keep Call bell within reach  - Keep bed low and locked with side rails adjusted as appropriate  - Keep care items and personal belongings within reach  - Initiate and maintain comfort rounds  - Make Fall Risk Sign visible to staff  - Apply yellow socks and bracelet for high fall risk patients  - Consider moving patient to room near nurses station  Outcome: Progressing  Goal: Maintain or return to baseline ADL function  Description: INTERVENTIONS:  -  Assess patient's ability to carry out ADLs; assess patient's baseline for ADL function and identify physical deficits which impact ability to perform ADLs (bathing, care of mouth/teeth, toileting, grooming, dressing, etc )  - Assess/evaluate cause of self-care deficits   - Assess range of motion  - Assess patient's mobility; develop plan if impaired  - Assess patient's need for assistive devices and provide as appropriate  - Encourage maximum independence but intervene and supervise when necessary  - Involve family in performance of ADLs  - Assess for home care needs following discharge   - Consider OT consult to assist with ADL evaluation and planning for discharge  - Provide patient education as appropriate  Outcome: Progressing  Goal: Maintains/Returns to pre admission functional level  Description: INTERVENTIONS:  - Perform BMAT or MOVE assessment daily    - Set and communicate daily mobility goal to care team and patient/family/caregiver  - Collaborate with rehabilitation services on mobility goals if consulted  - Out of bed for toileting  - Record patient progress and toleration of activity level   Outcome: Progressing     Problem: Knowledge Deficit  Goal: Patient/family/caregiver demonstrates understanding of disease process, treatment plan, medications, and discharge instructions  Description: Complete learning assessment and assess knowledge base    Interventions:  - Provide teaching at level of understanding  - Provide teaching via preferred learning methods  Outcome: Progressing     Problem: DISCHARGE PLANNING  Goal: Discharge to home or other facility with appropriate resources  Description: INTERVENTIONS:  - Identify barriers to discharge w/patient and caregiver  - Arrange for needed discharge resources and transportation as appropriate  - Identify discharge learning needs (meds, wound care, etc )  - Arrange for interpretive services to assist at discharge as needed  - Refer to Case Management Department for coordinating discharge planning if the patient needs post-hospital services based on physician/advanced practitioner order or complex needs related to functional status, cognitive ability, or social support system  Outcome: Progressing

## 2022-10-15 NOTE — PLAN OF CARE
Problem: Knowledge Deficit  Goal: Verbalizes understanding of labor plan  Description: Assess patient/family/caregiver's baseline knowledge level and ability to understand information  Provide education via patient/family/caregiver's preferred learning method at appropriate level of understanding  1  Provide teaching at level of understanding  2  Provide teaching via preferred learning method(s)  10/14/2022 2052 by Melva Covarrubias RN  Outcome: Completed  10/14/2022 2052 by Melva Covarrubias RN  Outcome: Progressing     Problem: Labor & Delivery  Goal: Manages discomfort  Description: Assess and monitor for signs and symptoms of discomfort  Assess patient's pain level regularly and per hospital policy  Administer medications as ordered  Support use of nonpharmacological methods to help control pain such as distraction, imagery, relaxation, and application of heat and cold  Collaborate with interdisciplinary team and patient to determine appropriate pain management plan  1  Include patient in decisions related to comfort  2  Offer non-pharmacological pain management interventions  3  Report ineffective pain management to physician  10/14/2022 2052 by Melva Covarrubias RN  Outcome: Completed  10/14/2022 2052 by Melva Covarrubias RN  Outcome: Progressing  Goal: Patient vital signs are stable  Description: 1  Assess vital signs - vaginal delivery    10/14/2022 2052 by Melva Covarrubias RN  Outcome: Completed  10/14/2022 2052 by Melva Covarrubias RN  Outcome: Progressing

## 2022-10-17 DIAGNOSIS — Z86.32 HISTORY OF GESTATIONAL DIABETES: ICD-10-CM

## 2022-10-17 DIAGNOSIS — Z13.1 DIABETES MELLITUS SCREENING: Primary | ICD-10-CM

## 2022-10-17 NOTE — UTILIZATION REVIEW
NOTIFICATION OF INPATIENT ADMISSION   MATERNITY/DELIVERY AUTHORIZATION REQUEST   SERVICING FACILITY:   [address] [place] [address] - L&D, , NICU  [address] [place] [address], [facility], [facility]  Tax ID: [id number]  NPI: [id number] ATTENDING PROVIDER:  Attending Name and NPI#: Triny Diaz Dad [[id number]]  Address: [address] [place] [address], [place] [address] [address] [facility]  Phone: [phone]     ADMISSION INFORMATION:  Place of Service: Inpatient [address] [facility]  [address]  Place of Service Code: 21  Inpatient Admission Date/Time: 10/13/22  1:34 PM  Discharge Date/Time: 10/15/2022  4:58 PM  Admitting Diagnosis Code/Description:  Abdominal pain during pregnancy in third trimester [O26 893, R10 9]     Mother: Romayne Irwin 1995 Estimated Date of Delivery: 10/23/22  Delivering clinician:     OB History        3    Para   1    Term   1            AB   1    Living   1       SAB   1    IAB        Ectopic        Multiple        Live Births   1                Name & MRN:   Information for the patient's :  Praveena IshaanPam [[id number]]      Delivery Information:  Sex: male  Delivered 10/14/2022 2:53 PM by Vaginal, Spontaneous; Gestational Age: 44w7d     Measurements:  Weight: 7 lb 7 1 oz (3375 g); Height: 20"    APGAR 1 minute 5 minutes 10 minutes   Totals: 9 9       UTILIZATION REVIEW CONTACT:  Mary Ann Meza Utilization   Network Utilization Review Department  Phone: [phone]  Fax [phone]  Email: [email or web address]  Contact for approvals/pending authorizations, clinical reviews, and discharge  PHYSICIAN ADVISORY SERVICES:  Medical Necessity Denial & Exld-nm-Uiav Review  Phone: [phone]  Fax: [phone]  Email: [email or web address]  Susanne Faustin MD   Resident   OB/GYN   Discharge Summary      Attested   Date of Service:  10/14/2022  3:16 PM                 Attested            Attestation signed by Wellington Stafford MD at 10/16/2022 10:05 AM     Attending/Teaching Physician Statement  I have participated in the care of this patient during this hospitalization and agree with the discharge summary from 10/15/22      Jana Ibarra  10/16/22  10:05 AM                           Show:Clear all  [x]Manual[x]Template[x]Copied    Added by:  Edel Richardson MD      []Hover for details    Discharge Summary - OB/GYN   Jorge Lara 27 y o  female MRN: [id number]  Unit/Bed#: L&D 322-01 Encounter: [id number]        Admission Date: 10/13/2022      Discharge Date: 10/15/22     Admitting Diagnosis:   1  Pregnancy at 38w5d  2  Pre-E w/o SF  3  A2GDM     Discharge Diagnosis:   Same, delivered        Procedures: spontaneous vaginal delivery     Delivering Attending: Dorota Colon MD  Discharge Attending: Dr Jose Eduardo Kimble Course:   Latesha Swanson is a 27 y o   female at 38w5d who was admitted to L&D for labor  Her labor course was notable for initial check of 1/50/-3  She progressed to 5/70/-2, then was unchanged at next check and started on pitocin  She had AROM for meconium fluid, and was internalized with FSE and IUPC due to difficulty with tracing  The patient was receiving inadequate anesthesia through the epidural, so pitocin was temporarily turned off and epidural was replaced  When the patient was comfortable again, pitocin was restarted  She progressed to complete at 026 848 14 90, pushed for 3 min, and delivered a healthy  at 65        She delivered a viable male  on 10/14/22  Weight 7lbs 7 1oz via spontaneous vaginal delivery  Apgars were 9 (1 min) and 9 (5 min)   was transferred to  nursery  Patient tolerated the procedure well and was transferred to recovery in stable condition       Her post-partum course was uncomplicated    Her post-partum pain was well controlled with oral analgesics      On day of discharge, she was ambulating and able to reasonably perform all ADLs  She was voiding and had appropriate bowel function  Pain was well controlled  She was discharged home on post-partum day #1 without complications  Patient was instructed to follow up with her OB as an outpatient and was given appropriate warnings to call provider if she develops signs of infection or uncontrolled pain      Complications: none apparent     Condition at discharge: good      Discharge instructions/Information to patient and family:   See after visit summary for information provided to patient and family        Provisions for Follow-Up Care:  See after visit summary for information related to follow-up care and any pertinent home health orders        Disposition: Home     Planned Readmission: No     Discharge Medications: For a complete list of the patient's medications, please refer to her med rec                            Cosigned by: Rin Johnston MD at 10/16/2022 10:05 AM     NOTIFICATION OF ADMISSION DISCHARGE   This is a Notification of Discharge from [address] [place] [address]  Please be advised that this patient has been discharge from our facility  Below you will find the admission and discharge date and time including the patient’s disposition  UTILIZATION REVIEW CONTACT:  Gwenette Ormond  Utilization   Network Utilization Review Department  Phone: [phone] x carefully listen to the prompts  All voicemails are confidential   Email: [email or web address]     ADMISSION INFORMATION  PRESENTATION DATE: 10/13/2022 12:47 PM    INPATIENT ADMISSION DATE: 10/13/22  1:34 PM   DISCHARGE DATE: 10/15/2022  4:58 PM  DISPOSITION: Home/Self Care Home/Self Care      IMPORTANT INFORMATION:  Send all requests for admission clinical reviews, approved or denied determinations and any other requests to dedicated fax number below belonging to the campus where the patient is receiving treatment   List of dedicated fax numbers:  Martin Evans NAME UR FAX NUMBER   ADMISSION DENIALS (Administrative/Medical Necessity) [phone]   PARENT CHILD HEALTH (Maternity/NICU/Pediatrics) [phone]   [facility] [address] [facility] [phone]   Etienne Cummings [address] [phone]   [address] [facility] [address] [place] [address] [phone]   [address] [place] [address] [phone]   [facility]  [phone]   [address] [facility] [phone]   [address] [facility] [address] [facility] [phone]

## 2022-10-21 ENCOUNTER — OFFICE VISIT (OUTPATIENT)
Dept: FAMILY MEDICINE CLINIC | Facility: CLINIC | Age: 27
End: 2022-10-21
Payer: COMMERCIAL

## 2022-10-21 VITALS
HEIGHT: 72 IN | SYSTOLIC BLOOD PRESSURE: 128 MMHG | BODY MASS INDEX: 39.68 KG/M2 | WEIGHT: 293 LBS | OXYGEN SATURATION: 98 % | HEART RATE: 69 BPM | DIASTOLIC BLOOD PRESSURE: 88 MMHG

## 2022-10-21 DIAGNOSIS — H69.81 EUSTACHIAN TUBE DYSFUNCTION, RIGHT: ICD-10-CM

## 2022-10-21 DIAGNOSIS — O24.414 INSULIN CONTROLLED GESTATIONAL DIABETES MELLITUS (GDM) IN THIRD TRIMESTER: ICD-10-CM

## 2022-10-21 DIAGNOSIS — F41.1 GENERALIZED ANXIETY DISORDER: ICD-10-CM

## 2022-10-21 DIAGNOSIS — O14.93 PRE-ECLAMPSIA IN THIRD TRIMESTER: ICD-10-CM

## 2022-10-21 DIAGNOSIS — F33.1 MODERATE EPISODE OF RECURRENT MAJOR DEPRESSIVE DISORDER (HCC): Primary | ICD-10-CM

## 2022-10-21 DIAGNOSIS — K64.4 EXTERNAL HEMORRHOIDS: ICD-10-CM

## 2022-10-21 PROCEDURE — 99214 OFFICE O/P EST MOD 30 MIN: CPT | Performed by: PHYSICIAN ASSISTANT

## 2022-10-21 RX ORDER — BUPROPION HYDROCHLORIDE 100 MG/1
100 TABLET, EXTENDED RELEASE ORAL 2 TIMES DAILY
Qty: 60 TABLET | Refills: 0 | Status: SHIPPED | OUTPATIENT
Start: 2022-10-21

## 2022-10-21 RX ORDER — ESCITALOPRAM OXALATE 10 MG/1
10 TABLET ORAL DAILY
Qty: 90 TABLET | Refills: 1 | Status: SHIPPED | OUTPATIENT
Start: 2022-10-21

## 2022-10-21 RX ORDER — FLUTICASONE PROPIONATE 50 MCG
1 SPRAY, SUSPENSION (ML) NASAL DAILY
Qty: 9.9 ML | Refills: 0 | Status: SHIPPED | OUTPATIENT
Start: 2022-10-21

## 2022-10-21 NOTE — PROGRESS NOTES
Assessment/Plan:    Problem List Items Addressed This Visit        Endocrine    Insulin controlled gestational diabetes mellitus (GDM) in third trimester       Cardiovascular and Mediastinum    Pre-eclampsia in third trimester       Other    Moderate episode of recurrent major depressive disorder (HCC) - Primary    Relevant Medications    escitalopram (Lexapro) 10 mg tablet    buPROPion (Wellbutrin SR) 100 mg 12 hr tablet    Generalized anxiety disorder    Relevant Medications    escitalopram (Lexapro) 10 mg tablet    buPROPion (Wellbutrin SR) 100 mg 12 hr tablet      Other Visit Diagnoses     Eustachian tube dysfunction, right        Relevant Medications    fluticasone (FLONASE) 50 mcg/act nasal spray    External hemorrhoids        Relevant Medications    pramoxine-hydrocortisone cream           Diagnoses and all orders for this visit:    Moderate episode of recurrent major depressive disorder (HCC)  -     escitalopram (Lexapro) 10 mg tablet; Take 1 tablet (10 mg total) by mouth daily  -     buPROPion (Wellbutrin SR) 100 mg 12 hr tablet; Take 1 tablet (100 mg total) by mouth 2 (two) times a day    Generalized anxiety disorder  -     escitalopram (Lexapro) 10 mg tablet; Take 1 tablet (10 mg total) by mouth daily  -     buPROPion (Wellbutrin SR) 100 mg 12 hr tablet; Take 1 tablet (100 mg total) by mouth 2 (two) times a day    Eustachian tube dysfunction, right  -     fluticasone (FLONASE) 50 mcg/act nasal spray; 1 spray into each nostril daily    External hemorrhoids  -     pramoxine-hydrocortisone cream; Apply topically 3 (three) times a day    Pre-eclampsia in third trimester    Insulin controlled gestational diabetes mellitus (GDM) in third trimester      Test was ordered by Maternal Fetal Medicine to follow-up on gestational diabetes  Recommended that she continue to follow a healthy diet  Repeat BP improved to 128/88  Will continue to monitor  Encouraged her to get a cuff to check at home       Will switch her back to her Lexapro and Wellbutrin pre-pregnancy regimen for depression and anxiety  Script for flonase for eustachian tube dysfunction    Script for proctofoam to help with hemorrhoids  Recommended warm sitz bath, stool softeners, and high fiber diet with plenty of water  Subjective:      Patient ID: Echo Camacho is a 27 y o  female  Humaira Evans is a very pleasant 27year old female who is here today for a follow-up  She is one week post-partum  She would like to get back on her pre-pregnancy regimen for her anxiety and depression  She has been on zoloft throughout her pregnancy, but she felt much better prior on her Wellbutrin and Lexapro  She denies any suicdial or homicidal thoughts  She is doing well post-partum  She is suffering from hemorrhoids and asked if there is anything else that she can try  She was diabetic and had hypertension in the 3rd trimester  She denies any headaches, dizziness, or lightheadedness  She has not been checking her sugars since being discharged  She was not diabetic prior to her pregnancy  She also has a blocked right ear  She denies any drainage, fevers, chills, or congestion  The following portions of the patient's history were reviewed and updated as appropriate:   She has a past medical history of Chlamydia, Depression, Gestational hypertension, third trimester (1/4/2021), Hypertension, Insulin controlled gestational diabetes mellitus (GDM) in third trimester (10/29/2020), Urinary tract infection, and Varicella  ,  does not have any pertinent problems on file  ,   has a past surgical history that includes [place] tooth extraction; [place] tooth extraction (Bilateral, 2010); Cholecystectomy; and pr remv pilonidal lesion extens (N/A, 8/3/2021)  ,  family history includes Breast cancer in her maternal grandmother; Clotting disorder in her father and paternal grandfather; Diabetes in her father and paternal grandmother; Kidney disease in her father;  Other in her mother; Thyroid disease in her mother  ,   reports that she has been smoking  She has a 2 50 pack-year smoking history  She has never used smokeless tobacco  She reports previous alcohol use  She reports previous drug use  Drug: Marijuana  ,  has No Known Allergies     Current Outpatient Medications   Medication Sig Dispense Refill   • acetaminophen (TYLENOL) 325 mg tablet Take 2 tablets (650 mg total) by mouth every 4 (four) hours as needed for mild pain for up to 10 days 60 tablet 0   • buPROPion (Wellbutrin SR) 100 mg 12 hr tablet Take 1 tablet (100 mg total) by mouth 2 (two) times a day 60 tablet 0   • docusate sodium (COLACE) 100 mg capsule Take 1 capsule (100 mg total) by mouth 2 (two) times a day for 10 days 20 capsule 0   • escitalopram (Lexapro) 10 mg tablet Take 1 tablet (10 mg total) by mouth daily 90 tablet 1   • fluticasone (FLONASE) 50 mcg/act nasal spray 1 spray into each nostril daily 9 9 mL 0   • pramoxine-hydrocortisone cream Apply topically 3 (three) times a day 57 g 0   • Prenatal MV-Min-Fe Fum-FA-DHA (PRENATAL 1 PO) Take by mouth     • Blood Glucose Monitoring Suppl (OneTouch Verio Flex System) w/Device KIT Dispense 1 kit per insurance formulary  (Patient not taking: Reported on 10/21/2022) 1 kit 0   • Insulin Pen Needle 31G X 5 MM MISC Inject under the skin daily at bedtime Use up to 6 a day or as directed  150 each 1   • OneTouch Delica Lancets 18J MISC Use 4 a day or as directed  (Patient not taking: Reported on 10/21/2022) 100 each 2     No current facility-administered medications for this visit  Review of Systems   Constitutional: Negative for chills, diaphoresis, fatigue and fever  HENT: Negative for congestion, ear pain, postnasal drip, rhinorrhea, sneezing, sore throat and trouble swallowing  +Blocked right ear   Eyes: Negative for pain and visual disturbance  Respiratory: Negative for apnea, cough, shortness of breath and wheezing      Cardiovascular: Negative for chest pain and palpitations  Gastrointestinal: Negative for abdominal pain, constipation, diarrhea, nausea and vomiting         +Hemorrhoids   Genitourinary: Negative for dysuria and hematuria  Musculoskeletal: Negative for arthralgias, gait problem and myalgias  Neurological: Negative for dizziness, syncope, weakness, light-headedness, numbness and headaches  Psychiatric/Behavioral: Negative for dysphoric mood and suicidal ideas  The patient is not nervous/anxious  Objective:  Vitals:    10/21/22 0912 10/21/22 0936   BP: 144/86 128/88   Pulse: 69    SpO2: 98%    Weight: (!) 151 kg (332 lb 9 6 oz)    Height: 6' (1 829 m)      Body mass index is 45 11 kg/m²  Physical Exam  Vitals and nursing note reviewed  Constitutional:       Appearance: She is well-developed  HENT:      Head: Normocephalic and atraumatic  Right Ear: Tympanic membrane, ear canal and external ear normal       Left Ear: Tympanic membrane, ear canal and external ear normal       Nose: Nose normal       Mouth/Throat:      Pharynx: No oropharyngeal exudate or posterior oropharyngeal erythema  Eyes:      Extraocular Movements: Extraocular movements intact  Cardiovascular:      Rate and Rhythm: Normal rate and regular rhythm  Heart sounds: Normal heart sounds  No murmur heard  No friction rub  No gallop  Pulmonary:      Effort: Pulmonary effort is normal  No respiratory distress  Breath sounds: Normal breath sounds  No wheezing or rales  Musculoskeletal:         General: Normal range of motion  Cervical back: Normal range of motion and neck supple  Lymphadenopathy:      Cervical: No cervical adenopathy  Skin:     General: Skin is warm and dry  Neurological:      Mental Status: She is alert and oriented to person, place, and time  Psychiatric:         Mood and Affect: Mood is not anxious or depressed  Behavior: Behavior normal          Thought Content:  Thought content normal  Thought content does not include homicidal or suicidal ideation  Thought content does not include homicidal or suicidal plan           Judgment: Judgment normal

## 2022-10-22 LAB — PLACENTA IN STORAGE: NORMAL

## 2022-10-31 ENCOUNTER — TELEPHONE (OUTPATIENT)
Dept: CASE MANAGEMENT | Facility: HOSPITAL | Age: 27
End: 2022-10-31

## 2022-10-31 NOTE — TELEPHONE ENCOUNTER
Baby's cord blood results came back positive for THC  UDS for mother and baby negative at delivery  CM called baby's mother to notify her of results and need for report to [facility]  CM briefly discussed this process, Mother expressed understanding       Report made online to [employer]; e-Referral ID: [id number]

## 2022-11-03 ENCOUNTER — RA CDI HCC (OUTPATIENT)
Dept: OTHER | Facility: HOSPITAL | Age: 27
End: 2022-11-03

## 2022-12-08 ENCOUNTER — TELEPHONE (OUTPATIENT)
Dept: FAMILY MEDICINE CLINIC | Facility: CLINIC | Age: 27
End: 2022-12-08

## 2022-12-08 DIAGNOSIS — L73.2 HIDRADENITIS SUPPURATIVA: Primary | ICD-10-CM

## 2022-12-08 NOTE — TELEPHONE ENCOUNTER
RSV is a virus, therefore, an antibiotic such as zithromax is not indicated  Treatment is symptomatic  I would recommend tylenol, motrin, plenty of fluids, and Mucinex DM

## 2023-01-03 ENCOUNTER — RA CDI HCC (OUTPATIENT)
Dept: OTHER | Facility: HOSPITAL | Age: 28
End: 2023-01-03

## 2023-01-03 NOTE — PROGRESS NOTES
[facility] 75  coding opportunities          Chart Reviewed number of suggestions sent to Provider: 1   F33 1    Patients Insurance        Commercial Insurance: [employer]

## 2023-01-10 ENCOUNTER — OFFICE VISIT (OUTPATIENT)
Dept: FAMILY MEDICINE CLINIC | Facility: CLINIC | Age: 28
End: 2023-01-10

## 2023-01-10 VITALS
WEIGHT: 293 LBS | DIASTOLIC BLOOD PRESSURE: 84 MMHG | SYSTOLIC BLOOD PRESSURE: 136 MMHG | HEIGHT: 72 IN | HEART RATE: 78 BPM | BODY MASS INDEX: 39.68 KG/M2 | OXYGEN SATURATION: 96 %

## 2023-01-10 DIAGNOSIS — F41.1 GENERALIZED ANXIETY DISORDER: ICD-10-CM

## 2023-01-10 DIAGNOSIS — F33.1 MODERATE EPISODE OF RECURRENT MAJOR DEPRESSIVE DISORDER (HCC): Primary | ICD-10-CM

## 2023-01-10 RX ORDER — BUPROPION HYDROCHLORIDE 200 MG/1
200 TABLET, EXTENDED RELEASE ORAL 2 TIMES DAILY
Qty: 60 TABLET | Refills: 1 | Status: SHIPPED | OUTPATIENT
Start: 2023-01-10

## 2023-01-10 RX ORDER — ESCITALOPRAM OXALATE 10 MG/1
10 TABLET ORAL DAILY
Qty: 90 TABLET | Refills: 1 | Status: SHIPPED | OUTPATIENT
Start: 2023-01-10

## 2023-01-10 NOTE — PROGRESS NOTES
Assessment/Plan:    Problem List Items Addressed This Visit        Other    Moderate episode of recurrent major depressive disorder ([facility] [address] ) - Primary    Relevant Medications    buPROPion (Wellbutrin SR) 200 MG 12 hr tablet    escitalopram (Lexapro) 10 mg tablet    Generalized anxiety disorder    Relevant Medications    buPROPion (Wellbutrin SR) 200 MG 12 hr tablet    escitalopram (Lexapro) 10 mg tablet        Diagnoses and all orders for this visit:    Moderate episode of recurrent major depressive disorder (HCC)  -     buPROPion (Wellbutrin SR) 200 MG 12 hr tablet; Take 1 tablet (200 mg total) by mouth 2 (two) times a day  -     escitalopram (Lexapro) 10 mg tablet; Take 1 tablet (10 mg total) by mouth daily    Generalized anxiety disorder  -     buPROPion (Wellbutrin SR) 200 MG 12 hr tablet; Take 1 tablet (200 mg total) by mouth 2 (two) times a day  -     escitalopram (Lexapro) 10 mg tablet; Take 1 tablet (10 mg total) by mouth daily      Will increase Wellbutrin to 200 mg BID  She did well on this regimen pre-pregnancy  Follow-up in 1 month or sooner if needed    Subjective:      Patient ID: Pita Suárez is a 32 y o  female  Isa Canchola is a very pleasant 32year old female who is here today for a follow-up  Since having her second child, she has not been able to get her moods back to where she was before pregnancy  She did well on Lexapro and Wellbutrin in the past  She did restart these medications, but the Wellbutrin was started at 100 mg BID  She does feel she needs an increase  She finds herself not caring about certain things like showering  She does force herself to do these things, but wants to feel better  She is not having any problems caring for her children         The following portions of the patient's history were reviewed and updated as appropriate:   She has a past medical history of Chlamydia, Depression, Gestational hypertension, third trimester (1/4/2021), Hypertension, Insulin controlled gestational diabetes mellitus (GDM) in third trimester (10/29/2020), Urinary tract infection, and Varicella  ,  does not have any pertinent problems on file  ,   has a past surgical history that includes [place] tooth extraction; [place] tooth extraction (Bilateral, 2010); Cholecystectomy; and pr excision pilonidal cyst/sinus extensive (N/A, 8/3/2021)  ,  family history includes Breast cancer in her maternal grandmother; Clotting disorder in her father and paternal grandfather; Diabetes in her father and paternal grandmother; Kidney disease in her father; Other in her mother; Thyroid disease in her mother  ,   reports that she has been smoking cigarettes  She has a 2 50 pack-year smoking history  She has never used smokeless tobacco  She reports that she does not currently use alcohol  She reports that she does not currently use drugs after having used the following drugs: Marijuana  ,  has No Known Allergies     Current Outpatient Medications   Medication Sig Dispense Refill   • buPROPion (Wellbutrin SR) 200 MG 12 hr tablet Take 1 tablet (200 mg total) by mouth 2 (two) times a day 60 tablet 1   • escitalopram (Lexapro) 10 mg tablet Take 1 tablet (10 mg total) by mouth daily 90 tablet 1   • fluticasone (FLONASE) 50 mcg/act nasal spray 1 spray into each nostril daily 9 9 mL 0   • pramoxine-hydrocortisone cream Apply topically 3 (three) times a day 57 g 0   • Prenatal MV-Min-Fe Fum-FA-DHA (PRENATAL 1 PO) Take by mouth     • Blood Glucose Monitoring Suppl (OneTouch Verio Flex System) w/Device KIT Dispense 1 kit per insurance formulary  (Patient not taking: Reported on 10/21/2022) 1 kit 0   • docusate sodium (COLACE) 100 mg capsule Take 1 capsule (100 mg total) by mouth 2 (two) times a day for 10 days 20 capsule 0   • Insulin Pen Needle 31G X 5 MM MISC Inject under the skin daily at bedtime Use up to 6 a day or as directed  150 each 1   • OneTouch Delica Lancets 97W MISC Use 4 a day or as directed   (Patient not taking: Reported on 10/21/2022) 100 each 2     No current facility-administered medications for this visit  Review of Systems   Constitutional: Negative for chills, diaphoresis, fatigue and fever  HENT: Negative for congestion, ear pain, postnasal drip, rhinorrhea, sneezing, sore throat and trouble swallowing  Eyes: Negative for pain and visual disturbance  Respiratory: Negative for apnea, cough, shortness of breath and wheezing  Cardiovascular: Negative for chest pain and palpitations  Gastrointestinal: Negative for abdominal pain, constipation, diarrhea, nausea and vomiting  Genitourinary: Negative for dysuria  Musculoskeletal: Negative for arthralgias, gait problem and myalgias  Neurological: Negative for dizziness, syncope, weakness, light-headedness, numbness and headaches  Psychiatric/Behavioral: Positive for dysphoric mood and sleep disturbance  Negative for suicidal ideas  The patient is nervous/anxious  Objective:  Vitals:    01/10/23 1123   BP: 136/84   Pulse: 78   SpO2: 96%   Weight: (!) 149 kg (327 lb 6 4 oz)   Height: 6' (1 829 m)     Body mass index is 44 4 kg/m²  Physical Exam  Vitals and nursing note reviewed  Constitutional:       Appearance: She is well-developed  HENT:      Head: Normocephalic and atraumatic  Right Ear: External ear normal       Left Ear: External ear normal       Nose: Nose normal    Eyes:      Extraocular Movements: Extraocular movements intact  Cardiovascular:      Rate and Rhythm: Normal rate and regular rhythm  Heart sounds: Normal heart sounds  No murmur heard  No friction rub  No gallop  Pulmonary:      Effort: Pulmonary effort is normal  No respiratory distress  Breath sounds: Normal breath sounds  No wheezing or rales  Abdominal:      Palpations: Abdomen is soft  Musculoskeletal:         General: Normal range of motion  Cervical back: Normal range of motion and neck supple  Skin:     General: Skin is warm and dry  Neurological:      Mental Status: She is alert and oriented to person, place, and time  Psychiatric:         Mood and Affect: Mood is anxious and depressed  Behavior: Behavior normal          Thought Content: Thought content normal  Thought content does not include homicidal or suicidal ideation  Thought content does not include homicidal or suicidal plan           Judgment: Judgment normal

## 2023-02-24 ENCOUNTER — OFFICE VISIT (OUTPATIENT)
Dept: FAMILY MEDICINE CLINIC | Facility: CLINIC | Age: 28
End: 2023-02-24

## 2023-02-24 VITALS
BODY MASS INDEX: 39.68 KG/M2 | SYSTOLIC BLOOD PRESSURE: 138 MMHG | DIASTOLIC BLOOD PRESSURE: 84 MMHG | OXYGEN SATURATION: 98 % | HEIGHT: 72 IN | WEIGHT: 293 LBS | HEART RATE: 80 BPM

## 2023-02-24 DIAGNOSIS — F41.1 GENERALIZED ANXIETY DISORDER: ICD-10-CM

## 2023-02-24 DIAGNOSIS — F33.1 MODERATE EPISODE OF RECURRENT MAJOR DEPRESSIVE DISORDER (HCC): Primary | ICD-10-CM

## 2023-02-24 RX ORDER — ESCITALOPRAM OXALATE 5 MG/1
5 TABLET ORAL DAILY
Qty: 30 TABLET | Refills: 1 | Status: SHIPPED | OUTPATIENT
Start: 2023-02-24

## 2023-02-24 NOTE — PROGRESS NOTES
Assessment/Plan:    Problem List Items Addressed This Visit        Other    Moderate episode of recurrent major depressive disorder ([facility] [address] ) - Primary    Relevant Medications    escitalopram (LEXAPRO) 5 mg tablet    Generalized anxiety disorder    Relevant Medications    escitalopram (LEXAPRO) 5 mg tablet        Diagnoses and all orders for this visit:    Moderate episode of recurrent major depressive disorder (HCC)  -     escitalopram (LEXAPRO) 5 mg tablet; Take 1 tablet (5 mg total) by mouth daily Take in addition to 10 mg tablet to total 15 mg daily    Generalized anxiety disorder  -     escitalopram (LEXAPRO) 5 mg tablet; Take 1 tablet (5 mg total) by mouth daily Take in addition to 10 mg tablet to total 15 mg daily      Will increase Lexapro to 15 mg daily  Continue Wellbutrin  Follow-up in 1 month or sooner if needed    Subjective:      Patient ID: Joellen Severs is a 32 y o  female  Marbella is a pleasant 32year old female who is here today for a follow-up for anxiety and depression  She is doing very well  She feels like her depression could be better, but she is moving in the right direction  She denies any side effects from the medication  She would like to try increasing her Lexapro  Her sons are 5 months and 3years old, so they are keeping her very busy  She denies any other concerns at this time  The following portions of the patient's history were reviewed and updated as appropriate:   She has a past medical history of Chlamydia, Depression, Gestational hypertension, third trimester (1/4/2021), Hypertension, Insulin controlled gestational diabetes mellitus (GDM) in third trimester (10/29/2020), Urinary tract infection, and Varicella  ,  does not have any pertinent problems on file  ,   has a past surgical history that includes [place] tooth extraction; [place] tooth extraction (Bilateral, 2010); Cholecystectomy; and pr excision pilonidal cyst/sinus extensive (N/A, 8/3/2021)  ,  family history includes Breast cancer in her maternal grandmother; Clotting disorder in her father and paternal grandfather; Diabetes in her father and paternal grandmother; Kidney disease in her father; Other in her mother; Thyroid disease in her mother  ,   reports that she has been smoking cigarettes  She has a 2 50 pack-year smoking history  She has never used smokeless tobacco  She reports that she does not currently use alcohol  She reports that she does not currently use drugs after having used the following drugs: Marijuana  ,  has No Known Allergies     Current Outpatient Medications   Medication Sig Dispense Refill   • buPROPion (Wellbutrin SR) 200 MG 12 hr tablet Take 1 tablet (200 mg total) by mouth 2 (two) times a day 60 tablet 1   • escitalopram (Lexapro) 10 mg tablet Take 1 tablet (10 mg total) by mouth daily 90 tablet 1   • escitalopram (LEXAPRO) 5 mg tablet Take 1 tablet (5 mg total) by mouth daily Take in addition to 10 mg tablet to total 15 mg daily 30 tablet 1     No current facility-administered medications for this visit  Review of Systems   Constitutional: Negative for chills, diaphoresis, fatigue and fever  HENT: Negative for congestion, ear pain, postnasal drip, rhinorrhea, sneezing, sore throat and trouble swallowing  Eyes: Negative for pain and visual disturbance  Respiratory: Negative for apnea, cough, shortness of breath and wheezing  Cardiovascular: Negative for chest pain and palpitations  Gastrointestinal: Negative for abdominal pain, constipation, diarrhea, nausea and vomiting  Genitourinary: Negative for dysuria  Musculoskeletal: Negative for arthralgias, gait problem and myalgias  Neurological: Negative for dizziness, syncope, weakness, light-headedness, numbness and headaches  Psychiatric/Behavioral: Positive for dysphoric mood (improved) and sleep disturbance  Negative for suicidal ideas  The patient is nervous/anxious            Objective:  Vitals:    02/24/23 0957   BP: 138/84   Pulse: 80   SpO2: 98%   Weight: (!) 150 kg (331 lb 3 2 oz)   Height: 6' (1 829 m)     Body mass index is 44 92 kg/m²  Physical Exam  Vitals and nursing note reviewed  Constitutional:       Appearance: She is well-developed  HENT:      Head: Normocephalic and atraumatic  Right Ear: External ear normal       Left Ear: External ear normal       Nose: Nose normal       Mouth/Throat:      Pharynx: No oropharyngeal exudate or posterior oropharyngeal erythema  Eyes:      Extraocular Movements: Extraocular movements intact  Cardiovascular:      Rate and Rhythm: Normal rate and regular rhythm  Heart sounds: Normal heart sounds  No murmur heard  No friction rub  No gallop  Pulmonary:      Effort: Pulmonary effort is normal  No respiratory distress  Breath sounds: Normal breath sounds  No wheezing or rales  Musculoskeletal:         General: Normal range of motion  Cervical back: Normal range of motion and neck supple  Skin:     General: Skin is warm and dry  Neurological:      Mental Status: She is alert and oriented to person, place, and time  Psychiatric:         Mood and Affect: Mood is depressed (improved)  Mood is not anxious  Behavior: Behavior normal          Thought Content: Thought content normal  Thought content does not include homicidal or suicidal ideation  Thought content does not include homicidal or suicidal plan           Judgment: Judgment normal

## 2023-04-07 ENCOUNTER — OFFICE VISIT (OUTPATIENT)
Dept: FAMILY MEDICINE CLINIC | Facility: CLINIC | Age: 28
End: 2023-04-07

## 2023-04-07 VITALS
HEART RATE: 92 BPM | BODY MASS INDEX: 39.68 KG/M2 | SYSTOLIC BLOOD PRESSURE: 134 MMHG | DIASTOLIC BLOOD PRESSURE: 76 MMHG | WEIGHT: 293 LBS | HEIGHT: 72 IN | OXYGEN SATURATION: 99 %

## 2023-04-07 DIAGNOSIS — L73.2 HIDRADENITIS SUPPURATIVA: ICD-10-CM

## 2023-04-07 DIAGNOSIS — F41.1 GENERALIZED ANXIETY DISORDER: ICD-10-CM

## 2023-04-07 DIAGNOSIS — F33.1 MODERATE EPISODE OF RECURRENT MAJOR DEPRESSIVE DISORDER (HCC): Primary | ICD-10-CM

## 2023-04-07 RX ORDER — ESCITALOPRAM OXALATE 20 MG/1
20 TABLET ORAL DAILY
Qty: 90 TABLET | Refills: 0 | Status: SHIPPED | OUTPATIENT
Start: 2023-04-07

## 2023-04-07 RX ORDER — SULFAMETHOXAZOLE AND TRIMETHOPRIM 800; 160 MG/1; MG/1
1 TABLET ORAL EVERY 12 HOURS SCHEDULED
Qty: 20 TABLET | Refills: 0 | Status: SHIPPED | OUTPATIENT
Start: 2023-04-07 | End: 2023-04-17

## 2023-04-07 NOTE — ASSESSMENT & PLAN NOTE
Continue Wellbutrin  Lexapro increased from 15 mg to 20 mg  Follow-up in 6 weeks or sooner if needed  No suicidal or homicidal thoughts

## 2023-04-07 NOTE — PROGRESS NOTES
Name: Melquiades Pineda      : 1995      MRN: [id number]  Encounter Provider: Malia Sanabria PA-C  Encounter Date: 2023   Encounter department: [address] [place] [address]     1  Moderate episode of recurrent major depressive disorder (HCC)  Assessment & Plan:  Continue Wellbutrin  Lexapro increased from 15 mg to 20 mg  Follow-up in 6 weeks or sooner if needed  No suicidal or homicidal thoughts  Orders:  -     escitalopram (Lexapro) 20 mg tablet; Take 1 tablet (20 mg total) by mouth daily    2  Generalized anxiety disorder  Assessment & Plan:  Continue Wellbutrin  Lexapro increased from 15 mg to 20 mg  Follow-up in 6 weeks or sooner if needed  No suicidal or homicidal thoughts  Orders:  -     escitalopram (Lexapro) 20 mg tablet; Take 1 tablet (20 mg total) by mouth daily    3  Hidradenitis suppurativa  Assessment & Plan:  Script for Bactrim  Apply warm compress  She will notify us if symptoms do not improve or worsen  Orders:  -     sulfamethoxazole-trimethoprim (BACTRIM DS) 800-160 mg per tablet; Take 1 tablet by mouth every 12 (twelve) hours for 10 days           Rocio No is a pleasant 32year old female who is here today for a follow-up for anxiety and depression  She admits that she is moving in the right direction and has more good days than bad  She does feel she could still use an adjustment on the Lexapro  She also has a history of hidradenitis suppurativa and has a lesion on her right breast that is not resolving  She has been applying a warm compress  Review of Systems   Constitutional: Negative for chills, diaphoresis, fatigue and fever  HENT: Negative for congestion, ear pain, postnasal drip, rhinorrhea, sneezing, sore throat and trouble swallowing  Eyes: Negative for pain and visual disturbance  Respiratory: Negative for apnea, cough, shortness of breath and wheezing  Cardiovascular: Negative for chest pain and palpitations  Gastrointestinal: Negative for abdominal pain, constipation, diarrhea, nausea and vomiting  Genitourinary: Negative for dysuria and hematuria  Musculoskeletal: Negative for arthralgias, gait problem and myalgias  Skin:        Right breast lesion   Neurological: Negative for dizziness, syncope, weakness, light-headedness, numbness and headaches  Psychiatric/Behavioral: Positive for dysphoric mood (improving)  Negative for suicidal ideas  The patient is nervous/anxious (improving)  Current Outpatient Medications on File Prior to Visit   Medication Sig   • buPROPion (Wellbutrin SR) 200 MG 12 hr tablet Take 1 tablet (200 mg total) by mouth 2 (two) times a day   • [DISCONTINUED] escitalopram (Lexapro) 10 mg tablet Take 1 tablet (10 mg total) by mouth daily   • [DISCONTINUED] escitalopram (LEXAPRO) 5 mg tablet Take 1 tablet (5 mg total) by mouth daily Take in addition to 10 mg tablet to total 15 mg daily       Objective     /76   Pulse 92   Ht 6' (1 829 m)   Wt (!) 150 kg (331 lb)   SpO2 99%   BMI 44 89 kg/m²     Physical Exam  Vitals and nursing note reviewed  Constitutional:       Appearance: She is well-developed  HENT:      Head: Normocephalic and atraumatic  Right Ear: External ear normal       Left Ear: External ear normal       Nose: Nose normal    Eyes:      Extraocular Movements: Extraocular movements intact  Cardiovascular:      Rate and Rhythm: Normal rate and regular rhythm  Heart sounds: Normal heart sounds  No murmur heard  No friction rub  No gallop  Pulmonary:      Effort: Pulmonary effort is normal  No respiratory distress  Breath sounds: Normal breath sounds  No wheezing or rales  Musculoskeletal:         General: Normal range of motion  Cervical back: Normal range of motion and neck supple  Skin:     General: Skin is warm and dry  Comments: Superficial abscess on right breast consistent with HS  No active drainage or erythema   Mild tenderness to palpation  Neurological:      Mental Status: She is alert and oriented to person, place, and time  Psychiatric:         Mood and Affect: Mood is depressed (improved)  Mood is not anxious  Behavior: Behavior normal          Thought Content: Thought content normal  Thought content does not include homicidal or suicidal ideation  Thought content does not include homicidal or suicidal plan           Judgment: Judgment normal        Adriel Case PA-C

## 2023-05-02 ENCOUNTER — OFFICE VISIT (OUTPATIENT)
Dept: FAMILY MEDICINE CLINIC | Facility: CLINIC | Age: 28
End: 2023-05-02

## 2023-05-02 VITALS
HEIGHT: 72 IN | OXYGEN SATURATION: 97 % | BODY MASS INDEX: 39.68 KG/M2 | SYSTOLIC BLOOD PRESSURE: 138 MMHG | HEART RATE: 112 BPM | DIASTOLIC BLOOD PRESSURE: 82 MMHG | WEIGHT: 293 LBS

## 2023-05-02 DIAGNOSIS — Z86.32 HISTORY OF GESTATIONAL DIABETES: ICD-10-CM

## 2023-05-02 DIAGNOSIS — L73.2 HIDRADENITIS SUPPURATIVA: ICD-10-CM

## 2023-05-02 DIAGNOSIS — R42 DIZZINESS: ICD-10-CM

## 2023-05-02 DIAGNOSIS — Z13.220 SCREENING FOR HYPERLIPIDEMIA: ICD-10-CM

## 2023-05-02 DIAGNOSIS — F33.1 MODERATE EPISODE OF RECURRENT MAJOR DEPRESSIVE DISORDER (HCC): Primary | ICD-10-CM

## 2023-05-02 DIAGNOSIS — F41.1 GENERALIZED ANXIETY DISORDER: ICD-10-CM

## 2023-05-02 RX ORDER — CLINDAMYCIN HYDROCHLORIDE 300 MG/1
300 CAPSULE ORAL 3 TIMES DAILY
Qty: 21 CAPSULE | Refills: 0 | Status: SHIPPED | OUTPATIENT
Start: 2023-05-02 | End: 2023-05-09

## 2023-05-02 RX ORDER — ESCITALOPRAM OXALATE 10 MG/1
10 TABLET ORAL DAILY
Qty: 30 TABLET | Refills: 1 | Status: SHIPPED | OUTPATIENT
Start: 2023-05-02

## 2023-05-02 NOTE — ASSESSMENT & PLAN NOTE
Script for Clindamycin  Referral to dermatology  Recommended that she keep area clean and dry  She will notify us if symptoms do not improve or worsen

## 2023-05-02 NOTE — ASSESSMENT & PLAN NOTE
Labs ordered to further evaluate     Decreased dose of Lexapro back to 10 mg daily  She will notify us if symptoms do not improve or worsen

## 2023-05-02 NOTE — ASSESSMENT & PLAN NOTE
"Will decrease Lexapro from 20 mg to 10 mg  We will see if this improves dizziness and \"zaps\"  Discussed referral to psychiatry  Patient is agreeable  I gave her a list of local psychiatrists and recommended she check with her insurance  No suicidal or homicidal thoughts     "

## 2023-05-04 ENCOUNTER — APPOINTMENT (OUTPATIENT)
Dept: LAB | Facility: MEDICAL CENTER | Age: 28
End: 2023-05-04

## 2023-05-08 DIAGNOSIS — E78.00 ELEVATED LDL CHOLESTEROL LEVEL: ICD-10-CM

## 2023-05-08 DIAGNOSIS — R73.03 PRE-DIABETES: Primary | ICD-10-CM

## 2023-05-16 ENCOUNTER — RA CDI HCC (OUTPATIENT)
Dept: OTHER | Facility: HOSPITAL | Age: 28
End: 2023-05-16

## 2023-05-16 NOTE — PROGRESS NOTES
[facility]  coding opportunities          Chart Reviewed number of suggestions sent to Provider: 1   [id number]    Patients Insurance        Commercial Insurance: [employer]

## 2023-06-05 NOTE — LETTER
September 6, 2022     Daina Bowen, [address]    Patient: Pedro Luis Arceo   YOB: 1995   Date of Visit: 9/6/2022       Dear Dr Arnoldo Comer: Thank you for referring Dolly De La Torre to me for evaluation  Below are my notes for this consultation  If you have questions, please do not hesitate to call me  I look forward to following your patient along with you  Sincerely,        Keesha Morgan MD        CC: No Recipients  Keesha Morgan MD  9/6/2022 12:55 PM  Sign when Signing Visit  Pedro Luis Arceo has no complaints today  She reports regular fetal movements and does not report any problems  She is here today at 33w2d for an ultrasound for fetal growth  Her last baby weighed 7 pounds 12 ounces at 37 weeks and 6 days in 2021  Problem list:  1  Pre gravid BMI of 45  2  Early-onset gestational diabetes  She is currently on 84 units of Lantus at nighttime and 30 units of NovoLog before lunch and 36 units of NovoLog before dinner  Her blood sugars continue to show fasting blood sugars greater than 90 and 2 hour postprandial blood sugars at lunch and dinner greater than 120    3  History of gestational diabetes in preeclampsia in a prior pregnancy  4  Macrosomia suspected on her 27 week ultrasound  5  In [place] her fetal echo was suspicious for possible aortic stenosis  A follow-up fetal echo through Pediatric Cardiology was normal    6  Recent treatment for an cutaneous abscess on the back of her thigh through [facility] with antibiotics on 09/01/22  Her sugars both before and after this infection were similar so this is not the reason behind her elevated blood sugars  Ultrasound findings: The ultrasound today shows a symmetric baby who is in the 91st percentile  ARTURO is normal    NST is reactive  Pregnancy ultrasound has limitations and is unable to detect all forms of fetal congenital abnormalities    The inaccuracy in the EFW can be off by 1 lb either way in the third trimester  Follow up recommended:   1  Recommending twice weekly NSTs and weekly ARTURO till delivery  2  Recommend a follow-up growth scan in 4 weeks  3  With her history of preeclampsia I called in a prescription for a blood pressure cuff to [facility] pharmacy so she can monitor her blood pressures twice a day at home  4  Recommend an increase in her insulin dosing by 15%  Will alert diabetes education to contact her with her new doses  Pre visit time reviewing her records   10 minutes  Face to face time 10 minutes  Post visit time on documentation of note, updating her problem list, adding orders and prescriptions 10 minutes  Procedures that were completed today were charged separately  The level of decision making was moderate complexity      Nuha Carl MD [place] Pregnancy And Lactation Text: This medication is not safe during pregnancy. It is unknown if this medication is excreted in breast milk.

## 2023-06-20 ENCOUNTER — OFFICE VISIT (OUTPATIENT)
Dept: FAMILY MEDICINE CLINIC | Facility: CLINIC | Age: 28
End: 2023-06-20
Payer: COMMERCIAL

## 2023-06-20 VITALS
BODY MASS INDEX: 39.68 KG/M2 | SYSTOLIC BLOOD PRESSURE: 140 MMHG | DIASTOLIC BLOOD PRESSURE: 78 MMHG | HEART RATE: 79 BPM | HEIGHT: 72 IN | WEIGHT: 293 LBS | OXYGEN SATURATION: 98 %

## 2023-06-20 DIAGNOSIS — F33.1 MODERATE EPISODE OF RECURRENT MAJOR DEPRESSIVE DISORDER (HCC): ICD-10-CM

## 2023-06-20 DIAGNOSIS — L73.2 HIDRADENITIS SUPPURATIVA: ICD-10-CM

## 2023-06-20 DIAGNOSIS — F41.1 GENERALIZED ANXIETY DISORDER: ICD-10-CM

## 2023-06-20 DIAGNOSIS — N61.1 BREAST ABSCESS: Primary | ICD-10-CM

## 2023-06-20 PROCEDURE — 99214 OFFICE O/P EST MOD 30 MIN: CPT | Performed by: PHYSICIAN ASSISTANT

## 2023-06-20 RX ORDER — CEPHALEXIN 500 MG/1
500 CAPSULE ORAL EVERY 8 HOURS SCHEDULED
Qty: 21 CAPSULE | Refills: 0 | Status: SHIPPED | OUTPATIENT
Start: 2023-06-20 | End: 2023-06-27

## 2023-06-20 RX ORDER — BUPROPION HYDROCHLORIDE 200 MG/1
200 TABLET, EXTENDED RELEASE ORAL 2 TIMES DAILY
Qty: 180 TABLET | Refills: 0 | Status: SHIPPED | OUTPATIENT
Start: 2023-06-20

## 2023-06-20 RX ORDER — ESCITALOPRAM OXALATE 10 MG/1
10 TABLET ORAL DAILY
Qty: 90 TABLET | Refills: 0 | Status: SHIPPED | OUTPATIENT
Start: 2023-06-20

## 2023-06-20 NOTE — ASSESSMENT & PLAN NOTE
Stable  Continue Lexapro 10 mg and Wellbutrin 200 mg twice daily  She will continue to go to counseling weekly  Encouraged her to continue to try to get in with psychiatrist

## 2023-06-20 NOTE — PROGRESS NOTES
Name: Violeta West      : 1995      MRN: [id number]  Encounter Provider: Wesley Cruz PA-C  Encounter Date: 2023   Encounter department: [address] [place] [address] Road     1  Breast abscess  Assessment & Plan: Will start on Keflex  Referral placed to general surgery  She will notify us if symptoms do not improve or worsen    Orders:  -     Ambulatory Referral to General Surgery; Future  -     cephalexin (KEFLEX) 500 mg capsule; Take 1 capsule (500 mg total) by mouth every 8 (eight) hours for 7 days    2  Moderate episode of recurrent major depressive disorder (HCC)  Assessment & Plan:  Continue Wellbutrin 200 mg twice daily and Lexapro 10 mg daily  Encourage patient to continue counseling  She will continue to try to get in with psychiatry  No suicidal or homicidal thoughts    Orders:  -     buPROPion (Wellbutrin SR) 200 MG 12 hr tablet; Take 1 tablet (200 mg total) by mouth 2 (two) times a day  -     escitalopram (Lexapro) 10 mg tablet; Take 1 tablet (10 mg total) by mouth daily    3  Generalized anxiety disorder  Assessment & Plan:  Stable  Continue Lexapro 10 mg and Wellbutrin 200 mg twice daily  She will continue to go to counseling weekly  Encouraged her to continue to try to get in with psychiatrist    Orders:  -     buPROPion (Wellbutrin SR) 200 MG 12 hr tablet; Take 1 tablet (200 mg total) by mouth 2 (two) times a day  -     escitalopram (Lexapro) 10 mg tablet; Take 1 tablet (10 mg total) by mouth daily    4  Hidradenitis suppurativa  Assessment & Plan:  Patient was referred to dermatology, but never received phone call to set up appointment  Gave patient phone number and information to schedule             Subjective      Vicente Faria is a very pleasant 38-year-old female who is here today for a medication refill and right breast abscess  She is doing very well on her Wellbutrin and Lexapro  She started seeing a counselor weekly, which has been very helpful    She is still on the wait list for psychiatry  She does not feel she needs an adjustment in her medications at this time  She is also complaining of a right breast abscess  She has a history of hidradenitis suppurativa, and gets frequent abscesses under her breasts, under her abdomen, and axilla  She has had a small abscess on her right breast for a few months, but over the past week it has been getting larger and more painful  She denies any drainage  She denies any fevers or chills  Review of Systems   Constitutional: Negative for chills, diaphoresis, fatigue and fever  HENT: Negative for congestion, ear pain, postnasal drip, rhinorrhea, sneezing, sore throat and trouble swallowing  Eyes: Negative for pain and visual disturbance  Respiratory: Negative for apnea, cough, shortness of breath and wheezing  Cardiovascular: Negative for chest pain and palpitations  Gastrointestinal: Negative for abdominal pain, constipation, diarrhea, nausea and vomiting  Genitourinary: Negative for dysuria  Musculoskeletal: Negative for arthralgias, gait problem and myalgias  Skin: Positive for color change         + Right breast abscess   Neurological: Negative for dizziness, syncope, weakness, light-headedness, numbness and headaches  Psychiatric/Behavioral: Positive for dysphoric mood (Improved)  Negative for suicidal ideas  The patient is nervous/anxious (Improved)  Current Outpatient Medications on File Prior to Visit   Medication Sig   • [DISCONTINUED] buPROPion (Wellbutrin SR) 200 MG 12 hr tablet Take 1 tablet (200 mg total) by mouth 2 (two) times a day   • [DISCONTINUED] escitalopram (Lexapro) 10 mg tablet Take 1 tablet (10 mg total) by mouth daily       Objective     /78   Pulse 79   Ht 6' (1 829 m)   Wt (!) 149 kg (327 lb 6 4 oz)   SpO2 98%   BMI 44 40 kg/m²     Physical Exam  Vitals and nursing note reviewed  Constitutional:       Appearance: She is well-developed     HENT:      Head: Normocephalic and atraumatic  Right Ear: Tympanic membrane, ear canal and external ear normal       Left Ear: Tympanic membrane, ear canal and external ear normal       Nose: Nose normal       Mouth/Throat:      Pharynx: No oropharyngeal exudate or posterior oropharyngeal erythema  Eyes:      Extraocular Movements: Extraocular movements intact  Cardiovascular:      Rate and Rhythm: Normal rate and regular rhythm  Heart sounds: Normal heart sounds  No murmur heard  No friction rub  No gallop  Pulmonary:      Effort: Pulmonary effort is normal  No respiratory distress  Breath sounds: Normal breath sounds  No wheezing or rales  Chest:       Musculoskeletal:         General: Normal range of motion  Cervical back: Normal range of motion and neck supple  Lymphadenopathy:      Cervical: No cervical adenopathy  Skin:     General: Skin is warm and dry  Neurological:      Mental Status: She is alert and oriented to person, place, and time  Psychiatric:         Mood and Affect: Mood is anxious  Mood is not depressed  Behavior: Behavior normal          Thought Content: Thought content normal  Thought content does not include homicidal or suicidal ideation  Thought content does not include homicidal or suicidal plan           Judgment: Judgment normal        Violeta Shay PA-C

## 2023-06-20 NOTE — ASSESSMENT & PLAN NOTE
Continue Wellbutrin 200 mg twice daily and Lexapro 10 mg daily  Encourage patient to continue counseling  She will continue to try to get in with psychiatry  No suicidal or homicidal thoughts

## 2023-06-20 NOTE — ASSESSMENT & PLAN NOTE
Patient was referred to dermatology, but never received phone call to set up appointment  Gave patient phone number and information to schedule

## 2023-06-20 NOTE — ASSESSMENT & PLAN NOTE
Will start on Keflex  Referral placed to general surgery  She will notify us if symptoms do not improve or worsen

## 2023-06-22 ENCOUNTER — HOSPITAL ENCOUNTER (EMERGENCY)
Facility: HOSPITAL | Age: 28
Discharge: HOME | End: 2023-06-22
Attending: EMERGENCY MEDICINE
Payer: COMMERCIAL

## 2023-06-22 VITALS
DIASTOLIC BLOOD PRESSURE: 89 MMHG | SYSTOLIC BLOOD PRESSURE: 147 MMHG | TEMPERATURE: 97.7 F | HEIGHT: 72 IN | BODY MASS INDEX: 39.68 KG/M2 | HEART RATE: 86 BPM | WEIGHT: 293 LBS | OXYGEN SATURATION: 100 % | RESPIRATION RATE: 18 BRPM

## 2023-06-22 DIAGNOSIS — L02.91 ABSCESS: Primary | ICD-10-CM

## 2023-06-22 PROCEDURE — 99283 EMERGENCY DEPT VISIT LOW MDM: CPT

## 2023-06-22 RX ORDER — CEPHALEXIN 250 MG/1
1000 CAPSULE ORAL ONCE
Status: COMPLETED | OUTPATIENT
Start: 2023-06-22 | End: 2023-06-22

## 2023-06-22 RX ADMIN — CEPHALEXIN 1000 MG: 250 CAPSULE ORAL at 22:00

## 2023-06-22 NOTE — Clinical Note
Erich Mejia accompanied Roma Hendricks to the emergency department on 6/22/2023  Return date if applicable: 63/52/5424        If you have any questions or concerns, please don't hesitate to call        Michoacano Romero, DO

## 2023-06-23 ENCOUNTER — CONSULT (OUTPATIENT)
Dept: SURGERY | Facility: CLINIC | Age: 28
End: 2023-06-23
Payer: COMMERCIAL

## 2023-06-23 ENCOUNTER — APPOINTMENT (OUTPATIENT)
Dept: LAB | Facility: HOSPITAL | Age: 28
End: 2023-06-23
Attending: SURGERY
Payer: COMMERCIAL

## 2023-06-23 VITALS
HEIGHT: 72 IN | OXYGEN SATURATION: 97 % | WEIGHT: 293 LBS | HEART RATE: 107 BPM | BODY MASS INDEX: 39.68 KG/M2 | SYSTOLIC BLOOD PRESSURE: 152 MMHG | DIASTOLIC BLOOD PRESSURE: 88 MMHG | TEMPERATURE: 97.8 F

## 2023-06-23 DIAGNOSIS — N61.1 BREAST ABSCESS: ICD-10-CM

## 2023-06-23 DIAGNOSIS — N61.1 BREAST ABSCESS: Primary | ICD-10-CM

## 2023-06-23 PROCEDURE — 87070 CULTURE OTHR SPECIMN AEROBIC: CPT

## 2023-06-23 PROCEDURE — 87186 SC STD MICRODIL/AGAR DIL: CPT

## 2023-06-23 PROCEDURE — 87205 SMEAR GRAM STAIN: CPT

## 2023-06-23 PROCEDURE — 10060 I&D ABSCESS SIMPLE/SINGLE: CPT | Performed by: SURGERY

## 2023-06-23 PROCEDURE — 87147 CULTURE TYPE IMMUNOLOGIC: CPT

## 2023-06-23 PROCEDURE — 99204 OFFICE O/P NEW MOD 45 MIN: CPT | Performed by: SURGERY

## 2023-06-23 NOTE — ED PROVIDER NOTES
History  Chief Complaint   Patient presents with   • Abscess     Pt has abscess on right breast for which she had a minor surgery scheduled today to drain it  The doc called and had to reschedule until tomorrow because he was in the OR today  Pt cannot make rescheduled appt because they are a one income, one car family  Pt unable to wait until next week  32year old female requests evaluation of right breast abscess ongoing for the past week, saw her clinician and received antibiotic prescription, but has not started  No fevers or chills  Notes that drains a little intermittently  States she has a history of hidradenitis suppurativa that does affect her breast typically inferiorly where reflects a chest wall and axilla  No history of resistant infection      History provided by:  Patient  Abscess  Abscess quality: draining, induration, painful and redness    Red streaking: no    Progression:  Worsening  Pain details:     Timing:  Constant    Progression:  Worsening  Chronicity:  New  Relieved by:  None tried  Worsened by:  Nothing  Ineffective treatments:  None tried  Associated symptoms: no fever        Prior to Admission Medications   Prescriptions Last Dose Informant Patient Reported? Taking?    buPROPion (Wellbutrin SR) 200 MG 12 hr tablet   No No   Sig: Take 1 tablet (200 mg total) by mouth 2 (two) times a day   cephalexin (KEFLEX) 500 mg capsule   No No   Sig: Take 1 capsule (500 mg total) by mouth every 8 (eight) hours for 7 days   escitalopram (Lexapro) 10 mg tablet   No No   Sig: Take 1 tablet (10 mg total) by mouth daily      Facility-Administered Medications: None       Past Medical History:   Diagnosis Date   • Chlamydia    • Depression     no medications for 1 year   • Gestational hypertension, third trimester 1/4/2021   • Hypertension    • Insulin controlled gestational diabetes mellitus (GDM) in third trimester 10/29/2020   • Urinary tract infection    • Varicella        Past Surgical History: Procedure Laterality Date   • CHOLECYSTECTOMY     • OR EXCISION PILONIDAL CYST/SINUS EXTENSIVE N/A 8/3/2021    Procedure: EXCISION PILONIDAL CYST;  Surgeon: Jelani Adam DO;  Location: [facility];  Service: General   • WISDOM TOOTH EXTRACTION     • WISDOM TOOTH EXTRACTION Bilateral 2010       Family History   Problem Relation Age of Onset   • Thyroid disease Mother    • Other Mother         cerebellar atrophy dx  at 22 years   • Diabetes Father    • Kidney disease Father    • Clotting disorder Father    • Breast cancer Maternal Grandmother    • Diabetes Paternal Grandmother    • Clotting disorder Paternal Grandfather    • Colon cancer Neg Hx    • Ovarian cancer Neg Hx      I have reviewed and agree with the history as documented  E-Cigarette/Vaping   • E-Cigarette Use Never User      E-Cigarette/Vaping Substances   • Nicotine Yes    • THC No    • CBD No    • Flavoring No    • Other No    • Unknown No      Social History     Tobacco Use   • Smoking status: Every Day     Packs/day: 1 00     Years: 10 00     Total pack years: 10 00     Types: Cigarettes   • Smokeless tobacco: Never   • Tobacco comments:     less juancarlos 10 cigs a day  Trying to quit  Vaping Use   • Vaping Use: Never used   Substance Use Topics   • Alcohol use: Not Currently     Comment: rare   • Drug use: Not Currently     Types: Marijuana     Comment: Marijuana prior to pregnancy  Review of Systems   Constitutional: Negative for fever  All other systems reviewed and are negative  Physical Exam  Physical Exam  Vitals and nursing note reviewed  Constitutional:       Appearance: Normal appearance  Comments: Pleasant, comfortable-appearing   HENT:      Head: Normocephalic and atraumatic        Right Ear: External ear normal       Left Ear: External ear normal       Nose: Nose normal    Eyes:      Conjunctiva/sclera: Conjunctivae normal    Pulmonary:      Effort: Pulmonary effort is normal    Abdominal:      General: Abdomen is flat    Musculoskeletal:         General: No deformity  Cervical back: Neck supple  Skin:     Comments: Right breast upper medial quadrant adjacent but not involving areola is erythematous area approximately 4 cm with central subcutaneous abscess-like change which is mildly locally tender, no drainage, approximately 1-2 cm diameter   Neurological:      General: No focal deficit present  Mental Status: She is alert  Psychiatric:         Mood and Affect: Mood normal          Vital Signs  ED Triage Vitals [06/22/23 2142]   Temperature Pulse Respirations Blood Pressure SpO2   97 7 °F (36 5 °C) 86 18 147/89 100 %      Temp Source Heart Rate Source Patient Position - Orthostatic VS BP Location FiO2 (%)   Temporal Monitor Sitting Right arm --      Pain Score       7           Vitals:    06/22/23 2142   BP: 147/89   Pulse: 86   Patient Position - Orthostatic VS: Sitting         Visual Acuity      ED Medications  Medications   cephalexin (KEFLEX) capsule 1,000 mg (1,000 mg Oral Given 6/22/23 2200)       Diagnostic Studies  Results Reviewed     None                 No orders to display              Procedures  Procedures         ED Course  ED Course as of 06/22/23 2202   Thu Jun 22, 2023 2201 We discussed care going forward including starting antibiotics and close outpatient follow-up with surgery, complications of hidradenitis suppurativa and potential for breast cancer  She voices good understanding and will follow-up with her surgeon as soon as possible  Medical Decision Making  Abscess: acute illness or injury  Risk  Prescription drug management            Disposition  Final diagnoses:   Abscess - breast     Time reflects when diagnosis was documented in both MDM as applicable and the Disposition within this note     Time User Action Codes Description Comment    6/22/2023  9:54 PM Shelly Valerio [L02 91] Abscess     6/22/2023  9:54 PM Alyssa Ibarra Modify [L02 91] Abscess breast      ED Disposition     ED Disposition   Discharge    Condition   Stable    Date/Time   Thu Jun 22, 2023  9:54 PM    Comment   Russ Guevara discharge to home/self care  Follow-up Information     Follow up With Specialties Details Why Contact Info    Violeta Shay PA-C Family Medicine, Physician Assistant Schedule an appointment as soon as possible for a visit in 1 week  [address] [address]  [phone]      Liliam Patton DO General Surgery, [address] [place] [address] in 1 day  [address] [address]  [phone]            Patient's Medications   Discharge Prescriptions    No medications on file       No discharge procedures on file      PDMP Review     None          ED Provider  Electronically Signed by           Tenisha Will DO  06/22/23 4930

## 2023-06-23 NOTE — DISCHARGE INSTRUCTIONS
Return immediately if worse or any new symptoms  Tylenol 1000 mg every 6 hours as needed  and/or  Advil 400 mg every 6 hours as needed  May take both together  Please go to surgeon as scheduled tomorrow or as soon as possible

## 2023-06-25 LAB
BACTERIA WND AEROBE CULT: NO GROWTH
GRAM STN SPEC: NORMAL

## 2023-06-25 NOTE — ASSESSMENT & PLAN NOTE
Patient with known hidradenitis suppurativa  Still waiting to get into see dermatology for further treatment  Found to have a small breast abscess  Just started antibiotics but still with significant pain  Small area of fluctuance noted  Incision and drainage completed with a small amount of seropurulent material noted  Area packed  Continue antibiotics for now  Cultures also taken will follow for culture and sensitivities    Follow-up 1 week for wound check

## 2023-06-25 NOTE — PROGRESS NOTES
Assessment/Plan:    Breast abscess  Patient with known hidradenitis suppurativa  Still waiting to get into see dermatology for further treatment  Found to have a small breast abscess  Just started antibiotics but still with significant pain  Small area of fluctuance noted  Incision and drainage completed with a small amount of seropurulent material noted  Area packed  Continue antibiotics for now  Cultures also taken will follow for culture and sensitivities  Follow-up 1 week for wound check       Diagnoses and all orders for this visit:    Breast abscess  -     Ambulatory Referral to General Surgery  -     Wound culture and Gram stain; Future          Subjective:      Patient ID: Juan Francisco Wolfe is a 32 y o  female  38-year-old female with known history of hypertension, gestational diabetes, hidradenitis suppurativa, presents today in consultation for evaluation of a breast abscess  Patient has had before is known hidradenitis suppurativa with abscesses underneath her axilla her abdomen and underneath her breasts  She still waiting consultation office appointment with dermatology  She is recently seen by her PCP with complaints of a abscess of the right breast which is failed to fully resolved  She was started on Keflex  Patient states she has had the abscess for some time unfortunately became significantly more painful over the past few weeks  Denies any active drainage  There is some erythema  No other associate symptoms at this time  No nausea vomiting fevers or chills  The following portions of the patient's history were reviewed and updated as appropriate:   She  has a past medical history of Chlamydia, Depression, Gestational hypertension, third trimester (1/4/2021), Hypertension, Insulin controlled gestational diabetes mellitus (GDM) in third trimester (10/29/2020), Urinary tract infection, and Varicella    She   Patient Active Problem List    Diagnosis Date Noted   • Breast abscess 2023   • Dizziness 2023   • Pre-eclampsia in third trimester 10/13/2022   • Morbid (severe) obesity due to excess calories ([address] [place] [address] ) 2022   • Macrosomia affecting management of mother in third trimester 2022   • Gastroesophageal reflux in pregnancy in third trimester 2022   •  (spontaneous vaginal delivery) 2022   • Insulin controlled gestational diabetes mellitus (GDM) in third trimester 2022   • Maternal morbid obesity, antepartum ([place] [address] ) 2022   • Moderate episode of recurrent major depressive disorder ([place] [address] ) 2021   • Generalized anxiety disorder 2021   • Hidradenitis suppurativa 2021   • Postpartum depression 2021   • BMI 45 0-49 9, adult ([place] [address] ) 2020   • Hyperglycemia in pregnancy 2020   • Pilonidal cyst 2020   • Left ovarian cyst 2020   • Tobacco user 2020   • H/O major depression 2020   • Functional diarrhea 03/15/2019   • Generalized abdominal pain 03/15/2019     She  has a past surgical history that includes [place] tooth extraction; [place] tooth extraction (Bilateral, ); Cholecystectomy; and pr excision pilonidal cyst/sinus extensive (N/A, 8/3/2021)  Her family history includes Breast cancer in her maternal grandmother; Clotting disorder in her father and paternal grandfather; Diabetes in her father and paternal grandmother; Kidney disease in her father; Other in her mother; Thyroid disease in her mother  She  reports that she has been smoking cigarettes  She has a 10 00 pack-year smoking history  She has never used smokeless tobacco  She reports that she does not currently use alcohol  She reports that she does not currently use drugs after having used the following drugs: Marijuana    Current Outpatient Medications   Medication Sig Dispense Refill   • buPROPion (Wellbutrin SR) 200 MG 12 hr tablet Take 1 tablet (200 mg total) by mouth 2 (two) times a day 180 tablet 0   • cephalexin (KEFLEX) 500 mg capsule Take 1 capsule (500 mg total) by mouth every 8 (eight) hours for 7 days 21 capsule 0   • escitalopram (Lexapro) 10 mg tablet Take 1 tablet (10 mg total) by mouth daily 90 tablet 0     No current facility-administered medications for this visit  She has No Known Allergies       Review of Systems      Review of systems completed, all negative except as per HPI  Objective:      /88 (BP Location: Left arm, Patient Position: Sitting, Cuff Size: Standard)   Pulse (!) 107   Temp 97 8 °F (36 6 °C) (Tympanic)   Ht 6' (1 829 m)   Wt (!) 148 kg (326 lb)   LMP 05/12/2023 (Approximate)   SpO2 97%   BMI 44 21 kg/m²            Physical Exam  Vitals reviewed  Exam conducted with a chaperone present  Constitutional:       General: She is not in acute distress  Appearance: Normal appearance  She is not ill-appearing, toxic-appearing or diaphoretic  HENT:      Head: Normocephalic and atraumatic  Right Ear: External ear normal       Left Ear: External ear normal    Eyes:      General: No scleral icterus  Right eye: No discharge  Left eye: No discharge  Cardiovascular:      Rate and Rhythm: Normal rate  Pulses: Normal pulses  Pulmonary:      Effort: Pulmonary effort is normal  No respiratory distress  Abdominal:      General: There is no distension  Tenderness: There is no abdominal tenderness  Musculoskeletal:         General: No swelling or deformity  Normal range of motion  Cervical back: Normal range of motion  Right lower leg: No edema  Left lower leg: No edema  Skin:     General: Skin is warm  Coloration: Skin is not jaundiced or pale  Findings: Erythema present  No bruising, lesion or rash  Comments: There is a small abscess approximately 1 5 cm of the right medial portion of the breast   This is very superficial likely represents additional lesions of hidradenitis  There is some surrounding erythema     Neurological: "General: No focal deficit present  Mental Status: She is alert and oriented to person, place, and time  Cranial Nerves: No cranial nerve deficit  Psychiatric:         Mood and Affect: Mood normal          Behavior: Behavior normal          Thought Content: Thought content normal          Judgment: Judgment normal            Incision and Drainage    Date/Time: 6/23/2023 1:00 PM    Performed by: Susy Pineda DO  Authorized by: Susy Pineda DO  Universal Protocol:  Consent: Verbal consent obtained  Risks and benefits: risks, benefits and alternatives were discussed  Consent given by: patient  Time out: Immediately prior to procedure a \"time out\" was called to verify the correct patient, procedure, equipment, support staff and site/side marked as required  Patient understanding: patient states understanding of the procedure being performed  Patient consent: the patient's understanding of the procedure matches consent given  Patient identity confirmed: verbally with patient      Patient location:  Clinic  Location:     Type:  Abscess    Location:  Trunk    Trunk location:  R breast  Pre-procedure details:     Skin preparation:  Chloraprep  Anesthesia (see MAR for exact dosages): Anesthesia method:  Local infiltration    Local anesthetic:  Lidocaine 1% w/o epi and lidocaine 1% WITH epi  Procedure details:     Complexity:  Simple    Incision types:  Elliptical    Scalpel blade:  11    Approach:  Open    Incision depth:  Subcutaneous    Wound management:  Probed and deloculated    Drainage:  Purulent and serous    Drainage amount:  Scant    Wound treatment:  Wound left open    Packing materials:  1/4 in gauze  Post-procedure details:     Patient tolerance of procedure: Tolerated well, no immediate complications        Notes:    Recent PCP note dated June 20, 2023 was personally reviewed by me    "

## 2023-06-28 LAB
BACTERIA WND AEROBE CULT: ABNORMAL
GRAM STN SPEC: ABNORMAL

## 2023-07-19 ENCOUNTER — VBI (OUTPATIENT)
Dept: ADMINISTRATIVE | Facility: OTHER | Age: 28
End: 2023-07-19

## 2023-09-19 ENCOUNTER — RA CDI HCC (OUTPATIENT)
Dept: OTHER | Facility: HOSPITAL | Age: 28
End: 2023-09-19

## 2023-09-19 NOTE — PROGRESS NOTES
[facility] coding opportunities          Chart Reviewed number of suggestions sent to Provider: 1   E66.01    Patients Insurance        Commercial Insurance: [employer]

## 2023-10-23 ENCOUNTER — OFFICE VISIT (OUTPATIENT)
Dept: FAMILY MEDICINE CLINIC | Facility: CLINIC | Age: 28
End: 2023-10-23
Payer: COMMERCIAL

## 2023-10-23 VITALS
WEIGHT: 293 LBS | SYSTOLIC BLOOD PRESSURE: 146 MMHG | BODY MASS INDEX: 39.68 KG/M2 | DIASTOLIC BLOOD PRESSURE: 92 MMHG | HEIGHT: 72 IN | HEART RATE: 92 BPM | OXYGEN SATURATION: 98 %

## 2023-10-23 DIAGNOSIS — F41.1 GENERALIZED ANXIETY DISORDER: Primary | ICD-10-CM

## 2023-10-23 DIAGNOSIS — Z23 ENCOUNTER FOR IMMUNIZATION: ICD-10-CM

## 2023-10-23 DIAGNOSIS — K04.7 DENTAL INFECTION: ICD-10-CM

## 2023-10-23 DIAGNOSIS — F33.1 MODERATE EPISODE OF RECURRENT MAJOR DEPRESSIVE DISORDER (HCC): ICD-10-CM

## 2023-10-23 PROBLEM — O24.414 INSULIN CONTROLLED GESTATIONAL DIABETES MELLITUS (GDM) IN THIRD TRIMESTER: Status: RESOLVED | Noted: 2022-04-04 | Resolved: 2023-10-23

## 2023-10-23 PROBLEM — O99.810 HYPERGLYCEMIA IN PREGNANCY: Status: RESOLVED | Noted: 2020-11-02 | Resolved: 2023-10-23

## 2023-10-23 PROBLEM — O14.93 PRE-ECLAMPSIA IN THIRD TRIMESTER: Status: RESOLVED | Noted: 2022-10-13 | Resolved: 2023-10-23

## 2023-10-23 PROCEDURE — 90686 IIV4 VACC NO PRSV 0.5 ML IM: CPT | Performed by: PHYSICIAN ASSISTANT

## 2023-10-23 PROCEDURE — 99214 OFFICE O/P EST MOD 30 MIN: CPT | Performed by: PHYSICIAN ASSISTANT

## 2023-10-23 PROCEDURE — 90471 IMMUNIZATION ADMIN: CPT | Performed by: PHYSICIAN ASSISTANT

## 2023-10-23 RX ORDER — ESCITALOPRAM OXALATE 10 MG/1
10 TABLET ORAL DAILY
Qty: 90 TABLET | Refills: 1 | Status: SHIPPED | OUTPATIENT
Start: 2023-10-23

## 2023-10-23 RX ORDER — AMOXICILLIN 500 MG/1
500 CAPSULE ORAL EVERY 8 HOURS SCHEDULED
Qty: 30 CAPSULE | Refills: 0 | Status: SHIPPED | OUTPATIENT
Start: 2023-10-23 | End: 2023-11-02

## 2023-10-23 RX ORDER — BUPROPION HYDROCHLORIDE 200 MG/1
200 TABLET, EXTENDED RELEASE ORAL 2 TIMES DAILY
Qty: 180 TABLET | Refills: 1 | Status: SHIPPED | OUTPATIENT
Start: 2023-10-23

## 2023-10-23 NOTE — ASSESSMENT & PLAN NOTE
Prescription for amoxicillin. Recommended that she follow through with dental extraction as scheduled on Friday.

## 2023-10-23 NOTE — ASSESSMENT & PLAN NOTE
Stable. Continue Wellbutrin 200 mg twice daily and Lexapro 10 mg daily. Patient is doing excellent with counseling. Encouraged her to continue weekly counseling. She is on a wait list for psychiatry. No suicidal or homicidal thoughts.   Follow-up in 6 months or sooner if needed

## 2023-10-23 NOTE — PROGRESS NOTES
Name: Logan Manrique      : 1995      MRN: [id number]  Encounter Provider: Vidal Senior PA-C  Encounter Date: 10/23/2023   Encounter department: [address]     1. Generalized anxiety disorder  Assessment & Plan:  Stable. Continue Wellbutrin 200 mg twice daily and Lexapro 10 mg daily. Patient is doing excellent with counseling. Encouraged her to continue weekly counseling. She is on a wait list for psychiatry. No suicidal or homicidal thoughts. Follow-up in 6 months or sooner if needed    Orders:  -     buPROPion (Wellbutrin SR) 200 MG 12 hr tablet; Take 1 tablet (200 mg total) by mouth 2 (two) times a day  -     escitalopram (Lexapro) 10 mg tablet; Take 1 tablet (10 mg total) by mouth daily    2. Moderate episode of recurrent major depressive disorder ([address])  Assessment & Plan:  Stable. Continue Wellbutrin 200 mg twice daily and Lexapro 10 mg daily. Patient is doing excellent with counseling. Encouraged her to continue weekly counseling. She is on a wait list for psychiatry. No suicidal or homicidal thoughts. Follow-up in 6 months or sooner if needed    Orders:  -     buPROPion (Wellbutrin SR) 200 MG 12 hr tablet; Take 1 tablet (200 mg total) by mouth 2 (two) times a day  -     escitalopram (Lexapro) 10 mg tablet; Take 1 tablet (10 mg total) by mouth daily    3. Dental infection  Assessment & Plan:  Prescription for amoxicillin. Recommended that she follow through with dental extraction as scheduled on Friday. Orders:  -     amoxicillin (AMOXIL) 500 mg capsule; Take 1 capsule (500 mg total) by mouth every 8 (eight) hours for 10 days    4. Encounter for immunization  -     influenza vaccine, quadrivalent, 0.5 mL, preservative-free, for adult and pediatric patients 6 mos+ ([place], FLULAVAL, FLUZONE)           Edward Geiger is a very pleasant 70-year-old female who is here today for a follow-up for anxiety and depression.   She admits that she is doing very well on her medications. She does not feel she needs a dose adjustment at this time. She is still following with her counselor weekly. She admits that she has been making a lot of progress, and is thankful that she found a great counselor. She is still on the wait list for psychiatry. She denies any suicidal or homicidal thoughts. She is also complaining about pain in her right back lower molar. She is scheduled to have an extraction on Friday. She has been getting pain that is radiating up into her right ear. She is concerned that it is infected. She denies any fevers or chills. Review of Systems   Constitutional:  Negative for chills, diaphoresis, fatigue and fever. HENT:  Positive for dental problem. Negative for congestion, ear pain, postnasal drip, rhinorrhea, sneezing, sore throat and trouble swallowing. Eyes:  Negative for pain and visual disturbance. Respiratory:  Negative for apnea, cough, shortness of breath and wheezing. Cardiovascular:  Negative for chest pain and palpitations. Gastrointestinal:  Negative for abdominal pain, constipation, diarrhea, nausea and vomiting. Genitourinary:  Negative for dysuria. Musculoskeletal:  Negative for arthralgias, gait problem and myalgias. Neurological:  Negative for dizziness, syncope, weakness, light-headedness, numbness and headaches. Psychiatric/Behavioral:  Positive for dysphoric mood (improved). Negative for suicidal ideas. The patient is nervous/anxious (improved).         Current Outpatient Medications on File Prior to Visit   Medication Sig   • [DISCONTINUED] buPROPion (Wellbutrin SR) 200 MG 12 hr tablet Take 1 tablet (200 mg total) by mouth 2 (two) times a day   • [DISCONTINUED] escitalopram (Lexapro) 10 mg tablet Take 1 tablet (10 mg total) by mouth daily       Objective     /92   Pulse 92   Ht 6' (1.829 m)   Wt (!) 147 kg (324 lb)   SpO2 98%   BMI 43.94 kg/m²     Physical Exam  Vitals and nursing note reviewed. Constitutional:       Appearance: She is well-developed. HENT:      Head: Normocephalic and atraumatic. Jaw: No swelling. Right Ear: Tympanic membrane, ear canal and external ear normal.      Left Ear: Tympanic membrane, ear canal and external ear normal.      Nose: Nose normal.      Mouth/Throat:      Dentition: Abnormal dentition. Pharynx: No oropharyngeal exudate or posterior oropharyngeal erythema. Eyes:      Pupils: Pupils are equal, round, and reactive to light. Cardiovascular:      Rate and Rhythm: Normal rate and regular rhythm. Heart sounds: Normal heart sounds. No murmur heard. No friction rub. No gallop. Pulmonary:      Effort: Pulmonary effort is normal. No respiratory distress. Breath sounds: Normal breath sounds. No wheezing or rales. Musculoskeletal:         General: Normal range of motion. Cervical back: Normal range of motion and neck supple. Lymphadenopathy:      Cervical: No cervical adenopathy. Skin:     General: Skin is warm and dry. Neurological:      Mental Status: She is alert and oriented to person, place, and time. Psychiatric:         Mood and Affect: Mood is not anxious or depressed. Thought Content: Thought content does not include homicidal or suicidal ideation. Thought content does not include homicidal or suicidal plan.        Vidal Senior PA-C

## 2024-03-06 PROBLEM — E66.01 MORBID (SEVERE) OBESITY DUE TO EXCESS CALORIES (HCC): Status: RESOLVED | Noted: 2022-09-21 | Resolved: 2024-03-06

## 2024-03-06 PROBLEM — E66.01 MATERNAL MORBID OBESITY, ANTEPARTUM (HCC): Status: RESOLVED | Noted: 2022-04-04 | Resolved: 2024-03-06

## 2024-03-06 PROBLEM — F33.1 MODERATE EPISODE OF RECURRENT MAJOR DEPRESSIVE DISORDER (HCC): Status: RESOLVED | Noted: 2021-11-09 | Resolved: 2024-03-06

## 2024-03-06 PROBLEM — O99.210 MATERNAL MORBID OBESITY, ANTEPARTUM (HCC): Status: RESOLVED | Noted: 2022-04-04 | Resolved: 2024-03-06

## 2024-03-06 PROBLEM — K21.9 GASTROESOPHAGEAL REFLUX IN PREGNANCY IN THIRD TRIMESTER: Status: RESOLVED | Noted: 2022-07-18 | Resolved: 2024-03-06

## 2024-03-06 PROBLEM — O99.613 GASTROESOPHAGEAL REFLUX IN PREGNANCY IN THIRD TRIMESTER: Status: RESOLVED | Noted: 2022-07-18 | Resolved: 2024-03-06

## 2024-03-06 PROBLEM — O36.63X0 MACROSOMIA AFFECTING MANAGEMENT OF MOTHER IN THIRD TRIMESTER: Status: RESOLVED | Noted: 2022-09-06 | Resolved: 2024-03-06

## 2024-03-07 ENCOUNTER — TELEPHONE (OUTPATIENT)
Dept: DERMATOLOGY | Facility: CLINIC | Age: 29
End: 2024-03-07

## 2024-03-07 NOTE — TELEPHONE ENCOUNTER
Called patient to advise her appt with Dr. Bernal on 8/28 has been moved from [place] to [place]. There was also a change in appt time from 1:50 to 2:00. LM for pt to call the office to confirm.

## 2024-03-22 ENCOUNTER — HOSPITAL ENCOUNTER (EMERGENCY)
Facility: HOSPITAL | Age: 29
End: 2024-03-23
Attending: EMERGENCY MEDICINE
Payer: COMMERCIAL

## 2024-03-22 ENCOUNTER — APPOINTMENT (EMERGENCY)
Dept: CT IMAGING | Facility: HOSPITAL | Age: 29
End: 2024-03-22
Payer: COMMERCIAL

## 2024-03-22 DIAGNOSIS — R56.9 NEW ONSET SEIZURE (HCC): Primary | ICD-10-CM

## 2024-03-22 LAB
2HR DELTA HS TROPONIN: 2 NG/L
ALBUMIN SERPL BCP-MCNC: 4.5 G/DL (ref 3.5–5)
ALP SERPL-CCNC: 66 U/L (ref 34–104)
ALT SERPL W P-5'-P-CCNC: 18 U/L (ref 7–52)
ANION GAP SERPL CALCULATED.3IONS-SCNC: 25 MMOL/L (ref 4–13)
AST SERPL W P-5'-P-CCNC: 20 U/L (ref 13–39)
BASOPHILS # BLD AUTO: 0.1 THOUSANDS/ÂΜL (ref 0–0.1)
BASOPHILS NFR BLD AUTO: 1 % (ref 0–1)
BILIRUB SERPL-MCNC: 0.29 MG/DL (ref 0.2–1)
BUN SERPL-MCNC: 9 MG/DL (ref 5–25)
CALCIUM SERPL-MCNC: 9.6 MG/DL (ref 8.4–10.2)
CARDIAC TROPONIN I PNL SERPL HS: 4 NG/L
CARDIAC TROPONIN I PNL SERPL HS: 6 NG/L
CHLORIDE SERPL-SCNC: 103 MMOL/L (ref 96–108)
CO2 SERPL-SCNC: 13 MMOL/L (ref 21–32)
CREAT SERPL-MCNC: 1.01 MG/DL (ref 0.6–1.3)
EOSINOPHIL # BLD AUTO: 0.37 THOUSAND/ÂΜL (ref 0–0.61)
EOSINOPHIL NFR BLD AUTO: 2 % (ref 0–6)
ERYTHROCYTE [DISTWIDTH] IN BLOOD BY AUTOMATED COUNT: 16.3 % (ref 11.6–15.1)
GFR SERPL CREATININE-BSD FRML MDRD: 74 ML/MIN/1.73SQ M
GLUCOSE SERPL-MCNC: 123 MG/DL (ref 65–140)
GLUCOSE SERPL-MCNC: 126 MG/DL (ref 65–140)
HCT VFR BLD AUTO: 42.2 % (ref 34.8–46.1)
HGB BLD-MCNC: 12.5 G/DL (ref 11.5–15.4)
IMM GRANULOCYTES # BLD AUTO: 0.1 THOUSAND/UL (ref 0–0.2)
IMM GRANULOCYTES NFR BLD AUTO: 1 % (ref 0–2)
LACTATE SERPL-SCNC: 1.7 MMOL/L (ref 0.5–2)
LACTATE SERPL-SCNC: 14.3 MMOL/L (ref 0.5–2)
LYMPHOCYTES # BLD AUTO: 6.26 THOUSANDS/ÂΜL (ref 0.6–4.47)
LYMPHOCYTES NFR BLD AUTO: 31 % (ref 14–44)
MAGNESIUM SERPL-MCNC: 1.9 MG/DL (ref 1.9–2.7)
MCH RBC QN AUTO: 26.1 PG (ref 26.8–34.3)
MCHC RBC AUTO-ENTMCNC: 29.6 G/DL (ref 31.4–37.4)
MCV RBC AUTO: 88 FL (ref 82–98)
MONOCYTES # BLD AUTO: 1.4 THOUSAND/ÂΜL (ref 0.17–1.22)
MONOCYTES NFR BLD AUTO: 7 % (ref 4–12)
NEUTROPHILS # BLD AUTO: 11.69 THOUSANDS/ÂΜL (ref 1.85–7.62)
NEUTS SEG NFR BLD AUTO: 58 % (ref 43–75)
NRBC BLD AUTO-RTO: 0 /100 WBCS
PHOSPHATE SERPL-MCNC: 3.3 MG/DL (ref 2.7–4.5)
PLATELET # BLD AUTO: 418 THOUSANDS/UL (ref 149–390)
PMV BLD AUTO: 10.1 FL (ref 8.9–12.7)
POTASSIUM SERPL-SCNC: 3.7 MMOL/L (ref 3.5–5.3)
PROT SERPL-MCNC: 7.6 G/DL (ref 6.4–8.4)
RBC # BLD AUTO: 4.79 MILLION/UL (ref 3.81–5.12)
SODIUM SERPL-SCNC: 141 MMOL/L (ref 135–147)
TSH SERPL DL<=0.05 MIU/L-ACNC: 6.49 UIU/ML (ref 0.45–4.5)
WBC # BLD AUTO: 19.92 THOUSAND/UL (ref 4.31–10.16)

## 2024-03-22 PROCEDURE — 96361 HYDRATE IV INFUSION ADD-ON: CPT

## 2024-03-22 PROCEDURE — 70450 CT HEAD/BRAIN W/O DYE: CPT

## 2024-03-22 PROCEDURE — 83605 ASSAY OF LACTIC ACID: CPT | Performed by: EMERGENCY MEDICINE

## 2024-03-22 PROCEDURE — 96375 TX/PRO/DX INJ NEW DRUG ADDON: CPT

## 2024-03-22 PROCEDURE — 87040 BLOOD CULTURE FOR BACTERIA: CPT | Performed by: EMERGENCY MEDICINE

## 2024-03-22 PROCEDURE — 93005 ELECTROCARDIOGRAM TRACING: CPT

## 2024-03-22 PROCEDURE — 99285 EMERGENCY DEPT VISIT HI MDM: CPT

## 2024-03-22 PROCEDURE — 82948 REAGENT STRIP/BLOOD GLUCOSE: CPT

## 2024-03-22 PROCEDURE — 96365 THER/PROPH/DIAG IV INF INIT: CPT

## 2024-03-22 PROCEDURE — 81025 URINE PREGNANCY TEST: CPT | Performed by: EMERGENCY MEDICINE

## 2024-03-22 PROCEDURE — 84443 ASSAY THYROID STIM HORMONE: CPT | Performed by: EMERGENCY MEDICINE

## 2024-03-22 PROCEDURE — 84100 ASSAY OF PHOSPHORUS: CPT | Performed by: EMERGENCY MEDICINE

## 2024-03-22 PROCEDURE — 84484 ASSAY OF TROPONIN QUANT: CPT | Performed by: EMERGENCY MEDICINE

## 2024-03-22 PROCEDURE — 85025 COMPLETE CBC W/AUTO DIFF WBC: CPT | Performed by: EMERGENCY MEDICINE

## 2024-03-22 PROCEDURE — 36415 COLL VENOUS BLD VENIPUNCTURE: CPT | Performed by: EMERGENCY MEDICINE

## 2024-03-22 PROCEDURE — 80053 COMPREHEN METABOLIC PANEL: CPT | Performed by: EMERGENCY MEDICINE

## 2024-03-22 PROCEDURE — 84439 ASSAY OF FREE THYROXINE: CPT | Performed by: EMERGENCY MEDICINE

## 2024-03-22 PROCEDURE — 83735 ASSAY OF MAGNESIUM: CPT | Performed by: EMERGENCY MEDICINE

## 2024-03-22 RX ORDER — CEFTRIAXONE 1 G/50ML
1000 INJECTION, SOLUTION INTRAVENOUS ONCE
Status: COMPLETED | OUTPATIENT
Start: 2024-03-22 | End: 2024-03-22

## 2024-03-22 RX ORDER — LORAZEPAM 2 MG/ML
INJECTION INTRAMUSCULAR
Status: COMPLETED
Start: 2024-03-22 | End: 2024-03-22

## 2024-03-22 RX ORDER — LORAZEPAM 2 MG/ML
INJECTION INTRAMUSCULAR
Status: DISCONTINUED
Start: 2024-03-22 | End: 2024-03-23 | Stop reason: HOSPADM

## 2024-03-22 RX ORDER — LORAZEPAM 2 MG/ML
2 INJECTION INTRAMUSCULAR ONCE
Status: COMPLETED | OUTPATIENT
Start: 2024-03-22 | End: 2024-03-22

## 2024-03-22 RX ORDER — ONDANSETRON 2 MG/ML
4 INJECTION INTRAMUSCULAR; INTRAVENOUS ONCE
Status: COMPLETED | OUTPATIENT
Start: 2024-03-22 | End: 2024-03-22

## 2024-03-22 RX ORDER — LEVETIRACETAM 500 MG/5ML
1500 INJECTION, SOLUTION, CONCENTRATE INTRAVENOUS ONCE
Status: COMPLETED | OUTPATIENT
Start: 2024-03-22 | End: 2024-03-22

## 2024-03-22 RX ADMIN — LORAZEPAM 2 MG: 2 INJECTION INTRAMUSCULAR at 20:59

## 2024-03-22 RX ADMIN — CEFTRIAXONE 1000 MG: 1 INJECTION, SOLUTION INTRAVENOUS at 22:02

## 2024-03-22 RX ADMIN — ONDANSETRON 4 MG: 2 INJECTION INTRAMUSCULAR; INTRAVENOUS at 21:10

## 2024-03-22 RX ADMIN — LEVETIRACETAM 1500 MG: 100 INJECTION, SOLUTION INTRAVENOUS at 21:16

## 2024-03-22 RX ADMIN — LORAZEPAM 2 MG: 2 INJECTION INTRAMUSCULAR; INTRAVENOUS at 20:59

## 2024-03-22 RX ADMIN — LORAZEPAM 2 MG: 2 INJECTION INTRAMUSCULAR; INTRAVENOUS at 20:49

## 2024-03-22 RX ADMIN — SODIUM CHLORIDE 1000 ML: 0.9 INJECTION, SOLUTION INTRAVENOUS at 21:11

## 2024-03-22 RX ADMIN — LORAZEPAM 2 MG: 2 INJECTION INTRAMUSCULAR at 20:49

## 2024-03-23 ENCOUNTER — APPOINTMENT (INPATIENT)
Dept: NEUROLOGY | Facility: CLINIC | Age: 29
DRG: 101 | End: 2024-03-23
Payer: COMMERCIAL

## 2024-03-23 ENCOUNTER — HOSPITAL ENCOUNTER (INPATIENT)
Facility: HOSPITAL | Age: 29
LOS: 3 days | Discharge: HOME | DRG: 101 | End: 2024-03-26
Attending: INTERNAL MEDICINE | Admitting: INTERNAL MEDICINE
Payer: COMMERCIAL

## 2024-03-23 ENCOUNTER — APPOINTMENT (INPATIENT)
Dept: NEUROLOGY | Facility: HOSPITAL | Age: 29
DRG: 101 | End: 2024-03-23
Payer: COMMERCIAL

## 2024-03-23 ENCOUNTER — APPOINTMENT (OUTPATIENT)
Dept: RADIOLOGY | Facility: HOSPITAL | Age: 29
DRG: 101 | End: 2024-03-23
Payer: COMMERCIAL

## 2024-03-23 VITALS
BODY MASS INDEX: 43.06 KG/M2 | DIASTOLIC BLOOD PRESSURE: 66 MMHG | WEIGHT: 293 LBS | SYSTOLIC BLOOD PRESSURE: 135 MMHG | TEMPERATURE: 98 F | HEART RATE: 69 BPM | OXYGEN SATURATION: 97 % | RESPIRATION RATE: 17 BRPM

## 2024-03-23 DIAGNOSIS — F41.1 GENERALIZED ANXIETY DISORDER: ICD-10-CM

## 2024-03-23 DIAGNOSIS — R56.9 NEW ONSET SEIZURE (HCC): Primary | ICD-10-CM

## 2024-03-23 PROBLEM — E87.29 HIGH ANION GAP METABOLIC ACIDOSIS: Status: RESOLVED | Noted: 2024-03-23 | Resolved: 2024-03-23

## 2024-03-23 PROBLEM — E87.29 HIGH ANION GAP METABOLIC ACIDOSIS: Status: ACTIVE | Noted: 2024-03-23

## 2024-03-23 LAB
AMPHETAMINES SERPL QL SCN: NEGATIVE
ANION GAP SERPL CALCULATED.3IONS-SCNC: 9 MMOL/L (ref 4–13)
BACTERIA UR QL AUTO: NORMAL /HPF
BARBITURATES UR QL: NEGATIVE
BASOPHILS # BLD AUTO: 0.03 THOUSANDS/ÂΜL (ref 0–0.1)
BASOPHILS NFR BLD AUTO: 0 % (ref 0–1)
BENZODIAZ UR QL: NEGATIVE
BILIRUB UR QL STRIP: NEGATIVE
BUN SERPL-MCNC: 7 MG/DL (ref 5–25)
CALCIUM SERPL-MCNC: 8.3 MG/DL (ref 8.4–10.2)
CHLORIDE SERPL-SCNC: 106 MMOL/L (ref 96–108)
CLARITY UR: CLEAR
CO2 SERPL-SCNC: 20 MMOL/L (ref 21–32)
COCAINE UR QL: NEGATIVE
COLOR UR: YELLOW
CREAT SERPL-MCNC: 0.76 MG/DL (ref 0.6–1.3)
EOSINOPHIL # BLD AUTO: 0.13 THOUSAND/ÂΜL (ref 0–0.61)
EOSINOPHIL NFR BLD AUTO: 1 % (ref 0–6)
ERYTHROCYTE [DISTWIDTH] IN BLOOD BY AUTOMATED COUNT: 16.3 % (ref 11.6–15.1)
EST. AVERAGE GLUCOSE BLD GHB EST-MCNC: 126 MG/DL
EXT PREGNANCY TEST URINE: NEGATIVE
EXT. CONTROL: NORMAL
GFR SERPL CREATININE-BSD FRML MDRD: 105 ML/MIN/1.73SQ M
GLUCOSE SERPL-MCNC: 85 MG/DL (ref 65–140)
GLUCOSE UR STRIP-MCNC: NEGATIVE MG/DL
HBA1C MFR BLD: 6 %
HCT VFR BLD AUTO: 33.7 % (ref 34.8–46.1)
HGB BLD-MCNC: 10.5 G/DL (ref 11.5–15.4)
HGB UR QL STRIP.AUTO: ABNORMAL
IMM GRANULOCYTES # BLD AUTO: 0.04 THOUSAND/UL (ref 0–0.2)
IMM GRANULOCYTES NFR BLD AUTO: 0 % (ref 0–2)
KETONES UR STRIP-MCNC: NEGATIVE MG/DL
LEUKOCYTE ESTERASE UR QL STRIP: NEGATIVE
LYMPHOCYTES # BLD AUTO: 3.4 THOUSANDS/ÂΜL (ref 0.6–4.47)
LYMPHOCYTES NFR BLD AUTO: 27 % (ref 14–44)
MCH RBC QN AUTO: 26.3 PG (ref 26.8–34.3)
MCHC RBC AUTO-ENTMCNC: 31.2 G/DL (ref 31.4–37.4)
MCV RBC AUTO: 84 FL (ref 82–98)
METHADONE UR QL: NEGATIVE
MONOCYTES # BLD AUTO: 0.81 THOUSAND/ÂΜL (ref 0.17–1.22)
MONOCYTES NFR BLD AUTO: 7 % (ref 4–12)
NEUTROPHILS # BLD AUTO: 7.99 THOUSANDS/ÂΜL (ref 1.85–7.62)
NEUTS SEG NFR BLD AUTO: 65 % (ref 43–75)
NITRITE UR QL STRIP: NEGATIVE
NON-SQ EPI CELLS URNS QL MICRO: NORMAL /HPF
NRBC BLD AUTO-RTO: 0 /100 WBCS
OPIATES UR QL SCN: NEGATIVE
OXYCODONE+OXYMORPHONE UR QL SCN: NEGATIVE
PCP UR QL: NEGATIVE
PH UR STRIP.AUTO: 6 [PH]
PLATELET # BLD AUTO: 261 THOUSANDS/UL (ref 149–390)
PMV BLD AUTO: 10.4 FL (ref 8.9–12.7)
POTASSIUM SERPL-SCNC: 4.1 MMOL/L (ref 3.5–5.3)
PROT UR STRIP-MCNC: ABNORMAL MG/DL
RBC # BLD AUTO: 4 MILLION/UL (ref 3.81–5.12)
RBC #/AREA URNS AUTO: NORMAL /HPF
SODIUM SERPL-SCNC: 135 MMOL/L (ref 135–147)
SP GR UR STRIP.AUTO: 1.02 (ref 1–1.03)
T4 FREE SERPL-MCNC: 0.59 NG/DL (ref 0.61–1.12)
THC UR QL: POSITIVE
TSH SERPL DL<=0.05 MIU/L-ACNC: 3.52 UIU/ML (ref 0.45–4.5)
UROBILINOGEN UR QL STRIP.AUTO: 0.2 E.U./DL
WBC # BLD AUTO: 12.4 THOUSAND/UL (ref 4.31–10.16)
WBC #/AREA URNS AUTO: NORMAL /HPF

## 2024-03-23 PROCEDURE — 99223 1ST HOSP IP/OBS HIGH 75: CPT | Performed by: INTERNAL MEDICINE

## 2024-03-23 PROCEDURE — 95816 EEG AWAKE AND DROWSY: CPT

## 2024-03-23 PROCEDURE — 4A10X4Z MONITORING OF CENTRAL NERVOUS ELECTRICAL ACTIVITY, EXTERNAL APPROACH: ICD-10-PCS | Performed by: INTERNAL MEDICINE

## 2024-03-23 PROCEDURE — 99223 1ST HOSP IP/OBS HIGH 75: CPT | Performed by: PSYCHIATRY & NEUROLOGY

## 2024-03-23 PROCEDURE — 95716 VEEG EA 12-26HR CONT MNTR: CPT

## 2024-03-23 PROCEDURE — C9254 INJECTION, LACOSAMIDE: HCPCS | Performed by: PHYSICIAN ASSISTANT

## 2024-03-23 PROCEDURE — 95819 EEG AWAKE AND ASLEEP: CPT | Performed by: PSYCHIATRY & NEUROLOGY

## 2024-03-23 PROCEDURE — 99291 CRITICAL CARE FIRST HOUR: CPT | Performed by: EMERGENCY MEDICINE

## 2024-03-23 PROCEDURE — 80307 DRUG TEST PRSMV CHEM ANLYZR: CPT

## 2024-03-23 PROCEDURE — 85025 COMPLETE CBC W/AUTO DIFF WBC: CPT

## 2024-03-23 PROCEDURE — 83036 HEMOGLOBIN GLYCOSYLATED A1C: CPT

## 2024-03-23 PROCEDURE — 70551 MRI BRAIN STEM W/O DYE: CPT

## 2024-03-23 PROCEDURE — 81001 URINALYSIS AUTO W/SCOPE: CPT | Performed by: EMERGENCY MEDICINE

## 2024-03-23 PROCEDURE — 80048 BASIC METABOLIC PNL TOTAL CA: CPT

## 2024-03-23 PROCEDURE — 99232 SBSQ HOSP IP/OBS MODERATE 35: CPT

## 2024-03-23 PROCEDURE — 84443 ASSAY THYROID STIM HORMONE: CPT

## 2024-03-23 PROCEDURE — 95700 EEG CONT REC W/VID EEG TECH: CPT

## 2024-03-23 RX ORDER — ACETAMINOPHEN 325 MG/1
650 TABLET ORAL EVERY 6 HOURS PRN
Status: DISCONTINUED | OUTPATIENT
Start: 2024-03-23 | End: 2024-03-26 | Stop reason: HOSPADM

## 2024-03-23 RX ORDER — LORAZEPAM 2 MG/ML
1 INJECTION INTRAMUSCULAR
Status: DISCONTINUED | OUTPATIENT
Start: 2024-03-23 | End: 2024-03-26 | Stop reason: HOSPADM

## 2024-03-23 RX ORDER — ESCITALOPRAM OXALATE 10 MG/1
10 TABLET ORAL DAILY
Status: DISCONTINUED | OUTPATIENT
Start: 2024-03-23 | End: 2024-03-26 | Stop reason: HOSPADM

## 2024-03-23 RX ORDER — MAGNESIUM SULFATE HEPTAHYDRATE 40 MG/ML
2 INJECTION, SOLUTION INTRAVENOUS ONCE
Status: COMPLETED | OUTPATIENT
Start: 2024-03-23 | End: 2024-03-23

## 2024-03-23 RX ORDER — LORAZEPAM 0.5 MG/1
1 TABLET ORAL ONCE
Qty: 1 TABLET | Refills: 0 | Status: CANCELLED | OUTPATIENT
Start: 2024-03-23 | End: 2024-03-23

## 2024-03-23 RX ORDER — LACOSAMIDE 100 MG/1
100 TABLET ORAL EVERY 12 HOURS SCHEDULED
Status: DISCONTINUED | OUTPATIENT
Start: 2024-03-23 | End: 2024-03-24

## 2024-03-23 RX ORDER — LORAZEPAM 2 MG/ML
1 INJECTION INTRAMUSCULAR ONCE AS NEEDED
Status: DISCONTINUED | OUTPATIENT
Start: 2024-03-23 | End: 2024-03-26 | Stop reason: HOSPADM

## 2024-03-23 RX ORDER — ONDANSETRON 2 MG/ML
4 INJECTION INTRAMUSCULAR; INTRAVENOUS EVERY 6 HOURS PRN
Status: DISCONTINUED | OUTPATIENT
Start: 2024-03-23 | End: 2024-03-26 | Stop reason: HOSPADM

## 2024-03-23 RX ORDER — LORAZEPAM 2 MG/ML
1 INJECTION INTRAMUSCULAR
Status: DISCONTINUED | OUTPATIENT
Start: 2024-03-23 | End: 2024-03-23

## 2024-03-23 RX ORDER — LEVETIRACETAM 500 MG/1
1000 TABLET ORAL EVERY 12 HOURS SCHEDULED
Status: DISCONTINUED | OUTPATIENT
Start: 2024-03-23 | End: 2024-03-23

## 2024-03-23 RX ORDER — LORAZEPAM 2 MG/ML
1 INJECTION INTRAMUSCULAR ONCE AS NEEDED
Status: COMPLETED | OUTPATIENT
Start: 2024-03-23 | End: 2024-03-25

## 2024-03-23 RX ORDER — LACOSAMIDE 10 MG/ML
200 INJECTION, SOLUTION INTRAVENOUS ONCE
Status: COMPLETED | OUTPATIENT
Start: 2024-03-23 | End: 2024-03-23

## 2024-03-23 RX ADMIN — ESCITALOPRAM OXALATE 10 MG: 10 TABLET ORAL at 09:00

## 2024-03-23 RX ADMIN — LACOSAMIDE 200 MG: 10 INJECTION, SOLUTION INTRAVENOUS at 14:24

## 2024-03-23 RX ADMIN — LACOSAMIDE 100 MG: 100 TABLET, FILM COATED ORAL at 20:13

## 2024-03-23 RX ADMIN — MAGNESIUM SULFATE HEPTAHYDRATE 2 G: 40 INJECTION, SOLUTION INTRAVENOUS at 05:04

## 2024-03-23 NOTE — CONSULTS
"Consultation - Neurology   Renu Aguiar 28 y.o. female MRN: [id number]  Unit/Bed#: [facility] 704-01 Encounter: [id number]      Assessment/Plan     * New onset seizure (HCC)  Assessment & Plan  28 y.o. female with HTN, anxiety, and depression on Wellbutrin who initially presented to [facility] ED on 3/22/2024 due to 2 witnessed GTC seizures.    Earlier that morning, patient had 2 episodes in which she tensed BUEs and felt a \"shock\" sensation.  Unclear if there was any confusion following these events.  Around 7 PM, she had an episode of GTC seizure activity with foaming at the mouth and roving eyes that lasted 45-60 seconds followed by confusion.  When EMS arrived, patient was alert and oriented.  She was in her normal state upon arrival to the ED.  Vitals stable.  While in the ED, patient had a another witnessed GTC seizure with bilateral lateral tongue bite, foaming at the mouth, labored breathing, roving/dilated eyes, and full body tremors.  No urinary or fecal incontinence.  Patient received a total of 4 mg IV Ativan, received Keppra 1.5 g, and transferred to [facility] for neurologic evaluation.  Patient does not recall either of these witnessed seizure like episodes.    CT head negative for acute intracranial abnormalities.    Labs revealed lactic acid 14.3 (normalized within 2 hours), WBC 19.92, TSH 6.488 (later normalized), and UDS positive for THC.  Urine pregnancy test negative.    Patient has been taking Wellbutrin for anxiety/depression which was increased last March 2023.  She did not feel this was helping her anxiety/depression, so she stopped Wellbutrin approximately 2-4 weeks ago, but then self resumed 1 week ago at the full dose of Wellbutrin 200 mg BID.  This is likely the cause for the new onset seizure, but will complete seizure workup.  Of note, for the past week, she has had intermittent \"electric shock\" sensations and tensing in her arms, but no shaking or loss of consciousness with these " brynn.    Plan:  - Initial MRI brain was limited due to claustrophobia.  No acute infarct, but unable to obtain remainder of sequences.    Recommend repeat MRI brain w/wo contrast pending (will need Ativan for claustrophobia).  If unable to complete today, can be completed in the outpatient setting  - Routine EEG pending  - Patient received Keppra 1500 mg once on arrival  - Will hold off on starting maintenance AED at this time as patient is back to baseline and likely provoked seizures in the setting of Wellbutrin  If patient has further seizures or if MRI/EEG is concerning, will need to start AED (ideally Vimpat 100 mg BID; Keppra is likely not the best option due to underlying anxiety/depression)  - Hold home Wellbutrin 200 mg BID.  Patient is declining inpatient psych consult, but is okay with primary team making adjustments to antidepressant medications.  She will follow-up with her physician upon discharge.  - Administer Ativan prn seizure-like activity    - Continue telemetry  - Continue seizure precautions   - [employer] form completed and faxed.  Patient understands that she is not to drive.  - Discussed seizure precautions  - Frequent neuro checks.  Continue to monitor and notify Neurology with any changes.  - Medical management and supportive care per primary team.  Correction of any metabolic or infectious disturbances.  - Patient is anxious to go home, but is willing to stay until tonight.  As long as EEG is completed and no abnormal findings, patient can go home later tonight.          Renu Aguiar will need follow up in 6-8 weeks with epilepsy attending. She will not require outpatient neurological testing.    History of Present Illness     Reason for Consult / Principal Problem: seizure  Hx and PE limited by: N/A  HPI: Renu Aguiar is a 28 y.o. female with HTN, anxiety, and depression on Wellbutrin who initially presented to [facility] ED on 3/22/2024 due to seizure like  activity.    History obtained per patient and chart review.  Yesterday, patient had 2 episodes in which she tensed bilateral upper extremities.  Unclear if there was any confusion following these events.  Around 7 PM, she had an episode of generalized tonic-clonic seizure activity that lasted 45-60 seconds followed by confusion.  When EMS arrived, patient was alert and oriented.  She was in her normal state upon arrival to the ED.  Vitals stable.  While in the ED, patient had a witnessed generalized tonic-clonic seizure with tongue bite, foaming at the mouth, labored breathing, roving/dilated eyes, and full body tremors.  No urinary or fecal incontinence.  Patient received Ativan 2 mg which aborted the shaking, but she continued to have roving eye movements, so received another 2 mg IV Ativan.  No significant changes following repeat Ativan dosing.  She received Keppra 1.5 g load and transferred to [facility] for neurologic evaluation.  Patient does not recall either of these witnessed seizure like episodes.    CT head negative for acute intracranial abnormalities.  Lactic acid 14.3 (normalized within 2 hours), WBC 19.92, TSH 6.488 (later normalized), and UDS positive for THC.    Patient was evaluated by the overnight neurology resident.  Per prior notes, for the past week, patient has been experiencing intermittent, generalized shock-like sensations throughout her whole body.  These have increased in frequency.  They last approximately 1 minute and often proceed intense anxiety.    No personal or family history of seizures.  Patient denies any alcohol use or tobacco use.  She does use marijuana.  No recent head trauma, illnesses, fever, or chills.  Patient takes Wellbutrin for anxiety and depression.  This dose was increased last year (March 2023).  She did not feel like this was helping her anxiety/depression, so she stopped Wellbutrin approximately 2-4 weeks ago, but then self resumed 1 week ago at the full dose of  Wellbutrin 200 mg BID.  She also takes Lexapro 10 mg daily.  Currently denies SI/HI.  She does have a prior psychiatric admission approximately 8 years ago for suicide attempt (drug overdose with pain meds).    On exam today, patient is lying in bed with a stuffed animal.  Currently states that she feels back to baseline.  She states that she does not want to stay in the hospital any later than tonight because she does not like being here.  Discussed the importance of completing workup, and patient understands.  Also discussed driving restrictions.      Inpatient consult to Neurology  Consult performed by: Adrianna Burrows PA-C  Consult ordered by: DEVANG Martínez          Review of Systems   Neurological:  Positive for tremors and seizures.   All other systems reviewed and are negative.      Historical Information   Past Medical History:   Diagnosis Date    Chlamydia     Depression     no medications for 1 year    Gestational hypertension, third trimester 1/4/2021    Hypertension     Insulin controlled gestational diabetes mellitus (GDM) in third trimester 10/29/2020    Urinary tract infection     Varicella      Past Surgical History:   Procedure Laterality Date    CHOLECYSTECTOMY      AK EXCISION PILONIDAL CYST/SINUS EXTENSIVE N/A 8/3/2021    Procedure: EXCISION PILONIDAL CYST;  Surgeon: Giovany Mack DO;  Location: [facility] OR;  Service: General    WISDOM TOOTH EXTRACTION      WISDOM TOOTH EXTRACTION Bilateral 2010     Social History   Social History     Substance and Sexual Activity   Alcohol Use Not Currently    Comment: rare     Social History     Substance and Sexual Activity   Drug Use Not Currently    Types: Marijuana    Comment: Marijuana prior to pregnancy.     E-Cigarette/Vaping    E-Cigarette Use Never User      E-Cigarette/Vaping Substances    Nicotine Yes     THC No     CBD No     Flavoring No     Other No     Unknown No      Social History     Tobacco Use   Smoking Status Every Day    Current  packs/day: 1.00    Average packs/day: 1 pack/day for 10.0 years (10.0 ttl pk-yrs)    Types: Cigarettes   Smokeless Tobacco Never   Tobacco Comments    less juancarlos 10 cigs a day. Trying to quit.      Family History:   Family History   Problem Relation Age of Onset    Thyroid disease Mother     Other Mother         cerebellar atrophy dx. at 25 years    Diabetes Father     Kidney disease Father     Clotting disorder Father     Breast cancer Maternal Grandmother     Diabetes Paternal Grandmother     Clotting disorder Paternal Grandfather     Colon cancer Neg Hx     Ovarian cancer Neg Hx        Review of previous medical records was completed. Reviewed prior notes, labs, CT head    Meds/Allergies   all current active meds have been reviewed, current meds:   Current Facility-Administered Medications   Medication Dose Route Frequency    acetaminophen (TYLENOL) tablet 650 mg  650 mg Oral Q6H PRN    escitalopram (LEXAPRO) tablet 10 mg  10 mg Oral Daily    LORazepam (ATIVAN) injection 1 mg  1 mg Intravenous Q5 Min PRN    ondansetron (ZOFRAN) injection 4 mg  4 mg Intravenous Q6H PRN   , and PTA meds:   Prior to Admission Medications   Prescriptions Last Dose Informant Patient Reported? Taking?   buPROPion (Wellbutrin SR) 200 MG 12 hr tablet   No No   Sig: Take 1 tablet (200 mg total) by mouth 2 (two) times a day   escitalopram (Lexapro) 10 mg tablet   No No   Sig: Take 1 tablet (10 mg total) by mouth daily      Facility-Administered Medications: None       No Known Allergies    Objective   Vitals:Blood pressure 119/68, pulse 79, temperature 98.7 °F (37.1 °C), SpO2 97%, unknown if currently breastfeeding.,There is no height or weight on file to calculate BMI.  No intake or output data in the 24 hours ending 03/23/24 0737    Invasive Devices:   Invasive Devices       Peripheral Intravenous Line  Duration             Peripheral IV 03/22/24 Left;Proximal;Ventral (anterior) Forearm <1 day    Peripheral IV 03/22/24 Right Antecubital <1  day              Drain  Duration             Open Drain Midline Buttock 962 days                    Physical Exam  Vitals and nursing note reviewed.   Constitutional:       Appearance: Normal appearance. She is obese.      Comments: Patient lying in bed in no acute distress.  She has a stuffed animal in bed that she often touches when she becomes anxious.  Tearful after discussing driving restrictions  BMI 43.06   HENT:      Head: Normocephalic and atraumatic.      Mouth/Throat:      Mouth: Mucous membranes are moist.      Pharynx: Oropharynx is clear.      Comments: Tongue trauma on bilateral lateral aspects as well as the tip of the tongue  Eyes:      Extraocular Movements: Extraocular movements intact.      Conjunctiva/sclera: Conjunctivae normal.      Pupils: Pupils are equal, round, and reactive to light.   Pulmonary:      Effort: Pulmonary effort is normal.   Musculoskeletal:         General: Normal range of motion.   Skin:     General: Skin is warm and dry.   Neurological:      General: No focal deficit present.      Mental Status: She is alert and oriented to person, place, and time.      Comments: See full neuro exam below       Neurologic Exam     Mental Status   Oriented to person, place, and time.   Patient awake and alert.  Oriented to person, place, month, and year.  Mild dysarthria (likely due to tongue trauma)  No aphasia.  Able to follow simple midline and appendicular commands.     Cranial Nerves     CN III, IV, VI   Pupils are equal, round, and reactive to light.  EOMs intact without nystagmus.  No visual field deficits.  Facial sensation to light touch intact throughout.  Facial expressions full and symmetric.  Tongue midline.  Hearing grossly intact.     Motor Exam   Muscle bulk: normal  Overall muscle tone: normal  5/5 strength in bilateral upper and lower extremities.     Sensory Exam   Sensation to light touch intact throughout.     Gait, Coordination, and Reflexes     Gait  Gait: (Deferred  "for patient's safety)  No ataxia with finger to nose bilaterally  No resting or action tremor  No clinical seizure activity       Lab Results: I have personally reviewed pertinent reports.  , CBC:   Results from last 7 days   Lab Units 03/23/24  0457 03/22/24  2104   WBC Thousand/uL 12.40* 19.92*   RBC Million/uL 4.00 4.79   HEMOGLOBIN g/dL 10.5* 12.5   HEMATOCRIT % 33.7* 42.2   MCV fL 84 88   PLATELETS Thousands/uL 261 418*   , BMP/CMP:   Results from last 7 days   Lab Units 03/23/24  0359 03/22/24  2104   SODIUM mmol/L 135 141   POTASSIUM mmol/L 4.1 3.7   CHLORIDE mmol/L 106 103   CO2 mmol/L 20* 13*   BUN mg/dL 7 9   CREATININE mg/dL 0.76 1.01   CALCIUM mg/dL 8.3* 9.6   AST U/L  --  20   ALT U/L  --  18   ALK PHOS U/L  --  66   EGFR ml/min/1.73sq m 105 74   , Vitamin B12:   , HgBA1C:   Results from last 7 days   Lab Units 03/23/24  0404   HEMOGLOBIN A1C % 6.0*   , TSH:   Results from last 7 days   Lab Units 03/23/24  0404 03/22/24  2104   TSH 3RD GENERATON uIU/mL 3.523 6.488*   , Coagulation:   , Lipid Profile:   , Ammonia:   , Urinalysis:   Results from last 7 days   Lab Units 03/23/24  0059   COLOR UA  Yellow   CLARITY UA  Clear   SPEC GRAV UA  1.025   PH UA  6.0   LEUKOCYTES UA  Negative   NITRITE UA  Negative   GLUCOSE UA mg/dl Negative   KETONES UA mg/dl Negative   BILIRUBIN UA  Negative   BLOOD UA  Trace-Intact*   , Drug Screen:   Results from last 7 days   Lab Units 03/23/24  0059   BARBITURATE UR  Negative   BENZODIAZEPINE UR  Negative   THC UR  Positive*   COCAINE UR  Negative   METHADONE URINE  Negative   OPIATE UR  Negative   PCP UR  Negative   , Medication Drug Levels:       Invalid input(s): \"CARBAMAZEPINE\", \"LACOSAMIDE\", \"OXCARBAZEPINE\"  Imaging Studies: I have personally reviewed pertinent reports.   and I have personally reviewed pertinent films in PACS  EKG, Pathology, and Other Studies: I have personally reviewed pertinent reports.   and I have personally reviewed pertinent films in PACS  VTE " Prophylaxis: Sequential compression device (Venodyne)     Code Status: Level 1 - Full Code  Advance Directive and Living Will:      Power of :    POLST:

## 2024-03-23 NOTE — QUICK NOTE
At 2:18 PM, Neurology team was notified by the epileptologist that routine EEG showed right temporal seizure.  The EEG tech started to talk to the patient during the seizure, and patient was able to answer questions appropriately.  No clinical seizure activity was noted.    Loaded the patient with Vimpat 200 mg once and will start maintenance Vimpat 100 mg BID.    Discussed the EEG findings with the patient and her  at bedside.  Patient will require video EEG monitoring.  She is agreeable with this plan.  Following EEG, will obtain MRI brain w/wo contrast (she will require Ativan).      Neurology will continue to follow.

## 2024-03-23 NOTE — PROGRESS NOTES
[facility]  Progress Note  Name: Renu Agiuar I  MRN: [id number]  Unit/Bed#: [facility] 704-01 I Date of Admission: 3/23/2024   Date of Service: 3/23/2024 I Hospital Day: 0    Assessment/Plan   * New onset seizure (HCC)  Assessment & Plan  Pt presents as a transfer from [facility] for evaluation of seizure-like activity. Had 3 episodes at home prior to EMS being called. While in [facility] ED, she had another seizure that was terminated with Ativan. Received Keppra load prior to transfer.  Unclear etiology however previously stopped wellbutrin dose and self resumed 1 week PTA @ 200mg BID  CTH negative  UDS + THC  Lactic 14.2 following seizure, resolved to 1.7  Neurology following, appreciate recs'  Hold wellbutrin 200mg   MRI unable to be performed. Recommending repeat with ativan.  Notified by neuro that patient had right temporal lobe seizure on her routine EEG and will require additional testing   Started on Vimpat 100 mg twice daily  Seizure precautions  F/u with epileptologist attending 4-6 weeks upon discharge    Generalized anxiety disorder  Assessment & Plan  Takes Wellbutrin and Lexapro as an outpatient  Offered psych consult for medication adjustments, however patient will follow-up outpatient with her psychiatrist    H/O major depression  Assessment & Plan  Takes Wellbutrin and Lexapro as an outpatient  Denies SI/HI  Recently self restarted 200mg BID 1 week PTA  Hold wellbutrin   F/u with outpt psych for further medication adjustments     High anion gap metabolic acidosis-resolved as of 3/23/2024  Assessment & Plan  Likely 2/2 to lactic acidosis in the setting of seizure activity  F/u morning labs         VTE Pharmacologic Prophylaxis: VTE Score: 2 Low Risk (Score 0-2) - Encourage Ambulation.    Mobility:   Basic Mobility Inpatient Raw Score: 23  JH-HLM Goal: 7: Walk 25 feet or more  JH-HLM Achieved: 7: Walk 25 feet or more  JH-HLM Goal achieved. Continue to encourage  appropriate mobility.    Patient Centered Rounds: I performed bedside rounds with nursing staff today.   Discussions with Specialists or Other Care Team Provider: Neuro     Education and Discussions with Family / Patient: Updated  () at bedside.    Total Time Spent on Date of Encounter in care of patient: This time was spent on one or more of the following: performing physical exam; counseling and coordination of care; obtaining or reviewing history; documenting in the medical record; reviewing/ordering tests, medications or procedures; communicating with other healthcare professionals and discussing with patient's family/caregivers.    Current Length of Stay: 0 day(s)  Current Patient Status: Inpatient   Certification Statement: The patient will continue to require additional inpatient hospital stay due to continued workup for new onset seizure  Discharge Plan: Anticipate discharge tomorrow to home.    Code Status: Level 1 - Full Code    Subjective:   Seen and examined.  Patient was seen shortly after returning from MRI in which she states she was unable to fully perform due to claustrophobia.  At this time patient was also adamant on leaving today.  After further discussion with neurology and follow-up EEG results, patient is recommended to stay for further workup.  Per additional conversation with neurology patient willing to stay for 1 more night.    Objective:     Vitals:   Temp (24hrs), Av.2 °F (36.8 °C), Min:97.8 °F (36.6 °C), Max:98.7 °F (37.1 °C)    Temp:  [97.8 °F (36.6 °C)-98.7 °F (37.1 °C)] 98.7 °F (37.1 °C)  HR:  [] 79  Resp:  [17-24] 17  BP: ()/(57-72) 119/68  SpO2:  [93 %-97 %] 97 %  There is no height or weight on file to calculate BMI.     Input and Output Summary (last 24 hours):     Intake/Output Summary (Last 24 hours) at 3/23/2024 1514  Last data filed at 3/23/2024 1145  Gross per 24 hour   Intake 0 ml   Output --   Net 0 ml       Physical Exam:   Physical  Exam  Constitutional:       General: She is not in acute distress.     Appearance: She is obese.   HENT:      Mouth/Throat:      Mouth: Mucous membranes are moist.   Cardiovascular:      Rate and Rhythm: Normal rate.      Heart sounds: No murmur heard.     No friction rub. No gallop.   Pulmonary:      Breath sounds: No wheezing, rhonchi or rales.   Abdominal:      Palpations: Abdomen is soft.   Musculoskeletal:      Right lower leg: No edema.      Left lower leg: No edema.   Skin:     General: Skin is warm and dry.   Neurological:      Mental Status: Mental status is at baseline.          Additional Data:     Labs:  Results from last 7 days   Lab Units 03/23/24  0457   WBC Thousand/uL 12.40*   HEMOGLOBIN g/dL 10.5*   HEMATOCRIT % 33.7*   PLATELETS Thousands/uL 261   NEUTROS PCT % 65   LYMPHS PCT % 27   MONOS PCT % 7   EOS PCT % 1     Results from last 7 days   Lab Units 03/23/24  0359 03/22/24  2104   SODIUM mmol/L 135 141   POTASSIUM mmol/L 4.1 3.7   CHLORIDE mmol/L 106 103   CO2 mmol/L 20* 13*   BUN mg/dL 7 9   CREATININE mg/dL 0.76 1.01   ANION GAP mmol/L 9 25*   CALCIUM mg/dL 8.3* 9.6   ALBUMIN g/dL  --  4.5   TOTAL BILIRUBIN mg/dL  --  0.29   ALK PHOS U/L  --  66   ALT U/L  --  18   AST U/L  --  20   GLUCOSE RANDOM mg/dL 85 126         Results from last 7 days   Lab Units 03/22/24  2050   POC GLUCOSE mg/dl 123     Results from last 7 days   Lab Units 03/23/24  0404   HEMOGLOBIN A1C % 6.0*     Results from last 7 days   Lab Units 03/22/24  2322 03/22/24  2104   LACTIC ACID mmol/L 1.7 14.3*       Lines/Drains:  Invasive Devices       Peripheral Intravenous Line  Duration             Peripheral IV 03/22/24 Left;Proximal;Ventral (anterior) Forearm <1 day    Peripheral IV 03/22/24 Right Antecubital <1 day              Drain  Duration             Open Drain Midline Buttock 963 days                          Imaging: Reviewed radiology reports from this admission including: MRI brain    Recent Cultures (last 7 days):          Last 24 Hours Medication List:   Current Facility-Administered Medications   Medication Dose Route Frequency Provider Last Rate    acetaminophen  650 mg Oral Q6H PRN DEVANG Martínez      escitalopram  10 mg Oral Daily DEVANG Martínez      lacosamide  100 mg Oral Q12H [facility] Adrianna Burrows PA-C      LORazepam  1 mg Intravenous Q5 Min PRN Shari Ford DO      LORazepam  1 mg Intravenous Once PRN Adrianna Burrows PA-C      And    LORazepam  1 mg Intravenous Once PRN Adrianna Burrows PA-C      ondansetron  4 mg Intravenous Q6H PRN DEVANG Martínez          Today, Patient Was Seen By: Maddie Brown PA-C    **Please Note: This note may have been constructed using a voice recognition system.**

## 2024-03-23 NOTE — H&P
[facility]  H&P  Name: Renu Aguiar 28 y.o. female I MRN: [id number]  Unit/Bed#: [facility]PHP 704-01 I Date of Admission: 3/23/2024   Date of Service: 3/23/2024 I Hospital Day: 0      Assessment/Plan   * New onset seizure (HCC)  Assessment & Plan  Pt presents as a transfer from [facility] for evaluation of seizure-like activity. Had 3 episodes at home prior to EMS being called. While in [facility] ED, she had another seizure that was terminated with Ativan. Received Keppra load prior to transfer.  Unclear etiology; hold Wellbutrin   CTH negative  Lactic 14.2 following seizure, resolved to 1.7  Neurology following  Obtain MRI  Initiate cvEEG  Seizure precautions  F/u AM labs    High anion gap metabolic acidosis  Assessment & Plan  Likely 2/2 to lactic acidosis in the setting of seizure activity  F/u morning labs    Generalized anxiety disorder  Assessment & Plan  Takes Wellbutrin and Lexapro as an outpatient  As Wellbutrin can lower seizure threshold, will hold for now    H/O major depression  Assessment & Plan  Takes Wellbutrin and Lexapro as an outpatient  As Wellbutrin can lower seizure threshold, will hold for now           VTE Pharmacologic Prophylaxis: VTE Score: 2 Low Risk (Score 0-2) - Encourage Ambulation.  Code Status: Level 1 - Full Code   Discussion with family: Patient declined call to .     Anticipated Length of Stay: Patient will be admitted on an inpatient basis with an anticipated length of stay of greater than 2 midnights secondary to seizure workup.    Total Time Spent on Date of Encounter in care of patient: 45 mins. This time was spent on one or more of the following: performing physical exam; counseling and coordination of care; obtaining or reviewing history; documenting in the medical record; reviewing/ordering tests, medications or procedures; communicating with other healthcare professionals and discussing with patient's  "family/caregivers.    Chief Complaint: New onset seizure activity    History of Present Illness:  Renu Aguiar is a 28 y.o. female with a PMH of MDD and ALMA who presented initially to [facility] ED for seizure activity. History was largely obtained by EMS, who reported pt had 3 seizures at home prior to calling EMS. While being worked up in the ED, she proceeded to have another witnessed seizure by staff that was terminated with Ativan. She subsequently received a loading dose of Keppra. Lactic acid post seizure was 14.3 in the setting of seizure which resolved to 1.7 within 2 hours. Other labs were significant for HAGMA 2/2 lactic acidosis in the setting of seizure.     On exam, pt reports what she calls \"zingers\", where she has intermittent periods of brain fog with associated weakness that requires her to stop and rest until the spell subsides. She reports this has been ongoing for 3 days. Denies any changes to her medications, recent sick contacts, recent travel. She was transferred to [facility] for further medical management including neurology consultation, cvEEG, and MRI.    Review of Systems:  Review of Systems   Constitutional: Negative.    HENT: Negative.     Eyes: Negative.    Respiratory: Negative.     Cardiovascular: Negative.    Gastrointestinal: Negative.    Endocrine: Negative.    Genitourinary: Negative.    Musculoskeletal: Negative.    Skin: Negative.    Allergic/Immunologic: Negative.    Neurological:  Positive for seizures, weakness (intermittent with associated \"brain fog\") and numbness (intermittent (L sided)). Negative for dizziness and light-headedness.   Psychiatric/Behavioral: Negative.         Past Medical and Surgical History:   Past Medical History:   Diagnosis Date    Chlamydia     Depression     no medications for 1 year    Gestational hypertension, third trimester 1/4/2021    Hypertension     Insulin controlled gestational diabetes mellitus (GDM) in third trimester 10/29/2020    Urinary " tract infection     Varicella        Past Surgical History:   Procedure Laterality Date    CHOLECYSTECTOMY      CT EXCISION PILONIDAL CYST/SINUS EXTENSIVE N/A 8/3/2021    Procedure: EXCISION PILONIDAL CYST;  Surgeon: Giovany Mack DO;  Location: [facility] OR;  Service: General    WISDOM TOOTH EXTRACTION      WISDOM TOOTH EXTRACTION Bilateral 2010       Meds/Allergies:  Prior to Admission medications    Medication Sig Start Date End Date Taking? Authorizing Provider   buPROPion (Wellbutrin SR) 200 MG 12 hr tablet Take 1 tablet (200 mg total) by mouth 2 (two) times a day 10/23/23   Lynda West PA-C   escitalopram (Lexapro) 10 mg tablet Take 1 tablet (10 mg total) by mouth daily 10/23/23   Lynda West PA-C     I have reviewed home medications with patient personally.    Allergies: No Known Allergies    Social History:  Marital Status: /Civil Union   Occupation: [employer]  Patient Pre-hospital Living Situation: Home  Patient Pre-hospital Level of Mobility: walks  Patient Pre-hospital Diet Restrictions: None  Substance Use History:   Social History     Substance and Sexual Activity   Alcohol Use Not Currently    Comment: rare     Social History     Tobacco Use   Smoking Status Every Day    Current packs/day: 1.00    Average packs/day: 1 pack/day for 10.0 years (10.0 ttl pk-yrs)    Types: Cigarettes   Smokeless Tobacco Never   Tobacco Comments    less juancarlos 10 cigs a day. Trying to quit.      Social History     Substance and Sexual Activity   Drug Use Not Currently    Types: Marijuana    Comment: Marijuana prior to pregnancy.       Family History:  Family History   Problem Relation Age of Onset    Thyroid disease Mother     Other Mother         cerebellar atrophy dx. at 25 years    Diabetes Father     Kidney disease Father     Clotting disorder Father     Breast cancer Maternal Grandmother     Diabetes Paternal Grandmother     Clotting disorder Paternal Grandfather     Colon cancer Neg Hx     Ovarian cancer Neg Hx         Physical Exam:     Vitals:   Blood Pressure: 96/66 (03/23/24 0251)  Pulse: 68 (03/23/24 0251)  Temperature: 98.2 °F (36.8 °C) (03/23/24 0251)  SpO2: 97 % (03/23/24 0251)    Physical Exam  Constitutional:       General: She is not in acute distress.  HENT:      Head: Normocephalic and atraumatic.   Eyes:      Extraocular Movements: Extraocular movements intact.      Pupils: Pupils are equal, round, and reactive to light.   Cardiovascular:      Rate and Rhythm: Normal rate and regular rhythm.      Pulses: Normal pulses.      Heart sounds: Normal heart sounds.   Pulmonary:      Effort: Pulmonary effort is normal. No respiratory distress.      Breath sounds: Normal breath sounds.   Abdominal:      General: Bowel sounds are normal. There is no distension.      Palpations: Abdomen is soft.      Tenderness: There is no abdominal tenderness.   Musculoskeletal:         General: Normal range of motion.   Skin:     General: Skin is warm and dry.      Capillary Refill: Capillary refill takes less than 2 seconds.   Neurological:      General: No focal deficit present.      Mental Status: She is alert and oriented to person, place, and time. Mental status is at baseline.      Cranial Nerves: No cranial nerve deficit.      Sensory: No sensory deficit.   Psychiatric:         Behavior: Behavior normal.         Additional Data:     Lab Results:  Results from last 7 days   Lab Units 03/22/24  2104   WBC Thousand/uL 19.92*   HEMOGLOBIN g/dL 12.5   HEMATOCRIT % 42.2   PLATELETS Thousands/uL 418*   NEUTROS PCT % 58   LYMPHS PCT % 31   MONOS PCT % 7   EOS PCT % 2     Results from last 7 days   Lab Units 03/22/24  2104   SODIUM mmol/L 141   POTASSIUM mmol/L 3.7   CHLORIDE mmol/L 103   CO2 mmol/L 13*   BUN mg/dL 9   CREATININE mg/dL 1.01   ANION GAP mmol/L 25*   CALCIUM mg/dL 9.6   ALBUMIN g/dL 4.5   TOTAL BILIRUBIN mg/dL 0.29   ALK PHOS U/L 66   ALT U/L 18   AST U/L 20   GLUCOSE RANDOM mg/dL 126         Results from last 7 days   Lab  Units 03/22/24 2050   POC GLUCOSE mg/dl 123         Results from last 7 days   Lab Units 03/22/24  2322 03/22/24  2104   LACTIC ACID mmol/L 1.7 14.3*       Lines/Drains:  Invasive Devices       Peripheral Intravenous Line  Duration             Peripheral IV 03/22/24 Left;Proximal;Ventral (anterior) Forearm <1 day    Peripheral IV 03/22/24 Right Antecubital <1 day              Drain  Duration             Open Drain Midline Buttock 962 days                        Imaging: Reviewed radiology reports from this admission including: CT head  MRI inpatient order    (Results Pending)       EKG and Other Studies Reviewed on Admission:   EKG: No EKG obtained.    ** Please Note: This note has been constructed using a voice recognition system. **

## 2024-03-23 NOTE — ASSESSMENT & PLAN NOTE
Takes Wellbutrin and Lexapro as an outpatient  Offered psych consult for medication adjustments, however patient will follow-up outpatient with her psychiatrist

## 2024-03-23 NOTE — ASSESSMENT & PLAN NOTE
Takes Wellbutrin and Lexapro as an outpatient  Denies SI/HI  Recently self restarted 200mg BID 1 week PTA  Hold wellbutrin   F/u with outpt psych for further medication adjustments

## 2024-03-23 NOTE — ED PROVIDER NOTES
Final Diagnosis:  1. New onset seizure (HCC)        Chief Complaint   Patient presents with    Seizure - New Onset     HPI  Patient presents for evaluation of seizure-like activity.  History was originally provided by EMS.  They stated that earlier today the patient had 2 episodes where she had some tensing of her bilateral upper extremities.  Unclear if she had any episodes of confusion after that.  Then this evening around 7:00 she had an episode of generalized tonic-clonic seizures.  This lasted only a couple seconds but she was confused afterwards.  EMS was eventually contacted.  Upon their arrival EMS states that she was alert and oriented.  She was then transferred to the emergency department.  She was in her normal status or upon arrival here.  During triage history taking with dinner she started to have a generalized tonic-clonic seizure.  He had tongue biting, extensive foaming at the mouth with labored breathing.  Eyes were roving, dilated.  Full body tremors.  No obvious loss of bowel or bladder function.  Patient was provided with 2 mg Ativan IV which seem to abort the generalized seizure.  As I was monitoring her she still had some significant eye roving so I proceeded to give her another 2 mg.  No distinct change after repeated dosing.  Patient then was monitored during workup.    Review of records show that the patient's follows for some general psychology issues but overall does not to be appear to be on large amount of medications.      Unless otherwise specified:  - No language barrier.   - History obtained from patient.   - There are no limitations to the history obtained.  - Previous charting was reviewed    PMH:   has a past medical history of Chlamydia, Depression, Gestational hypertension, third trimester (1/4/2021), Hypertension, Insulin controlled gestational diabetes mellitus (GDM) in third trimester (10/29/2020), Urinary tract infection, and Varicella.    PSH:   has a past surgical history  that includes Levels tooth extraction; Levels tooth extraction (Bilateral, 2010); Cholecystectomy; and pr excision pilonidal cyst/sinus extensive (N/A, 8/3/2021).       ROS:  Review of Systems   - 13 point ROS was performed and all are normal unless stated in the history above.   - Nursing note reviewed. Vitals reviewed.   - Orders placed by myself and/or advanced practitioner / resident.        PE:   Vitals:    03/22/24 2215 03/22/24 2230 03/22/24 2315 03/22/24 2330   BP: 115/66 112/66 129/72 124/67   BP Location: Left arm Left arm Left arm Left arm   Pulse: 88 82 85 78   Resp: (!) 24 22 19 20   Temp:       TempSrc:       SpO2: 93% 95% 97% 97%   Weight:         Vitals reviewed by me.     Patient having active seizure when I first evaluated her.  Foaming at the mouth.  Abrasions on tongue with clenched jaw.  Nursing provided jaw thrust.  She was saturating well but we put her on a nonrebreather, active, labored breathing.  This resolved after receiving Ativan.  She has no signs of head trauma.  No obvious rash.  Oropharynx otherwise appears normal.  No damage to teeth from what I could tell.  Unless otherwise specified above:    General: VS reviewed  Appears in NAD    Head: Normocephalic, atraumatic  Eyes: EOM-I.     ENT: Atraumatic external nose and ears.    No drooling.     Neck: No JVD.    CV: No pallor noted  Lungs:   No tachypnea  No respiratory distress    Abdomen:  Soft, non-tender, non-distended    MSK:   No obvious deformity    Skin: Dry, intact. No obvious rash.    Psychiatric/Behavioral: Appropriate mood and affect   Exam: deferred    Physical Exam     CriticalCare Time    Date/Time: 3/23/2024 12:02 AM    Performed by: Marc Mansfield MD  Authorized by: Marc Mansfield MD    Critical care provider statement:     Critical care time (minutes):  77    Critical care time was exclusive of:  Separately billable procedures and treating other patients and teaching time    Critical care was necessary to  treat or prevent imminent or life-threatening deterioration of the following conditions:  Respiratory failure and CNS failure or compromise    Critical care was time spent personally by me on the following activities:  Blood draw for specimens, obtaining history from patient or surrogate, development of treatment plan with patient or surrogate, discussions with consultants, evaluation of patient's response to treatment, examination of patient, review of old charts, re-evaluation of patient's condition, ordering and review of radiographic studies and ordering and review of laboratory studies     A:  - Nursing note reviewed.                      CT head without contrast   Final Result      No acute intracranial abnormality.                  Workstation performed: [id number]           Orders Placed This Encounter   Procedures    Critical Care    Blood culture #1    Blood culture #2    CT head without contrast    CBC and differential    Comprehensive metabolic panel    Lactic acid, plasma (w/reflex if result > 2.0)    HS Troponin 0hr (reflex protocol)    Magnesium    Phosphorus    Calcium, ionized    TSH, 3rd generation with Free T4 reflex    HS Troponin I 2hr    UA w Reflex to Microscopic w Reflex to Culture    Lactic acid 2 Hours    T4, free    Procalcitonin    Continuous cardiac monitoring    Pulse Oximetry,Spot    Nasal cannula oxygen    Elevate HOB    Insert peripheral IV    POCT pregnancy, urine    ECG 12 lead    Transfer to other facility    Seizure precautions     Labs Reviewed   CBC AND DIFFERENTIAL - Abnormal       Result Value Ref Range Status    WBC 19.92 (*) 4.31 - 10.16 Thousand/uL Final    RBC 4.79  3.81 - 5.12 Million/uL Final    Hemoglobin 12.5  11.5 - 15.4 g/dL Final    Hematocrit 42.2  34.8 - 46.1 % Final    MCV 88  82 - 98 fL Final    MCH 26.1 (*) 26.8 - 34.3 pg Final    MCHC 29.6 (*) 31.4 - 37.4 g/dL Final    RDW 16.3 (*) 11.6 - 15.1 % Final    MPV 10.1  8.9 - 12.7 fL Final    Platelets 418 (*) 149 -  390 Thousands/uL Final    nRBC 0  /100 WBCs Final    Neutrophils Relative 58  43 - 75 % Final    Immature Grans % 1  0 - 2 % Final    Lymphocytes Relative 31  14 - 44 % Final    Monocytes Relative 7  4 - 12 % Final    Eosinophils Relative 2  0 - 6 % Final    Basophils Relative 1  0 - 1 % Final    Neutrophils Absolute 11.69 (*) 1.85 - 7.62 Thousands/µL Final    Absolute Immature Grans 0.10  0.00 - 0.20 Thousand/uL Final    Absolute Lymphocytes 6.26 (*) 0.60 - 4.47 Thousands/µL Final    Absolute Monocytes 1.40 (*) 0.17 - 1.22 Thousand/µL Final    Eosinophils Absolute 0.37  0.00 - 0.61 Thousand/µL Final    Basophils Absolute 0.10  0.00 - 0.10 Thousands/µL Final   COMPREHENSIVE METABOLIC PANEL - Abnormal    Sodium 141  135 - 147 mmol/L Final    Potassium 3.7  3.5 - 5.3 mmol/L Final    Chloride 103  96 - 108 mmol/L Final    CO2 13 (*) 21 - 32 mmol/L Final    ANION GAP 25 (*) 4 - 13 mmol/L Final    BUN 9  5 - 25 mg/dL Final    Creatinine 1.01  0.60 - 1.30 mg/dL Final    Comment: Standardized to IDMS reference method    Glucose 126  65 - 140 mg/dL Final    Comment: If the patient is fasting, the ADA then defines impaired fasting glucose as > 100 mg/dL and diabetes as > or equal to 123 mg/dL.    Calcium 9.6  8.4 - 10.2 mg/dL Final    AST 20  13 - 39 U/L Final    ALT 18  7 - 52 U/L Final    Comment: Specimen collection should occur prior to Sulfasalazine administration due to the potential for falsely depressed results.     Alkaline Phosphatase 66  34 - 104 U/L Final    Total Protein 7.6  6.4 - 8.4 g/dL Final    Albumin 4.5  3.5 - 5.0 g/dL Final    Total Bilirubin 0.29  0.20 - 1.00 mg/dL Final    Comment: Use of this assay is not recommended for patients undergoing treatment with eltrombopag due to the potential for falsely elevated results.  N-acetyl-p-benzoquinone imine (metabolite of Acetaminophen) will generate erroneously low results in samples for patients that have taken an overdose of Acetaminophen.    eGFR 74   "ml/min/1.73sq m Final    Narrative:     National Kidney Disease Foundation guidelines for Chronic Kidney Disease (CKD):     Stage 1 with normal or high GFR (GFR > 90 mL/min/1.73 square meters)    Stage 2 Mild CKD (GFR = 60-89 mL/min/1.73 square meters)    Stage 3A Moderate CKD (GFR = 45-59 mL/min/1.73 square meters)    Stage 3B Moderate CKD (GFR = 30-44 mL/min/1.73 square meters)    Stage 4 Severe CKD (GFR = 15-29 mL/min/1.73 square meters)    Stage 5 End Stage CKD (GFR <15 mL/min/1.73 square meters)  Note: GFR calculation is accurate only with a steady state creatinine   LACTIC ACID, PLASMA (W/REFLEX IF RESULT > 2.0) - Abnormal    LACTIC ACID 14.3 (*) 0.5 - 2.0 mmol/L Final    Narrative:     Result may be elevated if tourniquet was used during collection.   TSH, 3RD GENERATION WITH FREE T4 REFLEX - Abnormal    TSH 3RD GENERATON 6.488 (*) 0.450 - 4.500 uIU/mL Final    Comment: The recommended reference ranges for TSH during pregnancy are as follows:   First trimester 0.100 to 2.500 uIU/mL   Second trimester  0.200 to 3.000 uIU/mL   Third trimester 0.300 to 3.000 uIU/m    Note: Normal ranges may not apply to patients who are transgender, non-binary, or whose legal sex, sex at birth, and gender identity differ.  Adult TSH (3rd generation) reference range follows the recommended guidelines of the American Thyroid Association, January, 2020.   HS TROPONIN I 0HR - Normal    hs TnI 0hr 4  \"Refer to ACS Flowchart\"- see link ng/L Final    Comment:                                              Initial (time 0) result  If >=50 ng/L, Myocardial injury suggested ;  Type of myocardial injury and treatment strategy  to be determined.  If 5-49 ng/L, a delta result at 2 hours and or 4 hours will be needed to further evaluate.  If <4 ng/L, and chest pain has been >3 hours since onset, patient may qualify for discharge based on the HEART score in the ED.  If <5 ng/L and <3hours since onset of chest pain, a delta result at 2 hours will " "be needed to further evaluate.    HS Troponin 99th Percentile URL of a Health Population=12 ng/L with a 95% Confidence Interval of 8-18 ng/L.    Second Troponin (time 2 hours)  If calculated delta >= 20 ng/L,  Myocardial injury suggested ; Type of myocardial injury and treatment strategy to be determined.  If 5-49 ng/L and the calculated delta is 5-19 ng/L, consult medical service for evaluation.  Continue evaluation for ischemia on ecg and other possible etiology and repeat hs troponin at 4 hours.  If delta is <5 ng/L at 2 hours, consider discharge based on risk stratification via the HEART score (if in ED), or JEFFERSON risk score in IP/Observation.    HS Troponin 99th Percentile URL of a Health Population=12 ng/L with a 95% Confidence Interval of 8-18 ng/L.   MAGNESIUM - Normal    Magnesium 1.9  1.9 - 2.7 mg/dL Final   PHOSPHORUS - Normal    Phosphorus 3.3  2.7 - 4.5 mg/dL Final   HS TROPONIN I 2HR - Normal    hs TnI 2hr 6  \"Refer to ACS Flowchart\"- see link ng/L Final    Comment:                                              Initial (time 0) result  If >=50 ng/L, Myocardial injury suggested ;  Type of myocardial injury and treatment strategy  to be determined.  If 5-49 ng/L, a delta result at 2 hours and or 4 hours will be needed to further evaluate.  If <4 ng/L, and chest pain has been >3 hours since onset, patient may qualify for discharge based on the HEART score in the ED.  If <5 ng/L and <3hours since onset of chest pain, a delta result at 2 hours will be needed to further evaluate.    HS Troponin 99th Percentile URL of a Health Population=12 ng/L with a 95% Confidence Interval of 8-18 ng/L.    Second Troponin (time 2 hours)  If calculated delta >= 20 ng/L,  Myocardial injury suggested ; Type of myocardial injury and treatment strategy to be determined.  If 5-49 ng/L and the calculated delta is 5-19 ng/L, consult medical service for evaluation.  Continue evaluation for ischemia on ecg and other possible etiology " and repeat hs troponin at 4 hours.  If delta is <5 ng/L at 2 hours, consider discharge based on risk stratification via the HEART score (if in ED), or JEFFERSON risk score in IP/Observation.    HS Troponin 99th Percentile URL of a Health Population=12 ng/L with a 95% Confidence Interval of 8-18 ng/L.    Delta 2hr hsTnI 2  <20 ng/L Final   LACTIC ACID 2 HOUR - Normal    LACTIC ACID 1.7  0.5 - 2.0 mmol/L Final    Narrative:     Result may be elevated if tourniquet was used during collection.   POCT GLUCOSE - Normal    POC Glucose 123  65 - 140 mg/dl Final   BLOOD CULTURE   BLOOD CULTURE   CALCIUM, IONIZED   UA W REFLEX TO MICROSCOPIC WITH REFLEX TO CULTURE   T4, FREE   PROCALCITONIN TEST   POCT PREGNANCY, URINE         Final Diagnosis:  1. New onset seizure (HCC)        P:  -Patient presents for new onset seizures.  Unclear of underlying pathology.  Will evaluate for electrolyte abnormalities, head bleed, mass, ischemia.  - Patient's mental status slowly improved while here in the emergency department.  She was then able to talk to me briefly.  Maintaining airway without issue.  -  was later bedside.  He states that the patient's been having shocklike sensations throughout her body which were intermittent and generalized.  She had told her  that they were happening more often recently so she was going to follow-up with her primary care on Monday and discussed why this may be happening.  He denies any head trauma.  He does not believe that she would have purposely overdosed on anything though she did have an episode of where she tried to harm herself in the distant past.  - Laboratory analysis shows leukocytosis as well as marked lactic acidosis which is consistent with seizure-like activity.  Head CT without acute pathology.  Reviewed with medical team onsite.  They believe the patient would be better served by transfer to other campus where she could be evaluated by neurology, EEG, MRI if indicated.  - Given  significant lactic acidosis I reviewed the possibility of giving 30 cc/kg with the  who is bedside.  I discussed with him that I currently do not have a source for infection and have a low suspicion of her having an acute infection at this time.  Discussed the risks and benefits of the extra fluid.  He would like to defer at this time which I believe is appropriate.  - Initiate transfer the patient.  Reviewed with medical team in [place].  They were comfortable excepting the patient.  Unclear if the patient will need EMU versus regular bed and normal evaluation.  I was encouraged then to reach out reviewed the case with neurology because if the patient needs a EMU bed she will need to be monitored here until she can be transferred versus if she does not she can be transferred immediately.  - Reach out to the neurology team reviewed the case with them.  No seizure history in patient.  No meningeal signs, we just think that an LP would be indicated.  They do not believe the patient will require continue EMU monitoring.  Okay for normal bed.  [facility]      Unless otherwise noted the patient's medications were reviewed and they should continue as directed.    Unless otherwise specified:  CC is exclusive from any separately billable procedures  CC is exclusive of treating other patients  CC is exclusive of teaching time     Medications   LORazepam (ATIVAN) 2 mg/mL injection **ADS Override Pull** (  Not Given 3/22/24 2323)   LORazepam (ATIVAN) injection 2 mg (2 mg Intravenous Given 3/22/24 2049)   LORazepam (ATIVAN) injection 2 mg (2 mg Intravenous Given 3/22/24 2059)   sodium chloride 0.9 % bolus 1,000 mL (1,000 mL Intravenous New Bag 3/22/24 2111)   ondansetron (ZOFRAN) injection 4 mg (4 mg Intravenous Given 3/22/24 2110)   levETIRAcetam (KEPPRA) injection 1,500 mg (1,500 mg Intravenous Given 3/22/24 2116)   cefTRIAXone (ROCEPHIN) IVPB (premix in dextrose)  1,000 mg 50 mL (0 mg Intravenous Stopped 3/22/24 2232)     Time reflects when diagnosis was documented in both MDM as applicable and the Disposition within this note       Time User Action Codes Description Comment    3/23/2024 12:00 AM Marc Mansfield Add [R56.9] New onset seizure (HCC)           ED Disposition       ED Disposition   Transfer to Another Facility-In Network    Condition   --    Date/Time   Sat Mar 23, 2024 12:00 AM    Comment   Renu Aguiar should be transferred out to [facility].               MD Documentation      Flowsheet Row Most Recent Value   Patient Condition The patient has been stabilized such that within reasonable medical probability, no material deterioration of the patient condition or the condition of the unborn child(quentin) is likely to result from the transfer   Reason for Transfer Level of Care needed not available at this facility   Benefits of Transfer Specialized equipment and/or services available at the receiving facility (Include comment)________________________  [Neuro, Possible EEG or MRI]   Risks of Transfer Potential for delay in receiving treatment, Potential deterioration of medical condition, Loss of IV, Increased discomfort during transfer, Possible worsening of condition or death during transfer   Accepting Physician Aida   Accepting Facility Name, City & State  [place]    (Name & Tel number) [phone]   Transported by (Company and Unit #) [employer]   Sending MD Mansfield   Provider Certification General risk, such as traffic hazards, adverse weather conditions, rough terrain or turbulence, possible failure of equipment (including vehicle or aircraft), or consequences of actions of persons outside the control of the transport personnel, Unanticipated needs of medical equipment and personnel during transport, Risk of worsening condition, The possibility of a transport vehicle being unavailable          RN Documentation      Flowsheet Row Most  "Recent Value   Accepting Facility Name, [facility]    (Name & Tel number) [phone]   Transported by (Company and Unit #) [employer]          Follow-up Information    None       Patient's Medications   Discharge Prescriptions    No medications on file     No discharge procedures on file.  Prior to Admission Medications   Prescriptions Last Dose Informant Patient Reported? Taking?   buPROPion (Wellbutrin SR) 200 MG 12 hr tablet 3/22/2024  No Yes   Sig: Take 1 tablet (200 mg total) by mouth 2 (two) times a day   escitalopram (Lexapro) 10 mg tablet 3/22/2024  No Yes   Sig: Take 1 tablet (10 mg total) by mouth daily      Facility-Administered Medications: None       Portions of the record may have been created with voice recognition software. Occasional wrong word or \"sound a like\" substitutions may have occurred due to the inherent limitations of voice recognition software. Read the chart carefully and recognize, using context, where substitutions have occurred.    Electronically signed by:  MD Marc Sanchez MD  03/23/24 0004    "

## 2024-03-23 NOTE — ASSESSMENT & PLAN NOTE
Takes Wellbutrin and Lexapro as an outpatient  As Wellbutrin can lower seizure threshold, will hold for now

## 2024-03-23 NOTE — DISCHARGE INSTR - AVS FIRST PAGE
Get blood work done in 1 and 2 weeks to check your blood counts and liver and kidney function and electrolytes (CBCD, CMP). Get script from your primary care physician.      SEIZURE SAFETY    Don’t let fear of seizures keep you at home. Be smart about your activities to make sure you are safe. The guidelines below can help you be as safe as possible.    Make sure the people around you are aware of:  What happens when you have a seizure  Correct seizure first aid  First aid for choking (consider taking a basic life support class)  When they should know to call 911 or for medical help    Avoid common triggers of seizures:  Missing your medications  Not getting enough sleep  Drinking alcohol  Using illegal drugs    Safety measures for at home:  In the bathroom:   Do not take baths in the bathtub. Instead, take only showers. Try to have a family member available while you are in the shower.  Make sure the drain in the bathtub/shower is working properly to avoid pooling of water.  You can consider a fitted shower seat. Recessed soap trays can minimize injury if you would happen to fall in the shower.   Bathroom doors can be hung to open outwards, so that the door can still be opened if you fall against the door.   Do not lock the bathroom door. Use an “Occupied” sign on the door or other signal to let others know you are in the bathroom.  Safety locks can be obtained from the [employer].  On your water heater, set your water temperature to a warm temperature that is not scalding to avoid burns from very hot water.   Put non-skid strips/ in the bathtub.  Use an electric shaver.  Avoid any electrical appliances (including electric shaver) in the bathroom or near water.  Use shatterproof glass for mirrors.    In the kitchen:   When possible, cook using a microwave.  Only cook or use electrical appliances when someone else is in the house and available.   As much as possible, grill food and avoid fryers  or frying food.  Use the back burners of the stove and turn the pot handles toward the back of the stove.  Avoid carrying hot pans, pots of boiling water, or very hot food. Serve food or liquids directly from the stove. At the least, minimize the distance hot food is carried.   Use precut foods or food processers to limit the need to use knives.   Use plastic or durable cups, dishes, and containers instead of breakable glass items.    In the bedroom  Low level and wide beds (like a futon) can reduce risk of injury of falling out of bed. If there is a high risk of falling out of bed, you can consider simply putting the mattress on the floor.  Avoid sleeping on top bunks of bunk beds.   Place a soft carpet on the floor.    Around the house  Pad sharp corners of tables, chairs, etc. Round tables and furniture can be considered to avoid sharp corners.   Avoid open flames. Place a screen in front of fireplaces and don’t build a fire alone.   Allow for open spaces with furniture.  Avoid loose throw rugs or slippery floors. Non-slip mulu or cushioned mulu can help reduce injury from a fall.   Fireproof fabrics and furniture are best, and especially important if you smoke.  Doors and windows with safety glass are safer if someone falls against them.  Avoid lights and heaters that could easily be knocked over.  Use curling irons or clothing irons that have automatic shut off switches.  Make sure motor driven equipment or lawn mowers have “dead man’s” handles or seats so they will turn off if you release pressure.    Safety measures when away from home  Driving  [place] law mandates that you cannot drive for 6 months after your most recent seizure.   [place] law mandates that you cannot drive for 6 months after your most recent seizure.    Work/Travel  Wear a medical alert bracelet or necklace at all times.  Wear a helmet and use protective clothing/equipment when appropriate.  Consider telling co-workers and  travel companions that you have epilepsy and what to do if you have a seizure.  Avoid irregular shifts or disruptions of a regular sleep pattern.  Take your medications on time and keep an updated list of medications in your purse or wallet.  Do not climb to heights or operate heavy machinery.  Stand back from the edges of roads or bus/train platforms when traveling.  If you wander when you have a seizure, take a friend along for the trip.    Recreation  Swimming can be dangerous. Do not swim alone, in open water, or in murky water that you cannot see the bottom.   Caregivers should be with you in the pool at all times and must be physically able to get you out of the water.  Use a flotation device.   Scuba diving is not recommended since during a seizure people are unaware of their surroundings.  Having a seizure underwater can be deadly and can endanger the lives of others.  Kayaking and canoeing can be especially dangerous  People with epilepsy are at a higher risk of becoming trapped underneath a canoe or kayak.  Wear a helmet when playing contact sports, biking, or if there is a risk of falling.  Patients with epilepsy are at a higher risk of head injury when playing contact sports.  Avoid riding a bike, running, or other activities around busy roads, steep hills, or secluded areas.   Exercise on soft surfaces.  Theme [place]: many people with epilepsy can go on rides depending on their type of seizures.  For some people, stress or excitement can trigger a seizure.   Rollercoasters (especially if you are upside-down) are more dangerous for people with epilepsy.      Medications  Take your medications on time. If you have trouble remembering, set alarms on your phone. You can visit [email or web address].com to set up reminders through text message to help you remember to take your medications.  Use a pillbox to help you keep track of your medications.  When out of the house, take any needed medications with you.  Consider keeping one or two extra doses with you in case you are unexpectedly away from home longer than planned.  If you realize you missed a dose of your medications and it is less than 2 hours until your next dose, skip the missed dose. Do not double up your medication dose. If it is more than 2 hours until your next dose, you can take the missed dose.   Avoid drinking alcohol, since this can enhance effects of your seizure medications.  Be aware of common and major side effects of your medications.  Keep your medications out of the reach of children.    Parenting:  Childproof your home as much as possible.  It is possible that you could drop your baby if you have a seizure while holding or feeding them.  If you are nursing a baby, sit on the floor or bed with your back supported so the baby will not fall far if you should lose consciousness.  Feed the baby while he or she is seated in an infant seat.  Dress, change, and sponge bathe the baby on the floor.  Move the baby around in a stroller or small crib.  Keep a young baby in a playpen when you are alone, and a toddler in an indoor play yard, or childproof one room and use safety cox at the doors.  When out of the house, use a bungee-type cord or restraint harness so your child cannot wander away if you have a seizure that affects your awareness.

## 2024-03-23 NOTE — EMTALA/ACUTE CARE TRANSFER
[facility] EMERGENCY DEPARTMENT  [address] [address]  Dept: [phone]      EMTALA TRANSFER CONSENT    NAME Renu MCCULLOUGH 1995                              MRN [id number]    I have been informed of my rights regarding examination, treatment, and transfer   by Dr. Marc Mansfield MD    Benefits: Specialized equipment and/or services available at the receiving facility (Include comment)________________________ (Neuro, Possible EEG or MRI)    Risks: Potential for delay in receiving treatment, Potential deterioration of medical condition, Loss of IV, Increased discomfort during transfer, Possible worsening of condition or death during transfer      Consent for Transfer:  I acknowledge that my medical condition has been evaluated and explained to me by the emergency department physician or other qualified medical person and/or my attending physician, who has recommended that I be transferred to the service of  Accepting Physician: Aida at Accepting Facility Name, City & State : [place]. The above potential benefits of such transfer, the potential risks associated with such transfer, and the probable risks of not being transferred have been explained to me, and I fully understand them.  The doctor has explained that, in my case, the benefits of transfer outweigh the risks.  I agree to be transferred.    I authorize the performance of emergency medical procedures and treatments upon me in both transit and upon arrival at the receiving facility.  Additionally, I authorize the release of any and all medical records to the receiving facility and request they be transported with me, if possible.  I understand that the safest mode of transportation during a medical emergency is an ambulance and that the Hospital advocates the use of this mode of transport. Risks of traveling to the receiving facility by car, including absence of  medical control, life sustaining equipment, such as oxygen, and medical personnel has been explained to me and I fully understand them.    (CHEMA CORRECT BOX BELOW)  [  ]  I consent to the stated transfer and to be transported by ambulance/helicopter.  [  ]  I consent to the stated transfer, but refuse transportation by ambulance and accept full responsibility for my transportation by car.  I understand the risks of non-ambulance transfers and I exonerate the Hospital and its staff from any deterioration in my condition that results from this refusal.    X___________________________________________    DATE  24  TIME________  Signature of patient or legally responsible individual signing on patient behalf           RELATIONSHIP TO PATIENT_________________________          Provider Certification    NAME Renu Aguiar                                        [facility] 1995                              MRN [id number]    A medical screening exam was performed on the above named patient.  Based on the examination:    Condition Necessitating Transfer The encounter diagnosis was New onset seizure (HCC).    Patient Condition: The patient has been stabilized such that within reasonable medical probability, no material deterioration of the patient condition or the condition of the unborn child(quentin) is likely to result from the transfer    Reason for Transfer: Level of Care needed not available at this facility    Transfer Requirements: Facility [place]   Space available and qualified personnel available for treatment as acknowledged by [phone]  Agreed to accept transfer and to provide appropriate medical treatment as acknowledged by       [facility]  Appropriate medical records of the examination and treatment of the patient are provided at the time of transfer   STAFF INITIAL WHEN COMPLETED _______  Transfer will be performed by qualified personnel from [facility]  and appropriate transfer equipment as required,  including the use of necessary and appropriate life support measures.    Provider Certification: I have examined the patient and explained the following risks and benefits of being transferred/refusing transfer to the patient/family:  General risk, such as traffic hazards, adverse weather conditions, rough terrain or turbulence, possible failure of equipment (including vehicle or aircraft), or consequences of actions of persons outside the control of the transport personnel, Unanticipated needs of medical equipment and personnel during transport, Risk of worsening condition, The possibility of a transport vehicle being unavailable      Based on these reasonable risks and benefits to the patient and/or the unborn child(quentin), and based upon the information available at the time of the patient’s examination, I certify that the medical benefits reasonably to be expected from the provision of appropriate medical treatments at another medical facility outweigh the increasing risks, if any, to the individual’s medical condition, and in the case of labor to the unborn child, from effecting the transfer.    X____________________________________________ DATE 03/23/24        TIME_______      ORIGINAL - SEND TO MEDICAL RECORDS   COPY - SEND WITH PATIENT DURING TRANSFER

## 2024-03-23 NOTE — ASSESSMENT & PLAN NOTE
Pt presents as a transfer from [facility] for evaluation of seizure-like activity. Had 3 episodes at home prior to EMS being called. While in [facility] ED, she had another seizure that was terminated with Ativan. Received Keppra load prior to transfer.  Unclear etiology; hold Wellbutrin   CTH negative  Lactic 14.2 following seizure, resolved to 1.7  Neurology following  Obtain MRI  Initiate cvEEG  Seizure precautions  F/u AM labs

## 2024-03-23 NOTE — QUICK NOTE
"  NEUROLOGY RESIDENCY CONSULT NOTE     Patient was seen by overnight resident in consultation, and staffed with overnight EMU attending Dr. Danial Duron.  Full formal consultation to be done by neurology day team.  Neurology team will continue to follow.     Name: Renu Aguiar   Age & Sex: 28 y.o. female   MRN: [id number]  Unit/Bed#: [facility] 704-01   Encounter: [id number]  Length of Stay: 0    Renu Aguiar will need follow up in in 4 weeks with epilepsy attending.  She will not require outpatient neurological testing.       Pending for discharge: MRI brain, rEEG    ASSESSMENT & PLAN     * New onset seizure (HCC)  Assessment & Plan  Renu is a very pleasant 29YO F with history of MDD and ALMA who presented to [facility] on 3/22 with seizure-like activity. On 3/22, she experienced 2 episodes of b/l UE tensing w/o confusion following either events, which she recalls; however, around ~1900, she reportedly had an episode of \"GTC\" seizure lasting a couple of seconds followed by confusion. Reportedly back to baseline upon EMS arrival. In [facility] ED, she had an episode of full body tremors with tongue laceration, extensive foaming at mouth, labored breathing, and dilated roving eyes. No obv loss of bowel/bladder fxn. Convulsions appeared to have abort s/p IV Ativan 2mg but with persistent roving eyes. Add'l Ativan 2mg given w/o distinct clinical change. She does not recall either seizure episodes. Received 1.5g Keppra loading dose prior to transfer to [facility] for closer monitoring and neurology evaluation.  PTA: 1 week hx of intermittent, generalized shock-like sensations throughout body, increasing in frequency, lasting ~1 min, appears to precede periods of intense anxiety.  Denies hx of seizures, alcohol usage, head trauma, recent fever/chills.  Home meds: Wellbutrin 200mg BID (increased to this dosage in March 2023), Lexapro 10mg daily  Admitted to recent worsening depression and anxiety. Felt Wellbutrin was no longer " effective and stopped taking it for 2-4 weeks, but self-resumed 1 wk PTA at 200mg BID.    Work-Up:  POA: Afebrile, HR 68, BNP 96/66, satting 97% ORA  HCO3 13 > 20, AG 25 > 9, Mg 1.9, Glucose 126 > 85, Na 141 > 35, Lactic acid 14.3 > 1.7, WBC 19.92, TSH 6.488 > 3.523, A1c 6%  U/A: Clear, neg leukocytes, neg nitrites, 0-1 WBC, occasional bacteria  Urine preg test negative  UDS +THC  CTH wo, 3/22: No acute intracranial abnormality. Personally reviewed images.    Impression: It is very possible that multiple new onset seizures within 24h is likely provoked in setting of inappropriately resuming high-dose Wellbutrin. Further evaluation as stated below.    Plan:  Discussed with epileptologist on-call, Dr. Duron  Maintenance Keppra 1g BID; however, will re-evaluate with day team if long-term AED indicated. If so, whether or not want to switch to another AED given psych history, such as Vimpat 100mg BID.  Agree with holding Wellbutrin 200mg BID. Pt is declining inpatient psych consult, but is okay with primary team making adjustments to antidepressant medications. She will follow up with her physician upon discharge.  Pending rEEG. cEEG not indicated at this time as pt's seizures are clinical.  Pending MRI brain w/wo contrast, seizure protocol  PRN Ativan 1mg for seizure >4min  Will need to send in [employer] form prior to discharge. Will need to discuss seizure precautions, including no high heights, alone in swimming pool/hot tubs/bath, no operating heavy machinery - anything that could endanger herself or others if she had another seizure. Seizure safety precautions added to Discharge Instructions.  Will need outpatient follow up with an epileptologist attending within 4-6 weeks.  Rest of care per primary team appreciated        SUBJECTIVE     Reason for Consult / Principal Problem: new onset seizure  Hx and PE limited by: does not recall both seizures    HPI: Renu Aguiar is a 28 y.o. female with history of MDD  "and ALMA who presented to [facility] on 3/22 via EMS with seizure-like activity. Hx obtained from chart review (EMS, ) and from patient. Renu reported 2 episodes of b/l UE tensing on 3/22 without episodes of confusion following it. However, on 3/22 evening ~1900, she had an episode of \"generalized tonic-clonic seizures\" that lasted a couple of seconds but with notable following confusion. She does not recall this episode. Upon EMS arrival, she appeared to have returned to her baseline. While in the ED at [facility], she was reported to have an episode of full body tremors with tongue laceration, extensive foaming at the mouth, labored breathing, and dilated roving eyes. No obvious loss of bowel or bladder function. She received IV Ativan 2mg, which seemed to have aborted the convulsions; however, appeared to have significant eye roving and was given another 2mg of Ativan but with no distinct change followed repeated dosage. She does not recall this episode either. Received 1.5g Keppra loading dose prior to transfer to hospitals for closer monitoring and neurology evaluation.    For the past week, she has been experiencing intermittent, generalized shock-like sensations throughout her body, which have been increasing in frequency. They last ~1 minute and seem to precede periods of intense anxiety.    No prior history of seizures. Denies any hx of alcohol use. Denies head trauma, fever/chills. Reports she has not taken any medications more than she is prescribed. Has been experiencing worsening depression and anxiety, which prompted her to stop taking Wellbutrin for 2-4 weeks because she felt it was ineffective, but then self-resumed the higher prescribed Wellbutrin dosage about 1 week ago. Denies SI/HI. Has a 6/10 bitemporal headache without migrainosus features that started upon being woken up by myself at 4am, declined med mgt and would like to \"sleep it off.\"    VS: Afebrile, HR 68, BNP 96/66, satting 97% ORA. " Initial labs significant for metabolic acidosis, likely in setting of lactic acidosis (14.3). Also leukocytosis (19.92) but afebrile - likely reactive in s/o seizure. Glucose 126. UDS +THC. No acute intracranial abnormality on CTH.    Event/Seizure semiology:  1. 3/22/24: Full body tremors, tongue biting, extensive foaming at mouth with labored breathing, dilated roving eyes.    Special Features  Precipitating Factors: Wellbutrin (self-discontinued Wellbutrin for 2-4 weeks, and then resumed Wellbutrin 200mg BID 1 week prior to seizure).    Epilepsy Risk Factors:  Abnormal pregnancy:    no  Abnormal birth/:   no  Abnormal Development:   no  Febrile seizures, simple:   no  Febrile seizures, complex:   no  CNS infection:    no  Intellectual disability:    no  Cerebral palsy:    no  Head injury (moderate/severe):  no  CNS neoplasm:    no  CNS malformation:    no  Neurosurgical procedure:   no  Stroke:     no  Alcohol abuse:    no  Drug abuse:     yes:  THC  Family history Sz/epilepsy:   no  Prior physical/sexual/emotional abuse: no    Prior AEDs:  None     Prior Evaluation:  - MRI brain: pending  - Routine EEG: pending  - Ambulatory EEG: none  - Video EEG: none  - PET scan brain : none  - Neuropsychologic testing: n/a  - Labs:   HCO3 13 > 20, AG 25 > 9, Mg 1.9, Glucose 126 > 85, Na 141 > 35, Lactic acid 14.3 > 1.7, WBC 19.92, TSH 6.488 > 3.523, A1c 6%  U/A: Clear, neg leukocytes, neg nitrites, 0-1 WBC, occasional bacteria  Urine preg test negative  CTH, 3/22/24 (personally reviewed by myself as well): No acute intracranial abnormality.    Psychiatric History:  Depression: yes  Anxiety: yes  Psychiatric Admissions: 8 years ago for suicide attempt a/w attempted drug overdose with pain meds    Inpatient consult to Neurology  Consult performed by: Shari Ford DO  Consult ordered by: DEVANG Martínez        Historical Information   Past Medical History:   Diagnosis Date    Chlamydia     Depression     no  medications for 1 year    Gestational hypertension, third trimester 1/4/2021    Hypertension     Insulin controlled gestational diabetes mellitus (GDM) in third trimester 10/29/2020    Urinary tract infection     Varicella      Past Surgical History:   Procedure Laterality Date    CHOLECYSTECTOMY      MT EXCISION PILONIDAL CYST/SINUS EXTENSIVE N/A 8/3/2021    Procedure: EXCISION PILONIDAL CYST;  Surgeon: Giovany Mack DO;  Location: [facility];  Service: General    WISDOM TOOTH EXTRACTION      WISDOM TOOTH EXTRACTION Bilateral 2010     Social History   Social History     Substance and Sexual Activity   Alcohol Use Not Currently    Comment: rare     Social History     Substance and Sexual Activity   Drug Use Not Currently    Types: Marijuana    Comment: Marijuana prior to pregnancy.     E-Cigarette/Vaping    E-Cigarette Use Never User      E-Cigarette/Vaping Substances    Nicotine Yes     THC No     CBD No     Flavoring No     Other No     Unknown No      Social History     Tobacco Use   Smoking Status Every Day    Current packs/day: 1.00    Average packs/day: 1 pack/day for 10.0 years (10.0 ttl pk-yrs)    Types: Cigarettes   Smokeless Tobacco Never   Tobacco Comments    less juancarlos 10 cigs a day. Trying to quit.      Family History:   Family History   Problem Relation Age of Onset    Thyroid disease Mother     Other Mother         cerebellar atrophy dx. at 25 years    Diabetes Father     Kidney disease Father     Clotting disorder Father     Breast cancer Maternal Grandmother     Diabetes Paternal Grandmother     Clotting disorder Paternal Grandfather     Colon cancer Neg Hx     Ovarian cancer Neg Hx      Meds/Allergies   all current active meds have been reviewed, current meds:   Current Facility-Administered Medications   Medication Dose Route Frequency    acetaminophen (TYLENOL) tablet 650 mg  650 mg Oral Q6H PRN    escitalopram (LEXAPRO) tablet 10 mg  10 mg Oral Daily    levETIRAcetam (KEPPRA) tablet 1,000 mg   1,000 mg Oral Q12H ELE    LORazepam (ATIVAN) injection 1 mg  1 mg Intravenous Q5 Min PRN    magnesium sulfate 2 g/50 mL IVPB (premix) 2 g  2 g Intravenous Once    ondansetron (ZOFRAN) injection 4 mg  4 mg Intravenous Q6H PRN   , and PTA meds:   Prior to Admission Medications   Prescriptions Last Dose Informant Patient Reported? Taking?   buPROPion (Wellbutrin SR) 200 MG 12 hr tablet   No No   Sig: Take 1 tablet (200 mg total) by mouth 2 (two) times a day   escitalopram (Lexapro) 10 mg tablet   No No   Sig: Take 1 tablet (10 mg total) by mouth daily      Facility-Administered Medications: None     No Known Allergies    Review of previous medical records was completed.       Review of Systems   Constitutional:  Negative for chills and fever.   Eyes:  Negative for photophobia.   Respiratory:  Negative for shortness of breath.    Cardiovascular:  Negative for chest pain.   Gastrointestinal:  Negative for abdominal pain.   Genitourinary:  Negative for dysuria.   Neurological:  Positive for seizures and headaches. Negative for dizziness, speech difficulty, weakness, light-headedness and numbness.   Psychiatric/Behavioral:  Positive for dysphoric mood. The patient is nervous/anxious.    All other systems reviewed and are negative.      OBJECTIVE     Patient ID: Renu Aguiar is a 28 y.o. female.    Vitals:   Vitals:    24 0251   BP: 96/66   Pulse: 68   Temp: 98.2 °F (36.8 °C)   SpO2: 97%      There is no height or weight on file to calculate BMI.   No intake or output data in the 24 hours ending 24 0553    Temperature:   Temp (24hrs), Av °F (36.7 °C), Min:97.8 °F (36.6 °C), Max:98.2 °F (36.8 °C)    Temperature: 98.2 °F (36.8 °C)    Invasive Devices:   Invasive Devices       Peripheral Intravenous Line  Duration             Peripheral IV 24 Left;Proximal;Ventral (anterior) Forearm <1 day    Peripheral IV 24 Right Antecubital <1 day              Drain  Duration             Open Drain  Midline Buttock 962 days                    Physical Exam   Vitals reviewed.   Constitutional:  Not in acute distress. Normal appearance. Not ill-appearing, toxic-appearing or diaphoretic.   HENT:   Normocephalic and atraumatic.  External ear normal b/l. Nose normal. No congestion or rhinorrhea. Mucous membranes are moist.  Oropharynx is clear. No oropharyngeal exudate or posterior oropharyngeal erythema. Laceration along anterior tongue (R>L), no active bleeding.  Eyes:    No scleral icterus.  No discharge b/l.  Conjunctivae normal.   Cardiovascular: no clear edema  Pulmonary:  No respiratory distress.   Musculoskeletal: no gross deformities  Skin:    Face flushed on both cheeks  Psychiatric: Flat affect, possibly depressed mood    Neurologic Exam   MENTAL STATUS: AAOx3. Follows simple/complex commands.     LANGUAGE: Speech clear, spontaneous, and fluent. No aphasia -  naming, repetition, comprehension, reading, writing intact. No dysarthria - normal volume and intonation.    CRANIAL NERVES:  CN II: Visual acuity grossly intact. No visual field deficit. PERRLA.   CN III, IV, VI: EOM's intact w/o nystagmus, gaze palsy, or preference.   CN V: Normal masseter bulk. V1-V3 sensation intact and symmetric bilaterally.   CN VII: Face movement symmetric. Smile, eyebrow raise, eyelid bury symmetric. Nasolabial folds and palpebral folds symmetric.   CN VIII: Hearing grossly intact bilaterally.   CN IX-X: No dysarthria. Palate elevates symmetrically. Uvula midline.   CN XI: Shoulder shrug symmetric.   CN XII: Tongue midline. No deviation, atrophy, or fasciculations.    MOTOR: Normal muscle bulk. Normal tone. No pronator drift or orbiting. No tremors or abnormal involuntary movements appreciated. Formal strength testing as follows:  Upper extremity:   Shoulder abduction Elbow flexion Elbow extension Wrist flexion Wrist extension   Right 5/5 5/5 5/5 5/5 5/5   Left 5/5 5/5 5/5 5/5 5/5     Lower extremity:   Hip flexion Knee  flexion Knee extension Ankle dorsiflexion Ankle plantarflexion   Right 5/5 5/5 5/5 5/5 5/5   Left 5/5 5/5 5/5 5/5 5/5     SENSORY:  Light touch: Intact and symmetric throughout.    REFLEXES:   Biceps Triceps Brachioradialis Patellar Plantar   Right 1+ 1+ 1+ 2+ Flexor   Left 1+ 1+ 1+ 2+ Flexor     COORDINATION: Finger-to-nose w/ eyes open intact bilaterally w/o dysmetria or ataxia.     GAIT: Deferred    LABORATORY DATA     Labs: I have personally reviewed pertinent reports.    Results from last 7 days   Lab Units 03/23/24  0457 03/22/24  2104   WBC Thousand/uL 12.40* 19.92*   HEMOGLOBIN g/dL 10.5* 12.5   HEMATOCRIT % 33.7* 42.2   PLATELETS Thousands/uL 261 418*   NEUTROS PCT % 65 58   MONOS PCT % 7 7   EOS PCT % 1 2      Results from last 7 days   Lab Units 03/23/24  0359 03/22/24  2104   POTASSIUM mmol/L 4.1 3.7   CHLORIDE mmol/L 106 103   CO2 mmol/L 20* 13*   BUN mg/dL 7 9   CREATININE mg/dL 0.76 1.01   CALCIUM mg/dL 8.3* 9.6   ALK PHOS U/L  --  66   ALT U/L  --  18   AST U/L  --  20     Results from last 7 days   Lab Units 03/22/24  2104   MAGNESIUM mg/dL 1.9     Results from last 7 days   Lab Units 03/22/24  2104   PHOSPHORUS mg/dL 3.3          Results from last 7 days   Lab Units 03/22/24  2322   LACTIC ACID mmol/L 1.7           IMAGING & DIAGNOSTIC TESTING     Radiology Results: I have personally reviewed pertinent films in PACS  MRI inpatient order    (Results Pending)     [facility] without contrast, 3/22/24: No acute intracranial abnormality.    Other Diagnostic Testing: I have personally reviewed pertinent reports.      ACTIVE MEDICATIONS     Current Facility-Administered Medications   Medication Dose Route Frequency    acetaminophen (TYLENOL) tablet 650 mg  650 mg Oral Q6H PRN    escitalopram (LEXAPRO) tablet 10 mg  10 mg Oral Daily    levETIRAcetam (KEPPRA) tablet 1,000 mg  1,000 mg Oral Q12H ELE    LORazepam (ATIVAN) injection 1 mg  1 mg Intravenous Q5 Min PRN    magnesium sulfate 2 g/50 mL IVPB (premix) 2 g  2  g Intravenous Once    ondansetron (ZOFRAN) injection 4 mg  4 mg Intravenous Q6H PRN       Prior to Admission medications    Medication Sig Start Date End Date Taking? Authorizing Provider   buPROPion (Wellbutrin SR) 200 MG 12 hr tablet Take 1 tablet (200 mg total) by mouth 2 (two) times a day 10/23/23   Lynda West PA-C   escitalopram (Lexapro) 10 mg tablet Take 1 tablet (10 mg total) by mouth daily 10/23/23   Lynda West PA-C       CODE STATUS & ADVANCED DIRECTIVES     Code Status: Level 1 - Full Code  Advance Directive and Living Will:      Power of :    POLST:        VTE Pharmacologic Prophylaxis: Defer to primary team  VTE Mechanical Prophylaxis: Defer to primary team    ======    I have discussed the patient's history, physical exam findings, assessment, and plan in detail with attending, Dr. Duron    Thank you for allowing me to participate in the care of your patient, Renu Aguiar.    Shari Ford DO  [facility] Neurology Residency, PGY-2

## 2024-03-23 NOTE — ASSESSMENT & PLAN NOTE
"28 y.o. female with HTN, anxiety/depression on Wellbutrin, and \"staring off spells\" in her childhood (question history of absence seizures, however, never formally diagnosed with seizures) who initially presented to [facility]'s ED on 3/22/2024 due to 2 witnessed GTC seizures.    Earlier that morning on 3/22, patient had 2 episodes in which she tensed BUEs and felt a \"shock\" sensation.  Unclear if there was any confusion following these events.  Around 7 PM on 3/22, she had an episode of GTC seizure activity with foaming at the mouth and roving eyes that lasted 45-60 seconds followed by confusion.  When EMS arrived, patient was alert and oriented.  She was in her normal state upon arrival to the ED.  Vitals stable.  While in the ED, patient had another witnessed GTC seizure with bilateral lateral tongue bite, foaming at the mouth, labored breathing, roving/dilated eyes, and full body tremors.  No urinary or fecal incontinence.  Patient received a total of 4 mg IV Ativan, received Keppra 1.5 g, and transferred to [facility] for neurologic evaluation.  Patient does not recall either of these witnessed seizure like episodes.  Of note, for the past week, she has had intermittent \"electric shock\" sensations and tensing in her arms, but no shaking or loss of consciousness with these spells.    CT head negative for acute intracranial abnormalities.    Labs revealed lactic acid 14.3 (normalized within 2 hours), WBC 19.92, TSH 6.488 (later normalized), and UDS positive for THC.  Urine pregnancy test negative.    Patient has been taking Wellbutrin for anxiety/depression which was increased last March 2023.  She did not feel this was helping her anxiety/depression, so she stopped Wellbutrin approximately 2-4 weeks ago, but then self resumed 1 week ago at the full dose of Wellbutrin 200 mg BID.  Initially, it was thought that Wellbutrin was the cause for the new onset seizures.  However, routine EEG 3/23 revealed R temporal lobe " seizure.  During the seizure, EEG tech was asking the patient questions, and patient was able to answer appropriately.  Patient was loaded with Vimpat 200 mg and started on vEEG monitoring:    vEEG:  Evening of 3/23: Electrographic seizures approximately every 10 to 20 minutes (no clinical correlate).  She received 2 mg IV Ativan and an extra dose of lacosamide 100 mg.  The EEG calmed down for a few hours.  3/24:   At 4 AM, patient began having seizures that continue to be electrographic with no clinical correlate occurring every 10 to 20 minutes.  Seizures are coming mainly from the frontal lobe (typically bilateral frontal, then predominant in the right frontal, but occasionally in the left.  Seizures start frontal and spread temporal.  This pattern can be seen in patients who are having frequent seizures).  She received an additional 2 mg IV Ativan and maintenance Vimpat increased to 200 mg BID.    This briefly helped, but then seizures returned every 10-20 minutes.  Loaded with Depacon 20 mg/kg and started on maintenance Depakote.  VPA total level 2 hours after load was 89.  In the afternoon, continues to have seizures every 10-20 minutes  Overnight into 3/25, patient continued to have electrographic seizures so Vimpat was dc'd and patient was given Keppra 4.5 g bolus and started on Keppra 1.5 mg BID maintenance.  3/25:  Keppra did stop the seizures for a while, going a few hours without any seizures, but in the early morning since about 05:40, continued to have electrographic seizures which have occurred about every 10-20 min since 05:40 (although not as prominent as in the past).  Fosphenytoin 20 mg/kg load at approximately 10:27 am  Seizures were noted at 7:40, 8:18, and 10:27 with no further definite seizures since.  3/26:  No further seizures since yesterday at 10:27 am    Etiology for seizures remains unclear.    Plan:  - Discontinue vEEG   - Initial MRI brain was limited due to claustrophobia.  No acute  infarct, but unable to obtain remainder of sequences.    Repeat MRI brain w/wo contrast was motion limited but unremarkable.  - AED loads:  3/22: Keppra 1500 mg once  3/23: Vimpat 200 mg once in the afternoon and additional 100 mg in the evening  3/24: Depacon 20 mg/kg once  3/25: Keppra 4.5 g x 1, Fosphenytoin 20 mg/kg x 1  - Maintenance AED:  Vimpat dc'd 3/25  Depakote 500 mg Q8 hours (started on 3/24)  Patient prefers Depakote over Keppra given her baseline depression/anxiety.  She understands that she may gain some weight.  At this time, no plan for any further pregnancies.  She understands that she will need to start birth control.  Keppra 1.5 g BID  Dilantin 150 mg Q8H (started on 3/25)  - AED levels:  VPA 2 hours after Depacon load: 89  VPA level this AM 94  3/25 Phenytoin level 9.1  Repeat phenytoin level pending  Repeat VPA and phenytoin levels in 2 days prior to AM dose  - Hold home Wellbutrin 200 mg BID.  Patient is declining inpatient psych consult, but is okay with primary team making adjustments to antidepressant medications.  She will follow-up with her physician upon discharge.  - Administer Ativan prn seizure-like activity    - Continue telemetry  - Continue seizure precautions   - [employer] form previously completed and faxed on 3/23.  Patient understands that she is not to drive.  - Discussed seizure precautions  - Frequent neuro checks.  Continue to monitor and notify Neurology with any changes.  - Medical management and supportive care per primary team.  Correction of any metabolic or infectious disturbances.  - No further inpatient neurology recommendations at this time. Please call with any questions.

## 2024-03-23 NOTE — ASSESSMENT & PLAN NOTE
Pt presents as a transfer from [facility] for evaluation of seizure-like activity. Had 3 episodes at home prior to EMS being called. While in [facility] ED, she had another seizure that was terminated with Ativan. Received Keppra load prior to transfer.  Unclear etiology however previously stopped wellbutrin dose and self resumed 1 week PTA @ 200mg BID  CTH negative  UDS + THC  Lactic 14.2 following seizure, resolved to 1.7  Neurology following, appreciate recs'  Hold wellbutrin 200mg   MRI unable to be performed. Recommending repeat with ativan.  Notified by neuro that patient had right temporal lobe seizure on her routine EEG and will require additional testing   Started on Vimpat 100 mg twice daily  Seizure precautions  F/u with epileptologist attending 4-6 weeks upon discharge

## 2024-03-24 ENCOUNTER — APPOINTMENT (INPATIENT)
Dept: NEUROLOGY | Facility: CLINIC | Age: 29
DRG: 101 | End: 2024-03-24
Payer: COMMERCIAL

## 2024-03-24 PROBLEM — D64.9 ANEMIA: Status: ACTIVE | Noted: 2024-03-24

## 2024-03-24 LAB
ANION GAP SERPL CALCULATED.3IONS-SCNC: 6 MMOL/L (ref 4–13)
BUN SERPL-MCNC: 7 MG/DL (ref 5–25)
CALCIUM SERPL-MCNC: 8.6 MG/DL (ref 8.4–10.2)
CHLORIDE SERPL-SCNC: 106 MMOL/L (ref 96–108)
CO2 SERPL-SCNC: 26 MMOL/L (ref 21–32)
CREAT SERPL-MCNC: 0.73 MG/DL (ref 0.6–1.3)
ERYTHROCYTE [DISTWIDTH] IN BLOOD BY AUTOMATED COUNT: 16.5 % (ref 11.6–15.1)
FERRITIN SERPL-MCNC: 9 NG/ML (ref 11–307)
GFR SERPL CREATININE-BSD FRML MDRD: 110 ML/MIN/1.73SQ M
GLUCOSE SERPL-MCNC: 92 MG/DL (ref 65–140)
HCT VFR BLD AUTO: 33.6 % (ref 34.8–46.1)
HGB BLD-MCNC: 10.9 G/DL (ref 11.5–15.4)
IRON SATN MFR SERPL: 7 % (ref 15–50)
IRON SERPL-MCNC: 22 UG/DL (ref 50–212)
MCH RBC QN AUTO: 26.8 PG (ref 26.8–34.3)
MCHC RBC AUTO-ENTMCNC: 32.4 G/DL (ref 31.4–37.4)
MCV RBC AUTO: 83 FL (ref 82–98)
PLATELET # BLD AUTO: 254 THOUSANDS/UL (ref 149–390)
PMV BLD AUTO: 10.1 FL (ref 8.9–12.7)
POTASSIUM SERPL-SCNC: 3.5 MMOL/L (ref 3.5–5.3)
RBC # BLD AUTO: 4.06 MILLION/UL (ref 3.81–5.12)
SODIUM SERPL-SCNC: 138 MMOL/L (ref 135–147)
TIBC SERPL-MCNC: 337 UG/DL (ref 250–450)
UIBC SERPL-MCNC: 315 UG/DL (ref 155–355)
VALPROATE SERPL-MCNC: 89 UG/ML (ref 50–100)
WBC # BLD AUTO: 8.38 THOUSAND/UL (ref 4.31–10.16)

## 2024-03-24 PROCEDURE — 82728 ASSAY OF FERRITIN: CPT

## 2024-03-24 PROCEDURE — C9254 INJECTION, LACOSAMIDE: HCPCS

## 2024-03-24 PROCEDURE — 99233 SBSQ HOSP IP/OBS HIGH 50: CPT | Performed by: PSYCHIATRY & NEUROLOGY

## 2024-03-24 PROCEDURE — 95715 VEEG EA 12-26HR INTMT MNTR: CPT

## 2024-03-24 PROCEDURE — 80048 BASIC METABOLIC PNL TOTAL CA: CPT

## 2024-03-24 PROCEDURE — 80164 ASSAY DIPROPYLACETIC ACD TOT: CPT | Performed by: PHYSICIAN ASSISTANT

## 2024-03-24 PROCEDURE — 85027 COMPLETE CBC AUTOMATED: CPT

## 2024-03-24 PROCEDURE — 83550 IRON BINDING TEST: CPT

## 2024-03-24 PROCEDURE — 99232 SBSQ HOSP IP/OBS MODERATE 35: CPT

## 2024-03-24 PROCEDURE — 83540 ASSAY OF IRON: CPT

## 2024-03-24 RX ORDER — LACOSAMIDE 10 MG/ML
100 INJECTION, SOLUTION INTRAVENOUS ONCE
Status: COMPLETED | OUTPATIENT
Start: 2024-03-24 | End: 2024-03-24

## 2024-03-24 RX ORDER — LACOSAMIDE 200 MG/1
200 TABLET ORAL EVERY 12 HOURS SCHEDULED
Status: DISCONTINUED | OUTPATIENT
Start: 2024-03-24 | End: 2024-03-24

## 2024-03-24 RX ORDER — DIVALPROEX SODIUM 500 MG/1
500 TABLET, DELAYED RELEASE ORAL EVERY 8 HOURS SCHEDULED
Status: DISCONTINUED | OUTPATIENT
Start: 2024-03-24 | End: 2024-03-24

## 2024-03-24 RX ORDER — LEVETIRACETAM 500 MG/5ML
1500 INJECTION, SOLUTION, CONCENTRATE INTRAVENOUS EVERY 12 HOURS SCHEDULED
Status: DISCONTINUED | OUTPATIENT
Start: 2024-03-25 | End: 2024-03-25

## 2024-03-24 RX ORDER — LEVETIRACETAM 500 MG/5ML
4500 INJECTION, SOLUTION, CONCENTRATE INTRAVENOUS ONCE
Status: COMPLETED | OUTPATIENT
Start: 2024-03-24 | End: 2024-03-25

## 2024-03-24 RX ORDER — LORAZEPAM 2 MG/ML
2 INJECTION INTRAMUSCULAR ONCE
Status: COMPLETED | OUTPATIENT
Start: 2024-03-24 | End: 2024-03-24

## 2024-03-24 RX ADMIN — LACOSAMIDE 100 MG: 10 INJECTION, SOLUTION INTRAVENOUS at 01:05

## 2024-03-24 RX ADMIN — DIVALPROEX SODIUM 500 MG: 500 TABLET, DELAYED RELEASE ORAL at 14:15

## 2024-03-24 RX ADMIN — LORAZEPAM 2 MG: 2 INJECTION INTRAMUSCULAR; INTRAVENOUS at 07:49

## 2024-03-24 RX ADMIN — LACOSAMIDE 200 MG: 200 TABLET, FILM COATED ORAL at 21:24

## 2024-03-24 RX ADMIN — SODIUM CHLORIDE 2880 MG: 9 INJECTION, SOLUTION INTRAVENOUS at 11:18

## 2024-03-24 RX ADMIN — LACOSAMIDE 200 MG: 200 TABLET, FILM COATED ORAL at 07:49

## 2024-03-24 RX ADMIN — DIVALPROEX SODIUM 500 MG: 500 TABLET, DELAYED RELEASE ORAL at 21:24

## 2024-03-24 RX ADMIN — LORAZEPAM 2 MG: 2 INJECTION INTRAMUSCULAR; INTRAVENOUS at 01:05

## 2024-03-24 RX ADMIN — ESCITALOPRAM OXALATE 10 MG: 10 TABLET ORAL at 09:00

## 2024-03-24 NOTE — PLAN OF CARE
Problem: PAIN - ADULT  Goal: Verbalizes/displays adequate comfort level or baseline comfort level  Description: Interventions:  - Encourage patient to monitor pain and request assistance  - Assess pain using appropriate pain scale  - Administer analgesics based on type and severity of pain and evaluate response  - Implement non-pharmacological measures as appropriate and evaluate response  - Consider cultural and social influences on pain and pain management  - Notify physician/advanced practitioner if interventions unsuccessful or patient reports new pain  Outcome: Progressing     Problem: INFECTION - ADULT  Goal: Absence or prevention of progression during hospitalization  Description: INTERVENTIONS:  - Assess and monitor for signs and symptoms of infection  - Monitor lab/diagnostic results  - Monitor all insertion sites, i.e. indwelling lines, tubes, and drains  - Monitor endotracheal if appropriate and nasal secretions for changes in amount and color  - Thayer appropriate cooling/warming therapies per order  - Administer medications as ordered  - Instruct and encourage patient and family to use good hand hygiene technique  - Identify and instruct in appropriate isolation precautions for identified infection/condition  Outcome: Progressing  Goal: Absence of fever/infection during neutropenic period  Description: INTERVENTIONS:  - Monitor WBC    Outcome: Progressing     Problem: SAFETY ADULT  Goal: Patient will remain free of falls  Description: INTERVENTIONS:  - Educate patient/family on patient safety including physical limitations  - Instruct patient to call for assistance with activity   - Consult OT/PT to assist with strengthening/mobility   - Keep Call bell within reach  - Keep bed low and locked with side rails adjusted as appropriate  - Keep care items and personal belongings within reach  - Initiate and maintain comfort rounds  - Make Fall Risk Sign visible to staff  - Offer Toileting every  Hours,  in advance of need  - Initiate/Maintain alarm  - Obtain necessary fall risk management equipment:   - Apply yellow socks and bracelet for high fall risk patients  - Consider moving patient to room near nurses station  Outcome: Progressing  Goal: Maintain or return to baseline ADL function  Description: INTERVENTIONS:  -  Assess patient's ability to carry out ADLs; assess patient's baseline for ADL function and identify physical deficits which impact ability to perform ADLs (bathing, care of mouth/teeth, toileting, grooming, dressing, etc.)  - Assess/evaluate cause of self-care deficits   - Assess range of motion  - Assess patient's mobility; develop plan if impaired  - Assess patient's need for assistive devices and provide as appropriate  - Encourage maximum independence but intervene and supervise when necessary  - Involve family in performance of ADLs  - Assess for home care needs following discharge   - Consider OT consult to assist with ADL evaluation and planning for discharge  - Provide patient education as appropriate  Outcome: Progressing  Goal: Maintains/Returns to pre admission functional level  Description: INTERVENTIONS:  - Perform AM-PAC 6 Click Basic Mobility/ Daily Activity assessment daily.  - Set and communicate daily mobility goal to care team and patient/family/caregiver.   - Collaborate with rehabilitation services on mobility goals if consulted  - Perform Range of Motion  times a day.  - Reposition patient every hours.  - Dangle patient  times a day  - Stand patient  times a day  - Ambulate patient  times a day  - Out of bed to chair  times a day   - Out of bed for meals times a day  - Out of bed for toileting  - Record patient progress and toleration of activity level   Outcome: Progressing     Problem: DISCHARGE PLANNING  Goal: Discharge to home or other facility with appropriate resources  Description: INTERVENTIONS:  - Identify barriers to discharge w/patient and caregiver  - Arrange for  needed discharge resources and transportation as appropriate  - Identify discharge learning needs (meds, wound care, etc.)  - Arrange for interpretive services to assist at discharge as needed  - Refer to Case Management Department for coordinating discharge planning if the patient needs post-hospital services based on physician/advanced practitioner order or complex needs related to functional status, cognitive ability, or social support system  Outcome: Progressing     Problem: Knowledge Deficit  Goal: Patient/family/caregiver demonstrates understanding of disease process, treatment plan, medications, and discharge instructions  Description: Complete learning assessment and assess knowledge base.  Interventions:  - Provide teaching at level of understanding  - Provide teaching via preferred learning methods  Outcome: Progressing     Problem: NEUROSENSORY - ADULT  Goal: Achieves stable or improved neurological status  Description: INTERVENTIONS  - Monitor and report changes in neurological status  - Monitor vital signs such as temperature, blood pressure, glucose, and any other labs ordered   - Initiate measures to prevent increased intracranial pressure  - Monitor for seizure activity and implement precautions if appropriate      Outcome: Progressing  Goal: Remains free of injury related to seizures activity  Description: INTERVENTIONS  - Maintain airway, patient safety  and administer oxygen as ordered  - Monitor patient for seizure activity, document and report duration and description of seizure to physician/advanced practitioner  - If seizure occurs,  ensure patient safety during seizure  - Reorient patient post seizure  - Seizure pads on all 4 side rails  - Instruct patient/family to notify RN of any seizure activity including if an aura is experienced  - Instruct patient/family to call for assistance with activity based on nursing assessment  - Administer anti-seizure medications if ordered    Outcome:  Progressing  Goal: Achieves maximal functionality and self care  Description: INTERVENTIONS  - Monitor swallowing and airway patency with patient fatigue and changes in neurological status  - Encourage and assist patient to increase activity and self care.   - Encourage visually impaired, hearing impaired and aphasic patients to use assistive/communication devices  Outcome: Progressing     Problem: CARDIOVASCULAR - ADULT  Goal: Maintains optimal cardiac output and hemodynamic stability  Description: INTERVENTIONS:  - Monitor I/O, vital signs and rhythm  - Monitor for S/S and trends of decreased cardiac output  - Administer and titrate ordered vasoactive medications to optimize hemodynamic stability  - Assess quality of pulses, skin color and temperature  - Assess for signs of decreased coronary artery perfusion  - Instruct patient to report change in severity of symptoms  Outcome: Progressing  Goal: Absence of cardiac dysrhythmias or at baseline rhythm  Description: INTERVENTIONS:  - Continuous cardiac monitoring, vital signs, obtain 12 lead EKG if ordered  - Administer antiarrhythmic and heart rate control medications as ordered  - Monitor electrolytes and administer replacement therapy as ordered  Outcome: Progressing     Problem: RESPIRATORY - ADULT  Goal: Achieves optimal ventilation and oxygenation  Description: INTERVENTIONS:  - Assess for changes in respiratory status  - Assess for changes in mentation and behavior  - Position to facilitate oxygenation and minimize respiratory effort  - Oxygen administered by appropriate delivery if ordered  - Initiate smoking cessation education as indicated  - Encourage broncho-pulmonary hygiene including cough, deep breathe, Incentive Spirometry  - Assess the need for suctioning and aspirate as needed  - Assess and instruct to report SOB or any respiratory difficulty  - Respiratory Therapy support as indicated  Outcome: Progressing     Problem: GASTROINTESTINAL -  ADULT  Goal: Minimal or absence of nausea and/or vomiting  Description: INTERVENTIONS:  - Administer IV fluids if ordered to ensure adequate hydration  - Maintain NPO status until nausea and vomiting are resolved  - Nasogastric tube if ordered  - Administer ordered antiemetic medications as needed  - Provide nonpharmacologic comfort measures as appropriate  - Advance diet as tolerated, if ordered  - Consider nutrition services referral to assist patient with adequate nutrition and appropriate food choices  Outcome: Progressing  Goal: Maintains or returns to baseline bowel function  Description: INTERVENTIONS:  - Assess bowel function  - Encourage oral fluids to ensure adequate hydration  - Administer IV fluids if ordered to ensure adequate hydration  - Administer ordered medications as needed  - Encourage mobilization and activity  - Consider nutritional services referral to assist patient with adequate nutrition and appropriate food choices  Outcome: Progressing  Goal: Maintains adequate nutritional intake  Description: INTERVENTIONS:  - Monitor percentage of each meal consumed  - Identify factors contributing to decreased intake, treat as appropriate  - Assist with meals as needed  - Monitor I&O, weight, and lab values if indicated  - Obtain nutrition services referral as needed  Outcome: Progressing  Goal: Establish and maintain optimal ostomy function  Description: INTERVENTIONS:  - Assess bowel function  - Encourage oral fluids to ensure adequate hydration  - Administer IV fluids if ordered to ensure adequate hydration   - Administer ordered medications as needed  - Encourage mobilization and activity  - Nutrition services referral to assist patient with appropriate food choices  - Assess stoma site  - Consider wound care consult   Outcome: Progressing  Goal: Oral mucous membranes remain intact  Description: INTERVENTIONS  - Assess oral mucosa and hygiene practices  - Implement preventative oral hygiene  regimen  - Implement oral medicated treatments as ordered  - Initiate Nutrition services referral as needed  Outcome: Progressing     Problem: GENITOURINARY - ADULT  Goal: Maintains or returns to baseline urinary function  Description: INTERVENTIONS:  - Assess urinary function  - Encourage oral fluids to ensure adequate hydration if ordered  - Administer IV fluids as ordered to ensure adequate hydration  - Administer ordered medications as needed  - Offer frequent toileting  - Follow urinary retention protocol if ordered  Outcome: Progressing  Goal: Absence of urinary retention  Description: INTERVENTIONS:  - Assess patient’s ability to void and empty bladder  - Monitor I/O  - Bladder scan as needed  - Discuss with physician/AP medications to alleviate retention as needed  - Discuss catheterization for long term situations as appropriate  Outcome: Progressing  Goal: Urinary catheter remains patent  Description: INTERVENTIONS:  - Assess patency of urinary catheter  - If patient has a chronic barrera, consider changing catheter if non-functioning  - Follow guidelines for intermittent irrigation of non-functioning urinary catheter  Outcome: Progressing     Problem: METABOLIC, FLUID AND ELECTROLYTES - ADULT  Goal: Electrolytes maintained within normal limits  Description: INTERVENTIONS:  - Monitor labs and assess patient for signs and symptoms of electrolyte imbalances  - Administer electrolyte replacement as ordered  - Monitor response to electrolyte replacements, including repeat lab results as appropriate  - Instruct patient on fluid and nutrition as appropriate  Outcome: Progressing  Goal: Fluid balance maintained  Description: INTERVENTIONS:  - Monitor labs   - Monitor I/O and WT  - Instruct patient on fluid and nutrition as appropriate  - Assess for signs & symptoms of volume excess or deficit  Outcome: Progressing  Goal: Glucose maintained within target range  Description: INTERVENTIONS:  - Monitor Blood Glucose as  ordered  - Assess for signs and symptoms of hyperglycemia and hypoglycemia  - Administer ordered medications to maintain glucose within target range  - Assess nutritional intake and initiate nutrition service referral as needed  Outcome: Progressing     Problem: SKIN/TISSUE INTEGRITY - ADULT  Goal: Skin Integrity remains intact(Skin Breakdown Prevention)  Description: Assess:  -Perform Mario assessment every   -Clean and moisturize skin every   -Inspect skin when repositioning, toileting, and assisting with ADLS  -Assess under medical devices such as every   -Assess extremities for adequate circulation and sensation     Bed Management:  -Have minimal linens on bed & keep smooth, unwrinkled  -Change linens as needed when moist or perspiring  -Avoid sitting or lying in one position for more than  hours while in bed  -Keep HOB at degrees     Toileting:  -Offer bedside commode  -Assess for incontinence every   -Use incontinent care products after each incontinent episode such as    Activity:  -Mobilize patient  times a day  -Encourage activity and walks on unit  -Encourage or provide ROM exercises   -Turn and reposition patient every  Hours  -Use appropriate equipment to lift or move patient in bed  -Instruct/ Assist with weight shifting every  when out of bed in chair  -Consider limitation of chair time  hour intervals    Skin Care:  -Avoid use of baby powder, tape, friction and shearing, hot water or constrictive clothing  -Relieve pressure over bony prominences using  -Do not massage red bony areas    Next Steps:  -Teach patient strategies to minimize risks such as    -Consider consults to  interdisciplinary teams such as   Outcome: Progressing  Goal: Incision(s), wounds(s) or drain site(s) healing without S/S of infection  Description: INTERVENTIONS  - Assess and document dressing, incision, wound bed, drain sites and surrounding tissue  - Provide patient and family education  - Perform skin care/dressing changes  every  Outcome: Progressing  Goal: Pressure injury heals and does not worsen  Description: Interventions:  - Implement low air loss mattress or specialty surface (Criteria met)  - Apply silicone foam dressing  - Instruct/assist with weight shifting every  minutes when in chair   - Limit chair time to  hour intervals  - Use special pressure reducing interventions such as  when in chair   - Apply fecal or urinary incontinence containment device   - Perform passive or active ROM every   - Turn and reposition patient & offload bony prominences every  hours   - Utilize friction reducing device or surface for transfers   - Consider consults to  interdisciplinary teams such as   - Use incontinent care products after each incontinent episode such as   - Consider nutrition services referral as needed  Outcome: Progressing     Problem: HEMATOLOGIC - ADULT  Goal: Maintains hematologic stability  Description: INTERVENTIONS  - Assess for signs and symptoms of bleeding or hemorrhage  - Monitor labs  - Administer supportive blood products/factors as ordered and appropriate  Outcome: Progressing     Problem: MUSCULOSKELETAL - ADULT  Goal: Maintain or return mobility to safest level of function  Description: INTERVENTIONS:  - Assess patient's ability to carry out ADLs; assess patient's baseline for ADL function and identify physical deficits which impact ability to perform ADLs (bathing, care of mouth/teeth, toileting, grooming, dressing, etc.)  - Assess/evaluate cause of self-care deficits   - Assess range of motion  - Assess patient's mobility  - Assess patient's need for assistive devices and provide as appropriate  - Encourage maximum independence but intervene and supervise when necessary  - Involve family in performance of ADLs  - Assess for home care needs following discharge   - Consider OT consult to assist with ADL evaluation and planning for discharge  - Provide patient education as appropriate  Outcome:  Progressing  Goal: Maintain proper alignment of affected body part  Description: INTERVENTIONS:  - Support, maintain and protect limb and body alignment  - Provide patient/ family with appropriate education  Outcome: Progressing

## 2024-03-24 NOTE — PROGRESS NOTES
[facility]  Progress Note  Name: Renu Aguiar I  MRN: [id number]  Unit/Bed#: [facility] 716-01 I Date of Admission: 3/23/2024   Date of Service: 3/24/2024 I Hospital Day: 1    Assessment/Plan   * New onset seizure (HCC)  Assessment & Plan  Pt presents as a transfer from [facility] for evaluation of seizure-like activity. Had 3 episodes at home prior to EMS being called. While in [facility] ED, she had another seizure that was terminated with Ativan. Received Keppra load prior to transfer.  Unclear etiology however previously stopped wellbutrin dose and self resumed 1 week PTA @ 200mg BID  CTH negative  UDS + THC  Lactic 14.2 following seizure, resolved to 1.7  Neurology following, appreciate recs'  Hold wellbutrin 200mg   MRI unable to be performed. Recommending repeat with ativan.  Routine EEG 3/23 with right temporal lobe seizure  Vimpat 200 mg and started on video EEG monitoring  Video EEG 3/23  Electrographic seizures every 10 to 20 minutes -seizures coming from frontal lobe and spread temporal  Given Ativan 2 mg IV and extra dose of Vimpat 100mg  Vimpat increased to 200 mg twice daily however still having seizures q10 to 20 minutes.   S/p Depacon 20 mg/kg and started on maintenance Depakote 500 mg every 8 hours  Seizure precautions   Telemetry .  F/u with epileptologist attending 6-8 weeks upon discharge    Anemia  Assessment & Plan  Hgb appears to be low chronically   No active signs of bleeding   F/u iron panel     Generalized anxiety disorder  Assessment & Plan  Takes Wellbutrin and Lexapro as an outpatient  Offered psych consult for medication adjustments, however patient will follow-up outpatient with her psychiatrist    H/O major depression  Assessment & Plan  Takes Wellbutrin and Lexapro as an outpatient  Denies SI/HI  Recently self restarted 200mg BID 1 week PTA  Hold wellbutrin   F/u with outpt psych for further medication adjustments     High anion gap metabolic  acidosis-resolved as of 3/23/2024  Assessment & Plan  Likely 2/2 to lactic acidosis in the setting of seizure activity  F/u morning labs         VTE Pharmacologic Prophylaxis: VTE Score: 2 Low Risk (Score 0-2) - Encourage Ambulation.    Mobility:   Basic Mobility Inpatient Raw Score: 23  JH-HLM Goal: 7: Walk 25 feet or more  JH-HLM Achieved: 7: Walk 25 feet or more  JH-HLM Goal achieved. Continue to encourage appropriate mobility.    Patient Centered Rounds: I performed bedside rounds with nursing staff today.   Discussions with Specialists or Other Care Team Provider: Neuro     Education and Discussions with Family / Patient: Updated  () at bedside.    Total Time Spent on Date of Encounter in care of patient: This time was spent on one or more of the following: performing physical exam; counseling and coordination of care; obtaining or reviewing history; documenting in the medical record; reviewing/ordering tests, medications or procedures; communicating with other healthcare professionals and discussing with patient's family/caregivers.    Current Length of Stay: 1 day(s)  Current Patient Status: Inpatient   Certification Statement: The patient will continue to require additional inpatient hospital stay due to continued workup for ongoing seizure activity   Discharge Plan: Anticipate discharge in 24-48 hrs to home.    Code Status: Level 1 - Full Code    Subjective:   Seen resting comfortably in bed.  No complaints at this time.    Objective:     Vitals:   Temp (24hrs), Av.7 °F (37.1 °C), Min:98.2 °F (36.8 °C), Max:98.9 °F (37.2 °C)    Temp:  [98.2 °F (36.8 °C)-98.9 °F (37.2 °C)] 98.2 °F (36.8 °C)  HR:  [] 77  BP: (138-155)/(79-92) 138/83  SpO2:  [96 %-98 %] 96 %  There is no height or weight on file to calculate BMI.     Input and Output Summary (last 24 hours):   No intake or output data in the 24 hours ending 24 1442    Physical Exam:   Physical Exam  Constitutional:        General: She is not in acute distress.     Appearance: She is not toxic-appearing.   HENT:      Mouth/Throat:      Mouth: Mucous membranes are moist.   Cardiovascular:      Rate and Rhythm: Normal rate.   Pulmonary:      Effort: Pulmonary effort is normal.   Abdominal:      Palpations: Abdomen is soft.   Skin:     General: Skin is warm and dry.   Neurological:      Mental Status: Mental status is at baseline.        Additional Data:     Labs:  Results from last 7 days   Lab Units 03/24/24  0946 03/23/24  0457   WBC Thousand/uL 8.38 12.40*   HEMOGLOBIN g/dL 10.9* 10.5*   HEMATOCRIT % 33.6* 33.7*   PLATELETS Thousands/uL 254 261   NEUTROS PCT %  --  65   LYMPHS PCT %  --  27   MONOS PCT %  --  7   EOS PCT %  --  1     Results from last 7 days   Lab Units 03/24/24  0946 03/23/24  0359 03/22/24  2104   SODIUM mmol/L 138   < > 141   POTASSIUM mmol/L 3.5   < > 3.7   CHLORIDE mmol/L 106   < > 103   CO2 mmol/L 26   < > 13*   BUN mg/dL 7   < > 9   CREATININE mg/dL 0.73   < > 1.01   ANION GAP mmol/L 6   < > 25*   CALCIUM mg/dL 8.6   < > 9.6   ALBUMIN g/dL  --   --  4.5   TOTAL BILIRUBIN mg/dL  --   --  0.29   ALK PHOS U/L  --   --  66   ALT U/L  --   --  18   AST U/L  --   --  20   GLUCOSE RANDOM mg/dL 92   < > 126    < > = values in this interval not displayed.         Results from last 7 days   Lab Units 03/22/24  2050   POC GLUCOSE mg/dl 123     Results from last 7 days   Lab Units 03/23/24  0404   HEMOGLOBIN A1C % 6.0*     Results from last 7 days   Lab Units 03/22/24  2322 03/22/24  2104   LACTIC ACID mmol/L 1.7 14.3*       Lines/Drains:  Invasive Devices       Peripheral Intravenous Line  Duration             Peripheral IV 03/24/24 Left;Proximal;Ventral (anterior) Forearm <1 day              Drain  Duration             Open Drain Midline Buttock 963 days                          Imaging: No pertinent imaging reviewed.    Recent Cultures (last 7 days):   Results from last 7 days   Lab Units 03/22/24  2138   BLOOD CULTURE   Received in Microbiology Lab. Culture in Progress.  Received in Microbiology Lab. Culture in Progress.       Last 24 Hours Medication List:   Current Facility-Administered Medications   Medication Dose Route Frequency Provider Last Rate    acetaminophen  650 mg Oral Q6H PRN DEVANG Martínez      divalproex sodium  500 mg Oral Q8H ELE Burrows PA-C      escitalopram  10 mg Oral Daily DEVANG Martínez      lacosamide  200 mg Oral Q12H [facility] Adrianna Burrows PA-C      LORazepam  1 mg Intravenous Q5 Min PRN Shari Ford DO      LORazepam  1 mg Intravenous Once PRN Adrianna Burrows PA-C      And    LORazepam  1 mg Intravenous Once PRN Adrianna Burrows PA-C      ondansetron  4 mg Intravenous Q6H PRN DEVANG Martínez          Today, Patient Was Seen By: Maddie Brown PA-C    **Please Note: This note may have been constructed using a voice recognition system.**

## 2024-03-24 NOTE — ASSESSMENT & PLAN NOTE
Pt presents as a transfer from  [facility] for evaluation of seizure-like activity. Had 3 episodes at home prior to EMS being called. While in [facility] ED, she had another seizure that was terminated with Ativan. Received Keppra load prior to transfer.  Unclear etiology however previously stopped wellbutrin dose and self resumed 1 week PTA @ 200mg BID  CTH negative  UDS + THC  Lactic 14.2 following seizure, resolved to 1.7  Neurology following, appreciate recs'  Hold wellbutrin 200mg   MRI unable to be performed. Recommending repeat with ativan.  Routine EEG 3/23 with right temporal lobe seizure  Vimpat 200 mg and started on video EEG monitoring  Video EEG 3/23  Electrographic seizures every 10 to 20 minutes -seizures coming from frontal lobe and spread temporal  Given Ativan 2 mg IV and extra dose of Vimpat 100mg  Vimpat increased to 200 mg twice daily however still having seizures q10 to 20 minutes.   S/p Depacon 20 mg/kg and started on maintenance Depakote 500 mg every 8 hours  Seizure precautions   Telemetry .  F/u with epileptologist attending 6-8 weeks upon discharge

## 2024-03-24 NOTE — PROGRESS NOTES
"Progress Note - Neurology   Renu Aguiar 28 y.o. female [id number]  Unit/Bed#: [facility] 716/[facility] 716-01    Assessment/Plan:    * New onset seizure (HCC)  Assessment & Plan  28 y.o. female with HTN, anxiety/depression on Wellbutrin, and \"staring off spells\" in her childhood (question history of absence seizures, however, never formally diagnosed with seizures) who initially presented to [facility]'s ED on 3/22/2024 due to 2 witnessed GTC seizures.    Earlier that morning, patient had 2 episodes in which she tensed BUEs and felt a \"shock\" sensation.  Unclear if there was any confusion following these events.  Around 7 PM on 3/22, she had an episode of GTC seizure activity with foaming at the mouth and roving eyes that lasted 45-60 seconds followed by confusion.  When EMS arrived, patient was alert and oriented.  She was in her normal state upon arrival to the ED.  Vitals stable.  While in the ED, patient had another witnessed GTC seizure with bilateral lateral tongue bite, foaming at the mouth, labored breathing, roving/dilated eyes, and full body tremors.  No urinary or fecal incontinence.  Patient received a total of 4 mg IV Ativan, received Keppra 1.5 g, and transferred to [facility] for neurologic evaluation.  Patient does not recall either of these witnessed seizure like episodes.  Of note, for the past week, she has had intermittent \"electric shock\" sensations and tensing in her arms, but no shaking or loss of consciousness with these spells.    CT head negative for acute intracranial abnormalities.    Labs revealed lactic acid 14.3 (normalized within 2 hours), WBC 19.92, TSH 6.488 (later normalized), and UDS positive for THC.  Urine pregnancy test negative.    Patient has been taking Wellbutrin for anxiety/depression which was increased last March 2023.  She did not feel this was helping her anxiety/depression, so she stopped Wellbutrin approximately 2-4 weeks ago, but then self resumed 1 week ago at the full dose " of Wellbutrin 200 mg BID.  Initially, it was thought that Wellbutrin was the cause for the new onset seizures.  However, routine EEG 3/23 revealed R temporal lobe seizure.  During the seizure, EEG tech was asking the patient questions, and patient was able to answer appropriately.  Patient was loaded with Vimpat 200 mg and started on vEEG monitoring:    vEEG:  Evening of 3/23: Electrographic seizures approximately every 10 to 20 minutes (no clinical correlate).  She received 2 mg IV Ativan and an extra dose of lacosamide 100 mg.  The EEG calmed down for a few hours.  3/24: At 4 AM, patient began having seizures that continue to be electrographic with no clinical correlate occurring every 10 to 20 minutes.  Seizures are coming mainly from the frontal lobe (typically bilateral frontal, then predominant in the right frontal, but occasionally in the left.  Seizures start frontal and spread temporal.  This pattern can be seen in patients who are having frequent seizures).  She received an additional 2 mg IV Ativan and maintenance Vimpat increased to 200 mg BID.  This briefly helped, but then seizures returned every 10-20 minutes.  Loaded with Depacon 20 mg/kg and started on maintenance Depakote.    Etiology for seizures remains unclear.  Workup in process.      Plan:  - Continue video EEG monitoring  - Initial MRI brain was limited due to claustrophobia.  No acute infarct, but unable to obtain remainder of sequences.    Recommend repeat MRI brain w/wo contrast pending (will need Ativan for claustrophobia)  - AED loads:  3/22: Keppra 1500 mg once  3/23: Vimpat 200 mg once in the afternoon and additional 100 mg in the evening  3/24: Depacon 20 mg/kg once  - Maintenance AED:  Vimpat 200 mg BID (increased from 100 mg BID on 3/24)  Depakote 500 mg Q8 hours (started on 3/24)  Patient prefers Depakote over Keppra given her baseline depression/anxiety.  She understands that she may gain some weight.  At this time, no plan for  "any further pregnancies.  She understands that she will need to start birth control.  - AED levels:  Check total VPA level 2 hours after loading with Depacon  - Hold home Wellbutrin 200 mg BID.    Patient is declining inpatient psych consult, but is okay with primary team making adjustments to antidepressant medications.  She will follow-up with her physician upon discharge.  - Administer Ativan prn seizure-like activity    - Continue telemetry  - Continue seizure precautions   - [employer] form completed and faxed on 3/23.  Patient understands that she is not to drive.  - Discussed seizure precautions  - Frequent neuro checks.  Continue to monitor and notify Neurology with any changes.  - Medical management and supportive care per primary team.  Correction of any metabolic or infectious disturbances.  - Patient is anxious to be discharged.  Discussed the importance of continuing seizure workup for her safety given continued seizures.        Renu Aguiar will need follow up in 6-8 weeks with epilepsy attending .  She will not require outpatient neurological testing.       Subjective:   Notified by the epileptologist that overnight, patient had subclinical electrographic seizures every 10 to 20 minutes.  She received Ativan 2 mg and an additional 100 mg Vimpat dose.  EEG improved until this morning at 4 AM when she began having subclinical seizures every 10 to 20 minutes.  She was given an additional 2 mg IV Ativan and Vimpat was increased to 200 mg twice daily    Patient seen and examined at bedside.  Patient states that she feels at baseline.  She has not had any abnormal shaking or abnormal sensations.  Denies any complaints other than wanting to go home to be with her 2 young children.    Patient continues to deny any formal diagnosis of seizures growing up.  However, she does state when she was a child, she was often in trouble because she \"could not focus.\"  Unclear if patient may have undiagnosed absence " seizures as a child.        Past Medical History:   Diagnosis Date    Chlamydia     Depression     no medications for 1 year    Gestational hypertension, third trimester 1/4/2021    Hypertension     Insulin controlled gestational diabetes mellitus (GDM) in third trimester 10/29/2020    Urinary tract infection     Varicella      Past Surgical History:   Procedure Laterality Date    CHOLECYSTECTOMY      VT EXCISION PILONIDAL CYST/SINUS EXTENSIVE N/A 8/3/2021    Procedure: EXCISION PILONIDAL CYST;  Surgeon: Giovany Mack DO;  Location: [facility];  Service: General    WISDOM TOOTH EXTRACTION      WISDOM TOOTH EXTRACTION Bilateral 2010     Family History   Problem Relation Age of Onset    Thyroid disease Mother     Other Mother         cerebellar atrophy dx. at 25 years    Diabetes Father     Kidney disease Father     Clotting disorder Father     Breast cancer Maternal Grandmother     Diabetes Paternal Grandmother     Clotting disorder Paternal Grandfather     Colon cancer Neg Hx     Ovarian cancer Neg Hx      Social History     Socioeconomic History    Marital status: /Civil Union     Spouse name: Not on file    Number of children: Not on file    Years of education: Not on file    Highest education level: Not on file   Occupational History    Not on file   Tobacco Use    Smoking status: Every Day     Current packs/day: 1.00     Average packs/day: 1 pack/day for 10.0 years (10.0 ttl pk-yrs)     Types: Cigarettes    Smokeless tobacco: Never    Tobacco comments:     less juancarlos 10 cigs a day. Trying to quit.    Vaping Use    Vaping status: Never Used   Substance and Sexual Activity    Alcohol use: Not Currently     Comment: rare    Drug use: Not Currently     Types: Marijuana     Comment: Marijuana prior to pregnancy.    Sexual activity: Not Currently     Partners: Male     Birth control/protection: None   Other Topics Concern    Not on file   Social History Narrative    Not on file     Social Determinants of Health      Financial Resource Strain: Not on file   Food Insecurity: Not on file   Transportation Needs: Not on file   Physical Activity: Not on file   Stress: Not on file   Social Connections: Not on file   Intimate Partner Violence: Not on file   Housing Stability: Not on file     E-Cigarette/Vaping    E-Cigarette Use Never User      E-Cigarette/Vaping Substances    Nicotine Yes     THC No     CBD No     Flavoring No     Other No     Unknown No          Medications:  All current active meds have been reviewed and current meds:  Scheduled Meds:  Current Facility-Administered Medications   Medication Dose Route Frequency Provider Last Rate    acetaminophen  650 mg Oral Q6H PRN Parisa Shaw, CRNP      escitalopram  10 mg Oral Daily Parisafranco Navarretea, CRNP      lacosamide  200 mg Oral Q12H ELE Adrianna Burrows PA-C      LORazepam  1 mg Intravenous Q5 Min PRN Sharidesiree Ford, DO      LORazepam  1 mg Intravenous Once PRN Adrianna Burrows PA-C      And    LORazepam  1 mg Intravenous Once PRN Adrianna Burrows PA-C      ondansetron  4 mg Intravenous Q6H PRN Parisa NavarreteaDEVANG       Continuous Infusions:   PRN Meds:.  acetaminophen    LORazepam    LORazepam **AND** LORazepam    ondansetron       ROS:   Review of Systems   All other systems reviewed and are negative.            Vitals:   /83   Pulse 77   Temp 98.2 °F (36.8 °C)   LMP  (LMP Unknown)   SpO2 98%     Physical Exam:   Physical Exam  Vitals and nursing note reviewed.   Constitutional:       Appearance: Normal appearance. She is obese.      Comments: Patient lying comfortably in bed in no acute distress  Patient became tearful during the exam when discussing that she will likely need to stay in the hospital longer  BMI 43.06   HENT:      Head:      Comments: EEG leads in place     Mouth/Throat:      Mouth: Mucous membranes are moist.      Pharynx: Oropharynx is clear.      Comments: Bilateral lateral tongue trauma  Eyes:      Extraocular Movements: Extraocular  movements intact.      Conjunctiva/sclera: Conjunctivae normal.      Pupils: Pupils are equal, round, and reactive to light.   Pulmonary:      Effort: Pulmonary effort is normal.   Musculoskeletal:         General: Normal range of motion.   Skin:     General: Skin is warm and dry.   Neurological:      General: No focal deficit present.      Mental Status: She is alert and oriented to person, place, and time.      Comments: See full neuro exam below       Neurologic Exam     Mental Status   Oriented to person, place, and time.   Patient awake and alert.  Oriented to person, place, month, and year.  No dysarthria or aphasia.  Able to follow simple midline and appendicular commands.     Cranial Nerves     CN III, IV, VI   Pupils are equal, round, and reactive to light.  Pupils 4 mm, round, reactive to light bilaterally.  EOMs intact without nystagmus.  No visual field deficits.  Facial sensation to light touch intact throughout.  Facial expressions full and symmetric.  Tongue midline.  Hearing grossly intact.     Motor Exam   Muscle bulk: normal  Overall muscle tone: normal  5/5 strength in bilateral upper and lower extremities.     Sensory Exam   Sensation to light touch intact throughout.     Gait, Coordination, and Reflexes     Gait  Gait: (deferred for patient's safety)  No ataxia with finger to nose bilaterally  No resting or action tremor  No clinical seizure activity           Labs: I have personally reviewed pertinent reports.   No results found for this or any previous visit (from the past 24 hour(s)).    Imaging: I have personally reviewed pertinent imaging in PACS, including CT head, limited MRI brain, EEG discussed with epileptologist,  and I have personally reviewed PACS reports.     EKG, Pathology, and Other Studies: I have personally reviewed pertinent reports.       VTE Prophylaxis: Sequential compression device (Venodyne)

## 2024-03-24 NOTE — PLAN OF CARE
Problem: PAIN - ADULT  Goal: Verbalizes/displays adequate comfort level or baseline comfort level  Description: Interventions:  - Encourage patient to monitor pain and request assistance  - Assess pain using appropriate pain scale  - Administer analgesics based on type and severity of pain and evaluate response  - Implement non-pharmacological measures as appropriate and evaluate response  - Consider cultural and social influences on pain and pain management  - Notify physician/advanced practitioner if interventions unsuccessful or patient reports new pain  Outcome: Progressing     Problem: INFECTION - ADULT  Goal: Absence or prevention of progression during hospitalization  Description: INTERVENTIONS:  - Assess and monitor for signs and symptoms of infection  - Monitor lab/diagnostic results  - Monitor all insertion sites, i.e. indwelling lines, tubes, and drains  - Monitor endotracheal if appropriate and nasal secretions for changes in amount and color  - [place] appropriate cooling/warming therapies per order  - Administer medications as ordered  - Instruct and encourage patient and family to use good hand hygiene technique  - Identify and instruct in appropriate isolation precautions for identified infection/condition  Outcome: Progressing     Problem: SAFETY ADULT  Goal: Patient will remain free of falls  Description: INTERVENTIONS:  - Educate patient/family on patient safety including physical limitations  - Instruct patient to call for assistance with activity   - Consult OT/PT to assist with strengthening/mobility   - Keep Call bell within reach  - Keep bed low and locked with side rails adjusted as appropriate  - Keep care items and personal belongings within reach  - Initiate and maintain comfort rounds  - Make Fall Risk Sign visible to staff  - Apply yellow socks and bracelet for high fall risk patients  - Consider moving patient to room near nurses station  Outcome: Progressing     Problem: Knowledge  Deficit  Goal: Patient/family/caregiver demonstrates understanding of disease process, treatment plan, medications, and discharge instructions  Description: Complete learning assessment and assess knowledge base.  Interventions:  - Provide teaching at level of understanding  - Provide teaching via preferred learning methods  Outcome: Progressing     Problem: NEUROSENSORY - ADULT  Goal: Achieves stable or improved neurological status  Description: INTERVENTIONS  - Monitor and report changes in neurological status  - Monitor vital signs such as temperature, blood pressure, glucose, and any other labs ordered   - Initiate measures to prevent increased intracranial pressure  - Monitor for seizure activity and implement precautions if appropriate      Outcome: Progressing     Problem: CARDIOVASCULAR - ADULT  Goal: Maintains optimal cardiac output and hemodynamic stability  Description: INTERVENTIONS:  - Monitor I/O, vital signs and rhythm  - Monitor for S/S and trends of decreased cardiac output  - Administer and titrate ordered vasoactive medications to optimize hemodynamic stability  - Assess quality of pulses, skin color and temperature  - Assess for signs of decreased coronary artery perfusion  - Instruct patient to report change in severity of symptoms  Outcome: Progressing     Problem: GASTROINTESTINAL - ADULT  Goal: Minimal or absence of nausea and/or vomiting  Description: INTERVENTIONS:  - Administer IV fluids if ordered to ensure adequate hydration  - Maintain NPO status until nausea and vomiting are resolved  - Nasogastric tube if ordered  - Administer ordered antiemetic medications as needed  - Provide nonpharmacologic comfort measures as appropriate  - Advance diet as tolerated, if ordered  - Consider nutrition services referral to assist patient with adequate nutrition and appropriate food choices  Outcome: Progressing

## 2024-03-24 NOTE — QUICK NOTE
This provider was notified by the epilepsy monitoring unit that the patient had 4 electrographic seizures.  After discussing with epileptologist, the patient's maintenance dosing of lacosamide was increased to 200 mg every 12 hours, a 100 mg intravenous bolus was ordered as STAT, and the patient was also ordered 2 mg of intravenous lorazepam STAT.

## 2024-03-25 ENCOUNTER — APPOINTMENT (OUTPATIENT)
Dept: RADIOLOGY | Facility: HOSPITAL | Age: 29
DRG: 101 | End: 2024-03-25
Payer: COMMERCIAL

## 2024-03-25 LAB
ANION GAP SERPL CALCULATED.3IONS-SCNC: 6 MMOL/L (ref 4–13)
ATRIAL RATE: 132 BPM
BUN SERPL-MCNC: 9 MG/DL (ref 5–25)
CALCIUM SERPL-MCNC: 8.7 MG/DL (ref 8.4–10.2)
CHLORIDE SERPL-SCNC: 107 MMOL/L (ref 96–108)
CO2 SERPL-SCNC: 25 MMOL/L (ref 21–32)
CREAT SERPL-MCNC: 0.75 MG/DL (ref 0.6–1.3)
ERYTHROCYTE [DISTWIDTH] IN BLOOD BY AUTOMATED COUNT: 16.6 % (ref 11.6–15.1)
GFR SERPL CREATININE-BSD FRML MDRD: 107 ML/MIN/1.73SQ M
GLUCOSE SERPL-MCNC: 84 MG/DL (ref 65–140)
HCT VFR BLD AUTO: 35.7 % (ref 34.8–46.1)
HGB BLD-MCNC: 11.2 G/DL (ref 11.5–15.4)
MCH RBC QN AUTO: 26.1 PG (ref 26.8–34.3)
MCHC RBC AUTO-ENTMCNC: 31.4 G/DL (ref 31.4–37.4)
MCV RBC AUTO: 83 FL (ref 82–98)
P AXIS: 52 DEGREES
PHENYTOIN SERPL-MCNC: 9.1 UG/ML (ref 10–20)
PLATELET # BLD AUTO: 269 THOUSANDS/UL (ref 149–390)
PMV BLD AUTO: 9.8 FL (ref 8.9–12.7)
POTASSIUM SERPL-SCNC: 3.8 MMOL/L (ref 3.5–5.3)
PR INTERVAL: 134 MS
QRS AXIS: 30 DEGREES
QRSD INTERVAL: 84 MS
QT INTERVAL: 314 MS
QTC INTERVAL: 465 MS
RBC # BLD AUTO: 4.29 MILLION/UL (ref 3.81–5.12)
SODIUM SERPL-SCNC: 138 MMOL/L (ref 135–147)
T WAVE AXIS: 129 DEGREES
VENTRICULAR RATE: 132 BPM
WBC # BLD AUTO: 9.32 THOUSAND/UL (ref 4.31–10.16)

## 2024-03-25 PROCEDURE — 99232 SBSQ HOSP IP/OBS MODERATE 35: CPT | Performed by: INTERNAL MEDICINE

## 2024-03-25 PROCEDURE — 80048 BASIC METABOLIC PNL TOTAL CA: CPT

## 2024-03-25 PROCEDURE — 70553 MRI BRAIN STEM W/O & W/DYE: CPT

## 2024-03-25 PROCEDURE — A9585 GADOBUTROL INJECTION: HCPCS | Performed by: PHYSICIAN ASSISTANT

## 2024-03-25 PROCEDURE — 85027 COMPLETE CBC AUTOMATED: CPT

## 2024-03-25 PROCEDURE — 99233 SBSQ HOSP IP/OBS HIGH 50: CPT | Performed by: PSYCHIATRY & NEUROLOGY

## 2024-03-25 PROCEDURE — 80307 DRUG TEST PRSMV CHEM ANLYZR: CPT

## 2024-03-25 PROCEDURE — 80185 ASSAY OF PHENYTOIN TOTAL: CPT | Performed by: NURSE PRACTITIONER

## 2024-03-25 PROCEDURE — 93010 ELECTROCARDIOGRAM REPORT: CPT | Performed by: INTERNAL MEDICINE

## 2024-03-25 RX ORDER — LEVETIRACETAM 750 MG/1
1500 TABLET ORAL EVERY 12 HOURS SCHEDULED
Status: DISCONTINUED | OUTPATIENT
Start: 2024-03-25 | End: 2024-03-26 | Stop reason: HOSPADM

## 2024-03-25 RX ORDER — DIVALPROEX SODIUM 500 MG/1
500 TABLET, DELAYED RELEASE ORAL EVERY 8 HOURS SCHEDULED
Status: DISCONTINUED | OUTPATIENT
Start: 2024-03-25 | End: 2024-03-26 | Stop reason: HOSPADM

## 2024-03-25 RX ORDER — PHENYTOIN 50 MG/1
150 TABLET, CHEWABLE ORAL EVERY 8 HOURS SCHEDULED
Status: DISCONTINUED | OUTPATIENT
Start: 2024-03-25 | End: 2024-03-26

## 2024-03-25 RX ORDER — GADOBUTROL 604.72 MG/ML
10 INJECTION INTRAVENOUS
Status: COMPLETED | OUTPATIENT
Start: 2024-03-25 | End: 2024-03-25

## 2024-03-25 RX ADMIN — LEVETIRACETAM 1500 MG: 750 TABLET, FILM COATED ORAL at 21:22

## 2024-03-25 RX ADMIN — LEVETIRACETAM 4500 MG: 100 INJECTION, SOLUTION INTRAVENOUS at 00:26

## 2024-03-25 RX ADMIN — DIVALPROEX SODIUM 500 MG: 500 TABLET, DELAYED RELEASE ORAL at 21:22

## 2024-03-25 RX ADMIN — PHENYTOIN 150 MG: 50 TABLET, CHEWABLE ORAL at 19:24

## 2024-03-25 RX ADMIN — VALPROATE SODIUM 500 MG: 100 INJECTION, SOLUTION INTRAVENOUS at 08:18

## 2024-03-25 RX ADMIN — GADOBUTROL 10 ML: 604.72 INJECTION INTRAVENOUS at 18:45

## 2024-03-25 RX ADMIN — SODIUM CHLORIDE 2875 MG PE: 9 INJECTION, SOLUTION INTRAVENOUS at 10:27

## 2024-03-25 RX ADMIN — LORAZEPAM 1 MG: 2 INJECTION INTRAMUSCULAR; INTRAVENOUS at 17:49

## 2024-03-25 RX ADMIN — ACETAMINOPHEN 650 MG: 325 TABLET, FILM COATED ORAL at 21:23

## 2024-03-25 RX ADMIN — LEVETIRACETAM 1500 MG: 100 INJECTION, SOLUTION INTRAVENOUS at 11:24

## 2024-03-25 RX ADMIN — VALPROATE SODIUM 500 MG: 100 INJECTION, SOLUTION INTRAVENOUS at 15:36

## 2024-03-25 RX ADMIN — ESCITALOPRAM OXALATE 10 MG: 10 TABLET ORAL at 08:18

## 2024-03-25 NOTE — PLAN OF CARE
Problem: PAIN - ADULT  Goal: Verbalizes/displays adequate comfort level or baseline comfort level  Description: Interventions:  - Encourage patient to monitor pain and request assistance  - Assess pain using appropriate pain scale  - Administer analgesics based on type and severity of pain and evaluate response  - Implement non-pharmacological measures as appropriate and evaluate response  - Consider cultural and social influences on pain and pain management  - Notify physician/advanced practitioner if interventions unsuccessful or patient reports new pain  Outcome: Progressing     Problem: INFECTION - ADULT  Goal: Absence or prevention of progression during hospitalization  Description: INTERVENTIONS:  - Assess and monitor for signs and symptoms of infection  - Monitor lab/diagnostic results  - Monitor all insertion sites, i.e. indwelling lines, tubes, and drains  - Monitor endotracheal if appropriate and nasal secretions for changes in amount and color  - [place] appropriate cooling/warming therapies per order  - Administer medications as ordered  - Instruct and encourage patient and family to use good hand hygiene technique  - Identify and instruct in appropriate isolation precautions for identified infection/condition  Outcome: Progressing  Goal: Absence of fever/infection during neutropenic period  Description: INTERVENTIONS:  - Monitor WBC    Outcome: Progressing     Problem: SAFETY ADULT  Goal: Patient will remain free of falls  Description: INTERVENTIONS:  - Educate patient/family on patient safety including physical limitations  - Instruct patient to call for assistance with activity   - Consult OT/PT to assist with strengthening/mobility   - Keep Call bell within reach  - Keep bed low and locked with side rails adjusted as appropriate  - Keep care items and personal belongings within reach  - Initiate and maintain comfort rounds  - Make Fall Risk Sign visible to staff  - Offer Toileting every 3 Hours,  in advance of need  - Initiate/Maintain bed alarm  - Obtain necessary fall risk management equipment:   - Apply yellow socks and bracelet for high fall risk patients  - Consider moving patient to room near nurses station  Outcome: Progressing  Goal: Maintain or return to baseline ADL function  Description: INTERVENTIONS:  -  Assess patient's ability to carry out ADLs; assess patient's baseline for ADL function and identify physical deficits which impact ability to perform ADLs (bathing, care of mouth/teeth, toileting, grooming, dressing, etc.)  - Assess/evaluate cause of self-care deficits   - Assess range of motion  - Assess patient's mobility; develop plan if impaired  - Assess patient's need for assistive devices and provide as appropriate  - Encourage maximum independence but intervene and supervise when necessary  - Involve family in performance of ADLs  - Assess for home care needs following discharge   - Consider OT consult to assist with ADL evaluation and planning for discharge  - Provide patient education as appropriate  Outcome: Progressing  Goal: Maintains/Returns to pre admission functional level  Description: INTERVENTIONS:  - Perform AM-PAC 6 Click Basic Mobility/ Daily Activity assessment daily.  - Set and communicate daily mobility goal to care team and patient/family/caregiver.   - Collaborate with rehabilitation services on mobility goals if consulted  - Perform Range of Motion 3 times a day.  - Reposition patient every 3 hours.  - Dangle patient 3 times a day  - Stand patient 3 times a day  - Ambulate patient 3 times a day  - Out of bed to chair 3 times a day   - Out of bed for meals 3 times a day  - Out of bed for toileting  - Record patient progress and toleration of activity level   Outcome: Progressing     Problem: DISCHARGE PLANNING  Goal: Discharge to home or other facility with appropriate resources  Description: INTERVENTIONS:  - Identify barriers to discharge w/patient and caregiver  -  Arrange for needed discharge resources and transportation as appropriate  - Identify discharge learning needs (meds, wound care, etc.)  - Arrange for interpretive services to assist at discharge as needed  - Refer to Case Management Department for coordinating discharge planning if the patient needs post-hospital services based on physician/advanced practitioner order or complex needs related to functional status, cognitive ability, or social support system  Outcome: Progressing     Problem: Knowledge Deficit  Goal: Patient/family/caregiver demonstrates understanding of disease process, treatment plan, medications, and discharge instructions  Description: Complete learning assessment and assess knowledge base.  Interventions:  - Provide teaching at level of understanding  - Provide teaching via preferred learning methods  Outcome: Progressing     Problem: NEUROSENSORY - ADULT  Goal: Achieves stable or improved neurological status  Description: INTERVENTIONS  - Monitor and report changes in neurological status  - Monitor vital signs such as temperature, blood pressure, glucose, and any other labs ordered   - Initiate measures to prevent increased intracranial pressure  - Monitor for seizure activity and implement precautions if appropriate      Outcome: Progressing  Goal: Remains free of injury related to seizures activity  Description: INTERVENTIONS  - Maintain airway, patient safety  and administer oxygen as ordered  - Monitor patient for seizure activity, document and report duration and description of seizure to physician/advanced practitioner  - If seizure occurs,  ensure patient safety during seizure  - Reorient patient post seizure  - Seizure pads on all 4 side rails  - Instruct patient/family to notify RN of any seizure activity including if an aura is experienced  - Instruct patient/family to call for assistance with activity based on nursing assessment  - Administer anti-seizure medications if  ordered    Outcome: Progressing  Goal: Achieves maximal functionality and self care  Description: INTERVENTIONS  - Monitor swallowing and airway patency with patient fatigue and changes in neurological status  - Encourage and assist patient to increase activity and self care.   - Encourage visually impaired, hearing impaired and aphasic patients to use assistive/communication devices  Outcome: Progressing     Problem: CARDIOVASCULAR - ADULT  Goal: Maintains optimal cardiac output and hemodynamic stability  Description: INTERVENTIONS:  - Monitor I/O, vital signs and rhythm  - Monitor for S/S and trends of decreased cardiac output  - Administer and titrate ordered vasoactive medications to optimize hemodynamic stability  - Assess quality of pulses, skin color and temperature  - Assess for signs of decreased coronary artery perfusion  - Instruct patient to report change in severity of symptoms  Outcome: Progressing  Goal: Absence of cardiac dysrhythmias or at baseline rhythm  Description: INTERVENTIONS:  - Continuous cardiac monitoring, vital signs, obtain 12 lead EKG if ordered  - Administer antiarrhythmic and heart rate control medications as ordered  - Monitor electrolytes and administer replacement therapy as ordered  Outcome: Progressing     Problem: RESPIRATORY - ADULT  Goal: Achieves optimal ventilation and oxygenation  Description: INTERVENTIONS:  - Assess for changes in respiratory status  - Assess for changes in mentation and behavior  - Position to facilitate oxygenation and minimize respiratory effort  - Oxygen administered by appropriate delivery if ordered  - Initiate smoking cessation education as indicated  - Encourage broncho-pulmonary hygiene including cough, deep breathe, Incentive Spirometry  - Assess the need for suctioning and aspirate as needed  - Assess and instruct to report SOB or any respiratory difficulty  - Respiratory Therapy support as indicated  Outcome: Progressing     Problem:  GASTROINTESTINAL - ADULT  Goal: Minimal or absence of nausea and/or vomiting  Description: INTERVENTIONS:  - Administer IV fluids if ordered to ensure adequate hydration  - Maintain NPO status until nausea and vomiting are resolved  - Nasogastric tube if ordered  - Administer ordered antiemetic medications as needed  - Provide nonpharmacologic comfort measures as appropriate  - Advance diet as tolerated, if ordered  - Consider nutrition services referral to assist patient with adequate nutrition and appropriate food choices  Outcome: Progressing  Goal: Maintains or returns to baseline bowel function  Description: INTERVENTIONS:  - Assess bowel function  - Encourage oral fluids to ensure adequate hydration  - Administer IV fluids if ordered to ensure adequate hydration  - Administer ordered medications as needed  - Encourage mobilization and activity  - Consider nutritional services referral to assist patient with adequate nutrition and appropriate food choices  Outcome: Progressing  Goal: Maintains adequate nutritional intake  Description: INTERVENTIONS:  - Monitor percentage of each meal consumed  - Identify factors contributing to decreased intake, treat as appropriate  - Assist with meals as needed  - Monitor I&O, weight, and lab values if indicated  - Obtain nutrition services referral as needed  Outcome: Progressing  Goal: Establish and maintain optimal ostomy function  Description: INTERVENTIONS:  - Assess bowel function  - Encourage oral fluids to ensure adequate hydration  - Administer IV fluids if ordered to ensure adequate hydration   - Administer ordered medications as needed  - Encourage mobilization and activity  - Nutrition services referral to assist patient with appropriate food choices  - Assess stoma site  - Consider wound care consult   Outcome: Progressing  Goal: Oral mucous membranes remain intact  Description: INTERVENTIONS  - Assess oral mucosa and hygiene practices  - Implement preventative  oral hygiene regimen  - Implement oral medicated treatments as ordered  - Initiate Nutrition services referral as needed  Outcome: Progressing     Problem: GENITOURINARY - ADULT  Goal: Maintains or returns to baseline urinary function  Description: INTERVENTIONS:  - Assess urinary function  - Encourage oral fluids to ensure adequate hydration if ordered  - Administer IV fluids as ordered to ensure adequate hydration  - Administer ordered medications as needed  - Offer frequent toileting  - Follow urinary retention protocol if ordered  Outcome: Progressing  Goal: Absence of urinary retention  Description: INTERVENTIONS:  - Assess patient’s ability to void and empty bladder  - Monitor I/O  - Bladder scan as needed  - Discuss with physician/AP medications to alleviate retention as needed  - Discuss catheterization for long term situations as appropriate  Outcome: Progressing  Goal: Urinary catheter remains patent  Description: INTERVENTIONS:  - Assess patency of urinary catheter  - If patient has a chronic barrera, consider changing catheter if non-functioning  - Follow guidelines for intermittent irrigation of non-functioning urinary catheter  Outcome: Progressing     Problem: METABOLIC, FLUID AND ELECTROLYTES - ADULT  Goal: Electrolytes maintained within normal limits  Description: INTERVENTIONS:  - Monitor labs and assess patient for signs and symptoms of electrolyte imbalances  - Administer electrolyte replacement as ordered  - Monitor response to electrolyte replacements, including repeat lab results as appropriate  - Instruct patient on fluid and nutrition as appropriate  Outcome: Progressing  Goal: Fluid balance maintained  Description: INTERVENTIONS:  - Monitor labs   - Monitor I/O and WT  - Instruct patient on fluid and nutrition as appropriate  - Assess for signs & symptoms of volume excess or deficit  Outcome: Progressing  Goal: Glucose maintained within target range  Description: INTERVENTIONS:  - Monitor  Blood Glucose as ordered  - Assess for signs and symptoms of hyperglycemia and hypoglycemia  - Administer ordered medications to maintain glucose within target range  - Assess nutritional intake and initiate nutrition service referral as needed  Outcome: Progressing     Problem: MUSCULOSKELETAL - ADULT  Goal: Maintain or return mobility to safest level of function  Description: INTERVENTIONS:  - Assess patient's ability to carry out ADLs; assess patient's baseline for ADL function and identify physical deficits which impact ability to perform ADLs (bathing, care of mouth/teeth, toileting, grooming, dressing, etc.)  - Assess/evaluate cause of self-care deficits   - Assess range of motion  - Assess patient's mobility  - Assess patient's need for assistive devices and provide as appropriate  - Encourage maximum independence but intervene and supervise when necessary  - Involve family in performance of ADLs  - Assess for home care needs following discharge   - Consider OT consult to assist with ADL evaluation and planning for discharge  - Provide patient education as appropriate  Outcome: Progressing  Goal: Maintain proper alignment of affected body part  Description: INTERVENTIONS:  - Support, maintain and protect limb and body alignment  - Provide patient/ family with appropriate education  Outcome: Progressing

## 2024-03-25 NOTE — PROGRESS NOTES
[facility]  Progress Note  Name: Renu Aguiar I  MRN: [id number]  Unit/Bed#: [facility] 716-01 I Date of Admission: 3/23/2024   Date of Service: 3/25/2024 I Hospital Day: 2    Assessment/Plan   * New onset seizure (HCC)  Assessment & Plan  Pt presents as a transfer from [facility] for evaluation of seizure-like activity. Had 3 episodes at home prior to EMS being called. While in [facility] [place] ED, she had another seizure that was terminated with Ativan. Received Keppra load prior to transfer.  Wellbutrin held  vEEG and antiseizure medications per neurology. Adjusted to keppra and Depakon. Given fosphenytoin x1 today    Anemia  Assessment & Plan  Hgb appears to be low chronically   No active signs of bleeding   F/u iron panel     Generalized anxiety disorder  Assessment & Plan  Takes Wellbutrin and Lexapro as an outpatient   follow-up outpatient with her psychiatrist    H/O major depression  Assessment & Plan  Takes Wellbutrin and Lexapro as an outpatient  Denies SI/HI  Recently self restarted 200mg BID 1 week PTA  wellbutrin on hold  F/u with outpt psych for further medication adjustments              VTE Pharmacologic Prophylaxis: VTE Score: 2 Low Risk (Score 0-2) - Encourage Ambulation.    Mobility:   Basic Mobility Inpatient Raw Score: 23  JH-HLM Goal: 7: Walk 25 feet or more  JH-HLM Achieved: 7: Walk 25 feet or more  JH-HLM Goal achieved. Continue to encourage appropriate mobility.    Patient Centered Rounds: I performed bedside rounds with nursing staff today.   Discussions with Specialists or Other Care Team Provider: CM    Education and Discussions with Family / Patient:  at bedside .     Total Time Spent on Date of Encounter in care of patient: 35 mins. This time was spent on one or more of the following: performing physical exam; counseling and coordination of care; obtaining or reviewing history; documenting in the medical record; reviewing/ordering tests, medications or  procedures; communicating with other healthcare professionals and discussing with patient's family/caregivers.    Current Length of Stay: 2 day(s)  Current Patient Status: Inpatient   Certification Statement: The patient will continue to require additional inpatient hospital stay due to vEEG and ongoing seizure meds adjustements   Discharge Plan:  Once cleared by neurology    Code Status: Level 1 - Full Code    Subjective:   No complaints.    Objective:     Vitals:   Temp (24hrs), Av.1 °F (36.7 °C), Min:97.6 °F (36.4 °C), Max:98.5 °F (36.9 °C)    Temp:  [97.6 °F (36.4 °C)-98.5 °F (36.9 °C)] 97.6 °F (36.4 °C)  HR:  [64-98] 64  Resp:  [16] 16  BP: (142-158)/(82-92) 142/82  SpO2:  [96 %-97 %] 97 %  There is no height or weight on file to calculate BMI.     Input and Output Summary (last 24 hours):     Intake/Output Summary (Last 24 hours) at 3/25/2024 1049  Last data filed at 3/24/2024 1743  Gross per 24 hour   Intake 480 ml   Output --   Net 480 ml       Physical Exam:   Physical Exam  Vitals and nursing note reviewed.   Constitutional:       General: She is not in acute distress.     Appearance: She is well-developed.   HENT:      Head: Normocephalic and atraumatic.   Eyes:      Conjunctiva/sclera: Conjunctivae normal.   Cardiovascular:      Rate and Rhythm: Normal rate and regular rhythm.      Heart sounds: No murmur heard.  Pulmonary:      Effort: Pulmonary effort is normal. No respiratory distress.      Breath sounds: Normal breath sounds.   Abdominal:      Palpations: Abdomen is soft.      Tenderness: There is no abdominal tenderness.   Musculoskeletal:         General: No swelling.      Cervical back: Neck supple.   Skin:     General: Skin is warm and dry.      Capillary Refill: Capillary refill takes less than 2 seconds.   Neurological:      Mental Status: She is alert.   Psychiatric:         Mood and Affect: Mood normal.          Additional Data:     Labs:  Results from last 7 days   Lab Units  03/25/24  0559 03/24/24  0946 03/23/24  0457   WBC Thousand/uL 9.32   < > 12.40*   HEMOGLOBIN g/dL 11.2*   < > 10.5*   HEMATOCRIT % 35.7   < > 33.7*   PLATELETS Thousands/uL 269   < > 261   NEUTROS PCT %  --   --  65   LYMPHS PCT %  --   --  27   MONOS PCT %  --   --  7   EOS PCT %  --   --  1    < > = values in this interval not displayed.     Results from last 7 days   Lab Units 03/25/24  0559 03/23/24  0359 03/22/24  2104   SODIUM mmol/L 138   < > 141   POTASSIUM mmol/L 3.8   < > 3.7   CHLORIDE mmol/L 107   < > 103   CO2 mmol/L 25   < > 13*   BUN mg/dL 9   < > 9   CREATININE mg/dL 0.75   < > 1.01   ANION GAP mmol/L 6   < > 25*   CALCIUM mg/dL 8.7   < > 9.6   ALBUMIN g/dL  --   --  4.5   TOTAL BILIRUBIN mg/dL  --   --  0.29   ALK PHOS U/L  --   --  66   ALT U/L  --   --  18   AST U/L  --   --  20   GLUCOSE RANDOM mg/dL 84   < > 126    < > = values in this interval not displayed.         Results from last 7 days   Lab Units 03/22/24  2050   POC GLUCOSE mg/dl 123     Results from last 7 days   Lab Units 03/23/24  0404   HEMOGLOBIN A1C % 6.0*     Results from last 7 days   Lab Units 03/22/24  2322 03/22/24  2104   LACTIC ACID mmol/L 1.7 14.3*       Lines/Drains:  Invasive Devices       Peripheral Intravenous Line  Duration             Peripheral IV 03/24/24 Left;Proximal;Ventral (anterior) Forearm 1 day              Drain  Duration             Open Drain Midline Buttock 964 days                          Imaging: Reviewed radiology reports from this admission including: CT head    Recent Cultures (last 7 days):   Results from last 7 days   Lab Units 03/22/24  2138   BLOOD CULTURE  No Growth at 24 hrs.  No Growth at 24 hrs.       Last 24 Hours Medication List:   Current Facility-Administered Medications   Medication Dose Route Frequency Provider Last Rate    acetaminophen  650 mg Oral Q6H PRN DEVANG Martínez      escitalopram  10 mg Oral Daily DEVANG Martínez      levETIRAcetam  1,500 mg Intravenous Q12H ELE  David Don, DO      LORazepam  1 mg Intravenous Q5 Min PRN Shari Ford, DO      LORazepam  1 mg Intravenous Once PRN Adrianna Burrows PA-C      And    LORazepam  1 mg Intravenous Once PRN Adrianna Burrows PA-C      ondansetron  4 mg Intravenous Q6H PRN DEVANG Martínez      valproate sodium  500 mg Intravenous Q8H ELE David Don,  mg (03/25/24 0818)        Today, Patient Was Seen By: Sherif Sy MD    **Please Note: This note may have been constructed using a voice recognition system.**

## 2024-03-25 NOTE — ASSESSMENT & PLAN NOTE
Takes Wellbutrin and Lexapro as an outpatient  Denies SI/HI  Recently self restarted 200mg BID 1 week PTA  wellbutrin on hold  F/u with outpt psych for further medication adjustments

## 2024-03-25 NOTE — ASSESSMENT & PLAN NOTE
Pt presents as a transfer from [facility] for evaluation of seizure-like activity. Had 3 episodes at home prior to EMS being called. While in [facility] ED, she had another seizure that was terminated with Ativan. Received Keppra load prior to transfer.  Wellbutrin held  vEEG and antiseizure medications per neurology. Adjusted to keppra and Depakon. Given fosphenytoin x1 today

## 2024-03-25 NOTE — QUICK NOTE
This provider was contacted by EMU on 3/24/2024 at 2145 in regards to the patient unfortunately continuing to demonstrate subclinical electrographic seizures.  As recommended by a day team, patient was ordered a 4500 mg loading dose of intravenous levetiracetam, and maintenance dosing of levetiracetam 1500 mg IV every 12 hours was initiated.  The patient's lacosamide was stopped, and valproate continued.

## 2024-03-25 NOTE — UTILIZATION REVIEW
Initial Clinical Review    Admission: Date/Time/Statement:   Admission Orders (From admission, onward)       Ordered        03/23/24 0254  Inpatient Admission  Once                          Orders Placed This Encounter   Procedures    Inpatient Admission     Standing Status:   Standing     Number of Occurrences:   1     Order Specific Question:   Level of Care     Answer:   Med Surg [16]     Order Specific Question:   Estimated length of stay     Answer:   More than 2 Midnights     Order Specific Question:   Certification     Answer:   I certify that inpatient services are medically necessary for this patient for a duration of greater than two midnights. See H&P and MD Progress Notes for additional information about the patient's course of treatment.     3/22/2024 - 3/23/2024 (4 hours)  Formerly [facility] Emergency Department  New onset seizure   Prior to arrival : Iv ceftriaxone, iv keppra, iv zofran, iv .9% ns bolus, iv ativan   Discharge to [facility] for higher level of care    Initial Presentation: 28 y.o. female received on 3-23 from [facility] ed for inpatient step down admission to further evaluate and treat seizure like evants.  PMHX: no history of seizures, gestational DM, depression. Patient had 3 events today prior to arrival  at [facility] ed with confusion after the last event, which was a tonic clonic event .  Arrived alert and oriented then in ed had a 4th event : tongue biting, foaming at the mouth with labored breathing eyes dilated and roving, full body tremors. In ed : LA 14.3- TSH 6.488- CO2 13- AN GAP- 25- WBC 19.92- Blood cultures x2- UA culture. Plan includes MRI of brain, routine EEG, neurology consult, iv Keppra. Continue Lexapro and hold Wellbutrin.  Consider psychiatric consult for adjustment of antidepressant medications.  Lactic acidosis not resolved.     Consult neurology: suspect new onset generalized seizure may be related to Wellbutrin rather than underlying  epilepsy. MRI of head not completed due to patient movement.  Will reorder as outpatient.  Routine EEG result pending.   Hold off on further antiepileptic medication. Unless repeat events of EEG is abnormal. Start iv vimpat.    Date: 3-24-24    Day 2: inpatient med surg changed to step down  The  Routine EEG recorded during wakefulness, drowsiness, and sleep is abnormal. UDS + THC Video EEG monitoring on 2-23 and 4 electrographic seizures captured. Received Iv vimpat given x 2 then discontinued.  Received iv ativan x 2.  Start iv valproate loading dose. MRI of brain ordered.  Recommend outpatient psychiatric follow up.       Date 3-25-24 inpatient step down    Video EEG monitoring in progress as subclinical seizures continue.    Give iv fosphenytoin x1 Start iv Keppra. Continue iv valproate.       ED Triage Vitals   03/23/24 0251 03/23/24 0251 03/24/24 1446 03/23/24 0251 03/23/24 0251   98.2 °F (36.8 °C) 68 16 96/66 97 %      Oral          Pain Score       2          03/22/24 (!) 144 kg (317 lb 7.4 oz)     Additional Vital Signs:     Date/Time Temp Pulse Resp BP MAP (mmHg) SpO2 O2 Device   03/25/24 07:55:29 97.6 °F (36.4 °C) 64 -- 142/82 102 97 % --   03/24/24 21:27:45 98.3 °F (36.8 °C) 82 -- 151/89 110 96 % --   03/24/24 14:46:30 98.5 °F (36.9 °C) 98 16 158/92 114 97 % None (Room air)   03/24/24 0800 -- -- -- -- -- 96 % None (Room air)   03/24/24 07:21:14 98.2 °F (36.8 °C) 77 -- 138/83 101 98 % --   03/23/24 22:10:33 98.9 °F (37.2 °C) 76 -- 145/92 110 98 % --   03/23/24 20:16:17 98.9 °F (37.2 °C) 106   Abnormal  -- 155/79 104 97 % --   03/23/24 07:02:02 98.7 °F (37.1 °C) 79 -- 119/68 85 97 % --   03/23/24 0300 -- -- -- -- -- -- None (Room air)   03/23/24 0251 98.2 °F (36.8 °C) 68 -- 96/66 76 97 % --           Pertinent Labs/Diagnostic Test Results:     Routine EEG   #-23   EEG Interpretation:  This Routine EEG recorded during wakefulness, drowsiness, and sleep is abnormal. A single electrographic seizure was  "capture, arising from the right hemisphere, possibly right frontal region, which was without clinical changes. Interictally, normal activities of wakefulness and sleep were seen.       MRI brain wo contrast   (03/23 1122)      Incomplete abbreviated examination due to patient unable to hold still and not wanting to continue further scanning.   - No acute infarction on this abbreviated incomplete examination. Consider repeat MRI brain seizure protocol with and without contrast under sedation if clinically indicated.   - Empty sella given motion degradation.            Results from last 7 days   Lab Units 03/25/24  0559 03/24/24  0946 03/23/24  0457 03/22/24 2104   WBC Thousand/uL 9.32 8.38 12.40* 19.92*   HEMOGLOBIN g/dL 11.2* 10.9* 10.5* 12.5   HEMATOCRIT % 35.7 33.6* 33.7* 42.2   PLATELETS Thousands/uL 269 254 261 418*   NEUTROS ABS Thousands/µL  --   --  7.99* 11.69*         Results from last 7 days   Lab Units 03/25/24  0559 03/24/24  0946 03/23/24 0359 03/22/24 2104   SODIUM mmol/L 138 138 135 141   POTASSIUM mmol/L 3.8 3.5 4.1 3.7   CHLORIDE mmol/L 107 106 106 103   CO2 mmol/L 25 26 20* 13*   ANION GAP mmol/L 6 6 9 25*   BUN mg/dL 9 7 7 9   CREATININE mg/dL 0.75 0.73 0.76 1.01   EGFR ml/min/1.73sq m 107 110 105 74   CALCIUM mg/dL 8.7 8.6 8.3* 9.6   MAGNESIUM mg/dL  --   --   --  1.9   PHOSPHORUS mg/dL  --   --   --  3.3     Results from last 7 days   Lab Units 03/22/24 2104   AST U/L 20   ALT U/L 18   ALK PHOS U/L 66   TOTAL PROTEIN g/dL 7.6   ALBUMIN g/dL 4.5   TOTAL BILIRUBIN mg/dL 0.29     Results from last 7 days   Lab Units 03/22/24 2050   POC GLUCOSE mg/dl 123     Results from last 7 days   Lab Units 03/25/24  0559 03/24/24  0946 03/23/24  0359 03/22/24 2104   GLUCOSE RANDOM mg/dL 84 92 85 126         Results from last 7 days   Lab Units 03/23/24  0404   HEMOGLOBIN A1C % 6.0*   EAG mg/dl 126     No results found for: \"BETA-HYDROXYBUTYRATE\"                   Results from last 7 days   Lab Units " 03/22/24  2322 03/22/24  2104   HS TNI 0HR ng/L  --  4   HS TNI 2HR ng/L 6  --    HSTNI D2 ng/L 2  --              Results from last 7 days   Lab Units 03/23/24  0404 03/22/24  2104   TSH 3RD GENERATON uIU/mL 3.523 6.488*         Results from last 7 days   Lab Units 03/22/24  2322 03/22/24  2104   LACTIC ACID mmol/L 1.7 14.3*     Results from last 7 days   Lab Units 03/24/24  1328   FERRITIN ng/mL 9*   IRON SATURATION % 7*   IRON ug/dL 22*   TIBC ug/dL 337       Results from last 7 days   Lab Units 03/23/24  0059   CLARITY UA  Clear   COLOR UA  Yellow   SPEC GRAV UA  1.025   PH UA  6.0   GLUCOSE UA mg/dl Negative   KETONES UA mg/dl Negative   BLOOD UA  Trace-Intact*   PROTEIN UA mg/dl 30 (1+)*   NITRITE UA  Negative   BILIRUBIN UA  Negative   UROBILINOGEN UA E.U./dl 0.2   LEUKOCYTES UA  Negative   WBC UA /hpf 0-1   RBC UA /hpf 0-1   BACTERIA UA /hpf Occasional   EPITHELIAL CELLS WET PREP /hpf Occasional             Results from last 7 days   Lab Units 03/23/24  0059   AMPH/METH  Negative   BARBITURATE UR  Negative   BENZODIAZEPINE UR  Negative   COCAINE UR  Negative   METHADONE URINE  Negative   OPIATE UR  Negative   PCP UR  Negative   THC UR  Positive*       Results from last 7 days   Lab Units 03/22/24 2138   BLOOD CULTURE  No Growth at 24 hrs.  No Growth at 24 hrs.         ED Treatment:   Medication Administration - No Administrations Displayed (No Start Event Found)       None          Past Medical History:   Diagnosis Date    Chlamydia     Depression     no medications for 1 year    Gestational hypertension, third trimester 1/4/2021    Hypertension     Insulin controlled gestational diabetes mellitus (GDM) in third trimester 10/29/2020    Urinary tract infection     Varicella      Present on Admission:   Generalized anxiety disorder      Admitting Diagnosis: Seizure (HCC) [R56.9]    Age/Sex: 28 y.o. female    Scheduled Medications:    escitalopram, 10 mg, Oral, Daily  levETIRAcetam, 1,500 mg, Intravenous,  Q12H ELE  valproate sodium, 500 mg, Intravenous, Q8H ELE      Continuous IV Infusions:     PRN Meds:  acetaminophen, 650 mg, Oral, Q6H PRN  LORazepam, 1 mg, Intravenous, Q5 Min PRN  LORazepam, 1 mg, Intravenous, Once PRN   And  LORazepam, 1 mg, Intravenous, Once PRN  ondansetron, 4 mg, Intravenous, Q6H PRN        IP CONSULT TO NEUROLOGY    Network Utilization Review Department  ATTENTION: Please call with any questions or concerns to [phone] and carefully listen to the prompts so that you are directed to the right person. All voicemails are confidential.   For Discharge needs, contact Care Management DC Support Team at [phone] opt. 2  Send all requests for admission clinical reviews, approved or denied determinations and any other requests to dedicated fax number below belonging to the [facility] where the patient is receiving treatment. List of dedicated fax numbers for the Facilities:  FACILITY NAME UR FAX NUMBER   ADMISSION DENIALS (Administrative/Medical Necessity) [phone]   DISCHARGE SUPPORT TEAM (NETWORK) [phone]   PARENT CHILD HEALTH (Maternity/NICU/Pediatrics) [phone]   [facility] [phone]   [facility] [phone]   [facility] [phone]   [facility] [phone]   [facility] [phone]   [facility] [phone]   [facility] [phone]   [facility] [phone]   [facility] [phone]   [facility] [phone]   [facility] [phone]   [facility] [phone]

## 2024-03-25 NOTE — PLAN OF CARE
Problem: PAIN - ADULT  Goal: Verbalizes/displays adequate comfort level or baseline comfort level  Description: Interventions:  - Encourage patient to monitor pain and request assistance  - Assess pain using appropriate pain scale  - Administer analgesics based on type and severity of pain and evaluate response  - Implement non-pharmacological measures as appropriate and evaluate response  - Consider cultural and social influences on pain and pain management  - Notify physician/advanced practitioner if interventions unsuccessful or patient reports new pain  Outcome: Progressing     Problem: INFECTION - ADULT  Goal: Absence or prevention of progression during hospitalization  Description: INTERVENTIONS:  - Assess and monitor for signs and symptoms of infection  - Monitor lab/diagnostic results  - Monitor all insertion sites, i.e. indwelling lines, tubes, and drains  - Monitor endotracheal if appropriate and nasal secretions for changes in amount and color  - [place] appropriate cooling/warming therapies per order  - Administer medications as ordered  - Instruct and encourage patient and family to use good hand hygiene technique  - Identify and instruct in appropriate isolation precautions for identified infection/condition  Outcome: Progressing     Problem: SAFETY ADULT  Goal: Patient will remain free of falls  Description: INTERVENTIONS:  - Educate patient/family on patient safety including physical limitations  - Instruct patient to call for assistance with activity   - Consult OT/PT to assist with strengthening/mobility   - Keep Call bell within reach  - Keep bed low and locked with side rails adjusted as appropriate  - Keep care items and personal belongings within reach  - Initiate and maintain comfort rounds  - Make Fall Risk Sign visible to staff  - Apply yellow socks and bracelet for high fall risk patients  - Consider moving patient to room near nurses station  Outcome: Progressing     Problem: Knowledge  Deficit  Goal: Patient/family/caregiver demonstrates understanding of disease process, treatment plan, medications, and discharge instructions  Description: Complete learning assessment and assess knowledge base.  Interventions:  - Provide teaching at level of understanding  - Provide teaching via preferred learning methods  Outcome: Progressing     Problem: NEUROSENSORY - ADULT  Goal: Achieves stable or improved neurological status  Description: INTERVENTIONS  - Monitor and report changes in neurological status  - Monitor vital signs such as temperature, blood pressure, glucose, and any other labs ordered   - Initiate measures to prevent increased intracranial pressure  - Monitor for seizure activity and implement precautions if appropriate      Outcome: Progressing

## 2024-03-25 NOTE — CASE MANAGEMENT
Case Management Assessment    Patient name Renu Aguiar  Location [facility]01 MRN [id number]  : 1995 Date 3/25/2024       Current Admission Date: 3/23/2024  Current Admission Diagnosis:New onset seizure (HCC)   Patient Active Problem List    Diagnosis Date Noted    Anemia 2024    New onset seizure (HCC) 2024    Dental infection 10/23/2023    Breast abscess 2023    Dizziness 2023     (spontaneous vaginal delivery) 2022    Generalized anxiety disorder 2021    Hidradenitis suppurativa 2021    Postpartum depression 2021    BMI 45.0-49.9, adult (HCC) 2020    Pilonidal cyst 2020    Left ovarian cyst 2020    Tobacco user 2020    H/O major depression 2020    Functional diarrhea 03/15/2019    Generalized abdominal pain 03/15/2019      LOS (days): 2  Geometric Mean LOS (GMLOS) (days):   Days to GMLOS:     OBJECTIVE:    Risk of Unplanned Readmission Score: 9.04         Current admission status: Inpatient  Referral Reason: Other    Preferred Pharmacy:   [id number] [address] [facility]  [address]  [facility] [address]  Phone: [phone] Fax: [phone]    Primary Care Provider: Lynda West PA-C    Primary Insurance: [employer]  Secondary Insurance:     ASSESSMENT:  Active Health Care Proxies    There are no active Health Care Proxies on file.       Advance Directives  Does patient have a Health Care POA?: No  Was patient offered paperwork?: Yes  Does patient have Advance Directives?: No  Was patient offered paperwork?: Yes  Primary Contact: JENNIFER HODGSON (Spouse)  [phone] (Mobile)         Readmission Root Cause  30 Day Readmission: No    Patient Information  Admitted from:: Home  Mental Status: Alert  During Assessment patient was accompanied by: Spouse  Assessment information provided by:: Patient  Primary Caregiver: Self  Support Systems: Self,  Spouse/significant other  County of Residence: [facility] [place] [facility] [place]  What city do you live in?: [place]  Home entry access options. Select all that apply.: No steps to enter home  Type of Current Residence: 2 story home  Upon entering residence, is there a bedroom on the main floor (no further steps)?: No  A bedroom is located on the following floor levels of residence (select all that apply):: 2nd Floor  Upon entering residence, is there a bathroom on the main floor (no further steps)?: No  Indicate which floors of current residence have a bathroom (select all the apply):: 2nd Floor  Number of steps to 2nd floor from main floor: One Flight  Living Arrangements: Lives w/ Spouse/significant other, Other (Comment) (2 minor children)  Is patient a ?: No    Activities of Daily Living Prior to Admission  Functional Status: Independent  Completes ADLs independently?: Yes  Ambulates independently?: Yes  Does patient use assisted devices?: No  Does patient currently own DME?: No  Does patient have a history of Outpatient Therapy (PT/OT)?: No  Does the patient have a history of Short-Term Rehab?: No  Does patient have a history of HHC?: No  Does patient currently have HHC?: No         Patient Information Continued  Income Source: Unemployed  Does patient have prescription coverage?: Yes  Does patient receive dialysis treatments?: No  Does patient have a history of substance abuse?: No  Does patient have a history of Mental Health Diagnosis?: No         Means of Transportation  Means of Transport to Appts:: Family transport      Social Determinants of Health (SDOH)      Flowsheet Row Most Recent Value   Housing Stability    In the last 12 months, was there a time when you were not able to pay the mortgage or rent on time? N   In the last 12 months, how many places have you lived? 1   In the last 12 months, was there a time when you did not have a steady place to sleep or slept in a shelter (including now)? N   Transportation  Needs    In the past 12 months, has lack of transportation kept you from medical appointments or from getting medications? no   In the past 12 months, has lack of transportation kept you from meetings, work, or from getting things needed for daily living? No   Food Insecurity    Within the past 12 months, you worried that your food would run out before you got the money to buy more. Never true   Within the past 12 months, the food you bought just didn't last and you didn't have money to get more. Never true   Utilities    In the past 12 months has the electric, gas, oil, or water company threatened to shut off services in your home? No          CM reviewed d/c planning process including the following: identifying help at home, patient preference for d/c planning needs,[facility] Meds to Bed program, availability of treatment team to discuss questions or concerns patient and/or family may have regarding understanding medications and recognizing signs and symptoms once discharged. CM also encouraged patient to follow up with all recommended appointments after discharge. Patient advised of importance for patient and family to participate in managing patient’s medical well being.

## 2024-03-25 NOTE — PROGRESS NOTES
"Progress Note - Neurology   Renu Aguiar 28 y.o. female [id number]  Unit/Bed#: [facility] 716/[facility] 716-01    Assessment:    * New onset seizure (HCC)  Assessment & Plan  28 y.o. female with HTN, anxiety/depression on Wellbutrin, and \"staring off spells\" in her childhood (question history of absence seizures, however, never formally diagnosed with seizures) who initially presented to [facility]'s ED on 3/22/2024 due to 2 witnessed GTC seizures.    Earlier that morning on 3/22, patient had 2 episodes in which she tensed BUEs and felt a \"shock\" sensation.  Unclear if there was any confusion following these events.  Around 7 PM on 3/22, she had an episode of GTC seizure activity with foaming at the mouth and roving eyes that lasted 45-60 seconds followed by confusion.  When EMS arrived, patient was alert and oriented.  She was in her normal state upon arrival to the ED.  Vitals stable.  While in the ED, patient had another witnessed GTC seizure with bilateral lateral tongue bite, foaming at the mouth, labored breathing, roving/dilated eyes, and full body tremors.  No urinary or fecal incontinence.  Patient received a total of 4 mg IV Ativan, received Keppra 1.5 g, and transferred to [facility] for neurologic evaluation.  Patient does not recall either of these witnessed seizure like episodes.  Of note, for the past week, she has had intermittent \"electric shock\" sensations and tensing in her arms, but no shaking or loss of consciousness with these spells.    CT head negative for acute intracranial abnormalities.    Labs revealed lactic acid 14.3 (normalized within 2 hours), WBC 19.92, TSH 6.488 (later normalized), and UDS positive for THC.  Urine pregnancy test negative.    Patient has been taking Wellbutrin for anxiety/depression which was increased last March 2023.  She did not feel this was helping her anxiety/depression, so she stopped Wellbutrin approximately 2-4 weeks ago, but then self resumed 1 week ago at the full " dose of Wellbutrin 200 mg BID.  Initially, it was thought that Wellbutrin was the cause for the new onset seizures.  However, routine EEG 3/23 revealed R temporal lobe seizure.  During the seizure, EEG tech was asking the patient questions, and patient was able to answer appropriately.  Patient was loaded with Vimpat 200 mg and started on vEEG monitoring:    vEEG:  Evening of 3/23: Electrographic seizures approximately every 10 to 20 minutes (no clinical correlate).  She received 2 mg IV Ativan and an extra dose of lacosamide 100 mg.  The EEG calmed down for a few hours.  3/24:   At 4 AM, patient began having seizures that continue to be electrographic with no clinical correlate occurring every 10 to 20 minutes.  Seizures are coming mainly from the frontal lobe (typically bilateral frontal, then predominant in the right frontal, but occasionally in the left.  Seizures start frontal and spread temporal.  This pattern can be seen in patients who are having frequent seizures).  She received an additional 2 mg IV Ativan and maintenance Vimpat increased to 200 mg BID.    This briefly helped, but then seizures returned every 10-20 minutes.  Loaded with Depacon 20 mg/kg and started on maintenance Depakote.  VPA total level 2 hours after load was 89.  In the afternoon, continues to have seizures every 10-20 minutes  Overnight into 3/25, patient continued to have electrographic seizures so Vimpat was dc'd and patient was given Keppra 4.5 g bolus and started on Keppra 1.5 mg BID maintenance.  3/25:  Keppra did stop the seizures for a while, going a few hours without any seizures, but in the early morning since about 05:40, continued to have electrographic seizures which have occurred about every 10-20 min since 05:40 (although not as prominent as in the past).  Fosphenytoin 20 mg/kg load at approximately 10:27 am  Seizures were noted at 7:40, 8:18, and 10:27 with no further definite seizures since.    Etiology for seizures  remains unclear.  Workup in process.    Plan:  - Continue video EEG monitoring  - Initial MRI brain was limited due to claustrophobia.  No acute infarct, but unable to obtain remainder of sequences.    Repeat MRI brain w/wo contrast pending (will need Ativan for claustrophobia)  - AED loads:  3/22: Keppra 1500 mg once  3/23: Vimpat 200 mg once in the afternoon and additional 100 mg in the evening  3/24: Depacon 20 mg/kg once  3/25: Keppra 4.5 g x 1, Fosphenytoin 20 mg/kg x 1  - Maintenance AED:  Vimpat dc'd 3/25  Depakote 500 mg Q8 hours (started on 3/24)  Patient prefers Depakote over Keppra given her baseline depression/anxiety.  She understands that she may gain some weight.  At this time, no plan for any further pregnancies.  She understands that she will need to start birth control.  Keppra 1.5 g BID  Will determine phenytoin dosing based on level  - AED levels:  VPA 2 hours after Depacon load: 89  Recheck VPA level tomorrow AM  Check phenytoin level now  - Hold home Wellbutrin 200 mg BID.    Patient is declining inpatient psych consult, but is okay with primary team making adjustments to antidepressant medications.  She will follow-up with her physician upon discharge.  - Administer Ativan prn seizure-like activity    - Continue telemetry  - Continue seizure precautions   - [employer] form previously completed and faxed on 3/23.  Patient understands that she is not to drive.  - Discussed seizure precautions  - Frequent neuro checks.  Continue to monitor and notify Neurology with any changes.  - Medical management and supportive care per primary team.  Correction of any metabolic or infectious disturbances.       Recommendations for outpatient neurological follow up have yet to be determined.    Case and treatment plan reviewed with attending neurologist, Dr. David. Please see attending attestation for any further recommendations.    Subjective:   Discussed with Epileptologist. Due to continued electrographic  seizures on EEG, Vimpat was dc'd and Keppra was initiated. Keppra did help with the seizures for a period of time but since early this morning at 5:40, patient continues to have electrographic seizures about every 10-20 minutes.    EEG leads in place. Patient reports she feels fine and denies any complaints at this time. She is upset that she may have to stay in the hospital longer with her EEG findings.         Past Medical History:   Diagnosis Date    Chlamydia     Depression     no medications for 1 year    Gestational hypertension, third trimester 1/4/2021    Hypertension     Insulin controlled gestational diabetes mellitus (GDM) in third trimester 10/29/2020    Urinary tract infection     Varicella      Past Surgical History:   Procedure Laterality Date    CHOLECYSTECTOMY      CA EXCISION PILONIDAL CYST/SINUS EXTENSIVE N/A 8/3/2021    Procedure: EXCISION PILONIDAL CYST;  Surgeon: Giovany Mack DO;  Location: [facility];  Service: General    WISDOM TOOTH EXTRACTION      WISDOM TOOTH EXTRACTION Bilateral 2010     Family History   Problem Relation Age of Onset    Thyroid disease Mother     Other Mother         cerebellar atrophy dx. at 25 years    Diabetes Father     Kidney disease Father     Clotting disorder Father     Breast cancer Maternal Grandmother     Diabetes Paternal Grandmother     Clotting disorder Paternal Grandfather     Colon cancer Neg Hx     Ovarian cancer Neg Hx      Social History     Socioeconomic History    Marital status: /Civil Union     Spouse name: Not on file    Number of children: Not on file    Years of education: Not on file    Highest education level: Not on file   Occupational History    Not on file   Tobacco Use    Smoking status: Every Day     Current packs/day: 1.00     Average packs/day: 1 pack/day for 10.0 years (10.0 ttl pk-yrs)     Types: Cigarettes    Smokeless tobacco: Never    Tobacco comments:     less juancarlos 10 cigs a day. Trying to quit.    Vaping Use    Vaping  status: Never Used   Substance and Sexual Activity    Alcohol use: Not Currently     Comment: rare    Drug use: Not Currently     Types: Marijuana     Comment: Marijuana prior to pregnancy.    Sexual activity: Not Currently     Partners: Male     Birth control/protection: None   Other Topics Concern    Not on file   Social History Narrative    Not on file     Social Determinants of Health     Financial Resource Strain: Not on file   Food Insecurity: Not on file   Transportation Needs: Not on file   Physical Activity: Not on file   Stress: Not on file   Social Connections: Not on file   Intimate Partner Violence: Not on file   Housing Stability: Not on file         Medications:  All current active meds have been reviewed and current meds:  Scheduled Meds:  Current Facility-Administered Medications   Medication Dose Route Frequency Provider Last Rate    acetaminophen  650 mg Oral Q6H PRN Pariasfranco Navarretea, CRNP      escitalopram  10 mg Oral Daily Parisa Navarretea, CRNP      levETIRAcetam  1,500 mg Intravenous Q12H [facility] David Don,       LORazepam  1 mg Intravenous Q5 Min PRN Sharidesiree Ford, DO      LORazepam  1 mg Intravenous Once PRN Adrianna Burrows PA-C      And    LORazepam  1 mg Intravenous Once PRN Adrianna Burrows PA-C      ondansetron  4 mg Intravenous Q6H PRN Parisa Shaw, DEVANG      valproate sodium  500 mg Intravenous Q8H [facility] David Don,  mg (03/25/24 0818)     Continuous Infusions:   PRN Meds:.  acetaminophen    LORazepam    LORazepam **AND** LORazepam    ondansetron       ROS:   A 12 system ROS was completed. Other than the above mentioned complaints in the HPI and those commented on below, all remaining systems were negative.      Vitals:   /82   Pulse 64   Temp 97.6 °F (36.4 °C) (Oral)   Resp 16   LMP  (LMP Unknown)   SpO2 97%       Physical Exam:   Physical Exam  Vitals and nursing note reviewed.   Constitutional:       General: She is not in acute distress.     Appearance: Normal  appearance. She is not ill-appearing.      Comments: EEG leads in place   HENT:      Head: Normocephalic.   Eyes:      General: No scleral icterus.        Right eye: No discharge.         Left eye: No discharge.      Conjunctiva/sclera: Conjunctivae normal.   Cardiovascular:      Rate and Rhythm: Normal rate.   Pulmonary:      Effort: Pulmonary effort is normal. No respiratory distress.   Skin:     General: Skin is warm and dry.      Coloration: Skin is not jaundiced or pale.   Neurological:      Mental Status: She is alert and oriented to person, place, and time.   Psychiatric:         Mood and Affect: Mood normal.         Behavior: Behavior normal.         Thought Content: Thought content normal.         Judgment: Judgment normal.       Neurologic Exam     Mental Status   Oriented to person, place, and time.   Level of consciousness: alert  Able to follow commands appropriately. No aphasia or dysarthria noted.     Cranial Nerves   Conjugate gaze  Tracking examiner bilaterally  Face appears symmetric  No gaze preference noted     Motor Exam   Muscle bulk: normal  Moves all four extremities bilaterally without obvious focal weakness noted     Sensory Exam   Deferred per patient request     Gait, Coordination, and Reflexes     Tremor   Resting tremor: absent  Intention tremor: absent  No abnormal movements or seizure-like activity noted           Labs: I have personally reviewed pertinent reports.   Recent Results (from the past 24 hour(s))   CBC    Collection Time: 03/25/24  5:59 AM   Result Value Ref Range    WBC 9.32 4.31 - 10.16 Thousand/uL    RBC 4.29 3.81 - 5.12 Million/uL    Hemoglobin 11.2 (L) 11.5 - 15.4 g/dL    Hematocrit 35.7 34.8 - 46.1 %    MCV 83 82 - 98 fL    MCH 26.1 (L) 26.8 - 34.3 pg    MCHC 31.4 31.4 - 37.4 g/dL    RDW 16.6 (H) 11.6 - 15.1 %    Platelets 269 149 - 390 Thousands/uL    MPV 9.8 8.9 - 12.7 fL   Basic metabolic panel    Collection Time: 03/25/24  5:59 AM   Result Value Ref Range     Sodium 138 135 - 147 mmol/L    Potassium 3.8 3.5 - 5.3 mmol/L    Chloride 107 96 - 108 mmol/L    CO2 25 21 - 32 mmol/L    ANION GAP 6 4 - 13 mmol/L    BUN 9 5 - 25 mg/dL    Creatinine 0.75 0.60 - 1.30 mg/dL    Glucose 84 65 - 140 mg/dL    Calcium 8.7 8.4 - 10.2 mg/dL    eGFR 107 ml/min/1.73sq m       Imaging: I have personally reviewed pertinent imaging in PACS, and I have personally reviewed PACS reports.     EKG, Pathology, and Other Studies: I have personally reviewed pertinent reports.

## 2024-03-26 VITALS
HEART RATE: 84 BPM | DIASTOLIC BLOOD PRESSURE: 81 MMHG | TEMPERATURE: 98.5 F | OXYGEN SATURATION: 97 % | RESPIRATION RATE: 18 BRPM | SYSTOLIC BLOOD PRESSURE: 130 MMHG

## 2024-03-26 LAB
ANION GAP SERPL CALCULATED.3IONS-SCNC: 10 MMOL/L (ref 4–13)
BUN SERPL-MCNC: 9 MG/DL (ref 5–25)
CALCIUM SERPL-MCNC: 9 MG/DL (ref 8.4–10.2)
CHLORIDE SERPL-SCNC: 105 MMOL/L (ref 96–108)
CO2 SERPL-SCNC: 25 MMOL/L (ref 21–32)
CREAT SERPL-MCNC: 0.71 MG/DL (ref 0.6–1.3)
ERYTHROCYTE [DISTWIDTH] IN BLOOD BY AUTOMATED COUNT: 16.8 % (ref 11.6–15.1)
GFR SERPL CREATININE-BSD FRML MDRD: 114 ML/MIN/1.73SQ M
GLUCOSE SERPL-MCNC: 91 MG/DL (ref 65–140)
HCT VFR BLD AUTO: 38.5 % (ref 34.8–46.1)
HGB BLD-MCNC: 12.1 G/DL (ref 11.5–15.4)
MCH RBC QN AUTO: 26.2 PG (ref 26.8–34.3)
MCHC RBC AUTO-ENTMCNC: 31.4 G/DL (ref 31.4–37.4)
MCV RBC AUTO: 84 FL (ref 82–98)
PHENYTOIN SERPL-MCNC: 8.4 UG/ML (ref 10–20)
PLATELET # BLD AUTO: 290 THOUSANDS/UL (ref 149–390)
PMV BLD AUTO: 10.3 FL (ref 8.9–12.7)
POTASSIUM SERPL-SCNC: 4 MMOL/L (ref 3.5–5.3)
RBC # BLD AUTO: 4.61 MILLION/UL (ref 3.81–5.12)
SODIUM SERPL-SCNC: 140 MMOL/L (ref 135–147)
VALPROATE SERPL-MCNC: 94 UG/ML (ref 50–100)
WBC # BLD AUTO: 11.1 THOUSAND/UL (ref 4.31–10.16)

## 2024-03-26 PROCEDURE — 80164 ASSAY DIPROPYLACETIC ACD TOT: CPT | Performed by: NURSE PRACTITIONER

## 2024-03-26 PROCEDURE — 99233 SBSQ HOSP IP/OBS HIGH 50: CPT | Performed by: PSYCHIATRY & NEUROLOGY

## 2024-03-26 PROCEDURE — 80185 ASSAY OF PHENYTOIN TOTAL: CPT | Performed by: NURSE PRACTITIONER

## 2024-03-26 PROCEDURE — 85027 COMPLETE CBC AUTOMATED: CPT | Performed by: INTERNAL MEDICINE

## 2024-03-26 PROCEDURE — 80048 BASIC METABOLIC PNL TOTAL CA: CPT | Performed by: INTERNAL MEDICINE

## 2024-03-26 PROCEDURE — 99239 HOSP IP/OBS DSCHRG MGMT >30: CPT | Performed by: INTERNAL MEDICINE

## 2024-03-26 RX ORDER — LEVETIRACETAM 750 MG/1
1500 TABLET ORAL EVERY 12 HOURS SCHEDULED
Qty: 120 TABLET | Refills: 0 | Status: SHIPPED | OUTPATIENT
Start: 2024-03-26

## 2024-03-26 RX ORDER — PHENYTOIN 50 MG/1
200 TABLET, CHEWABLE ORAL ONCE
Status: COMPLETED | OUTPATIENT
Start: 2024-03-26 | End: 2024-03-26

## 2024-03-26 RX ORDER — LANOLIN ALCOHOL/MO/W.PET/CERES
3 CREAM (GRAM) TOPICAL
Status: DISCONTINUED | OUTPATIENT
Start: 2024-03-26 | End: 2024-03-26 | Stop reason: HOSPADM

## 2024-03-26 RX ORDER — DIVALPROEX SODIUM 500 MG/1
500 TABLET, DELAYED RELEASE ORAL EVERY 8 HOURS SCHEDULED
Qty: 90 TABLET | Refills: 0 | Status: SHIPPED | OUTPATIENT
Start: 2024-03-26

## 2024-03-26 RX ORDER — PHENYTOIN 50 MG/1
250 TABLET, CHEWABLE ORAL EVERY 12 HOURS SCHEDULED
Status: DISCONTINUED | OUTPATIENT
Start: 2024-03-26 | End: 2024-03-26 | Stop reason: HOSPADM

## 2024-03-26 RX ORDER — PHENYTOIN 50 MG/1
250 TABLET, CHEWABLE ORAL EVERY 12 HOURS SCHEDULED
Qty: 300 TABLET | Refills: 0 | Status: SHIPPED | OUTPATIENT
Start: 2024-03-26

## 2024-03-26 RX ADMIN — PHENYTOIN 150 MG: 50 TABLET, CHEWABLE ORAL at 05:05

## 2024-03-26 RX ADMIN — DIVALPROEX SODIUM 500 MG: 500 TABLET, DELAYED RELEASE ORAL at 05:05

## 2024-03-26 RX ADMIN — PHENYTOIN 200 MG: 50 TABLET, CHEWABLE ORAL at 15:21

## 2024-03-26 RX ADMIN — PHENYTOIN 150 MG: 50 TABLET, CHEWABLE ORAL at 13:59

## 2024-03-26 RX ADMIN — LEVETIRACETAM 1500 MG: 750 TABLET, FILM COATED ORAL at 08:33

## 2024-03-26 RX ADMIN — DIVALPROEX SODIUM 500 MG: 500 TABLET, DELAYED RELEASE ORAL at 13:59

## 2024-03-26 RX ADMIN — ESCITALOPRAM OXALATE 10 MG: 10 TABLET ORAL at 08:33

## 2024-03-26 RX ADMIN — MELATONIN 3 MG: at 02:56

## 2024-03-26 RX ADMIN — PHENYTOIN 150 MG: 50 TABLET, CHEWABLE ORAL at 00:00

## 2024-03-26 NOTE — PLAN OF CARE
Problem: PAIN - ADULT  Goal: Verbalizes/displays adequate comfort level or baseline comfort level  Description: Interventions:  - Encourage patient to monitor pain and request assistance  - Assess pain using appropriate pain scale  - Administer analgesics based on type and severity of pain and evaluate response  - Implement non-pharmacological measures as appropriate and evaluate response  - Consider cultural and social influences on pain and pain management  - Notify physician/advanced practitioner if interventions unsuccessful or patient reports new pain  Outcome: Progressing     Problem: INFECTION - ADULT  Goal: Absence or prevention of progression during hospitalization  Description: INTERVENTIONS:  - Assess and monitor for signs and symptoms of infection  - Monitor lab/diagnostic results  - Monitor all insertion sites, i.e. indwelling lines, tubes, and drains  - Monitor endotracheal if appropriate and nasal secretions for changes in amount and color  - [place] appropriate cooling/warming therapies per order  - Administer medications as ordered  - Instruct and encourage patient and family to use good hand hygiene technique  - Identify and instruct in appropriate isolation precautions for identified infection/condition  Outcome: Progressing  Goal: Absence of fever/infection during neutropenic period  Description: INTERVENTIONS:  - Monitor WBC    Outcome: Progressing     Problem: SAFETY ADULT  Goal: Patient will remain free of falls  Description: INTERVENTIONS:  - Educate patient/family on patient safety including physical limitations  - Instruct patient to call for assistance with activity   - Consult OT/PT to assist with strengthening/mobility   - Keep Call bell within reach  - Keep bed low and locked with side rails adjusted as appropriate  - Keep care items and personal belongings within reach  - Initiate and maintain comfort rounds  - Make Fall Risk Sign visible to staff  - Offer Toileting every  Hours,  in advance of need  - Initiate/Maintain alarm  - Obtain necessary fall risk management equipment:   - Apply yellow socks and bracelet for high fall risk patients  - Consider moving patient to room near nurses station  Outcome: Progressing  Goal: Maintain or return to baseline ADL function  Description: INTERVENTIONS:  -  Assess patient's ability to carry out ADLs; assess patient's baseline for ADL function and identify physical deficits which impact ability to perform ADLs (bathing, care of mouth/teeth, toileting, grooming, dressing, etc.)  - Assess/evaluate cause of self-care deficits   - Assess range of motion  - Assess patient's mobility; develop plan if impaired  - Assess patient's need for assistive devices and provide as appropriate  - Encourage maximum independence but intervene and supervise when necessary  - Involve family in performance of ADLs  - Assess for home care needs following discharge   - Consider OT consult to assist with ADL evaluation and planning for discharge  - Provide patient education as appropriate  Outcome: Progressing  Goal: Maintains/Returns to pre admission functional level  Description: INTERVENTIONS:  - Perform AM-PAC 6 Click Basic Mobility/ Daily Activity assessment daily.  - Set and communicate daily mobility goal to care team and patient/family/caregiver.   - Collaborate with rehabilitation services on mobility goals if consulted  - Perform Range of Motion  times a day.  - Reposition patient every  hours.  - Dangle patient  times a day  - Stand patient  times a day  - Ambulate patient  times a day  - Out of bed to chair  times a day   - Out of bed for meals  times a day  - Out of bed for toileting  - Record patient progress and toleration of activity level   Outcome: Progressing     Problem: DISCHARGE PLANNING  Goal: Discharge to home or other facility with appropriate resources  Description: INTERVENTIONS:  - Identify barriers to discharge w/patient and caregiver  - Arrange for  needed discharge resources and transportation as appropriate  - Identify discharge learning needs (meds, wound care, etc.)  - Arrange for interpretive services to assist at discharge as needed  - Refer to Case Management Department for coordinating discharge planning if the patient needs post-hospital services based on physician/advanced practitioner order or complex needs related to functional status, cognitive ability, or social support system  Outcome: Progressing     Problem: Knowledge Deficit  Goal: Patient/family/caregiver demonstrates understanding of disease process, treatment plan, medications, and discharge instructions  Description: Complete learning assessment and assess knowledge base.  Interventions:  - Provide teaching at level of understanding  - Provide teaching via preferred learning methods  Outcome: Progressing     Problem: NEUROSENSORY - ADULT  Goal: Achieves stable or improved neurological status  Description: INTERVENTIONS  - Monitor and report changes in neurological status  - Monitor vital signs such as temperature, blood pressure, glucose, and any other labs ordered   - Initiate measures to prevent increased intracranial pressure  - Monitor for seizure activity and implement precautions if appropriate      Outcome: Progressing  Goal: Remains free of injury related to seizures activity  Description: INTERVENTIONS  - Maintain airway, patient safety  and administer oxygen as ordered  - Monitor patient for seizure activity, document and report duration and description of seizure to physician/advanced practitioner  - If seizure occurs,  ensure patient safety during seizure  - Reorient patient post seizure  - Seizure pads on all 4 side rails  - Instruct patient/family to notify RN of any seizure activity including if an aura is experienced  - Instruct patient/family to call for assistance with activity based on nursing assessment  - Administer anti-seizure medications if ordered    Outcome:  Progressing  Goal: Achieves maximal functionality and self care  Description: INTERVENTIONS  - Monitor swallowing and airway patency with patient fatigue and changes in neurological status  - Encourage and assist patient to increase activity and self care.   - Encourage visually impaired, hearing impaired and aphasic patients to use assistive/communication devices  Outcome: Progressing     Problem: CARDIOVASCULAR - ADULT  Goal: Maintains optimal cardiac output and hemodynamic stability  Description: INTERVENTIONS:  - Monitor I/O, vital signs and rhythm  - Monitor for S/S and trends of decreased cardiac output  - Administer and titrate ordered vasoactive medications to optimize hemodynamic stability  - Assess quality of pulses, skin color and temperature  - Assess for signs of decreased coronary artery perfusion  - Instruct patient to report change in severity of symptoms  Outcome: Progressing  Goal: Absence of cardiac dysrhythmias or at baseline rhythm  Description: INTERVENTIONS:  - Continuous cardiac monitoring, vital signs, obtain 12 lead EKG if ordered  - Administer antiarrhythmic and heart rate control medications as ordered  - Monitor electrolytes and administer replacement therapy as ordered  Outcome: Progressing     Problem: RESPIRATORY - ADULT  Goal: Achieves optimal ventilation and oxygenation  Description: INTERVENTIONS:  - Assess for changes in respiratory status  - Assess for changes in mentation and behavior  - Position to facilitate oxygenation and minimize respiratory effort  - Oxygen administered by appropriate delivery if ordered  - Initiate smoking cessation education as indicated  - Encourage broncho-pulmonary hygiene including cough, deep breathe, Incentive Spirometry  - Assess the need for suctioning and aspirate as needed  - Assess and instruct to report SOB or any respiratory difficulty  - Respiratory Therapy support as indicated  Outcome: Progressing     Problem: GASTROINTESTINAL -  ADULT  Goal: Minimal or absence of nausea and/or vomiting  Description: INTERVENTIONS:  - Administer IV fluids if ordered to ensure adequate hydration  - Maintain NPO status until nausea and vomiting are resolved  - Nasogastric tube if ordered  - Administer ordered antiemetic medications as needed  - Provide nonpharmacologic comfort measures as appropriate  - Advance diet as tolerated, if ordered  - Consider nutrition services referral to assist patient with adequate nutrition and appropriate food choices  Outcome: Progressing  Goal: Maintains or returns to baseline bowel function  Description: INTERVENTIONS:  - Assess bowel function  - Encourage oral fluids to ensure adequate hydration  - Administer IV fluids if ordered to ensure adequate hydration  - Administer ordered medications as needed  - Encourage mobilization and activity  - Consider nutritional services referral to assist patient with adequate nutrition and appropriate food choices  Outcome: Progressing  Goal: Maintains adequate nutritional intake  Description: INTERVENTIONS:  - Monitor percentage of each meal consumed  - Identify factors contributing to decreased intake, treat as appropriate  - Assist with meals as needed  - Monitor I&O, weight, and lab values if indicated  - Obtain nutrition services referral as needed  Outcome: Progressing  Goal: Establish and maintain optimal ostomy function  Description: INTERVENTIONS:  - Assess bowel function  - Encourage oral fluids to ensure adequate hydration  - Administer IV fluids if ordered to ensure adequate hydration   - Administer ordered medications as needed  - Encourage mobilization and activity  - Nutrition services referral to assist patient with appropriate food choices  - Assess stoma site  - Consider wound care consult   Outcome: Progressing  Goal: Oral mucous membranes remain intact  Description: INTERVENTIONS  - Assess oral mucosa and hygiene practices  - Implement preventative oral hygiene  regimen  - Implement oral medicated treatments as ordered  - Initiate Nutrition services referral as needed  Outcome: Progressing     Problem: GENITOURINARY - ADULT  Goal: Maintains or returns to baseline urinary function  Description: INTERVENTIONS:  - Assess urinary function  - Encourage oral fluids to ensure adequate hydration if ordered  - Administer IV fluids as ordered to ensure adequate hydration  - Administer ordered medications as needed  - Offer frequent toileting  - Follow urinary retention protocol if ordered  Outcome: Progressing  Goal: Absence of urinary retention  Description: INTERVENTIONS:  - Assess patient’s ability to void and empty bladder  - Monitor I/O  - Bladder scan as needed  - Discuss with physician/AP medications to alleviate retention as needed  - Discuss catheterization for long term situations as appropriate  Outcome: Progressing  Goal: Urinary catheter remains patent  Description: INTERVENTIONS:  - Assess patency of urinary catheter  - If patient has a chronic barrera, consider changing catheter if non-functioning  - Follow guidelines for intermittent irrigation of non-functioning urinary catheter  Outcome: Progressing     Problem: METABOLIC, FLUID AND ELECTROLYTES - ADULT  Goal: Electrolytes maintained within normal limits  Description: INTERVENTIONS:  - Monitor labs and assess patient for signs and symptoms of electrolyte imbalances  - Administer electrolyte replacement as ordered  - Monitor response to electrolyte replacements, including repeat lab results as appropriate  - Instruct patient on fluid and nutrition as appropriate  Outcome: Progressing  Goal: Fluid balance maintained  Description: INTERVENTIONS:  - Monitor labs   - Monitor I/O and WT  - Instruct patient on fluid and nutrition as appropriate  - Assess for signs & symptoms of volume excess or deficit  Outcome: Progressing  Goal: Glucose maintained within target range  Description: INTERVENTIONS:  - Monitor Blood Glucose as  ordered  - Assess for signs and symptoms of hyperglycemia and hypoglycemia  - Administer ordered medications to maintain glucose within target range  - Assess nutritional intake and initiate nutrition service referral as needed  Outcome: Progressing     Problem: SKIN/TISSUE INTEGRITY - ADULT  Goal: Skin Integrity remains intact(Skin Breakdown Prevention)  Description: Assess:  -Perform Mario assessment every   -Clean and moisturize skin every   -Inspect skin when repositioning, toileting, and assisting with ADLS  -Assess under medical devices such as  every   -Assess extremities for adequate circulation and sensation     Bed Management:  -Have minimal linens on bed & keep smooth, unwrinkled  -Change linens as needed when moist or perspiring  -Avoid sitting or lying in one position for more than  hours while in bed  -Keep HOB at degrees     Toileting:  -Offer bedside commode  -Assess for incontinence every   -Use incontinent care products after each incontinent episode such as     Activity:  -Mobilize patient  times a day  -Encourage activity and walks on unit  -Encourage or provide ROM exercises   -Turn and reposition patient every  Hours  -Use appropriate equipment to lift or move patient in bed  -Instruct/ Assist with weight shifting every when out of bed in chair  -Consider limitation of chair time  hour intervals    Skin Care:  -Avoid use of baby powder, tape, friction and shearing, hot water or constrictive clothing  -Relieve pressure over bony prominences using   -Do not massage red bony areas    Next Steps:  -Teach patient strategies to minimize risks such as    -Consider consults to  interdisciplinary teams such as   Outcome: Progressing  Goal: Incision(s), wounds(s) or drain site(s) healing without S/S of infection  Description: INTERVENTIONS  - Assess and document dressing, incision, wound bed, drain sites and surrounding tissue  - Provide patient and family education  - Perform skin care/dressing changes  every   Outcome: Progressing  Goal: Pressure injury heals and does not worsen  Description: Interventions:  - Implement low air loss mattress or specialty surface (Criteria met)  - Apply silicone foam dressing  - Instruct/assist with weight shifting every 30 minutes when in chair   - Limit chair time to 3 hour intervals  - Use special pressure reducing interventions such as  when in chair   - Apply fecal or urinary incontinence containment device   - Perform passive or active ROM every   - Turn and reposition patient & offload bony prominences every  hours   - Utilize friction reducing device or surface for transfers   - Consider consults to  interdisciplinary teams such as   - Use incontinent care products after each incontinent episode such as   - Consider nutrition services referral as needed  Outcome: Progressing

## 2024-03-26 NOTE — PLAN OF CARE
Problem: PAIN - ADULT  Goal: Verbalizes/displays adequate comfort level or baseline comfort level  Description: Interventions:  - Encourage patient to monitor pain and request assistance  - Assess pain using appropriate pain scale  - Administer analgesics based on type and severity of pain and evaluate response  - Implement non-pharmacological measures as appropriate and evaluate response  - Consider cultural and social influences on pain and pain management  - Notify physician/advanced practitioner if interventions unsuccessful or patient reports new pain  Outcome: Progressing     Problem: INFECTION - ADULT  Goal: Absence or prevention of progression during hospitalization  Description: INTERVENTIONS:  - Assess and monitor for signs and symptoms of infection  - Monitor lab/diagnostic results  - Monitor all insertion sites, i.e. indwelling lines, tubes, and drains  - Monitor endotracheal if appropriate and nasal secretions for changes in amount and color  - [place] appropriate cooling/warming therapies per order  - Administer medications as ordered  - Instruct and encourage patient and family to use good hand hygiene technique  - Identify and instruct in appropriate isolation precautions for identified infection/condition  Outcome: Progressing     Problem: SAFETY ADULT  Goal: Patient will remain free of falls  Description: INTERVENTIONS:  - Educate patient/family on patient safety including physical limitations  - Instruct patient to call for assistance with activity   - Consult OT/PT to assist with strengthening/mobility   - Keep Call bell within reach  - Keep bed low and locked with side rails adjusted as appropriate  - Keep care items and personal belongings within reach  - Initiate and maintain comfort rounds  - Make Fall Risk Sign visible to staff  - Apply yellow socks and bracelet for high fall risk patients  - Consider moving patient to room near nurses station  Outcome: Progressing     Problem:  NEUROSENSORY - ADULT  Goal: Achieves stable or improved neurological status  Description: INTERVENTIONS  - Monitor and report changes in neurological status  - Monitor vital signs such as temperature, blood pressure, glucose, and any other labs ordered   - Initiate measures to prevent increased intracranial pressure  - Monitor for seizure activity and implement precautions if appropriate      Outcome: Progressing     Problem: CARDIOVASCULAR - ADULT  Goal: Maintains optimal cardiac output and hemodynamic stability  Description: INTERVENTIONS:  - Monitor I/O, vital signs and rhythm  - Monitor for S/S and trends of decreased cardiac output  - Administer and titrate ordered vasoactive medications to optimize hemodynamic stability  - Assess quality of pulses, skin color and temperature  - Assess for signs of decreased coronary artery perfusion  - Instruct patient to report change in severity of symptoms  Outcome: Progressing     Problem: RESPIRATORY - ADULT  Goal: Achieves optimal ventilation and oxygenation  Description: INTERVENTIONS:  - Assess for changes in respiratory status  - Assess for changes in mentation and behavior  - Position to facilitate oxygenation and minimize respiratory effort  - Oxygen administered by appropriate delivery if ordered  - Initiate smoking cessation education as indicated  - Encourage broncho-pulmonary hygiene including cough, deep breathe, Incentive Spirometry  - Assess the need for suctioning and aspirate as needed  - Assess and instruct to report SOB or any respiratory difficulty  - Respiratory Therapy support as indicated  Outcome: Progressing

## 2024-03-26 NOTE — PROGRESS NOTES
"  Addendum: Phenyotin level resulted at 8.4. Attending neurologist discussed with Epileptologist and recommends giving Dilantin 200 mg x 1 now, then increased maintenance Dilantin to 250 mg BID. Will check VPA and phenytoin free and total levels in 2 days. Also recommend checking CMP in 2 weeks.    Progress Note - Neurology   Renu Aguiar 28 y.o. female [id number]  Unit/Bed#: [facility] 716/[facility] 716-01    Assessment:    * New onset seizure (HCC)  Assessment & Plan  28 y.o. female with HTN, anxiety/depression on Wellbutrin, and \"staring off spells\" in her childhood (question history of absence seizures, however, never formally diagnosed with seizures) who initially presented to [facility]'s ED on 3/22/2024 due to 2 witnessed GTC seizures.    Earlier that morning on 3/22, patient had 2 episodes in which she tensed BUEs and felt a \"shock\" sensation.  Unclear if there was any confusion following these events.  Around 7 PM on 3/22, she had an episode of GTC seizure activity with foaming at the mouth and roving eyes that lasted 45-60 seconds followed by confusion.  When EMS arrived, patient was alert and oriented.  She was in her normal state upon arrival to the ED.  Vitals stable.  While in the ED, patient had another witnessed GTC seizure with bilateral lateral tongue bite, foaming at the mouth, labored breathing, roving/dilated eyes, and full body tremors.  No urinary or fecal incontinence.  Patient received a total of 4 mg IV Ativan, received Keppra 1.5 g, and transferred to [facility] for neurologic evaluation.  Patient does not recall either of these witnessed seizure like episodes.  Of note, for the past week, she has had intermittent \"electric shock\" sensations and tensing in her arms, but no shaking or loss of consciousness with these spells.    CT head negative for acute intracranial abnormalities.    Labs revealed lactic acid 14.3 (normalized within 2 hours), WBC 19.92, TSH 6.488 (later normalized), and UDS positive " for THC.  Urine pregnancy test negative.    Patient has been taking Wellbutrin for anxiety/depression which was increased last March 2023.  She did not feel this was helping her anxiety/depression, so she stopped Wellbutrin approximately 2-4 weeks ago, but then self resumed 1 week ago at the full dose of Wellbutrin 200 mg BID.  Initially, it was thought that Wellbutrin was the cause for the new onset seizures.  However, routine EEG 3/23 revealed R temporal lobe seizure.  During the seizure, EEG tech was asking the patient questions, and patient was able to answer appropriately.  Patient was loaded with Vimpat 200 mg and started on vEEG monitoring:    vEEG:  Evening of 3/23: Electrographic seizures approximately every 10 to 20 minutes (no clinical correlate).  She received 2 mg IV Ativan and an extra dose of lacosamide 100 mg.  The EEG calmed down for a few hours.  3/24:   At 4 AM, patient began having seizures that continue to be electrographic with no clinical correlate occurring every 10 to 20 minutes.  Seizures are coming mainly from the frontal lobe (typically bilateral frontal, then predominant in the right frontal, but occasionally in the left.  Seizures start frontal and spread temporal.  This pattern can be seen in patients who are having frequent seizures).  She received an additional 2 mg IV Ativan and maintenance Vimpat increased to 200 mg BID.    This briefly helped, but then seizures returned every 10-20 minutes.  Loaded with Depacon 20 mg/kg and started on maintenance Depakote.  VPA total level 2 hours after load was 89.  In the afternoon, continues to have seizures every 10-20 minutes  Overnight into 3/25, patient continued to have electrographic seizures so Vimpat was dc'd and patient was given Keppra 4.5 g bolus and started on Keppra 1.5 mg BID maintenance.  3/25:  Keppra did stop the seizures for a while, going a few hours without any seizures, but in the early morning since about 05:40, continued  to have electrographic seizures which have occurred about every 10-20 min since 05:40 (although not as prominent as in the past).  Fosphenytoin 20 mg/kg load at approximately 10:27 am  Seizures were noted at 7:40, 8:18, and 10:27 with no further definite seizures since.  3/26:  No further seizures since yesterday at 10:27 am    Etiology for seizures remains unclear.    Plan:  - Discontinue vEEG   - Initial MRI brain was limited due to claustrophobia.  No acute infarct, but unable to obtain remainder of sequences.    Repeat MRI brain w/wo contrast was motion limited but unremarkable.  - AED loads:  3/22: Keppra 1500 mg once  3/23: Vimpat 200 mg once in the afternoon and additional 100 mg in the evening  3/24: Depacon 20 mg/kg once  3/25: Keppra 4.5 g x 1, Fosphenytoin 20 mg/kg x 1  - Maintenance AED:  Vimpat dc'd 3/25  Depakote 500 mg Q8 hours (started on 3/24)  Patient prefers Depakote over Keppra given her baseline depression/anxiety.  She understands that she may gain some weight.  At this time, no plan for any further pregnancies.  She understands that she will need to start birth control.  Keppra 1.5 g BID  Dilantin 150 mg Q8H (started on 3/25)  - AED levels:  VPA 2 hours after Depacon load: 89  VPA level this AM 94  3/25 Phenytoin level 9.1  Repeat phenytoin level pending  Repeat VPA and phenytoin levels in 2 days prior to AM dose  - Hold home Wellbutrin 200 mg BID.  Patient is declining inpatient psych consult, but is okay with primary team making adjustments to antidepressant medications.  She will follow-up with her physician upon discharge.  - Administer Ativan prn seizure-like activity    - Continue telemetry  - Continue seizure precautions   - [employer] [place] previously completed and faxed on 3/23.  Patient understands that she is not to drive.  - Discussed seizure precautions  - Frequent neuro checks.  Continue to monitor and notify Neurology with any changes.  - Medical management and supportive care per  primary team.  Correction of any metabolic or infectious disturbances.  - No further inpatient neurology recommendations at this time. Please call with any questions.       Renu Aguiar will need follow up in in 6 weeks with epilepsy attending .  She will not require outpatient neurological testing.     Case and treatment plan reviewed with attending neurologist, Dr. David. Please see attending attestation for any further recommendations.    Subjective:   Patient seen and evaluated. She denies any complaints at this time and feels well. She is very happy to hear that she may be going home today.        Past Medical History:   Diagnosis Date    Chlamydia     Depression     no medications for 1 year    Gestational hypertension, third trimester 1/4/2021    Hypertension     Insulin controlled gestational diabetes mellitus (GDM) in third trimester 10/29/2020    Urinary tract infection     Varicella      Past Surgical History:   Procedure Laterality Date    CHOLECYSTECTOMY      [place] EXCISION PILONIDAL CYST/SINUS EXTENSIVE N/A 8/3/2021    Procedure: EXCISION PILONIDAL CYST;  Surgeon: Giovany Mack DO;  Location: [facility];  Service: General    WISDOM TOOTH EXTRACTION      WISDOM TOOTH EXTRACTION Bilateral 2010     Family History   Problem Relation Age of Onset    Thyroid disease Mother     Other Mother         cerebellar atrophy dx. at 25 years    Diabetes Father     Kidney disease Father     Clotting disorder Father     Breast cancer Maternal Grandmother     Diabetes Paternal Grandmother     Clotting disorder Paternal Grandfather     Colon cancer Neg Hx     Ovarian cancer Neg Hx      Social History     Socioeconomic History    Marital status: /Civil Union     Spouse name: Not on file    Number of children: Not on file    Years of education: Not on file    Highest education level: Not on file   Occupational History    Not on file   Tobacco Use    Smoking status: Every Day     Current packs/day: 1.00      Average packs/day: 1 pack/day for 10.0 years (10.0 ttl pk-yrs)     Types: Cigarettes    Smokeless tobacco: Never    Tobacco comments:     less juancarlos 10 cigs a day. Trying to quit.    Vaping Use    Vaping status: Never Used   Substance and Sexual Activity    Alcohol use: Not Currently     Comment: rare    Drug use: Not Currently     Types: Marijuana     Comment: Marijuana prior to pregnancy.    Sexual activity: Not Currently     Partners: Male     Birth control/protection: None   Other Topics Concern    Not on file   Social History Narrative    Not on file     Social Determinants of Health     Financial Resource Strain: Not on file   Food Insecurity: No Food Insecurity (3/25/2024)    Hunger Vital Sign     Worried About Running Out of Food in the Last Year: Never true     Ran Out of Food in the Last Year: Never true   Transportation Needs: No Transportation Needs (3/25/2024)    PRAPARE - Transportation     Lack of Transportation (Medical): No     Lack of Transportation (Non-Medical): No   Physical Activity: Not on file   Stress: Not on file   Social Connections: Not on file   Intimate Partner Violence: Not on file   Housing Stability: Low Risk  (3/25/2024)    Housing Stability Vital Sign     Unable to Pay for Housing in the Last Year: No     Number of Places Lived in the Last Year: 1     Unstable Housing in the Last Year: No         Medications:  All current active meds have been reviewed and current meds:  Scheduled Meds:  Current Facility-Administered Medications   Medication Dose Route Frequency Provider Last Rate    acetaminophen  650 mg Oral Q6H PRN Parisafranco Navarretea, CRNP      divalproex sodium  500 mg Oral Q8H ELE An K Long, CRNP      escitalopram  10 mg Oral Daily Parisafranco Navarretea CRNP      levETIRAcetam  1,500 mg Oral Q12H ELE An K Long, CRNP      LORazepam  1 mg Intravenous Q5 Min PRN Shari Ford DO      LORazepam  1 mg Intravenous Once PRN Adrianna Burrows PA-C      melatonin  3 mg Oral HS She Samaniego PA-C       ondansetron  4 mg Intravenous Q6H PRN Parisa Shaw, CRNP      phenytoin  150 mg Oral Q8H ELE An K Long, CRNP       Continuous Infusions:   PRN Meds:.  acetaminophen    LORazepam    [COMPLETED] LORazepam **AND** LORazepam    ondansetron       ROS:   A 12 system ROS was completed. Other than the above mentioned complaints in the HPI and those commented on below, all remaining systems were negative.      Vitals:   /72   Pulse 92   Temp 98.5 °F (36.9 °C)   Resp 19   LMP  (LMP Unknown)   SpO2 97%       Physical Exam:   Physical Exam  Vitals and nursing note reviewed.   Constitutional:       General: She is not in acute distress.     Appearance: Normal appearance. She is not ill-appearing.      Comments: EEG leads in place   HENT:      Head: Normocephalic.      Mouth/Throat:      Mouth: Mucous membranes are moist.      Pharynx: Oropharynx is clear.   Eyes:      General: No scleral icterus.        Right eye: No discharge.         Left eye: No discharge.      Extraocular Movements: Extraocular movements intact and EOM normal.      Conjunctiva/sclera: Conjunctivae normal.   Cardiovascular:      Rate and Rhythm: Normal rate.   Pulmonary:      Effort: Pulmonary effort is normal. No respiratory distress.   Musculoskeletal:         General: Normal range of motion.   Skin:     General: Skin is warm and dry.      Coloration: Skin is not jaundiced or pale.   Neurological:      Mental Status: She is alert and oriented to person, place, and time.      Coordination: Finger-Nose-Finger Test normal.   Psychiatric:         Mood and Affect: Mood normal.         Behavior: Behavior normal.       Neurologic Exam     Mental Status   Oriented to person, place, and time.   Level of consciousness: alert  Able to follow commands appropriately. No aphasia or dysarthria noted.     Cranial Nerves     CN II   Right visual field deficit: none  Left visual field deficit: none     CN III, IV, VI   Extraocular motions are normal.     CN  V   Facial sensation intact.     CN VII   Facial expression full, symmetric.     CN VIII   Hearing: intact    CN IX, X   Palate: symmetric    CN XI   CN XI normal.     CN XII   CN XII normal.     Motor Exam   Muscle bulk: normal  Bilateral UE strength 5/5 deltoids, biceps, triceps, hand   Bilateral LE strength 5/5 hip flexion, dorsiflexion, plantar flexion     Sensory Exam   Light touch normal.     Gait, Coordination, and Reflexes     Coordination   Finger to nose coordination: normal    Tremor   Resting tremor: absent  Intention tremor: absent          Labs: I have personally reviewed pertinent reports.   Recent Results (from the past 24 hour(s))   Phenytoin level, total    Collection Time: 03/25/24  2:56 PM   Result Value Ref Range    Phenytoin Lvl 9.1 (L) 10.0 - 20.0 ug/mL   CBC    Collection Time: 03/26/24  7:05 AM   Result Value Ref Range    WBC 11.10 (H) 4.31 - 10.16 Thousand/uL    RBC 4.61 3.81 - 5.12 Million/uL    Hemoglobin 12.1 11.5 - 15.4 g/dL    Hematocrit 38.5 34.8 - 46.1 %    MCV 84 82 - 98 fL    MCH 26.2 (L) 26.8 - 34.3 pg    MCHC 31.4 31.4 - 37.4 g/dL    RDW 16.8 (H) 11.6 - 15.1 %    Platelets 290 149 - 390 Thousands/uL    MPV 10.3 8.9 - 12.7 fL   Basic metabolic panel    Collection Time: 03/26/24  7:05 AM   Result Value Ref Range    Sodium 140 135 - 147 mmol/L    Potassium 4.0 3.5 - 5.3 mmol/L    Chloride 105 96 - 108 mmol/L    CO2 25 21 - 32 mmol/L    ANION GAP 10 4 - 13 mmol/L    BUN 9 5 - 25 mg/dL    Creatinine 0.71 0.60 - 1.30 mg/dL    Glucose 91 65 - 140 mg/dL    Calcium 9.0 8.4 - 10.2 mg/dL    eGFR 114 ml/min/1.73sq m   Valproic acid level, total    Collection Time: 03/26/24  7:05 AM   Result Value Ref Range    Valproic Acid, Total 94 50 - 100 ug/mL       Imaging: I have personally reviewed pertinent imaging in PACS, and I have personally reviewed PACS reports.     EKG, Pathology, and Other Studies: I have personally reviewed pertinent reports.

## 2024-03-27 ENCOUNTER — TRANSITIONAL CARE MANAGEMENT (OUTPATIENT)
Dept: FAMILY MEDICINE CLINIC | Facility: CLINIC | Age: 29
End: 2024-03-27

## 2024-03-27 LAB — ETHANOL UR-MCNC: NEGATIVE %

## 2024-03-27 PROCEDURE — 95720 EEG PHY/QHP EA INCR W/VEEG: CPT | Performed by: PSYCHIATRY & NEUROLOGY

## 2024-03-27 NOTE — DISCHARGE SUMMARY
Discharging Physician / Practitioner: Mari Diaz MD  PCP: Lynda West PA-C  Admission Date:   Admission Orders (From admission, onward)       Ordered        03/23/24 0254  Inpatient Admission  Once                          Discharge Date: 03/26/24    Principal discharge diagnosis:  New onset seizure    Secondary diagnoses:  Depression  Generalized anxiety disorder    Consultations During Hospital Stay:  Neurology    Procedures Performed:   CT head without contrast - no acute intracranial abnormality  MRI brain without and with contrast - Limited evaluation due to significant patient motion. Within the limitations of the examination there are no findings to suggest acute infarction, intracranial mass lesion, or pathologic parenchymal enhancement.  EEG (3/23/23) - This Routine EEG recorded during wakefulness, drowsiness, and sleep is abnormal. A single electrographic seizure was capture, arising from the right hemisphere, possibly right frontal region, which was without clinical changes. Interictally, normal activities of wakefulness and sleep were seen.   Video EEG monitoring (3/23/24) - electrographic seizures with no clinical correlate     Outpatient Tests Requested:  Phenytoin level on 3/28/2024  Valproic acid level on 3/28/2024  CBCD, CMP at 7 days and 14 days following discharge      Hospital Course:   Renu Aguiar is a 28 y.o. female patient who originally presented to the hospital on 3/23/2024 as a transfer from [facility] where she presented with new onset seizures.   She had been on Wellbutrin for anxiety and depression which was increased in March 2023.  She felt this was not helping with her symptoms and had discontinued it 2 to 4 weeks ago but then restarted it at the full dose of 200 mg twice daily 1 week prior to presentation.  Initially it was felt that Wellbutrin was the cause for her new onset seizures but routine EEG on 3/23/2024 showed a right temporal lobe seizure.   Subsequent video EEG showed electrographic seizures approximately every 10 to 20 minutes with no clinical correlate.  Medications were adjusted by neurology and she was discharged on Depakote 500 mg every 8 hours, Keppra 1500 mg every 12 hours, and phenytoin 250 mg every 12 hours.  Wellbutrin was discontinued.   She has been advised not to drive.  She will follow-up with neurology as an outpatient    Condition at Discharge: stable    Discharge Day Visit / Exam:   Subjective:    Vitals: Blood Pressure: 130/81 (03/26/24 1454)  Pulse: 84 (03/26/24 1454)  Temperature: 98.5 °F (36.9 °C) (03/26/24 1454)  Temp Source: Oral (03/25/24 0755)  Respirations: 18 (03/26/24 1454)  SpO2: 97 % (03/26/24 1454)  Exam:   Physical Exam  Vitals reviewed.   HENT:      Head: Normocephalic.      Nose: Nose normal.      Mouth/Throat:      Mouth: Mucous membranes are moist.   Eyes:      Extraocular Movements: Extraocular movements intact.   Cardiovascular:      Rate and Rhythm: Normal rate and regular rhythm.   Pulmonary:      Effort: Pulmonary effort is normal. No respiratory distress.      Breath sounds: Normal breath sounds. No wheezing.   Abdominal:      General: Bowel sounds are normal. There is no distension.      Palpations: Abdomen is soft.      Tenderness: There is no abdominal tenderness.   Musculoskeletal:         General: No swelling.      Cervical back: Neck supple.   Skin:     General: Skin is warm and dry.   Neurological:      General: No focal deficit present.      Mental Status: She is alert and oriented to person, place, and time.   Psychiatric:         Mood and Affect: Mood normal.         Behavior: Behavior normal.          Discussion with Family: Updated  () at bedside.    Discharge instructions/Information to patient and family:   See after visit summary for information provided to patient and family.      Provisions for Follow-Up Care:  See after visit summary for information related to follow-up  care and any pertinent home health orders.      Mobility at time of Discharge:   Basic Mobility Inpatient Raw Score: 23  JH-HLM Goal: 7: Walk 25 feet or more  JH-HLM Achieved: 7: Walk 25 feet or more  HLM Goal achieved. Continue to encourage appropriate mobility.     Disposition:   Home    Planned Readmission: No     Discharge Statement:  I spent 40 minutes discharging the patient. This time was spent on the day of discharge. I had direct contact with the patient on the day of discharge. Greater than 50% of the total time was spent examining patient, answering all patient questions, arranging and discussing plan of care with patient as well as directly providing post-discharge instructions.  Additional time then spent on discharge activities.    Discharge Medications:  See after visit summary for reconciled discharge medications provided to patient and/or family.      **Please Note: This note may have been constructed using a voice recognition system**

## 2024-03-27 NOTE — UTILIZATION REVIEW
NOTIFICATION OF ADMISSION DISCHARGE   This is a Notification of Discharge from [facility]. Please be advised that this patient has been discharge from our facility. Below you will find the admission and discharge date and time including the patient’s disposition.   UTILIZATION REVIEW CONTACT:  Srikanth Marques  Utilization   Network Utilization Review Department  Phone: [phone] x carefully listen to the prompts. All voicemails are confidential.  Email: [email or web address]     ADMISSION INFORMATION  PRESENTATION DATE: 3/23/2024  2:44 AM  OBERVATION ADMISSION DATE:   INPATIENT ADMISSION DATE: 3/23/24  2:44 AM   DISCHARGE DATE: 3/26/2024  4:08 PM   DISPOSITION:Home/Self Care    Network Utilization Review Department  ATTENTION: Please call with any questions or concerns to [phone] and carefully listen to the prompts so that you are directed to the right person. All voicemails are confidential.   For Discharge needs, contact Care Management DC Support Team at [phone] opt. 2  Send all requests for admission clinical reviews, approved or denied determinations and any other requests to dedicated fax number below belonging to the campus where the patient is receiving treatment. List of dedicated fax numbers for the Facilities:  FACILITY NAME UR FAX NUMBER   ADMISSION DENIALS (Administrative/Medical Necessity) [phone]   DISCHARGE SUPPORT TEAM ([facility]) [phone]   PARENT CHILD HEALTH (Maternity/NICU/Pediatrics) [phone]   [facility] [phone]   [facility] [phone]   [facility] [phone]   [facility] [phone]   [facility] [phone]   [facility] [phone]   [facility] [phone]   [facility] [phone]   [facility]  [facility] [phone]   [facility] [phone]   [facility] [phone]   [facility] [phone]

## 2024-03-28 LAB
BACTERIA BLD CULT: NORMAL
BACTERIA BLD CULT: NORMAL

## 2024-03-31 ENCOUNTER — TELEPHONE (OUTPATIENT)
Dept: OTHER | Facility: OTHER | Age: 29
End: 2024-03-31

## 2024-04-01 NOTE — TELEPHONE ENCOUNTER
Patient is calling regarding cancelling an appointment.     Date/Time: 01/04  @ 12:20 pm      Patient was rescheduled: YES [ ] NO [X ]     Patient requesting call back to reschedule: YES [ X] NO [ ]

## 2024-04-05 ENCOUNTER — TELEPHONE (OUTPATIENT)
Dept: NEUROLOGY | Facility: CLINIC | Age: 29
End: 2024-04-05

## 2024-04-05 ENCOUNTER — OFFICE VISIT (OUTPATIENT)
Dept: FAMILY MEDICINE CLINIC | Facility: CLINIC | Age: 29
End: 2024-04-05
Payer: COMMERCIAL

## 2024-04-05 VITALS
SYSTOLIC BLOOD PRESSURE: 138 MMHG | BODY MASS INDEX: 39.68 KG/M2 | DIASTOLIC BLOOD PRESSURE: 84 MMHG | HEART RATE: 89 BPM | OXYGEN SATURATION: 98 % | WEIGHT: 293 LBS | HEIGHT: 72 IN

## 2024-04-05 DIAGNOSIS — Z72.0 TOBACCO ABUSE: ICD-10-CM

## 2024-04-05 DIAGNOSIS — F41.1 GENERALIZED ANXIETY DISORDER: ICD-10-CM

## 2024-04-05 DIAGNOSIS — F33.1 MODERATE EPISODE OF RECURRENT MAJOR DEPRESSIVE DISORDER (HCC): ICD-10-CM

## 2024-04-05 DIAGNOSIS — R56.9 NEW ONSET SEIZURE (HCC): Primary | ICD-10-CM

## 2024-04-05 PROBLEM — K59.1 FUNCTIONAL DIARRHEA: Status: RESOLVED | Noted: 2019-03-15 | Resolved: 2024-04-05

## 2024-04-05 PROBLEM — K04.7 DENTAL INFECTION: Status: RESOLVED | Noted: 2023-10-23 | Resolved: 2024-04-05

## 2024-04-05 PROCEDURE — 99495 TRANSJ CARE MGMT MOD F2F 14D: CPT | Performed by: PHYSICIAN ASSISTANT

## 2024-04-05 RX ORDER — NICOTINE 21 MG/24HR
1 PATCH, TRANSDERMAL 24 HOURS TRANSDERMAL EVERY 24 HOURS
Qty: 28 PATCH | Refills: 0 | Status: SHIPPED | OUTPATIENT
Start: 2024-04-05

## 2024-04-05 NOTE — PROGRESS NOTES
Assessment & Plan     1. New onset seizure (HCC)  Assessment & Plan:  Patient has been seizure-free since discharge  Continue Depakote, Keppra, and Dilantin  Recommended that patient schedule a follow-up appointment with neurology outpatient.  She did not hear from them to schedule appointment after being discharged.  I provided her with phone number to call and schedule.      2. Tobacco abuse  Assessment & Plan:  Prescription for NicoDerm patch.  Advised not to smoke while wearing the patch.    Orders:  -     nicotine (NICODERM CQ) 21 mg/24 hr TD 24 hr patch; Place 1 patch on the skin over 24 hours every 24 hours    3. Generalized anxiety disorder  Assessment & Plan:  Stable.  Continue Lexapro      4. Moderate episode of recurrent major depressive disorder (HCC)  Assessment & Plan:  Stable.  Continue Lexapro 10 mg daily.  Patient admits that her depression has improved significantly since starting seizure medications.  Discussed how she may have more of a bipolar picture, and Depakote may be acting as a mood stabilizer.  Encouraged her to continue medications.           Subjective     Transitional Care Management Review:   Renu Aguiar is a 28 y.o. female here for TCM follow up.     During the TCM phone call patient stated:  TCM Call     Date and time call was made  3/27/2024  8:01 AM    Hospital care reviewed  Records reviewed    Patient was hospitialized at  [facility]    Date of Admission  03/23/24    Date of discharge  03/26/24    Diagnosis  new onset seizures    Disposition  Home  self care    Were the patients medications reviewed and updated  No    Current Symptoms  None      TCM Call     Post hospital issues  None    Should patient be enrolled in anticoag monitoring?  No    Scheduled for follow up?  Yes    Patients specialists  Neurologist    Other specialists names  F/U WITH SURGEON 2/3/21    Did you obtain your prescribed medications  Yes    Why were  you unable to obtain your medications  NO NEW MEDS    Do you need help managing your prescriptions or medications  No    Is transportation to your appointment needed  Yes    I have advised the patient to call PCP with any new or worsening symptoms  Marlena Townsend,     Counseling  Patient        Renu is a very pleasant 28-year-old female who is here today for a follow-up from her recent hospitalization. She presented to the emergency department on 3/22/2024 for evaluation of seizure-like activity.  She was brought to the hospital by EMS.  She was transferred to [facility] where she was evaluated by neurology.  EEG on 3/23/2024 showed a right temporal lobe seizure.  Subsequent video EEG showed seizures approximately every 10 to 20 minutes with no clinical correlate.  Medications were adjusted by neurology, and she was discharged in stable condition.  She has not had any seizure-like activity since being discharged.  She admits that she is tolerating the medications.  She has not heard from neurology to schedule a follow-up.  She admits that it has been a lifestyle adjustment since being diagnosed with seizures, but she is doing well.  She admits that her depression has been significantly better since starting the medications.  She would also like to quit smoking.  She admits that she is smoking about 1 pack/day.      Review of Systems   Constitutional:  Negative for chills, diaphoresis, fatigue and fever.   HENT:  Negative for congestion, ear pain, postnasal drip, rhinorrhea, sneezing, sore throat and trouble swallowing.    Eyes:  Negative for pain and visual disturbance.   Respiratory:  Negative for apnea, cough, shortness of breath and wheezing.    Cardiovascular:  Negative for chest pain and palpitations.   Gastrointestinal:  Negative for abdominal pain, constipation, diarrhea, nausea and vomiting.   Genitourinary:  Negative for dysuria and hematuria.   Musculoskeletal:  Negative for  arthralgias, gait problem and myalgias.   Neurological:  Positive for seizures. Negative for dizziness, syncope, weakness, light-headedness, numbness and headaches.   Psychiatric/Behavioral:  Negative for suicidal ideas. The patient is not nervous/anxious.        Objective     /84   Pulse 89   Ht 6' (1.829 m)   Wt (!) 142 kg (312 lb 6.4 oz)   LMP  (LMP Unknown)   SpO2 98%   BMI 42.37 kg/m²      Physical Exam  Vitals and nursing note reviewed.   Constitutional:       General: She is not in acute distress.     Appearance: She is well-developed. She is not diaphoretic.   HENT:      Head: Normocephalic and atraumatic.      Right Ear: Hearing and external ear normal.      Left Ear: Hearing and external ear normal.      Nose: Nose normal. No mucosal edema or rhinorrhea.      Mouth/Throat:      Pharynx: No oropharyngeal exudate or posterior oropharyngeal erythema.   Eyes:      Extraocular Movements: Extraocular movements intact.   Cardiovascular:      Rate and Rhythm: Normal rate and regular rhythm.      Heart sounds: Normal heart sounds. No murmur heard.     No friction rub. No gallop.   Pulmonary:      Effort: Pulmonary effort is normal. No respiratory distress.      Breath sounds: Normal breath sounds. No wheezing or rales.   Musculoskeletal:         General: Normal range of motion.      Cervical back: Normal range of motion and neck supple.   Skin:     General: Skin is warm and dry.      Findings: No rash.   Neurological:      General: No focal deficit present.      Mental Status: She is alert and oriented to person, place, and time.      Motor: No weakness.      Gait: Gait normal.   Psychiatric:         Behavior: Behavior normal.         Thought Content: Thought content normal.         Judgment: Judgment normal.       Medications have been reviewed by provider in current encounter    Lynda West PA-C

## 2024-04-05 NOTE — TELEPHONE ENCOUNTER
She appears to be a more complex epilepsy patient; I would prefer she be seen by one of the epileptologists.

## 2024-04-05 NOTE — TELEPHONE ENCOUNTER
"Dr. Branham,  Would you be ok to see this Pt for an HFU?  Pt was scheduled with Dr. Duron in a \"same day\" slot that can't be used.   Would you be open to me scheduling the HFU with you?     Thank you.   "

## 2024-04-05 NOTE — ASSESSMENT & PLAN NOTE
Patient has been seizure-free since discharge  Continue Depakote, Keppra, and Dilantin  Recommended that patient schedule a follow-up appointment with neurology outpatient.  She did not hear from them to schedule appointment after being discharged.  I provided her with phone number to call and schedule.

## 2024-04-05 NOTE — ASSESSMENT & PLAN NOTE
Stable.  Continue Lexapro 10 mg daily.  Patient admits that her depression has improved significantly since starting seizure medications.  Discussed how she may have more of a bipolar picture, and Depakote may be acting as a mood stabilizer.  Encouraged her to continue medications.

## 2024-04-05 NOTE — TELEPHONE ENCOUNTER
3/23/2024 - 3/26/2024 (3 days)  [facility]    New onset seizure (HCC)  Principal problem      Renu Xiongntz will need follow up in 6-8 weeks with epilepsy attending. She will not require outpatient neurological testing.     APPOINTMENT: 5/7/24 @ 12:30 with Dr Duron in the [facility]

## 2024-04-25 DIAGNOSIS — F33.1 MODERATE EPISODE OF RECURRENT MAJOR DEPRESSIVE DISORDER (HCC): Primary | ICD-10-CM

## 2024-04-25 DIAGNOSIS — R56.9 NEW ONSET SEIZURE (HCC): ICD-10-CM

## 2024-04-25 RX ORDER — ESCITALOPRAM OXALATE 5 MG/1
5 TABLET ORAL DAILY
Qty: 30 TABLET | Refills: 0 | Status: SHIPPED | OUTPATIENT
Start: 2024-04-25

## 2024-04-25 RX ORDER — DIVALPROEX SODIUM 500 MG/1
500 TABLET, DELAYED RELEASE ORAL EVERY 8 HOURS SCHEDULED
Qty: 270 TABLET | Refills: 1 | Status: SHIPPED | OUTPATIENT
Start: 2024-04-25 | End: 2024-05-07 | Stop reason: SDUPTHER

## 2024-04-25 RX ORDER — LEVETIRACETAM 750 MG/1
1500 TABLET ORAL EVERY 12 HOURS SCHEDULED
Qty: 360 TABLET | Refills: 1 | Status: SHIPPED | OUTPATIENT
Start: 2024-04-25 | End: 2024-05-07 | Stop reason: SDUPTHER

## 2024-04-25 RX ORDER — PHENYTOIN 50 MG/1
250 TABLET, CHEWABLE ORAL EVERY 12 HOURS SCHEDULED
Qty: 150 TABLET | Refills: 0 | Status: SHIPPED | OUTPATIENT
Start: 2024-04-25 | End: 2024-05-07

## 2024-04-25 NOTE — TELEPHONE ENCOUNTER
Medication: divalproex sodium    Dose/Frequency: 500mg every 8 horus    Quantity: 90    Pharmacy: [facility]    Office:   [x] PCP/Provider -   [] Speciality/Provider -     Does the patient have enough for 3 days?   [] Yes   [x] No - Send as HP to POD      Medication: Levetiracetam    Dose/Frequency:        Take 2 tablets (1,500 mg total) by mouth every 12 (twelve) hours               Quantity: 120    Pharmacy:  [facility]    Office:   [x] PCP/Provider -   [] Speciality/Provider -     Does the patient have enough for 3 days?   [] Yes   [x] No - Send as HP to POD

## 2024-05-07 ENCOUNTER — OFFICE VISIT (OUTPATIENT)
Dept: NEUROLOGY | Facility: CLINIC | Age: 29
End: 2024-05-07
Payer: COMMERCIAL

## 2024-05-07 VITALS
SYSTOLIC BLOOD PRESSURE: 118 MMHG | HEIGHT: 72 IN | TEMPERATURE: 98.2 F | WEIGHT: 293 LBS | OXYGEN SATURATION: 99 % | HEART RATE: 76 BPM | DIASTOLIC BLOOD PRESSURE: 70 MMHG | BODY MASS INDEX: 39.68 KG/M2

## 2024-05-07 DIAGNOSIS — Z79.899 ENCOUNTER FOR LONG-TERM (CURRENT) USE OF HIGH-RISK MEDICATION: Primary | ICD-10-CM

## 2024-05-07 DIAGNOSIS — R56.9 NEW ONSET SEIZURE (HCC): ICD-10-CM

## 2024-05-07 PROCEDURE — 99215 OFFICE O/P EST HI 40 MIN: CPT | Performed by: PSYCHIATRY & NEUROLOGY

## 2024-05-07 RX ORDER — DIVALPROEX SODIUM 250 MG/1
TABLET, DELAYED RELEASE ORAL
Qty: 180 TABLET | Refills: 3 | Status: SHIPPED | OUTPATIENT
Start: 2024-05-07

## 2024-05-07 RX ORDER — LEVETIRACETAM 750 MG/1
1500 TABLET ORAL EVERY 12 HOURS SCHEDULED
Qty: 360 TABLET | Refills: 3 | Status: SHIPPED | OUTPATIENT
Start: 2024-05-07

## 2024-05-07 RX ORDER — DIVALPROEX SODIUM 500 MG/1
TABLET, DELAYED RELEASE ORAL
Qty: 180 TABLET | Refills: 3 | Status: SHIPPED | OUTPATIENT
Start: 2024-05-07

## 2024-05-07 RX ORDER — PHENYTOIN SODIUM 100 MG/1
CAPSULE, EXTENDED RELEASE ORAL
Qty: 450 CAPSULE | Refills: 3 | Status: SHIPPED | OUTPATIENT
Start: 2024-05-07

## 2024-05-07 RX ORDER — FOLIC ACID 1 MG/1
1 TABLET ORAL DAILY
Qty: 90 TABLET | Refills: 3 | Status: SHIPPED | OUTPATIENT
Start: 2024-05-07

## 2024-05-07 NOTE — PATIENT INSTRUCTIONS
-- Continue your Levetiracetam unchanged     -- I will change how you are taking the Divalproex (Depakote) and Phenytoin (Dilantin) to make it easier on you, but still keeping the same total amount per day.   - Please take Divalproex  mg (one 250 mg tab with one 500 mg tab) twice a day   - Please take Phenytoin  mg (2 tabs) in the morning and 300 mg (3 tabs) at night.     -- get blood work rechecked in about 1 month    -- Because of your medications, it will be important to consider options to avoid pregnancy. I would recommend starting folic acid 1 mg daily to help reduce the risk of birth defects.

## 2024-05-07 NOTE — ASSESSMENT & PLAN NOTE
Overall, she did have new onset focal seizures in March, initially with focal aware seizures, but then with two focal to bilateral tonic clonic seizures. She had numerous electrographic seizures on EEG in the hospital, which took several seizure medications to stop. It is unclear if these were precipitated by starting wellbutrin at a dose of 200 mg twice a day rather quickly, or if these occurred spontaneously.     She fortunately has not had any additional seizures since her initial hospitalization and since being on her current medications. She has some difficulty with ongoing depression. Her medications are likely not making this worse, at it has been a chronic issue, as below.     --I will have her continue to take levetiracetam, phenytoin, and divalproex at the same total daily dose as she is currently taking.  I will have her change her divalproex to be 750 mg twice a day and phenytoin to be ER tablets, taking 200 mg in the morning and 300 mg at night.  These are the same total daily dosing, will be easier for her to remember by avoiding 3 times a day dosing and simplifying the number of pills she takes    --I will have her get blood work to check a CBC, CMP, levetiracetam level, phenytoin level, valproate level and vitamin D.  These levels will be helpful to determine if we have room to increase one of her medications if seizures would recur.    --We sensibly discussed pregnancy issues related to epilepsy and her seizure medications.  Overall divalproex does have an associated higher risk of neural tube defects, and does not prefer to be used during pregnancy.  She does not wish to be pregnant, so we did discuss different methods of birth control to avoid pregnancy.  I will have her start taking folic acid 1 mg daily

## 2024-05-07 NOTE — PROGRESS NOTES
Patient ID: Renu Aguiar is a 28 y.o. female with new onset focal seizures, who is presenting to Neurology office for evaluation of her seizures.       Assessment/Plan:    New onset seizure (HCC)  Overall, she did have new onset focal seizures in March, initially with focal aware seizures, but then with two focal to bilateral tonic clonic seizures. She had numerous electrographic seizures on EEG in the hospital, which took several seizure medications to stop. It is unclear if these were precipitated by starting wellbutrin at a dose of 200 mg twice a day rather quickly, or if these occurred spontaneously.     She fortunately has not had any additional seizures since her initial hospitalization and since being on her current medications. She has some difficulty with ongoing depression. Her medications are likely not making this worse, at it has been a chronic issue, as below.     --I will have her continue to take levetiracetam, phenytoin, and divalproex at the same total daily dose as she is currently taking.  I will have her change her divalproex to be 750 mg twice a day and phenytoin to be ER tablets, taking 200 mg in the morning and 300 mg at night.  These are the same total daily dosing, will be easier for her to remember by avoiding 3 times a day dosing and simplifying the number of pills she takes    --I will have her get blood work to check a CBC, CMP, levetiracetam level, phenytoin level, valproate level and vitamin D.  These levels will be helpful to determine if we have room to increase one of her medications if seizures would recur.    --We sensibly discussed pregnancy issues related to epilepsy and her seizure medications.  Overall divalproex does have an associated higher risk of neural tube defects, and does not prefer to be used during pregnancy.  She does not wish to be pregnant, so we did discuss different methods of birth control to avoid pregnancy.  I will have her start taking folic acid  "1 mg daily    I spent a total of 40 min with the patient and completing documentation on the day of the encounter. This time was spent specifically discussing her diagnosis, medications, pregnancy issues, and plan as detailed above     She will Return in about 3 months (around 8/7/2024).      Subjective:    HPI  Current seizure medications:  1. Levetiracetam 1500 mg twice a day  2. Divalproex  mg three times a day   3. Phenytoin 250 mg twice a day   Other medications as per Epic    Briefly reviewing her history, a few days before going to the hospital, she felt like she was having episodes where she would feel briefly dizzy or out of it for a second or so. She had just restarted and increased her Wellbutrin prior to those events starting. Then on 3/22/2024, she had a larger seizure. Her  told her that she felt as if a \"zap\" (little spell) was happening then her left arm shot out, and she became stiff all over, then was shaking all over. She had another seizure when in the ED at her local hospital, and was transferred to [place]. While in the hospital, she was put on continuous video EEG and was having seizures multiple times per hour. Levetiracetam was optimized, Lacosamide was started, then Divalproex and finally Phenytoin. Seizures stopped after Phenytoin was started. Lacosamide was discontinued to simplify her medications, as it was not effective.      She only remembers bits and pieces of being in the hospital. She mainly starts to remember things after she left from the hospital.    She hasn't noted any other \"zap\" feelings since being in the hospital. She notes some occasional shaking of her hands. She denies any clear side effects other than possibly being a little more sleepy.     She has noticed that since being on the seizure medications, her menses are a lot shorter.     She has been struggling with her depression. She denies any thoughts of harming herself, but this has been worsening " "since stopping her Wellbutrin.     She has not been driving since her hospitalization.     Event/Seizure semiology:  1.  Focal aware seizures: \"zap\" feeling. Brief dizzy/\"out of it\" feeling lasting only a few seconds.   2. Focal to bilateral tonic clonic seizure. Initial \"zap\" feeling, then left arm extension, whole body stiffening and shaking. Lasting few min. Occurred twice on 3/22/2024    Special Features  Status epilepticus: Yes, multiple seizures without return to baseline  Self Injury Seizures: bitten tongue  Precipitating Factors: possibly increasing wellbutrin     Epilepsy Risk Factors:  Abnormal pregnancy:    no  Abnormal birth/:   no  Abnormal Development:   no  Febrile seizures, simple:   no  Febrile seizures, complex:   no  CNS infection:    no  Intellectual disability:    no  Cerebral palsy:    no  Head injury (moderate/severe):  no  CNS neoplasm:    no  CNS malformation:    no  Neurosurgical procedure:   no  Stroke:     no  Alcohol abuse:    no  Drug abuse:     No. Uses marijuana  Family history Sz/epilepsy:   no    Prior AEDs:  Lacosamide (stopped in the hospital when ineffective)    Prior Evaluation:  - MRI brain: 3/25/2024: (independently reviewed). Limited evaluation due to significant patient motion. Within the limitations of the examination there are no findings to suggest acute infarction, intracranial mass lesion, or pathologic parenchymal enhancement. On independent review, slightly large anterior horn of lateral ventricle bilaterally.   - Routine EEG: 3/23/2024: abnormal. A single electrographic seizure was capture, arising from the right hemisphere, possibly right frontal region, which was without clinical changes. Interictally, normal activities of wakefulness and sleep were seen   - Ambulatory EEG: none  - Video EEG: 3/23/2024 - 3/25/2024: focal seizures originating in the right hemisphere, right frontotemporal maximal, without clinical correlate, occurring 0.25-4 times per hour. " Excessive theta activity during wakefulness suggest mild nonspecific diffuse cerebral dysfunction.  Occasional right frontotemporal sharp waves suggest underlying focal epileptogenic potential.  - PET scan brain : none  - Neuropsychologic testing: none    Psychiatric History:  Depression: yes  Anxiety: yes  Psychosis: no  Psychiatric Admissions: Yes, most recent in 2014.     I reviewed prior notes including prior hospitalization notes, as documented in Epic/eXelate, and summarized above.     The following portions of the patient's history were reviewed and updated as appropriate: allergies, current medications, past family history, past medical history, past social history, past surgical history, and problem list.     Objective:    Blood pressure 118/70, pulse 76, temperature 98.2 °F (36.8 °C), temperature source Temporal, height 6' (1.829 m), weight (!) 141 kg (311 lb 4.8 oz), SpO2 99%, unknown if currently breastfeeding.    Physical Exam    Neurological Exam  GENERAL EXAMINATION:   In general patient is well appearing and in no distress.  There is no peripheral edema.    NEUROLOGIC EXAMINATION:     Alert and oriented to person, date, location. Fund of knowledge is full with good understanding of medical situation.  Recent and remote memory were intact    Mood and affect are appropriate. Attention is intact.    Language function including fluency, naming, and comprehension intact.    Cranial nerves: Pupils are equal round reactive to light and accommodation.  Visual Fields are full to confrontation bilaterally. Extraocular movements are intact without nystagmus. Facial sensation is intact to light touch. No facial droop, face activates symmetrically. There is no dysarthria. Hearing was intact to finger rub. Tongue and uvula are midline and palate elevates symmetrically.  Shoulder shrug  5/5.    Motor Exam:  No pronator drift. Bulk and tone are normal. Strength is 5/5 throughout.    Deep tendon reflexes:  Biceps 2+, brachioradialis 2+, patellar 2+, Achilles 2+ bilaterally.     Sensation: Intact light touch    Coordination: Finger nose finger and heel to shin testing are without dysmetria.     Gait: Negative romberg. Normal casual gait.     ROS:    Review of Systems   Constitutional:  Negative for appetite change, fatigue and fever.   HENT: Negative.  Negative for hearing loss, tinnitus, trouble swallowing and voice change.    Eyes: Negative.  Negative for photophobia, pain and visual disturbance.   Respiratory: Negative.  Negative for shortness of breath.    Cardiovascular: Negative.  Negative for palpitations.   Gastrointestinal: Negative.  Negative for nausea and vomiting.   Endocrine: Negative.  Negative for cold intolerance.   Genitourinary: Negative.  Negative for dysuria, frequency and urgency.   Musculoskeletal:  Negative for back pain, gait problem, myalgias, neck pain and neck stiffness.   Skin: Negative.  Negative for rash.   Allergic/Immunologic: Negative.    Neurological: Negative.  Negative for dizziness, tremors, seizures, syncope, facial asymmetry, speech difficulty, weakness, light-headedness, numbness and headaches.   Hematological: Negative.  Does not bruise/bleed easily.   Psychiatric/Behavioral: Negative.  Negative for confusion, hallucinations and sleep disturbance.    All other systems reviewed and are negative.        I personally reviewed the ROS that was entered by the medical assistant    Voice recognition software was used in the generation of this note. There may be unintentional errors including grammatical errors, spelling errors, or pronoun errors.

## 2024-05-21 DIAGNOSIS — F33.1 MODERATE EPISODE OF RECURRENT MAJOR DEPRESSIVE DISORDER (HCC): ICD-10-CM

## 2024-05-21 RX ORDER — ESCITALOPRAM OXALATE 5 MG/1
5 TABLET ORAL DAILY
Qty: 30 TABLET | Refills: 5 | Status: SHIPPED | OUTPATIENT
Start: 2024-05-21

## 2024-06-07 ENCOUNTER — TELEPHONE (OUTPATIENT)
Dept: NEUROLOGY | Facility: CLINIC | Age: 29
End: 2024-06-07

## 2024-08-15 DIAGNOSIS — F33.1 MODERATE EPISODE OF RECURRENT MAJOR DEPRESSIVE DISORDER (HCC): ICD-10-CM

## 2024-08-15 DIAGNOSIS — F41.1 GENERALIZED ANXIETY DISORDER: ICD-10-CM

## 2024-08-15 RX ORDER — ESCITALOPRAM OXALATE 5 MG/1
5 TABLET ORAL DAILY
Qty: 90 TABLET | Refills: 1 | Status: SHIPPED | OUTPATIENT
Start: 2024-08-15

## 2024-08-15 RX ORDER — ESCITALOPRAM OXALATE 10 MG/1
10 TABLET ORAL DAILY
Qty: 90 TABLET | Refills: 1 | Status: SHIPPED | OUTPATIENT
Start: 2024-08-15 | End: 2024-08-15 | Stop reason: SDUPTHER

## 2024-08-15 RX ORDER — ESCITALOPRAM OXALATE 10 MG/1
10 TABLET ORAL DAILY
Qty: 90 TABLET | Refills: 1 | Status: SHIPPED | OUTPATIENT
Start: 2024-08-15

## 2024-08-23 ENCOUNTER — TELEPHONE (OUTPATIENT)
Age: 29
End: 2024-08-23

## 2024-08-27 ENCOUNTER — TELEPHONE (OUTPATIENT)
Dept: DERMATOLOGY | Facility: CLINIC | Age: 29
End: 2024-08-27

## 2024-08-27 ENCOUNTER — RA CDI HCC (OUTPATIENT)
Dept: OTHER | Facility: HOSPITAL | Age: 29
End: 2024-08-27

## 2024-08-27 NOTE — TELEPHONE ENCOUNTER
Called patient regarding her appt tomorrow. Insurance on file ([employer]) is returning as inactive. Left a message for patient to call the office with updated information or to advise if self-pay.

## 2024-08-28 ENCOUNTER — TELEPHONE (OUTPATIENT)
Age: 29
End: 2024-08-28

## 2024-08-28 NOTE — TELEPHONE ENCOUNTER
Patient called to cancel today's appointment. Won't be able to come in due to picking up son from school.     I rescheduled into next opening 2025. Advised to call frequently if need to be seen sooner for an appt.

## 2024-08-29 ENCOUNTER — VBI (OUTPATIENT)
Dept: ADMINISTRATIVE | Facility: OTHER | Age: 29
End: 2024-08-29

## 2024-08-29 NOTE — TELEPHONE ENCOUNTER
08/29/24 2:33 PM     Chart reviewed for Pap Smear (HPV) aka Cervical Cancer Screening was/were not submitted to the patient's insurance.     Jennifer Ralph MA   [facility]

## 2024-09-17 ENCOUNTER — OFFICE VISIT (OUTPATIENT)
Dept: FAMILY MEDICINE CLINIC | Facility: CLINIC | Age: 29
End: 2024-09-17
Payer: COMMERCIAL

## 2024-09-17 ENCOUNTER — TELEPHONE (OUTPATIENT)
Age: 29
End: 2024-09-17

## 2024-09-17 VITALS
OXYGEN SATURATION: 98 % | SYSTOLIC BLOOD PRESSURE: 132 MMHG | HEART RATE: 79 BPM | DIASTOLIC BLOOD PRESSURE: 82 MMHG | HEIGHT: 72 IN | BODY MASS INDEX: 39.68 KG/M2 | WEIGHT: 293 LBS

## 2024-09-17 DIAGNOSIS — F33.1 MODERATE EPISODE OF RECURRENT MAJOR DEPRESSIVE DISORDER (HCC): Primary | ICD-10-CM

## 2024-09-17 DIAGNOSIS — Z23 NEED FOR INFLUENZA VACCINATION: ICD-10-CM

## 2024-09-17 DIAGNOSIS — F41.1 GENERALIZED ANXIETY DISORDER: ICD-10-CM

## 2024-09-17 PROCEDURE — 90471 IMMUNIZATION ADMIN: CPT | Performed by: PHYSICIAN ASSISTANT

## 2024-09-17 PROCEDURE — 90656 IIV3 VACC NO PRSV 0.5 ML IM: CPT | Performed by: PHYSICIAN ASSISTANT

## 2024-09-17 PROCEDURE — 99214 OFFICE O/P EST MOD 30 MIN: CPT | Performed by: PHYSICIAN ASSISTANT

## 2024-09-17 RX ORDER — ESCITALOPRAM OXALATE 5 MG/1
5 TABLET ORAL DAILY
Qty: 90 TABLET | Refills: 1 | Status: SHIPPED | OUTPATIENT
Start: 2024-09-17

## 2024-09-17 NOTE — PROGRESS NOTES
Ambulatory Visit  Name: Renu Aguiar      : 1995      MRN: [id number]  Encounter Provider: Lynda West PA-C  Encounter Date: 2024   Encounter department: [facility]    Assessment & Plan  Moderate episode of recurrent major depressive disorder (HCC)  Will increase Lexapro to 15 mg daily.  Referral placed to psychiatry.  She is scheduled to see psychiatry on 10/8/2024.  Referral placed to counseling  Patient denies any suicidal or homicidal thoughts  Follow-up in 1 to 2 months or sooner if needed    Depression Screening Follow-up Plan: Patient's depression screening was positive with a PHQ-9 score of 20. Patient assessed for underlying major depression. They have no active suicidal ideations. Brief counseling provided and recommend additional follow-up/re-evaluation next office visit.    Orders:    Ambulatory referral to Psych Services; Future    escitalopram (LEXAPRO) 5 mg tablet; Take 1 tablet (5 mg total) by mouth daily Take in addition to 10 mg tablet to total 15 mg daily    Generalized anxiety disorder  Will increase Lexapro to 15 mg daily.  Referral placed to psychiatry.  She is scheduled to establish with psychiatry on 10/6/2024.  Patient has a lot of social stressors at home.  Referral placed to therapy.    Orders:    Ambulatory referral to Psych Services; Future    Need for influenza vaccination    Orders:    influenza vaccine preservative-free 0.5 mL IM (Fluzone, Afluria, Fluarix, Flulaval)       History of Present Illness     João is a very pleasant 28-year-old female who is here today for a follow-up for anxiety and depression.  She admits that her anxiety and depression have been getting worse.  She found out that when she was hospitalized for her new seizure disorder in March that her  was cheating on her.  She has 2 children under the age of 3, which adds a lot of stress.  She believes that her and her  are going to get a separation.  She admits that  she feels very sad and depressed.  She is doing okay on the Lexapro, but is interested in trying a higher dose.  She denies any suicidal or homicidal thoughts.  She did reach out to psychiatry, but is still on a wait list.  She is very willing to see a psychiatrist and therapist, but has had no luck.  She continues to follow with her neurologist.  She has been seizure-free since March.  She is compliant with her medications.          Review of Systems   Constitutional:  Negative for chills, diaphoresis, fatigue and fever.   HENT:  Negative for congestion, ear pain, postnasal drip, rhinorrhea, sneezing, sore throat and trouble swallowing.    Eyes:  Negative for pain and visual disturbance.   Respiratory:  Negative for apnea, cough, shortness of breath and wheezing.    Cardiovascular:  Negative for chest pain and palpitations.   Gastrointestinal:  Negative for abdominal pain, constipation, diarrhea, nausea and vomiting.   Genitourinary:  Negative for dysuria.   Musculoskeletal:  Negative for arthralgias, gait problem and myalgias.   Neurological:  Negative for dizziness, syncope, weakness, light-headedness, numbness and headaches.   Psychiatric/Behavioral:  Positive for dysphoric mood. Negative for suicidal ideas. The patient is nervous/anxious.            Objective     /82   Pulse 79   Ht 6' (1.829 m)   Wt (!) 145 kg (319 lb 3.2 oz)   SpO2 98%   BMI 43.29 kg/m²     Physical Exam  Vitals and nursing note reviewed.   Constitutional:       Appearance: She is well-developed.   HENT:      Head: Normocephalic and atraumatic.      Right Ear: External ear normal.      Left Ear: External ear normal.      Nose: Nose normal.      Mouth/Throat:      Pharynx: No oropharyngeal exudate or posterior oropharyngeal erythema.   Eyes:      Extraocular Movements: Extraocular movements intact.   Cardiovascular:      Rate and Rhythm: Normal rate and regular rhythm.      Heart sounds: Normal heart sounds. No murmur heard.     No  friction rub. No gallop.   Pulmonary:      Effort: Pulmonary effort is normal. No respiratory distress.      Breath sounds: Normal breath sounds. No wheezing or rales.   Musculoskeletal:         General: Normal range of motion.      Cervical back: Normal range of motion and neck supple.   Skin:     General: Skin is warm and dry.   Neurological:      Mental Status: She is alert and oriented to person, place, and time.   Psychiatric:         Mood and Affect: Mood is anxious and depressed. Affect is tearful.         Behavior: Behavior normal.         Thought Content: Thought content normal. Thought content does not include homicidal or suicidal ideation. Thought content does not include homicidal or suicidal plan.         Judgment: Judgment normal.          36.7

## 2024-09-17 NOTE — ASSESSMENT & PLAN NOTE
Will increase Lexapro to 15 mg daily.  Referral placed to psychiatry.  She is scheduled to establish with psychiatry on 10/6/2024.  Patient has a lot of social stressors at home.  Referral placed to therapy.    Orders:    Ambulatory referral to Psych Services; Future

## 2024-09-25 ENCOUNTER — TELEPHONE (OUTPATIENT)
Age: 29
End: 2024-09-25

## 2024-09-25 NOTE — TELEPHONE ENCOUNTER
It would be okay for her to see Ning Guthrie if no appointments are available with me.     Looking at notes, her last seizure that I am aware of was on 3/22/2024. Please confirm if this was her last seizure. If so, then we can fill out a Seizure Reporting form for [employer] now. We don't need to see her in the office as long as she verbally confirms no additional seizures since that time.     Is she is doing well, she likely could be scheduled next available appointment with me or Ning.

## 2024-09-25 NOTE — TELEPHONE ENCOUNTER
Dr. Duron,  Pt was due to see you on 10/16 but you will not be in the office. Would pt be OK to see an AP? She has hit her 6 month seizure free jenifer and is needing to get her license back.   Please let me know if I can make an appt with an AP. I will call pt back.     Thank you,   Dorothy

## 2024-09-25 NOTE — TELEPHONE ENCOUNTER
Spoke to Renu who stated she has been seizure free . Last seizure as confirmed 3/22/2024.  Pt schedule with Ning on 9/27/2024 will obtain BW before visit.

## 2024-10-15 ENCOUNTER — OFFICE VISIT (OUTPATIENT)
Age: 29
End: 2024-10-15

## 2024-10-15 ENCOUNTER — OFFICE VISIT (OUTPATIENT)
Dept: FAMILY MEDICINE CLINIC | Facility: CLINIC | Age: 29
End: 2024-10-15

## 2024-10-15 ENCOUNTER — TELEPHONE (OUTPATIENT)
Dept: PSYCHIATRY | Facility: CLINIC | Age: 29
End: 2024-10-15

## 2024-10-15 ENCOUNTER — APPOINTMENT (OUTPATIENT)
Dept: LAB | Facility: MEDICAL CENTER | Age: 29
End: 2024-10-15

## 2024-10-15 VITALS
HEIGHT: 72 IN | BODY MASS INDEX: 39.68 KG/M2 | WEIGHT: 293 LBS | DIASTOLIC BLOOD PRESSURE: 74 MMHG | TEMPERATURE: 97.8 F | SYSTOLIC BLOOD PRESSURE: 134 MMHG | HEART RATE: 85 BPM | OXYGEN SATURATION: 99 %

## 2024-10-15 DIAGNOSIS — F33.1 MODERATE EPISODE OF RECURRENT MAJOR DEPRESSIVE DISORDER (HCC): Primary | ICD-10-CM

## 2024-10-15 DIAGNOSIS — R73.01 ELEVATED FASTING GLUCOSE: ICD-10-CM

## 2024-10-15 DIAGNOSIS — F33.2 SEVERE EPISODE OF RECURRENT MAJOR DEPRESSIVE DISORDER, WITHOUT PSYCHOTIC FEATURES (HCC): Primary | ICD-10-CM

## 2024-10-15 DIAGNOSIS — R56.9 NEW ONSET SEIZURE (HCC): ICD-10-CM

## 2024-10-15 DIAGNOSIS — F41.1 GENERALIZED ANXIETY DISORDER: ICD-10-CM

## 2024-10-15 DIAGNOSIS — Z79.899 ENCOUNTER FOR LONG-TERM (CURRENT) USE OF HIGH-RISK MEDICATION: ICD-10-CM

## 2024-10-15 PROBLEM — N61.1 BREAST ABSCESS: Status: RESOLVED | Noted: 2023-06-20 | Resolved: 2024-10-15

## 2024-10-15 PROBLEM — R10.84 GENERALIZED ABDOMINAL PAIN: Status: RESOLVED | Noted: 2019-03-15 | Resolved: 2024-10-15

## 2024-10-15 LAB
25(OH)D3 SERPL-MCNC: 8.8 NG/ML (ref 30–100)
ALBUMIN SERPL BCG-MCNC: 3.6 G/DL (ref 3.5–5)
ALP SERPL-CCNC: 60 U/L (ref 34–104)
ALT SERPL W P-5'-P-CCNC: 10 U/L (ref 7–52)
ANION GAP SERPL CALCULATED.3IONS-SCNC: 8 MMOL/L (ref 4–13)
AST SERPL W P-5'-P-CCNC: 12 U/L (ref 13–39)
BILIRUB SERPL-MCNC: 0.17 MG/DL (ref 0.2–1)
BUN SERPL-MCNC: 10 MG/DL (ref 5–25)
CALCIUM SERPL-MCNC: 8.2 MG/DL (ref 8.4–10.2)
CHLORIDE SERPL-SCNC: 106 MMOL/L (ref 96–108)
CO2 SERPL-SCNC: 25 MMOL/L (ref 21–32)
CREAT SERPL-MCNC: 0.75 MG/DL (ref 0.6–1.3)
ERYTHROCYTE [DISTWIDTH] IN BLOOD BY AUTOMATED COUNT: 14.8 % (ref 11.6–15.1)
EST. AVERAGE GLUCOSE BLD GHB EST-MCNC: 117 MG/DL
GFR SERPL CREATININE-BSD FRML MDRD: 107 ML/MIN/1.73SQ M
GLUCOSE P FAST SERPL-MCNC: 92 MG/DL (ref 65–99)
HBA1C MFR BLD: 5.7 %
HCT VFR BLD AUTO: 35.9 % (ref 34.8–46.1)
HGB BLD-MCNC: 11.9 G/DL (ref 11.5–15.4)
LEVETIRACETAM SERPL-MCNC: 5.2 UG/ML (ref 12–46)
MCH RBC QN AUTO: 29.4 PG (ref 26.8–34.3)
MCHC RBC AUTO-ENTMCNC: 33.1 G/DL (ref 31.4–37.4)
MCV RBC AUTO: 89 FL (ref 82–98)
PHENYTOIN SERPL-MCNC: <2.5 UG/ML (ref 10–20)
PLATELET # BLD AUTO: 263 THOUSANDS/UL (ref 149–390)
PMV BLD AUTO: 10.2 FL (ref 8.9–12.7)
POTASSIUM SERPL-SCNC: 3.9 MMOL/L (ref 3.5–5.3)
PROT SERPL-MCNC: 6.5 G/DL (ref 6.4–8.4)
RBC # BLD AUTO: 4.05 MILLION/UL (ref 3.81–5.12)
SODIUM SERPL-SCNC: 139 MMOL/L (ref 135–147)
VALPROATE SERPL-MCNC: 24 UG/ML (ref 50–100)
WBC # BLD AUTO: 9.28 THOUSAND/UL (ref 4.31–10.16)

## 2024-10-15 PROCEDURE — 83036 HEMOGLOBIN GLYCOSYLATED A1C: CPT

## 2024-10-15 PROCEDURE — 80165 DIPROPYLACETIC ACID FREE: CPT

## 2024-10-15 PROCEDURE — 83520 IMMUNOASSAY QUANT NOS NONAB: CPT

## 2024-10-15 PROCEDURE — 80186 ASSAY OF PHENYTOIN FREE: CPT | Performed by: NURSE PRACTITIONER

## 2024-10-15 PROCEDURE — NC001 PR NO CHARGE

## 2024-10-15 PROCEDURE — 82306 VITAMIN D 25 HYDROXY: CPT

## 2024-10-15 PROCEDURE — 36415 COLL VENOUS BLD VENIPUNCTURE: CPT

## 2024-10-15 PROCEDURE — 80185 ASSAY OF PHENYTOIN TOTAL: CPT

## 2024-10-15 PROCEDURE — 80164 ASSAY DIPROPYLACETIC ACD TOT: CPT

## 2024-10-15 PROCEDURE — 99214 OFFICE O/P EST MOD 30 MIN: CPT | Performed by: PHYSICIAN ASSISTANT

## 2024-10-15 PROCEDURE — 85027 COMPLETE CBC AUTOMATED: CPT

## 2024-10-15 PROCEDURE — 80053 COMPREHEN METABOLIC PANEL: CPT

## 2024-10-15 RX ORDER — ESCITALOPRAM OXALATE 20 MG/1
20 TABLET ORAL DAILY
Qty: 30 TABLET | Refills: 2 | Status: SHIPPED | OUTPATIENT
Start: 2024-10-15 | End: 2025-01-13

## 2024-10-15 NOTE — TELEPHONE ENCOUNTER
LMOM asking patient to please call office back to schedule 3 week follow up in [place] with Dr. Kurtz.

## 2024-10-15 NOTE — ASSESSMENT & PLAN NOTE
Patient establish care with psychiatry today.  Lexapro was increased to 20 mg daily  She will continue to follow with psychiatrist in 3 weeks.  She plans to start group therapy  Follow-up in 3 months or sooner if needed

## 2024-10-15 NOTE — PROGRESS NOTES
Ambulatory Visit  Name: Renu Aguiar      : 1995      MRN: [id number]  Encounter Provider: Lynda West PA-C  Encounter Date: 10/15/2024   Encounter department: [facility]    Assessment & Plan  Moderate episode of recurrent major depressive disorder (HCC)  Patient establish care with psychiatry today.  Lexapro was increased to 20 mg daily  She will continue to follow with psychiatrist in 3 weeks.  She plans to start group therapy  Follow-up in 3 months or sooner if needed         Generalized anxiety disorder  Patient establish care with psychiatry today.  Lexapro was increased to 20 mg daily  She will continue to follow with psychiatrist in 3 weeks.  She plans to start group therapy  Follow-up in 3 months or sooner if needed       Elevated fasting glucose  Labs ordered to evaluate.  Recommended low-carb diet.  Orders:    Hemoglobin A1C; Future    New onset seizure (HCC)  Patient has been seizure-free for over 6 months  Continue Keppra, Dilantin, and Depakote  Continue to follow with neurology          History of Present Illness     João is a very pleasant 28-year-old female who is here today for a follow-up for anxiety and depression.  She established today with psychiatry.  She was very happy with how her appointment went.  Her psychiatrist recently increased her dose of Lexapro to 20 mg.  She denies any suicidal or homicidal thoughts.  She was given some resources for therapy, which she is going to look into.  She plans to continue to follow with psychiatry.  She is currently in the process of deciding whether or not her and her  are going to separate.  She is trying to do what is best for her and her children.  She is doing very well with neurology.  She has not had any seizures in over 6 months.  She was able to regain her 's license.  She is scheduled to follow-up with them tomorrow.  She admits that her diet can be better.  She has not been watching carbs or sugars  in her diet.  She is due for repeat labs.          Review of Systems   Constitutional:  Negative for chills, diaphoresis, fatigue and fever.   HENT:  Negative for congestion, ear pain, postnasal drip, rhinorrhea, sneezing, sore throat and trouble swallowing.    Eyes:  Negative for pain and visual disturbance.   Respiratory:  Negative for apnea, cough, shortness of breath and wheezing.    Cardiovascular:  Negative for chest pain and palpitations.   Gastrointestinal:  Negative for abdominal pain, constipation, diarrhea, nausea and vomiting.   Genitourinary:  Negative for dysuria.   Musculoskeletal:  Negative for arthralgias, gait problem and myalgias.   Neurological:  Negative for dizziness, syncope, weakness, light-headedness, numbness and headaches.   Psychiatric/Behavioral:  Positive for dysphoric mood. Negative for suicidal ideas. The patient is nervous/anxious.            Objective     /74   Pulse 85   Temp 97.8 °F (36.6 °C)   Ht 6' (1.829 m)   Wt (!) 145 kg (320 lb)   SpO2 99%   BMI 43.40 kg/m²     Physical Exam  Vitals and nursing note reviewed.   Constitutional:       General: She is not in acute distress.     Appearance: She is well-developed. She is not diaphoretic.   HENT:      Head: Normocephalic and atraumatic.      Right Ear: Hearing and external ear normal.      Left Ear: Hearing and external ear normal.      Nose: Nose normal. No mucosal edema or rhinorrhea.      Mouth/Throat:      Pharynx: No oropharyngeal exudate or posterior oropharyngeal erythema.   Eyes:      Extraocular Movements: Extraocular movements intact.   Cardiovascular:      Rate and Rhythm: Normal rate and regular rhythm.      Heart sounds: Normal heart sounds. No murmur heard.     No friction rub. No gallop.   Pulmonary:      Effort: Pulmonary effort is normal. No respiratory distress.      Breath sounds: Normal breath sounds. No wheezing or rales.   Musculoskeletal:         General: Normal range of motion.      Cervical  back: Normal range of motion and neck supple.   Lymphadenopathy:      Cervical: No cervical adenopathy.   Skin:     General: Skin is warm and dry.   Neurological:      Mental Status: She is alert and oriented to person, place, and time.   Psychiatric:         Mood and Affect: Mood is anxious and depressed.         Behavior: Behavior normal.         Thought Content: Thought content normal. Thought content does not include homicidal or suicidal ideation. Thought content does not include homicidal or suicidal plan.         Judgment: Judgment normal.
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
## 2024-10-15 NOTE — ASSESSMENT & PLAN NOTE
Patient has been seizure-free for over 6 months  Continue Keppra, Dilantin, and Depakote  Continue to follow with neurology

## 2024-10-17 LAB — VALPROATE FREE SERPL-MCNC: NORMAL UG/ML (ref 6–22)

## 2024-10-18 LAB — MISCELLANEOUS LAB TEST RESULT: NORMAL

## 2025-02-03 ENCOUNTER — OFFICE VISIT (OUTPATIENT)
Dept: FAMILY MEDICINE CLINIC | Facility: CLINIC | Age: 30
End: 2025-02-03
Payer: COMMERCIAL

## 2025-02-03 VITALS
BODY MASS INDEX: 39.68 KG/M2 | HEART RATE: 109 BPM | WEIGHT: 293 LBS | SYSTOLIC BLOOD PRESSURE: 148 MMHG | DIASTOLIC BLOOD PRESSURE: 98 MMHG | TEMPERATURE: 97.8 F | HEIGHT: 72 IN | OXYGEN SATURATION: 98 %

## 2025-02-03 DIAGNOSIS — R56.9 NEW ONSET SEIZURE (HCC): ICD-10-CM

## 2025-02-03 DIAGNOSIS — H65.03 NON-RECURRENT ACUTE SEROUS OTITIS MEDIA OF BOTH EARS: Primary | ICD-10-CM

## 2025-02-03 DIAGNOSIS — F33.2 SEVERE EPISODE OF RECURRENT MAJOR DEPRESSIVE DISORDER, WITHOUT PSYCHOTIC FEATURES (HCC): ICD-10-CM

## 2025-02-03 PROCEDURE — 99213 OFFICE O/P EST LOW 20 MIN: CPT | Performed by: PHYSICIAN ASSISTANT

## 2025-02-03 NOTE — PROGRESS NOTES
Name: Renu Aguiar      : 1995      MRN: [id number]  Encounter Provider: Lynda West PA-C  Encounter Date: 2/3/2025   Encounter department: [facility]  :  Assessment & Plan  Non-recurrent acute serous otitis media of both ears  Prescription for Augmentin.  Recommended that she alternate between Tylenol and Motrin as needed for pain relief.  Follow-up if symptoms do not improve or worsen  Orders:  •  amoxicillin-clavulanate (AUGMENTIN) 875-125 mg per tablet; Take 1 tablet by mouth every 12 (twelve) hours for 10 days    Severe episode of recurrent major depressive disorder, without psychotic features (HCC)  Provided patient with phone number to schedule a follow-up with psychiatry         New onset seizure (HCC)  Patient has been seizure-free.  Continue to follow with neurology.              History of Present Illness   Renu is a very pleasant 29-year-old female who is here today complaining of bilateral ear pain since Friday.  She admits that her left is worse than her right.  She is having difficulty hearing out of her ear.  She denies any ear drainage, fevers, or chills.  She was sick with an upper respiratory infection a few weeks ago, which improved.  She is prone to ear infections.  She has tried over-the-counter analgesics, but they are not providing relief.      Review of Systems   Constitutional:  Negative for chills, diaphoresis, fatigue and fever.   HENT:  Positive for ear pain (Bilateral) and hearing loss. Negative for congestion, postnasal drip, rhinorrhea, sneezing, sore throat and trouble swallowing.    Eyes:  Negative for pain and visual disturbance.   Respiratory:  Negative for apnea, cough, shortness of breath and wheezing.    Cardiovascular:  Negative for chest pain and palpitations.   Gastrointestinal:  Negative for abdominal pain, constipation, diarrhea, nausea and vomiting.   Genitourinary:  Negative for dysuria.   Musculoskeletal:  Negative for arthralgias, gait  problem and myalgias.   Neurological:  Negative for dizziness, syncope, weakness, light-headedness, numbness and headaches.   Psychiatric/Behavioral:  Negative for suicidal ideas. The patient is not nervous/anxious.        Objective   /98   Pulse (!) 109   Temp 97.8 °F (36.6 °C)   Ht 6' (1.829 m)   Wt (!) 145 kg (319 lb 3.2 oz)   SpO2 98%   BMI 43.29 kg/m²      Physical Exam  Vitals and nursing note reviewed.   Constitutional:       General: She is not in acute distress.     Appearance: She is well-developed. She is not diaphoretic.   HENT:      Head: Normocephalic and atraumatic.      Right Ear: Hearing, ear canal and external ear normal. Tympanic membrane is erythematous.      Left Ear: Hearing, ear canal and external ear normal. A middle ear effusion is present. Tympanic membrane is erythematous.      Nose: Nose normal. No mucosal edema or rhinorrhea.      Mouth/Throat:      Pharynx: No oropharyngeal exudate or posterior oropharyngeal erythema.   Eyes:      Extraocular Movements: Extraocular movements intact.   Cardiovascular:      Rate and Rhythm: Normal rate and regular rhythm.      Heart sounds: Normal heart sounds. No murmur heard.     No friction rub. No gallop.   Pulmonary:      Effort: Pulmonary effort is normal. No respiratory distress.      Breath sounds: Normal breath sounds. No wheezing or rales.   Musculoskeletal:         General: Normal range of motion.      Cervical back: Normal range of motion and neck supple.   Skin:     General: Skin is warm and dry.      Findings: No rash.   Neurological:      Mental Status: She is alert and oriented to person, place, and time.   Psychiatric:         Behavior: Behavior normal.         Thought Content: Thought content normal.         Judgment: Judgment normal.

## 2025-02-06 DIAGNOSIS — F41.1 GENERALIZED ANXIETY DISORDER: ICD-10-CM

## 2025-02-06 DIAGNOSIS — H65.03 NON-RECURRENT ACUTE SEROUS OTITIS MEDIA OF BOTH EARS: Primary | ICD-10-CM

## 2025-02-06 DIAGNOSIS — F33.1 MODERATE EPISODE OF RECURRENT MAJOR DEPRESSIVE DISORDER (HCC): ICD-10-CM

## 2025-02-06 RX ORDER — ESCITALOPRAM OXALATE 10 MG/1
10 TABLET ORAL DAILY
Qty: 90 TABLET | Refills: 1 | Status: SHIPPED | OUTPATIENT
Start: 2025-02-06

## 2025-02-06 RX ORDER — PREDNISONE 10 MG/1
TABLET ORAL
Qty: 30 TABLET | Refills: 0 | Status: SHIPPED | OUTPATIENT
Start: 2025-02-06 | End: 2025-02-18

## 2025-02-06 RX ORDER — FLUTICASONE PROPIONATE 50 MCG
2 SPRAY, SUSPENSION (ML) NASAL 2 TIMES DAILY
Qty: 9.9 ML | Refills: 0 | Status: SHIPPED | OUTPATIENT
Start: 2025-02-06

## 2025-02-10 ENCOUNTER — OFFICE VISIT (OUTPATIENT)
Dept: FAMILY MEDICINE CLINIC | Facility: CLINIC | Age: 30
End: 2025-02-10
Payer: COMMERCIAL

## 2025-02-10 VITALS
OXYGEN SATURATION: 98 % | DIASTOLIC BLOOD PRESSURE: 84 MMHG | HEART RATE: 72 BPM | TEMPERATURE: 97.8 F | SYSTOLIC BLOOD PRESSURE: 138 MMHG | BODY MASS INDEX: 39.68 KG/M2 | WEIGHT: 293 LBS | HEIGHT: 72 IN

## 2025-02-10 DIAGNOSIS — H60.503 ACUTE OTITIS EXTERNA OF BOTH EARS, UNSPECIFIED TYPE: Primary | ICD-10-CM

## 2025-02-10 DIAGNOSIS — H65.06 RECURRENT ACUTE SEROUS OTITIS MEDIA OF BOTH EARS: ICD-10-CM

## 2025-02-10 PROCEDURE — 99213 OFFICE O/P EST LOW 20 MIN: CPT | Performed by: PHYSICIAN ASSISTANT

## 2025-02-10 RX ORDER — AZITHROMYCIN 250 MG/1
TABLET, FILM COATED ORAL
Qty: 6 TABLET | Refills: 0 | Status: SHIPPED | OUTPATIENT
Start: 2025-02-10 | End: 2025-02-14

## 2025-02-10 RX ORDER — OFLOXACIN 3 MG/ML
10 SOLUTION AURICULAR (OTIC) DAILY
Qty: 10 ML | Refills: 0 | Status: SHIPPED | OUTPATIENT
Start: 2025-02-10

## 2025-02-10 RX ORDER — CIPROFLOXACIN AND DEXAMETHASONE 3; 1 MG/ML; MG/ML
4 SUSPENSION/ DROPS AURICULAR (OTIC) 2 TIMES DAILY
Qty: 7.5 ML | Refills: 0 | Status: SHIPPED | OUTPATIENT
Start: 2025-02-10 | End: 2025-02-10

## 2025-02-10 NOTE — PROGRESS NOTES
Name: Renu Aguiar      : 1995      MRN: [id number]  Encounter Provider: Lynda West PA-C  Encounter Date: 2/10/2025   Encounter department: [facility]  :  Assessment & Plan  Acute otitis externa of both ears, unspecified type  Prescription for Ciprodex drops.  Orders:  •  ciprofloxacin-dexamethasone (CIPRODEX) otic suspension; Administer 4 drops into both ears 2 (two) times a day    Recurrent acute serous otitis media of both ears  Stop Augmentin.  Will start on azithromycin.  She will notify us if symptoms do not improve or worsen.  Orders:  •  azithromycin (ZITHROMAX) 250 mg tablet; Take 2 tablets today then 1 tablet daily x 4 days           History of Present Illness   Renu is a very pleasant 29-year-old female who is here today complaining of bilateral ear pain.  She was seen last Monday, and diagnosed with bilateral otitis media.  She was prescribed Augmentin, but has not noticed much of an improvement in her ear pain since starting the medication.  She is still having difficulty hearing out of her right ear.  She was also started on prednisone a few days later due to the pain and pressure.  She admits that the prednisone may have provided some mild relief, but she is still having pain and difficulty hearing.  She admits that the fevers have resolved.      Review of Systems   Constitutional:  Negative for chills, diaphoresis, fatigue and fever.   HENT:  Positive for ear pain and hearing loss. Negative for congestion, postnasal drip, rhinorrhea, sneezing, sore throat and trouble swallowing.    Eyes:  Negative for pain and visual disturbance.   Respiratory:  Negative for apnea, cough, shortness of breath and wheezing.    Cardiovascular:  Negative for chest pain and palpitations.   Gastrointestinal:  Negative for abdominal pain, constipation, diarrhea, nausea and vomiting.   Genitourinary:  Negative for dysuria.   Musculoskeletal:  Negative for arthralgias, gait problem and  myalgias.   Neurological:  Negative for dizziness, syncope, weakness, light-headedness, numbness and headaches.   Psychiatric/Behavioral:  Negative for suicidal ideas. The patient is not nervous/anxious.        Objective   /84   Pulse 72   Temp 97.8 °F (36.6 °C)   Ht 6' (1.829 m)   Wt (!) 150 kg (330 lb)   SpO2 98%   BMI 44.76 kg/m²      Physical Exam  Vitals and nursing note reviewed.   Constitutional:       General: She is not in acute distress.     Appearance: She is well-developed. She is not diaphoretic.   HENT:      Head: Normocephalic and atraumatic.      Right Ear: Hearing and external ear normal. Swelling present. A middle ear effusion is present. Tympanic membrane is erythematous.      Left Ear: Hearing and external ear normal. Swelling present. A middle ear effusion is present. Tympanic membrane is erythematous.      Nose: Nose normal. No mucosal edema or rhinorrhea.      Mouth/Throat:      Pharynx: No oropharyngeal exudate or posterior oropharyngeal erythema.   Eyes:      Extraocular Movements: Extraocular movements intact.   Cardiovascular:      Rate and Rhythm: Normal rate and regular rhythm.      Heart sounds: Normal heart sounds. No murmur heard.     No friction rub. No gallop.   Pulmonary:      Effort: Pulmonary effort is normal. No respiratory distress.      Breath sounds: Normal breath sounds.   Musculoskeletal:         General: Normal range of motion.      Cervical back: Normal range of motion and neck supple.   Skin:     General: Skin is warm and dry.      Findings: No rash.   Neurological:      Mental Status: She is alert and oriented to person, place, and time.   Psychiatric:         Behavior: Behavior normal.         Thought Content: Thought content normal.         Judgment: Judgment normal.

## 2025-02-19 DIAGNOSIS — H60.503 ACUTE OTITIS EXTERNA OF BOTH EARS, UNSPECIFIED TYPE: Primary | ICD-10-CM

## 2025-02-19 RX ORDER — AZITHROMYCIN 250 MG/1
TABLET, FILM COATED ORAL
Qty: 6 TABLET | Refills: 0 | Status: SHIPPED | OUTPATIENT
Start: 2025-02-19 | End: 2025-02-23

## 2025-05-09 ENCOUNTER — OFFICE VISIT (OUTPATIENT)
Dept: FAMILY MEDICINE CLINIC | Facility: CLINIC | Age: 30
End: 2025-05-09
Payer: COMMERCIAL

## 2025-05-09 VITALS
HEART RATE: 58 BPM | DIASTOLIC BLOOD PRESSURE: 96 MMHG | TEMPERATURE: 97.2 F | WEIGHT: 293 LBS | OXYGEN SATURATION: 99 % | HEIGHT: 72 IN | BODY MASS INDEX: 39.68 KG/M2 | SYSTOLIC BLOOD PRESSURE: 142 MMHG

## 2025-05-09 DIAGNOSIS — H65.03 NON-RECURRENT ACUTE SEROUS OTITIS MEDIA OF BOTH EARS: Primary | ICD-10-CM

## 2025-05-09 PROCEDURE — 99213 OFFICE O/P EST LOW 20 MIN: CPT | Performed by: PHYSICIAN ASSISTANT

## 2025-05-09 RX ORDER — METHYLPREDNISOLONE 4 MG/1
TABLET ORAL
Qty: 21 EACH | Refills: 0 | Status: SHIPPED | OUTPATIENT
Start: 2025-05-09

## 2025-05-09 RX ORDER — AZITHROMYCIN 250 MG/1
TABLET, FILM COATED ORAL
Qty: 6 TABLET | Refills: 0 | Status: SHIPPED | OUTPATIENT
Start: 2025-05-09 | End: 2025-05-13

## 2025-05-09 RX ORDER — FLUTICASONE PROPIONATE 50 MCG
2 SPRAY, SUSPENSION (ML) NASAL 2 TIMES DAILY
Qty: 9.9 ML | Refills: 1 | Status: SHIPPED | OUTPATIENT
Start: 2025-05-09

## 2025-05-09 NOTE — PROGRESS NOTES
Name: Renu Aguiar      : 1995      MRN: [id number]  Encounter Provider: Lynda West PA-C  Encounter Date: 2025   Encounter department: [facility]  :  Assessment & Plan  Non-recurrent acute serous otitis media of both ears  Prescription for Zithromax, Medrol Dosepak, and Flonase.  She may try an over-the-counter antihistamine/decongestant as well.  If no relief, she will let us know.  Orders:  •  azithromycin (ZITHROMAX) 250 mg tablet; Take 2 tablets today then 1 tablet daily x 4 days  •  methylPREDNISolone 4 MG tablet therapy pack; Use as directed on package  •  fluticasone (FLONASE) 50 mcg/act nasal spray; 2 sprays into each nostril 2 (two) times a day           History of Present Illness   Renu is a very pleasant 29-year-old female who is here today complaining of bilateral ear pain for the past 1 to 2 days.  She has been fighting off a cold for the past week.  Her son is in , and has been sick.  She denies any fevers, chills, chest pains, or shortness of breath.  She admits to some nasal congestion.  She has noticed that her ear hearing has become blocked in both ears, but worse in the left.  She suffered from severe ear infections over the winter, and would like to make sure that she is not getting another one.      Review of Systems   Constitutional:  Negative for chills, diaphoresis, fatigue and fever.   HENT:  Positive for congestion, ear pain and hearing loss. Negative for postnasal drip, rhinorrhea, sneezing, sore throat and trouble swallowing.    Eyes:  Negative for pain and visual disturbance.   Respiratory:  Negative for apnea, cough, shortness of breath and wheezing.    Cardiovascular:  Negative for chest pain and palpitations.   Gastrointestinal:  Negative for abdominal pain, constipation, diarrhea, nausea and vomiting.   Genitourinary:  Negative for dysuria and hematuria.   Musculoskeletal:  Negative for arthralgias, gait problem and myalgias.    Neurological:  Negative for dizziness, syncope, weakness, light-headedness, numbness and headaches.   Psychiatric/Behavioral:  Negative for suicidal ideas. The patient is not nervous/anxious.        Objective   /96   Pulse 58   Temp (!) 97.2 °F (36.2 °C)   Ht 6' (1.829 m)   Wt (!) 146 kg (322 lb 3.2 oz)   SpO2 99%   BMI 43.70 kg/m²      Physical Exam  Vitals and nursing note reviewed.   Constitutional:       General: She is not in acute distress.     Appearance: She is well-developed. She is not diaphoretic.   HENT:      Head: Normocephalic and atraumatic.      Right Ear: Hearing, ear canal and external ear normal. A middle ear effusion is present. Tympanic membrane is erythematous.      Left Ear: Ear canal and external ear normal. Decreased hearing noted. A middle ear effusion is present. Tympanic membrane is erythematous.      Nose: Congestion present. No mucosal edema or rhinorrhea.      Mouth/Throat:      Pharynx: No oropharyngeal exudate or posterior oropharyngeal erythema.   Eyes:      Extraocular Movements: Extraocular movements intact.   Cardiovascular:      Rate and Rhythm: Normal rate and regular rhythm.      Heart sounds: Normal heart sounds. No murmur heard.     No friction rub. No gallop.   Pulmonary:      Effort: Pulmonary effort is normal. No respiratory distress.      Breath sounds: Normal breath sounds. No wheezing or rales.   Musculoskeletal:         General: Normal range of motion.      Cervical back: Normal range of motion and neck supple.   Skin:     General: Skin is warm and dry.      Findings: No rash.   Neurological:      Mental Status: She is alert and oriented to person, place, and time.   Psychiatric:         Behavior: Behavior normal.         Thought Content: Thought content normal.         Judgment: Judgment normal.

## 2025-05-10 ENCOUNTER — TELEPHONE (OUTPATIENT)
Dept: NEUROLOGY | Facility: CLINIC | Age: 30
End: 2025-05-10

## 2025-05-10 DIAGNOSIS — R56.9 NEW ONSET SEIZURE (HCC): ICD-10-CM

## 2025-05-12 RX ORDER — PHENYTOIN SODIUM 100 MG/1
CAPSULE, EXTENDED RELEASE ORAL
Qty: 450 CAPSULE | Refills: 0 | Status: SHIPPED | OUTPATIENT
Start: 2025-05-12

## 2025-05-12 RX ORDER — DIVALPROEX SODIUM 250 MG/1
TABLET, DELAYED RELEASE ORAL
Qty: 180 TABLET | Refills: 0 | Status: SHIPPED | OUTPATIENT
Start: 2025-05-12

## 2025-05-12 NOTE — TELEPHONE ENCOUNTER
I received request for refill of medication. Patient has not been seen in over a year. Please call patient to schedule an appointment so that we can continue to provide refills.

## 2025-05-13 NOTE — TELEPHONE ENCOUNTER
Hello Good Day,     Called patient, no answer, Left a detailed message to call us back and schedule follow up for any further refills to be given.    Thank you,     Susanne RAMIREZ 5/7/24 [facility]   - Return in about 3 months (around 8/7/2024).

## 2025-05-14 DIAGNOSIS — H66.006 RECURRENT ACUTE SUPPURATIVE OTITIS MEDIA WITHOUT SPONTANEOUS RUPTURE OF TYMPANIC MEMBRANE OF BOTH SIDES: Primary | ICD-10-CM

## 2025-05-14 RX ORDER — CEFUROXIME AXETIL 500 MG/1
500 TABLET ORAL EVERY 12 HOURS SCHEDULED
Qty: 20 TABLET | Refills: 0 | Status: SHIPPED | OUTPATIENT
Start: 2025-05-14 | End: 2025-05-24

## 2025-05-15 DIAGNOSIS — F33.2 SEVERE EPISODE OF RECURRENT MAJOR DEPRESSIVE DISORDER, WITHOUT PSYCHOTIC FEATURES (HCC): ICD-10-CM

## 2025-05-19 RX ORDER — ESCITALOPRAM OXALATE 20 MG/1
20 TABLET ORAL DAILY
Qty: 30 TABLET | Refills: 2 | Status: SHIPPED | OUTPATIENT
Start: 2025-05-19

## 2025-08-02 DIAGNOSIS — F33.1 MODERATE EPISODE OF RECURRENT MAJOR DEPRESSIVE DISORDER (HCC): ICD-10-CM

## 2025-08-02 DIAGNOSIS — F41.1 GENERALIZED ANXIETY DISORDER: ICD-10-CM

## 2025-08-02 DIAGNOSIS — R56.9 NEW ONSET SEIZURE (HCC): ICD-10-CM

## 2025-08-04 ENCOUNTER — TELEPHONE (OUTPATIENT)
Dept: DERMATOLOGY | Facility: CLINIC | Age: 30
End: 2025-08-04

## 2025-08-04 RX ORDER — ESCITALOPRAM OXALATE 10 MG/1
10 TABLET ORAL DAILY
Qty: 90 TABLET | Refills: 1 | Status: SHIPPED | OUTPATIENT
Start: 2025-08-04

## 2025-08-07 RX ORDER — DIVALPROEX SODIUM 500 MG/1
TABLET, DELAYED RELEASE ORAL
Qty: 180 TABLET | Refills: 3 | Status: SHIPPED | OUTPATIENT
Start: 2025-08-07

## 2025-08-07 RX ORDER — LEVETIRACETAM 750 MG/1
1500 TABLET ORAL 2 TIMES DAILY
Qty: 360 TABLET | Refills: 3 | Status: SHIPPED | OUTPATIENT
Start: 2025-08-07

## 2025-08-16 DIAGNOSIS — F33.2 SEVERE EPISODE OF RECURRENT MAJOR DEPRESSIVE DISORDER, WITHOUT PSYCHOTIC FEATURES (HCC): ICD-10-CM

## 2025-08-17 DIAGNOSIS — R56.9 NEW ONSET SEIZURE (HCC): ICD-10-CM

## 2025-08-17 RX ORDER — ESCITALOPRAM OXALATE 20 MG/1
20 TABLET ORAL DAILY
Qty: 90 TABLET | Refills: 0 | Status: SHIPPED | OUTPATIENT
Start: 2025-08-17

## 2025-08-18 RX ORDER — PHENYTOIN SODIUM 100 MG/1
CAPSULE, EXTENDED RELEASE ORAL
Qty: 450 CAPSULE | Refills: 0 | Status: SHIPPED | OUTPATIENT
Start: 2025-08-18

## (undated) DEVICE — SUT ETHILON 2-0 FSLX 30 IN 1674H

## (undated) DEVICE — PAD GROUNDING ADULT

## (undated) DEVICE — 3000CC GUARDIAN II: Brand: GUARDIAN

## (undated) DEVICE — MEDI-VAC YANKAUER SUCTION HANDLE W/BULBOUS AND CONTROL VENT: Brand: CARDINAL HEALTH

## (undated) DEVICE — SPONGE LAP 18 X 18 IN

## (undated) DEVICE — SUT CHROMIC 2-0 CT-2 27 IN 883H

## (undated) DEVICE — BETHLEHEM UNIVERSAL MINOR GEN: Brand: CARDINAL HEALTH

## (undated) DEVICE — NEEDLE 25G X 1 1/2

## (undated) DEVICE — GAUZE SPONGES,16 PLY: Brand: CURITY

## (undated) DEVICE — 3M™ DURAPORE™ SURGICAL TAPE 1538-3, 3 INCH X 10 YARD (7,5CM X 9,1M), 4 ROLLS/BOX: Brand: 3M™ DURAPORE™

## (undated) DEVICE — TIBURON PEDIATRIC DRAPE: Brand: CONVERTORS

## (undated) DEVICE — TUBING SUCTION 5MM X 12 FT

## (undated) DEVICE — SPONGE LAP 18 X 4 IN

## (undated) DEVICE — CHEST ROLL FOAM POSITIONER: Brand: CARDINAL HEALTH

## (undated) DEVICE — CURITY PLAIN PACKING STRIP: Brand: CURITY

## (undated) DEVICE — ASTOUND STANDARD SURGICAL GOWN, XL: Brand: CONVERTORS

## (undated) DEVICE — SPONGE STICK WITH PVP-I: Brand: KENDALL

## (undated) DEVICE — SYRINGE 10ML LL

## (undated) DEVICE — ABDOMINAL PAD: Brand: DERMACEA

## (undated) DEVICE — GLOVE SRG BIOGEL 7

## (undated) DEVICE — PLUMEPEN PRO 10FT

## (undated) DEVICE — SUT VICRYL 3-0 CT-1 36 IN J944H

## (undated) DEVICE — SUT CHROMIC 2-0 SH 27 IN G123H

## (undated) DEVICE — DISPOSABLE BRIEF/UNDERWEAR

## (undated) DEVICE — ELECTRODE NEEDLE E-Z CLEAN 2.75IN 7CM -0013